# Patient Record
Sex: FEMALE | Race: WHITE | NOT HISPANIC OR LATINO | Employment: FULL TIME | URBAN - METROPOLITAN AREA
[De-identification: names, ages, dates, MRNs, and addresses within clinical notes are randomized per-mention and may not be internally consistent; named-entity substitution may affect disease eponyms.]

---

## 2017-02-20 ENCOUNTER — GENERIC CONVERSION - ENCOUNTER (OUTPATIENT)
Dept: OTHER | Facility: OTHER | Age: 57
End: 2017-02-20

## 2017-08-22 ENCOUNTER — ALLSCRIPTS OFFICE VISIT (OUTPATIENT)
Dept: OTHER | Facility: OTHER | Age: 57
End: 2017-08-22

## 2018-01-14 VITALS
WEIGHT: 183 LBS | SYSTOLIC BLOOD PRESSURE: 122 MMHG | HEART RATE: 72 BPM | HEIGHT: 65 IN | BODY MASS INDEX: 30.49 KG/M2 | DIASTOLIC BLOOD PRESSURE: 78 MMHG

## 2018-01-15 NOTE — RESULT NOTES
Verified Results  (Q) CBC (INCLUDES DIFF/PLT) (REFL) [id number] 08:01GIOVANNA Marcano     Test Name Result Flag Reference   WHITE BLOOD CELL COUNT 7 8 Thousand/uL  3 8-10 8   RED BLOOD CELL COUNT 5 03 Million/uL  3 80-5 10   HEMOGLOBIN 14 8 g/dL  11 7-15 5   HEMATOCRIT 45 0 %  35 0-45 0   MCV 89 3 fL  80 0-100 0   MCH 29 4 pg  27 0-33 0   MCHC 32 8 g/dL  32 0-36 0   RDW 13 0 %  11 0-15 0   PLATELET COUNT 166 Thousand/uL  140-400   MPV 8 7 fL  7 5-11 5   ABSOLUTE NEUTROPHILS 5296 cells/uL  5885-4932   ABSOLUTE LYMPHOCYTES 1700 cells/uL  850-3900   ABSOLUTE MONOCYTES 515 cells/uL  200-950   ABSOLUTE EOSINOPHILS 234 cells/uL     ABSOLUTE BASOPHILS 55 cells/uL  0-200   NEUTROPHILS 67 9 %     LYMPHOCYTES 21 8 %     MONOCYTES 6 6 %     EOSINOPHILS 3 0 %     BASOPHILS 0 7 %       (Q) COMPREHENSIVE METABOLIC PANEL W/O eGFR [id number] 08:01GIOVANNA Canchola [employer]     Test Name Result Flag Reference   GLUCOSE 84 mg/dL  65-99   Fasting reference interval   UREA NITROGEN (BUN) 19 mg/dL  7-25   CREATININE 0 83 mg/dL  0 50-1 05   For patients >52years of age, the reference limit  for Creatinine is approximately 13% higher for people  identified as -American     BUN/CREATININE RATIO   5-75   NOT APPLICABLE (calc)   SODIUM 138 mmol/L  135-146   POTASSIUM 4 0 mmol/L  3 5-5 3   CHLORIDE 102 mmol/L     CARBON DIOXIDE 24 mmol/L  20-31   CALCIUM 9 7 mg/dL  8 6-10 4   PROTEIN, TOTAL 7 1 g/dL  6 1-8 1   ALBUMIN 4 3 g/dL  3 6-5 1   GLOBULIN 2 8 g/dL (calc)  1 9-3 7   ALBUMIN/GLOBULIN RATIO 1 5 (calc)  1 0-2 5   BILIRUBIN, TOTAL 0 4 mg/dL  0 2-1 2   ALKALINE PHOSPHATASE 98 U/L     AST 20 U/L  10-35   ALT 25 U/L  6-29     (Q) URINALYSIS MICROSCOPIC [id number] 08:01GIOVANNA Canchola [employer]     Test Name Result Flag Reference   WBC 6-10 /HPF A < OR = 5   RBC NONE SEEN /HPF  < OR = 2   SQUAMOUS EPITHELIAL CELLS 0-5 /HPF  < OR = 5   BACTERIA NONE SEEN /HPF  NONE SEEN   HYALINE CAST NONE SEEN /LPF  NONE SEEN     (Q) TSH, 3RD GENERATION W/REFLEX TO FT4 [id number] 08:01AM Sushant Marcano   REPORT COMMENT:  FASTING:YES     Test Name Result Flag Reference   TSH W/REFLEX TO FT4 1 86 mIU/L     Reference Range                         > or = 20 Years  0 40-4 50                              Pregnancy Ranges            First trimester    0 26-2 66            Second trimester   0 55-2 73            Third trimester    0 43-2 91     (Q) LIPID PANEL WITH DIRECT LDL [id number] 08:01AM Sushant Marcano     Test Name Result Flag Reference   CHOLESTEROL, TOTAL 220 mg/dL H 125-200   HDL CHOLESTEROL 54 mg/dL  > OR = 46   TRIGLICERIDES 948 mg/dL H <150   DIRECT  mg/dL H <130   Desirable range <100 mg/dL for patients with CHD or  diabetes and <70 mg/dL for diabetic patients with  known heart disease  CHOL/HDLC RATIO 4 1 (calc)  < OR = 5 0   NON HDL CHOLESTEROL 166 mg/dL (calc) H    Target for non-HDL cholesterol is 30 mg/dL higher than   LDL cholesterol target

## 2018-01-17 NOTE — RESULT NOTES
Verified Results  * XR KNEE 3 VW RIGHT NON INJURY [id number] 09:42AM Jesse Swain Order Number: [id number]   Performing Comments: ongoing pain right knee dot subpatellar    no injury     Test Name Result Flag Reference   XR KNEE 3 VW RIGHT (Report)     RIGHT KNEE     INDICATION: Right knee pain  COMPARISON: None     VIEWS: 3; 3 images     FINDINGS:     There is no acute fracture or dislocation  There is no joint effusion  Mild degenerative changes noted medial compartment  No lytic or blastic lesions are seen  Soft tissues are unremarkable  IMPRESSION:     No acute osseous abnormality         Workstation performed: [id number]     Signed by:   Dimas Leon MD   11/16/16

## 2018-04-17 RX ORDER — ALPRAZOLAM 0.25 MG/1
TABLET ORAL
COMMUNITY
Start: 2014-01-31 | End: 2018-04-19 | Stop reason: SDUPTHER

## 2018-04-18 PROBLEM — E78.00 MILD HYPERCHOLESTEROLEMIA: Status: ACTIVE | Noted: 2018-04-18

## 2018-04-18 PROBLEM — M54.2 CERVICAL SPINE PAIN: Status: ACTIVE | Noted: 2017-08-22

## 2018-04-19 ENCOUNTER — OFFICE VISIT (OUTPATIENT)
Dept: FAMILY MEDICINE CLINIC | Facility: CLINIC | Age: 58
End: 2018-04-19
Payer: COMMERCIAL

## 2018-04-19 VITALS
WEIGHT: 177 LBS | OXYGEN SATURATION: 97 % | SYSTOLIC BLOOD PRESSURE: 138 MMHG | DIASTOLIC BLOOD PRESSURE: 76 MMHG | BODY MASS INDEX: 29.49 KG/M2 | HEIGHT: 65 IN | HEART RATE: 80 BPM

## 2018-04-19 DIAGNOSIS — F41.9 ANXIETY: ICD-10-CM

## 2018-04-19 DIAGNOSIS — M72.2 PLANTAR FASCIITIS: ICD-10-CM

## 2018-04-19 DIAGNOSIS — Z00.00 WELL ADULT HEALTH CHECK: Primary | ICD-10-CM

## 2018-04-19 DIAGNOSIS — M77.11 EPICONDYLITIS, LATERAL, RIGHT: ICD-10-CM

## 2018-04-19 DIAGNOSIS — Q61.4 CONGENITAL RENAL DYSPLASIA: ICD-10-CM

## 2018-04-19 PROBLEM — M54.2 CERVICAL SPINE PAIN: Status: RESOLVED | Noted: 2017-08-22 | Resolved: 2018-04-19

## 2018-04-19 PROCEDURE — 99396 PREV VISIT EST AGE 40-64: CPT | Performed by: FAMILY MEDICINE

## 2018-04-19 RX ORDER — ALPRAZOLAM 0.25 MG/1
0.25 TABLET ORAL DAILY PRN
Qty: 30 TABLET | Refills: 0 | Status: SHIPPED | OUTPATIENT
Start: 2018-04-19 | End: 2018-04-19 | Stop reason: SDUPTHER

## 2018-04-19 RX ORDER — ALPRAZOLAM 0.25 MG/1
0.25 TABLET ORAL DAILY PRN
Qty: 30 TABLET | Refills: 0
Start: 2018-04-19 | End: 2019-09-24 | Stop reason: SDUPTHER

## 2018-04-19 NOTE — PATIENT INSTRUCTIONS
She is in today for a routine physical/ check up    She is doing well overall, she does have right heel plantar fasciitis along with right lateral epicondylitis, she will use ice as needed, do stretches  She will use heel cups, sneakers and avoid going barefoot regarding heel pain, if this is not improving in 3-4 weeks she will call us and we will set her up with Podiatry  She also could consider forearm band  I did give her a slip to redo CMP / urinalysis  Lipid panel in 2016 with cholesterol 220, HDL 54,   We will redo that in 1-2 years        Immunization History   Administered Date(s) Administered    Tdap 04/16/2015    Zoster 01/01/2016       She does not do yearly Flu shot  Tdap/tetanus shot is up to date  (done every 10 yrs for superficial cuts, every 5 yrs for deep wounds)   Can also look into coverage for new shingles shot, Shingrix (indicated if over 48years of age ) Can do that at pharmacy  past 3-4 cig a day smoker-  quit over 10 yrs ago     Regarding Colon Cancer screening, we discussed Colonoscopy vs ColoGuard is indicated starting at age 48  Screening is up to date 3/16 w Dr Wesley Tidwell -  Redo 5 yrs    She does see her Gynecologist routinely  up-to-date   2/17  Discussed screening Mammogram, this is up-to-date    2/17-   Will redo later this yr     Hepatis C Screening indicated for 'baby boomers' -  after discussion she will not do Hepatitis C screen  low risk    We did review previous blood work,   She is up to date with Lipid screening  She is up to date with Diabetes screening  Discussed importance of routine exercise, healthy diet       Also - should see Dentist routinely, and also should see Eye Dr if any vision changes      We will see her again in 12 months, sooner as needed

## 2018-04-19 NOTE — PROGRESS NOTES
FAMILY PRACTICE OFFICE VISIT  [facility] [address]  [address], [address]      NAME: Dalila Juan  AGE: 56 y o  SEX: female  : 1960   MRN: [id number]    DATE: 2018  TIME: 5:04 PM    Assessment and Plan     Problem List Items Addressed This Visit        Genitourinary    Congenital renal dysplasia    Relevant Orders    Comprehensive metabolic panel    Urinalysis with microscopic       Other    Anxiety    Relevant Medications    ALPRAZolam (XANAX) 0 25 mg tablet      Other Visit Diagnoses     Well adult health check    -  Primary    Plantar fasciitis        Epicondylitis, lateral, right              Patient Instructions     She is in today for a routine physical/ check up    She is doing well overall, she does have right heel plantar fasciitis along with right lateral epicondylitis, she will use ice as needed, do stretches  She will use heel cups, sneakers and avoid going barefoot regarding heel pain, if this is not improving in 3-4 weeks she will call us and we will set her up with Podiatry  She also could consider forearm band  I did give her a slip to redo CMP / urinalysis  Lipid panel in 2016 with cholesterol 220, HDL 54,   We will redo that in 1-2 years        Immunization History   Administered Date(s) Administered    Tdap 2015    Zoster 2016       She does not do yearly Flu shot  Tdap/tetanus shot is up to date  (done every 10 yrs for superficial cuts, every 5 yrs for deep wounds)   Can also look into coverage for new shingles shot, Shingrix (indicated if over 48years of age ) Can do that at pharmacy  past 3-4 cig a day smoker-  quit over 10 yrs ago     Regarding Colon Cancer screening, we discussed Colonoscopy vs ColoGuard is indicated starting at age 48  Screening is up to date 3/16 w Dr Roberth Bustamante -  Redo 5 yrs    She does see her Gynecologist routinely    up-to-date 2/17  Discussed screening Mammogram, this is up-to-date    2/17-   Will redo later this yr     Hepatis C Screening indicated for 'baby boomers' -  after discussion she will not do Hepatitis C screen  low risk    We did review previous blood work,   She is up to date with Lipid screening  She is up to date with Diabetes screening  Discussed importance of routine exercise, healthy diet       Also - should see Dentist routinely, and also should see Eye Dr if any vision changes  We will see her again in 12 months, sooner as needed            Chief Complaint     Chief Complaint   Patient presents with    Physical Exam       History of Present Illness   Yeimi Maldonado is a 56y o -year-old female who   Is in today for a routine checkup, she is feeling well other than right heel pain, right elbow pain  She had undergone breast reduction in did very well, back pain much improved  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, much less stress, uses alprazolam once or twice a month or so  Review of Systems   Review of Systems   Constitutional: Negative for appetite change, fatigue, fever and unexpected weight change  HENT: Negative for sore throat and trouble swallowing  Respiratory: Negative for cough, chest tightness and shortness of breath  Cardiovascular: Negative for chest pain, palpitations and leg swelling  Gastrointestinal: Negative for abdominal pain and blood in stool  Genitourinary: Negative for dysuria and hematuria  Musculoskeletal: Negative for back pain  Neurological: Negative for dizziness, light-headedness and headaches  Hematological: Does not bruise/bleed easily  Psychiatric/Behavioral: Negative for behavioral problems and confusion         Active Problem List     Patient Active Problem List   Diagnosis    Anxiety    Chondromalacia of right patella    Congenital renal dysplasia    Mild hypercholesterolemia       Past Medical History:  Past Medical History:   Diagnosis Date    Renal dysplasia     nonfunctioning left life-long       Past Surgical History:  Past Surgical History:   Procedure Laterality Date    COLONOSCOPY  03/2016    KIDNEY SURGERY Right     age 29   Nick Perry REDUCTION MAMMAPLASTY      WRIST SURGERY Right     fx       Family History:  No family history on file  Social History:  Social History     Social History    Marital status: /Civil Union     Spouse name: N/A    Number of children: N/A    Years of education: N/A     Occupational History    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      Social History Main Topics    Smoking status: Former Smoker    Smokeless tobacco: Never Used    Alcohol use Yes      Comment: social    Drug use: No    Sexual activity: Not on file     Other Topics Concern    Not on file     Social History Narrative    No narrative on file       Objective     Vitals:    04/19/18 1608   BP: 138/76   Pulse: 80   SpO2: 97%   Weight: 80 3 kg (177 lb)   Height: 5' 5" (1 651 m)     Body mass index is 29 45 kg/m²  BP Readings from Last 3 Encounters:   04/19/18 138/76   08/22/17 122/78   12/13/16 138/83       Wt Readings from Last 3 Encounters:   04/19/18 80 3 kg (177 lb)   08/22/17 83 kg (183 lb)   12/13/16 83 6 kg (184 lb 6 1 oz)       Physical Exam   Constitutional: She is oriented to person, place, and time  She appears well-developed and well-nourished  No distress  HENT:   Mouth/Throat: Oropharynx is clear and moist  No oropharyngeal exudate  TM clear   Eyes: Conjunctivae are normal  No scleral icterus  Cardiovascular: Normal rate, regular rhythm and normal heart sounds  No murmur heard  No carotid bruit   Pulmonary/Chest: Effort normal and breath sounds normal  No respiratory distress  Abdominal: Soft  There is no tenderness  Musculoskeletal: She exhibits no edema  Mild tenderness right heel  Mild tenderness right lateral epicondylar   Lymphadenopathy:     She has no cervical adenopathy     Neurological: She is alert and oriented to person, place, and time  Psychiatric: She has a normal mood and affect  Her behavior is normal        ALLERGIES:  Allergies   Allergen Reactions    Sulfa Antibiotics Rash       Current Medications     Current Outpatient Prescriptions   Medication Sig Dispense Refill    ALPRAZolam (XANAX) 0 25 mg tablet Take 1 tablet (0 25 mg total) by mouth daily as needed for anxiety 30 tablet 0     No current facility-administered medications for this visit                Most recent labs available from [address]   ( others may be available in [facility] / Media sections)  Lab Results   Component Value Date    WBC 7 8 11/18/2016    WBC 6-10 (A) 11/18/2016    HGB 14 8 11/18/2016    HCT 45 0 11/18/2016     11/18/2016    CHOL 220 (H) 11/18/2016    TRIG 203 (H) 11/18/2016    HDL 54 11/18/2016    LDLDIRECT 140 (H) 11/18/2016    ALT 25 11/18/2016    AST 20 11/18/2016     11/18/2016    K 4 0 11/18/2016     11/18/2016    CREATININE 0 83 11/18/2016    BUN 19 11/18/2016    CO2 24 11/18/2016     No results found for: LDLCALC  No results found for: TSH      Orders Placed This Encounter   Procedures    Comprehensive metabolic panel    Urinalysis with microscopic         Pearl Merino DO

## 2018-05-11 DIAGNOSIS — R82.90 ABNORMAL URINALYSIS: Primary | ICD-10-CM

## 2018-05-11 LAB
ALBUMIN SERPL-MCNC: 4.5 G/DL (ref 3.6–5.1)
ALBUMIN/GLOB SERPL: 1.8 (CALC) (ref 1–2.5)
ALP SERPL-CCNC: 75 U/L (ref 33–130)
ALT SERPL-CCNC: 21 U/L (ref 6–29)
APPEARANCE UR: CLEAR
AST SERPL-CCNC: 19 U/L (ref 10–35)
BACTERIA UR QL AUTO: ABNORMAL /HPF
BILIRUB SERPL-MCNC: 0.7 MG/DL (ref 0.2–1.2)
BILIRUB UR QL STRIP: NEGATIVE
BUN SERPL-MCNC: 18 MG/DL (ref 7–25)
BUN/CREAT SERPL: NORMAL (CALC) (ref 6–22)
CALCIUM SERPL-MCNC: 9.6 MG/DL (ref 8.6–10.4)
CHLORIDE SERPL-SCNC: 104 MMOL/L (ref 98–110)
CO2 SERPL-SCNC: 24 MMOL/L (ref 20–31)
COLOR UR: YELLOW
CREAT SERPL-MCNC: 0.73 MG/DL (ref 0.5–1.05)
GLOBULIN SER CALC-MCNC: 2.5 G/DL (CALC) (ref 1.9–3.7)
GLUCOSE SERPL-MCNC: 90 MG/DL (ref 65–139)
GLUCOSE UR QL STRIP: NEGATIVE
HGB UR QL STRIP: NEGATIVE
HYALINE CASTS #/AREA URNS LPF: ABNORMAL /LPF
KETONES UR QL STRIP: NEGATIVE
LEUKOCYTE ESTERASE UR QL STRIP: ABNORMAL
NITRITE UR QL STRIP: POSITIVE
PH UR STRIP: 6.5 [PH] (ref 5–8)
POTASSIUM SERPL-SCNC: 4.2 MMOL/L (ref 3.5–5.3)
PROT SERPL-MCNC: 7 G/DL (ref 6.1–8.1)
PROT UR QL STRIP: NEGATIVE
RBC #/AREA URNS HPF: ABNORMAL /HPF
SL AMB EGFR AFRICAN AMERICAN: 107 ML/MIN/1.73M2
SL AMB EGFR NON AFRICAN AMERICAN: 93 ML/MIN/1.73M2
SODIUM SERPL-SCNC: 137 MMOL/L (ref 135–146)
SP GR UR STRIP: 1.01 (ref 1–1.03)
SQUAMOUS #/AREA URNS HPF: ABNORMAL /HPF
WBC #/AREA URNS HPF: ABNORMAL /HPF

## 2018-05-21 DIAGNOSIS — B96.20 E. COLI UTI: Primary | ICD-10-CM

## 2018-05-21 DIAGNOSIS — N39.0 E. COLI UTI: Primary | ICD-10-CM

## 2018-05-21 DIAGNOSIS — N39.0 URINARY TRACT INFECTION WITHOUT HEMATURIA, SITE UNSPECIFIED: Primary | ICD-10-CM

## 2018-05-21 RX ORDER — CIPROFLOXACIN 500 MG/1
500 TABLET, FILM COATED ORAL EVERY 12 HOURS SCHEDULED
Qty: 6 TABLET | Refills: 0 | Status: SHIPPED | OUTPATIENT
Start: 2018-05-21 | End: 2018-05-24

## 2018-06-14 LAB
APPEARANCE UR: CLEAR
BACTERIA UR CULT: NORMAL
BACTERIA UR QL AUTO: NORMAL /HPF
BILIRUB UR QL STRIP: NEGATIVE
COLOR UR: YELLOW
GLUCOSE UR QL STRIP: NEGATIVE
HGB UR QL STRIP: NEGATIVE
HYALINE CASTS #/AREA URNS LPF: NORMAL /LPF
KETONES UR QL STRIP: NEGATIVE
LEUKOCYTE ESTERASE UR QL STRIP: NEGATIVE
NITRITE UR QL STRIP: NEGATIVE
PH UR STRIP: 6 [PH] (ref 5–8)
PROT UR QL STRIP: NEGATIVE
RBC #/AREA URNS HPF: NORMAL /HPF
SP GR UR STRIP: 1.01 (ref 1–1.03)
SQUAMOUS #/AREA URNS HPF: NORMAL /HPF
WBC #/AREA URNS HPF: NORMAL /HPF

## 2019-09-24 ENCOUNTER — OFFICE VISIT (OUTPATIENT)
Dept: FAMILY MEDICINE CLINIC | Facility: CLINIC | Age: 59
End: 2019-09-24
Payer: COMMERCIAL

## 2019-09-24 VITALS
HEART RATE: 75 BPM | BODY MASS INDEX: 29.72 KG/M2 | SYSTOLIC BLOOD PRESSURE: 120 MMHG | HEIGHT: 65 IN | WEIGHT: 178.4 LBS | DIASTOLIC BLOOD PRESSURE: 70 MMHG | OXYGEN SATURATION: 98 %

## 2019-09-24 DIAGNOSIS — M22.41 CHONDROMALACIA PATELLAE OF RIGHT KNEE: ICD-10-CM

## 2019-09-24 DIAGNOSIS — M25.562 PAIN IN BOTH KNEES, UNSPECIFIED CHRONICITY: Primary | ICD-10-CM

## 2019-09-24 DIAGNOSIS — M17.11 PRIMARY OSTEOARTHRITIS OF RIGHT KNEE: ICD-10-CM

## 2019-09-24 DIAGNOSIS — F41.9 ANXIETY: ICD-10-CM

## 2019-09-24 DIAGNOSIS — M25.561 PAIN IN BOTH KNEES, UNSPECIFIED CHRONICITY: Primary | ICD-10-CM

## 2019-09-24 PROCEDURE — 3008F BODY MASS INDEX DOCD: CPT | Performed by: FAMILY MEDICINE

## 2019-09-24 PROCEDURE — 99212 OFFICE O/P EST SF 10 MIN: CPT | Performed by: FAMILY MEDICINE

## 2019-09-24 PROCEDURE — 20610 DRAIN/INJ JOINT/BURSA W/O US: CPT | Performed by: FAMILY MEDICINE

## 2019-09-24 RX ORDER — LIDOCAINE HYDROCHLORIDE AND EPINEPHRINE BITARTRATE 20; .01 MG/ML; MG/ML
2 INJECTION, SOLUTION SUBCUTANEOUS ONCE
Status: COMPLETED | OUTPATIENT
Start: 2019-09-24 | End: 2019-09-24

## 2019-09-24 RX ORDER — METHYLPREDNISOLONE ACETATE 40 MG/ML
20 INJECTION, SUSPENSION INTRA-ARTICULAR; INTRALESIONAL; INTRAMUSCULAR; SOFT TISSUE ONCE
Status: COMPLETED | OUTPATIENT
Start: 2019-09-24 | End: 2019-09-24

## 2019-09-24 RX ORDER — ALPRAZOLAM 0.25 MG/1
0.25 TABLET ORAL DAILY PRN
Qty: 30 TABLET | Refills: 0 | Status: SHIPPED | OUTPATIENT
Start: 2019-09-24 | End: 2021-08-16 | Stop reason: SDUPTHER

## 2019-09-24 RX ADMIN — METHYLPREDNISOLONE ACETATE 20 MG: 40 INJECTION, SUSPENSION INTRA-ARTICULAR; INTRALESIONAL; INTRAMUSCULAR; SOFT TISSUE at 17:01

## 2019-09-24 RX ADMIN — LIDOCAINE HYDROCHLORIDE AND EPINEPHRINE BITARTRATE 2 ML: 20; .01 INJECTION, SOLUTION SUBCUTANEOUS at 17:00

## 2019-09-24 NOTE — PATIENT INSTRUCTIONS
See procedure note, tolerated injection right knee without issue, notes significant benefit immediately after, can use ice as needed, Advil as needed, slowly increase walking as tolerated  We did discuss this is a temporary benefit, eventually she will need to see Orthopedics  As before she uses Xanax very sparingly long-term for anxiety, we have discussed use many times in the past, is a controlled substance, contract sign  She does see her gynecologist routinely, saw them in March, had mammogram back in February  Colonoscopy was last done in March 2016, redo 2021

## 2019-09-24 NOTE — PROGRESS NOTES
FAMILY PRACTICE OFFICE VISIT  [facility]  [address]   [address] [place], [address]      NAME: Kelsie Christian  AGE: 66 y o  SEX: female  : 1960   MRN: [id number]    DATE: 2019  TIME: 5:05 PM    Assessment and Plan       Large joint arthrocentesis: R knee  Date/Time: 2019 11:59 AM  Consent given by: patient  Site marked: site marked  Timeout: Immediately prior to procedure a time out was called to verify the correct patient, procedure, equipment, support staff and site/side marked as required   Supporting Documentation  Indications: pain and joint swelling   Procedure Details  Location: knee - R knee  Preparation: Patient was prepped and draped in the usual sterile fashion  Needle size: 22 G  Ultrasound guidance: no  Approach: medial    Aspirate amount: 0 mL    Patient tolerance: patient tolerated the procedure well with no immediate complications  Dressing:  Sterile dressing applied          Patient Instructions   See procedure note, tolerated injection right knee without issue, notes significant benefit immediately after, can use ice as needed, Advil as needed, slowly increase walking as tolerated  We did discuss this is a temporary benefit, eventually she will need to see Orthopedics  As before she uses Xanax very sparingly long-term for anxiety, we have discussed use many times in the past, is a controlled substance, contract sign  She does see her gynecologist routinely, saw them in March, had mammogram back in February  Colonoscopy was last done in 2016, redo   Chief Complaint     Chief Complaint   Patient presents with    Knee Pain       History of Present Illness   Kelsie Christian is a 66y o -year-old female who is in today to check bilateral knee pain especially on right, we had seen her 3 years ago for knee pain, x-ray at that time with mild degenerative medial on right     Stiffens w rest   Recently had started walking routinely for exercise/weight loss, seem to aggravate her knees  No other new trauma  She had used Advil/Tylenol with slight benefit  Review of Systems   Review of Systems   Constitutional: Negative for appetite change, fatigue, fever and unexpected weight change  HENT: Negative for sore throat and trouble swallowing  Respiratory: Negative for cough, chest tightness and shortness of breath  Cardiovascular: Negative for chest pain, palpitations and leg swelling  Gastrointestinal: Negative for abdominal pain and blood in stool  No acid reflux     No change in bowel   Genitourinary: Negative for dysuria and hematuria  Neurological: Negative for dizziness, light-headedness and headaches  Hematological: Does not bruise/bleed easily  Active Problem List     Patient Active Problem List   Diagnosis    Anxiety    Chondromalacia patellae of right knee    Congenital renal dysplasia    Mild hypercholesterolemia    Primary osteoarthritis of right knee       Past Medical History:  Reviewed    Past Surgical History:  Reviewed    Family History:  Reviewed    Social History:  Reviewed    Objective     Vitals:    09/24/19 1058   BP: 120/70   BP Location: Left arm   Patient Position: Sitting   Cuff Size: Large   Pulse: 75   SpO2: 98%   Weight: 80 9 kg (178 lb 6 4 oz)   Height: 5' 5" (1 651 m)     Body mass index is 29 69 kg/m²  BP Readings from Last 3 Encounters:   09/24/19 120/70   04/19/18 138/76   08/22/17 122/78       Wt Readings from Last 3 Encounters:   09/24/19 80 9 kg (178 lb 6 4 oz)   04/19/18 80 3 kg (177 lb)   08/22/17 83 kg (183 lb)       Physical Exam   Constitutional:   Pleasant overweight female, ambulates favoring right knee   Musculoskeletal:   Crepitus subpatellar on right, degenerative changes right knee, no redness or warmth, good range of motion, no instability  Tender medial knee with mild compression  No ankle edema/calf tenderness  ALLERGIES:  Allergies   Allergen Reactions    Sulfa Antibiotics Rash       Current Medications     Current Outpatient Medications   Medication Sig Dispense Refill    ALPRAZolam (XANAX) 0 25 mg tablet Take 1 tablet (0 25 mg total) by mouth daily as needed for anxiety 30 tablet 0    FIBER PO Take by mouth daily      Multiple Vitamins-Minerals (MULTI FOR HER 50+ PO) Take by mouth daily       No current facility-administered medications for this visit               Orders Placed This Encounter   Procedures    Large joint arthrocentesis: R knee         Feliz Proctor DO

## 2019-11-24 ENCOUNTER — DOCUMENTATION (OUTPATIENT)
Dept: FAMILY MEDICINE CLINIC | Facility: CLINIC | Age: 59
End: 2019-11-24

## 2019-11-25 NOTE — PROGRESS NOTES
At her visit in Sept maryuri again discussed anxiety-   See note-  She denied any new depressive symptoms or need for other meds

## 2020-12-07 ENCOUNTER — OFFICE VISIT (OUTPATIENT)
Dept: FAMILY MEDICINE CLINIC | Facility: CLINIC | Age: 60
End: 2020-12-07
Payer: COMMERCIAL

## 2020-12-07 VITALS
HEART RATE: 80 BPM | TEMPERATURE: 98.2 F | HEIGHT: 64 IN | SYSTOLIC BLOOD PRESSURE: 118 MMHG | BODY MASS INDEX: 31.07 KG/M2 | DIASTOLIC BLOOD PRESSURE: 70 MMHG | OXYGEN SATURATION: 97 % | WEIGHT: 182 LBS

## 2020-12-07 DIAGNOSIS — R50.9 FEVER, UNSPECIFIED FEVER CAUSE: Primary | ICD-10-CM

## 2020-12-07 DIAGNOSIS — Z20.822 EXPOSURE TO COVID-19 VIRUS: ICD-10-CM

## 2020-12-07 DIAGNOSIS — R50.9 FEVER, UNSPECIFIED FEVER CAUSE: ICD-10-CM

## 2020-12-07 DIAGNOSIS — R68.83 CHILLS: ICD-10-CM

## 2020-12-07 PROBLEM — E78.2 MIXED HYPERLIPIDEMIA: Status: ACTIVE | Noted: 2018-04-18

## 2020-12-07 LAB
BACTERIA UR QL AUTO: NORMAL /HPF
BILIRUB UR QL STRIP: NEGATIVE
CLARITY UR: CLEAR
COLOR UR: YELLOW
GLUCOSE UR STRIP-MCNC: NEGATIVE MG/DL
HGB UR QL STRIP.AUTO: NEGATIVE
HYALINE CASTS #/AREA URNS LPF: NORMAL /LPF
KETONES UR STRIP-MCNC: NEGATIVE MG/DL
LEUKOCYTE ESTERASE UR QL STRIP: NEGATIVE
NITRITE UR QL STRIP: NEGATIVE
NON-SQ EPI CELLS URNS QL MICRO: NORMAL /HPF
PH UR STRIP.AUTO: 6 [PH]
PROT UR STRIP-MCNC: NEGATIVE MG/DL
RBC #/AREA URNS AUTO: NORMAL /HPF
SL AMB  POCT GLUCOSE, UA: NORMAL
SL AMB LEUKOCYTE ESTERASE,UA: NORMAL
SL AMB POCT BILIRUBIN,UA: NORMAL
SL AMB POCT BLOOD,UA: NORMAL
SL AMB POCT CLARITY,UA: NORMAL
SL AMB POCT COLOR,UA: YELLOW
SL AMB POCT KETONES,UA: NORMAL
SL AMB POCT NITRITE,UA: NORMAL
SL AMB POCT PH,UA: 6
SL AMB POCT SPECIFIC GRAVITY,UA: 1.01
SL AMB POCT URINE PROTEIN: NORMAL
SL AMB POCT UROBILINOGEN: NORMAL
SP GR UR STRIP.AUTO: 1.01 (ref 1–1.03)
UROBILINOGEN UR QL STRIP.AUTO: 0.2 E.U./DL
WBC #/AREA URNS AUTO: NORMAL /HPF

## 2020-12-07 PROCEDURE — 3725F SCREEN DEPRESSION PERFORMED: CPT | Performed by: FAMILY MEDICINE

## 2020-12-07 PROCEDURE — 87086 URINE CULTURE/COLONY COUNT: CPT | Performed by: FAMILY MEDICINE

## 2020-12-07 PROCEDURE — U0003 INFECTIOUS AGENT DETECTION BY NUCLEIC ACID (DNA OR RNA); SEVERE ACUTE RESPIRATORY SYNDROME CORONAVIRUS 2 (SARS-COV-2) (CORONAVIRUS DISEASE [COVID-19]), AMPLIFIED PROBE TECHNIQUE, MAKING USE OF HIGH THROUGHPUT TECHNOLOGIES AS DESCRIBED BY CMS-2020-01-R: HCPCS | Performed by: FAMILY MEDICINE

## 2020-12-07 PROCEDURE — 81001 URINALYSIS AUTO W/SCOPE: CPT | Performed by: FAMILY MEDICINE

## 2020-12-07 PROCEDURE — 81002 URINALYSIS NONAUTO W/O SCOPE: CPT | Performed by: FAMILY MEDICINE

## 2020-12-07 PROCEDURE — 1036F TOBACCO NON-USER: CPT | Performed by: FAMILY MEDICINE

## 2020-12-07 PROCEDURE — 3008F BODY MASS INDEX DOCD: CPT | Performed by: FAMILY MEDICINE

## 2020-12-07 PROCEDURE — 99213 OFFICE O/P EST LOW 20 MIN: CPT | Performed by: FAMILY MEDICINE

## 2020-12-08 LAB
BACTERIA UR CULT: NORMAL
SARS-COV-2 RNA SPEC QL NAA+PROBE: NOT DETECTED

## 2021-03-08 ENCOUNTER — OFFICE VISIT (OUTPATIENT)
Dept: FAMILY MEDICINE CLINIC | Facility: CLINIC | Age: 61
End: 2021-03-08
Payer: COMMERCIAL

## 2021-03-08 ENCOUNTER — TELEPHONE (OUTPATIENT)
Dept: ADMINISTRATIVE | Facility: OTHER | Age: 61
End: 2021-03-08

## 2021-03-08 VITALS
BODY MASS INDEX: 30.9 KG/M2 | HEART RATE: 62 BPM | TEMPERATURE: 98.2 F | OXYGEN SATURATION: 98 % | WEIGHT: 181 LBS | SYSTOLIC BLOOD PRESSURE: 120 MMHG | DIASTOLIC BLOOD PRESSURE: 72 MMHG | HEIGHT: 64 IN

## 2021-03-08 DIAGNOSIS — M17.0 PRIMARY OSTEOARTHRITIS OF BOTH KNEES: ICD-10-CM

## 2021-03-08 DIAGNOSIS — Z00.00 ENCOUNTER FOR ANNUAL PHYSICAL EXAM: Primary | ICD-10-CM

## 2021-03-08 DIAGNOSIS — E78.2 MIXED HYPERLIPIDEMIA: ICD-10-CM

## 2021-03-08 DIAGNOSIS — F41.9 ANXIETY: ICD-10-CM

## 2021-03-08 DIAGNOSIS — M25.561 CHRONIC PAIN OF BOTH KNEES: ICD-10-CM

## 2021-03-08 DIAGNOSIS — M25.562 CHRONIC PAIN OF BOTH KNEES: ICD-10-CM

## 2021-03-08 DIAGNOSIS — Z11.59 ENCOUNTER FOR HEPATITIS C SCREENING TEST FOR LOW RISK PATIENT: ICD-10-CM

## 2021-03-08 DIAGNOSIS — G89.29 CHRONIC PAIN OF BOTH KNEES: ICD-10-CM

## 2021-03-08 PROCEDURE — 3725F SCREEN DEPRESSION PERFORMED: CPT | Performed by: FAMILY MEDICINE

## 2021-03-08 PROCEDURE — 99396 PREV VISIT EST AGE 40-64: CPT | Performed by: FAMILY MEDICINE

## 2021-03-08 PROCEDURE — 3008F BODY MASS INDEX DOCD: CPT | Performed by: FAMILY MEDICINE

## 2021-03-08 PROCEDURE — 1036F TOBACCO NON-USER: CPT | Performed by: FAMILY MEDICINE

## 2021-03-08 NOTE — TELEPHONE ENCOUNTER
----- Message from [place], [place] sent at 3/8/2021 10:59 AM EST -----  Regarding: mammogram and Pap Smear  03/08/21 11:01 AM    Hello, our patient Garth Cardona has had Mammogram and Pap Smear completed/performed  Please assist in updating the patient chart by pulling a previous Electronic Medical Record (EMR) document  The previous EMR is on Care Everywhere  The date of service is Mammogram 6/29/20 and Pap Smear 3/28/19        Thank you,  Nancy Henry MA  [facility]

## 2021-03-08 NOTE — PATIENT INSTRUCTIONS
Reviewed health history, overall she is doing well other than ongoing knee pain  She has started to power walk routinely along with her , has started to watch her diet more closely, keep at it  We did review previous blood work, I would like her to redo fasting blood work along with urinalysis     She is up to date with Lipid screening  Back in 2016 cholesterol 220 with HDL 54, , triglyceride 203  Await redo  She is up to date with Diabetes screening  History renal dysplasia, urinalysis was negative in December  History anxiety, redo TSH, last TSH was okay in 2016  Await redo  She uses alprazolam very sparingly, still has 5 pills left over from 2019 prescription, we did discuss controlled substance, call for prescription when needed  Immunization History   Administered Date(s) Administered    Tdap 04/16/2015    Zoster 01/01/2016     She does not do yearly Flu shot  Tdap/tetanus shot is up to date  (done every 10 yrs for superficial cuts, every 5 yrs for deep wounds)   Can also look into coverage for new shingles shot, Shingrix  Can do that here or at pharmacy  Covid vaccine when available     Is a former smoker, quit 13 years ago  Regarding Colon Cancer screening, her colonoscopy was negative back in March of 2016  She will be doing another 1 this month  She does see her Gynecologist routinely  Discussed screening Mammogram, this is up-to-date  Last mammogram was in June of 2020 at [facility]  Hepatitis C Screening indicated for 'baby boomers' -  after discussion she will do Hepatitis C screen  low risk  We will see her again in 12 months, sooner as needed

## 2021-03-08 NOTE — PROGRESS NOTES
BMI Counseling: Body mass index is 31 07 kg/m²  The BMI is above normal  Nutrition recommendations include decreasing portion sizes, encouraging healthy choices of fruits and vegetables and moderation in carbohydrate intake  Exercise recommendations include exercising 3-5 times per week  FAMILY PRACTICE OFFICE VISIT  [facility]  [id number]   [address] [place], [address]      NAME: Romi Luong  AGE: 64 y o  SEX: female  : 1960   MRN: [id number]    DATE: 3/8/2021  TIME: 12:20 PM    Assessment and Plan     Problem List Items Addressed This Visit        Other    Anxiety    Relevant Orders    TSH, 3rd generation with Free T4 reflex    Mixed hyperlipidemia    Relevant Orders    Comprehensive metabolic panel    Lipid panel      Other Visit Diagnoses     Encounter for annual physical exam    -  Primary    Chronic pain of both knees        Relevant Orders    CBC    Ambulatory referral to Orthopedic Surgery    Primary osteoarthritis of both knees        Relevant Orders    Ambulatory referral to Orthopedic Surgery    Encounter for hepatitis C screening test for low risk patient        Relevant Orders    Hepatitis C antibody    BMI 31 0-31 9,adult              Patient Instructions     Reviewed health history, overall she is doing well other than ongoing knee pain  She has started to power walk routinely along with her , has started to watch her diet more closely, keep at it  We did review previous blood work, I would like her to redo fasting blood work along with urinalysis     She is up to date with Lipid screening  Back in 2016 cholesterol 220 with HDL 54, , triglyceride 203  Await redo  She is up to date with Diabetes screening  History renal dysplasia, urinalysis was negative in December  History anxiety, redo TSH, last TSH was okay in 2016    Await redo  She uses alprazolam very sparingly, still has 5 pills left over from 2019 prescription, we did discuss controlled substance, call for prescription when needed  Immunization History   Administered Date(s) Administered    Tdap 04/16/2015    Zoster 01/01/2016     She does not do yearly Flu shot  Tdap/tetanus shot is up to date  (done every 10 yrs for superficial cuts, every 5 yrs for deep wounds)   Can also look into coverage for new shingles shot, Shingrix  Can do that here or at pharmacy  Covid vaccine when available     Is a former smoker, quit 13 years ago  Regarding Colon Cancer screening, her colonoscopy was negative back in March of 2016  She will be doing another 1 this month  She does see her Gynecologist routinely  Discussed screening Mammogram, this is up-to-date  Last mammogram was in June of 2020 at [facility]  Hepatitis C Screening indicated for 'baby boomers' -  after discussion she will do Hepatitis C screen  low risk  We will see her again in 12 months, sooner as needed  Chief Complaint     Chief Complaint   Patient presents with    Physical Exam       History of Present Illness   Scarlett Krishna is a 64y o -year-old female who is in today for a routine checkup, overall she is feeling well other than ongoing bilateral knee pain  She has been able to walk routinely, recently started power walking with  for 1-1/2 hour  No exertional chest pain, does note some nonspecific chest discomfort at times at rest   No other new issues, uses Xanax very rarely/ every few months  Review of Systems   Review of Systems   Constitutional: Negative for appetite change, fatigue, fever and unexpected weight change  HENT: Negative for sore throat and trouble swallowing  Respiratory: Negative for cough, chest tightness and shortness of breath  Cardiovascular: Negative for chest pain, palpitations and leg swelling  Gastrointestinal: Negative for abdominal pain, blood in stool, nausea and vomiting  No acid reflux     No change in bowel   Genitourinary: Negative for dysuria and hematuria  Musculoskeletal: Positive for arthralgias  Neurological: Negative for dizziness, syncope, light-headedness and headaches  Hematological: Does not bruise/bleed easily  Psychiatric/Behavioral: Negative for behavioral problems and confusion  The patient is nervous/anxious  Active Problem List     Patient Active Problem List   Diagnosis    Anxiety    Chondromalacia patellae of right knee    Congenital renal dysplasia ( left )    Mixed hyperlipidemia    Primary osteoarthritis of right knee       Past Medical History:  Reviewed    Past Surgical History:  Reviewed    Family History:  Reviewed    Social History:  Reviewed    Objective     Vitals:    03/08/21 1050   BP: 120/72   BP Location: Left arm   Patient Position: Sitting   Cuff Size: Standard   Pulse: 62   Temp: 98 2 °F (36 8 °C)   SpO2: 98%   Weight: 82 1 kg (181 lb)   Height: 5' 4" (1 626 m)     Body mass index is 31 07 kg/m²  BP Readings from Last 3 Encounters:   03/08/21 120/72   12/07/20 118/70   09/24/19 120/70       Wt Readings from Last 3 Encounters:   03/08/21 82 1 kg (181 lb)   12/07/20 82 6 kg (182 lb)   09/24/19 80 9 kg (178 lb 6 4 oz)       Physical Exam  Constitutional:       General: She is not in acute distress  Appearance: Normal appearance  She is well-developed  Eyes:      General: No scleral icterus  Neck:      Vascular: No carotid bruit  Cardiovascular:      Rate and Rhythm: Normal rate and regular rhythm  Heart sounds: Normal heart sounds  No murmur  Pulmonary:      Effort: Pulmonary effort is normal  No respiratory distress  Breath sounds: Normal breath sounds  Abdominal:      Palpations: Abdomen is soft  Tenderness: There is no abdominal tenderness  Musculoskeletal:      Right lower leg: No edema  Left lower leg: No edema  Lymphadenopathy:      Cervical: No cervical adenopathy     Skin: Coloration: Skin is not jaundiced  Neurological:      Mental Status: She is alert and oriented to person, place, and time  Psychiatric:         Mood and Affect: Mood normal          Behavior: Behavior normal          ALLERGIES:  Allergies   Allergen Reactions    Sulfa Antibiotics Rash       Current Medications     Current Outpatient Medications   Medication Sig Dispense Refill    FIBER PO Take by mouth daily      Multiple Vitamins-Minerals (MULTI FOR HER 50+ PO) Take by mouth daily      ALPRAZolam (XANAX) 0 25 mg tablet Take 1 tablet (0 25 mg total) by mouth daily as needed for anxiety (Patient taking differently: Take 0 25 mg by mouth as needed for anxiety ) 30 tablet 0     No current facility-administered medications for this visit               Orders Placed This Encounter   Procedures    Comprehensive metabolic panel    Lipid panel    TSH, 3rd generation with Free T4 reflex    Hepatitis C antibody    CBC    Ambulatory referral to [address] [place] [address]

## 2021-03-10 DIAGNOSIS — Z23 ENCOUNTER FOR IMMUNIZATION: ICD-10-CM

## 2021-03-17 ENCOUNTER — APPOINTMENT (OUTPATIENT)
Dept: LAB | Facility: HOSPITAL | Age: 61
End: 2021-03-17
Payer: COMMERCIAL

## 2021-03-17 ENCOUNTER — IMMUNIZATIONS (OUTPATIENT)
Dept: FAMILY MEDICINE CLINIC | Facility: HOSPITAL | Age: 61
End: 2021-03-17
Payer: COMMERCIAL

## 2021-03-17 DIAGNOSIS — Z11.59 ENCOUNTER FOR HEPATITIS C SCREENING TEST FOR LOW RISK PATIENT: ICD-10-CM

## 2021-03-17 DIAGNOSIS — Z23 ENCOUNTER FOR IMMUNIZATION: Primary | ICD-10-CM

## 2021-03-17 LAB
ALBUMIN SERPL BCP-MCNC: 3.9 G/DL (ref 3.5–5)
ALP SERPL-CCNC: 80 U/L (ref 46–116)
ALT SERPL W P-5'-P-CCNC: 44 U/L (ref 12–78)
ANION GAP SERPL CALCULATED.3IONS-SCNC: 5 MMOL/L (ref 4–13)
AST SERPL W P-5'-P-CCNC: 24 U/L (ref 5–45)
BILIRUB SERPL-MCNC: 0.64 MG/DL (ref 0.2–1)
BUN SERPL-MCNC: 19 MG/DL (ref 5–25)
CALCIUM SERPL-MCNC: 9.1 MG/DL (ref 8.3–10.1)
CHLORIDE SERPL-SCNC: 109 MMOL/L (ref 100–108)
CHOLEST SERPL-MCNC: 208 MG/DL (ref 50–200)
CO2 SERPL-SCNC: 26 MMOL/L (ref 21–32)
CREAT SERPL-MCNC: 0.75 MG/DL (ref 0.6–1.3)
ERYTHROCYTE [DISTWIDTH] IN BLOOD BY AUTOMATED COUNT: 12.2 % (ref 11.6–15.1)
GFR SERPL CREATININE-BSD FRML MDRD: 88 ML/MIN/1.73SQ M
GLUCOSE P FAST SERPL-MCNC: 85 MG/DL (ref 65–99)
HCT VFR BLD AUTO: 44.6 % (ref 34.8–46.1)
HCV AB SER QL: NORMAL
HDLC SERPL-MCNC: 59 MG/DL
HGB BLD-MCNC: 14.6 G/DL (ref 11.5–15.4)
LDLC SERPL CALC-MCNC: 120 MG/DL (ref 0–100)
MCH RBC QN AUTO: 29.4 PG (ref 26.8–34.3)
MCHC RBC AUTO-ENTMCNC: 32.7 G/DL (ref 31.4–37.4)
MCV RBC AUTO: 90 FL (ref 82–98)
NONHDLC SERPL-MCNC: 149 MG/DL
PLATELET # BLD AUTO: 260 THOUSANDS/UL (ref 149–390)
PMV BLD AUTO: 10.3 FL (ref 8.9–12.7)
POTASSIUM SERPL-SCNC: 3.9 MMOL/L (ref 3.5–5.3)
PROT SERPL-MCNC: 7.3 G/DL (ref 6.4–8.2)
RBC # BLD AUTO: 4.97 MILLION/UL (ref 3.81–5.12)
SODIUM SERPL-SCNC: 140 MMOL/L (ref 136–145)
TRIGL SERPL-MCNC: 147 MG/DL
TSH SERPL DL<=0.05 MIU/L-ACNC: 2.04 UIU/ML (ref 0.36–3.74)
WBC # BLD AUTO: 6.37 THOUSAND/UL (ref 4.31–10.16)

## 2021-03-17 PROCEDURE — 85027 COMPLETE CBC AUTOMATED: CPT | Performed by: FAMILY MEDICINE

## 2021-03-17 PROCEDURE — 80053 COMPREHEN METABOLIC PANEL: CPT | Performed by: FAMILY MEDICINE

## 2021-03-17 PROCEDURE — 80061 LIPID PANEL: CPT | Performed by: FAMILY MEDICINE

## 2021-03-17 PROCEDURE — 86803 HEPATITIS C AB TEST: CPT

## 2021-03-17 PROCEDURE — 36415 COLL VENOUS BLD VENIPUNCTURE: CPT | Performed by: FAMILY MEDICINE

## 2021-03-17 PROCEDURE — 84443 ASSAY THYROID STIM HORMONE: CPT | Performed by: FAMILY MEDICINE

## 2021-04-05 DIAGNOSIS — M25.561 PAIN IN BOTH KNEES, UNSPECIFIED CHRONICITY: Primary | ICD-10-CM

## 2021-04-05 DIAGNOSIS — M25.562 PAIN IN BOTH KNEES, UNSPECIFIED CHRONICITY: Primary | ICD-10-CM

## 2021-04-08 ENCOUNTER — IMMUNIZATIONS (OUTPATIENT)
Dept: FAMILY MEDICINE CLINIC | Facility: HOSPITAL | Age: 61
End: 2021-04-08

## 2021-04-08 DIAGNOSIS — Z23 ENCOUNTER FOR IMMUNIZATION: Primary | ICD-10-CM

## 2021-04-08 PROCEDURE — 0002A SARS-COV-2 / COVID-19 MRNA VACCINE (PFIZER-BIONTECH) 30 MCG: CPT

## 2021-04-08 PROCEDURE — 91300 SARS-COV-2 / COVID-19 MRNA VACCINE (PFIZER-BIONTECH) 30 MCG: CPT

## 2021-04-08 RX ORDER — SODIUM PICOSULFATE, MAGNESIUM OXIDE, AND ANHYDROUS CITRIC ACID 10; 3.5; 12 MG/160ML; G/160ML; G/160ML
LIQUID ORAL
COMMUNITY
Start: 2021-03-18 | End: 2021-08-17 | Stop reason: ALTCHOICE

## 2021-04-08 RX ORDER — SODIUM PICOSULFATE, MAGNESIUM OXIDE, AND ANHYDROUS CITRIC ACID 10; 3.5; 12 MG/160ML; G/160ML; G/160ML
LIQUID ORAL
COMMUNITY
Start: 2021-03-18 | End: 2021-08-17 | Stop reason: HOSPADM

## 2021-04-08 RX ORDER — PSYLLIUM SEED (WITH DEXTROSE)
POWDER (GRAM) ORAL
COMMUNITY

## 2021-04-13 ENCOUNTER — CONSULT (OUTPATIENT)
Dept: OBGYN CLINIC | Facility: HOSPITAL | Age: 61
End: 2021-04-13
Payer: COMMERCIAL

## 2021-04-13 ENCOUNTER — HOSPITAL ENCOUNTER (OUTPATIENT)
Dept: RADIOLOGY | Facility: HOSPITAL | Age: 61
Discharge: HOME | End: 2021-04-13
Attending: ORTHOPAEDIC SURGERY
Payer: COMMERCIAL

## 2021-04-13 VITALS
HEART RATE: 51 BPM | HEIGHT: 64 IN | BODY MASS INDEX: 31.07 KG/M2 | SYSTOLIC BLOOD PRESSURE: 159 MMHG | DIASTOLIC BLOOD PRESSURE: 88 MMHG

## 2021-04-13 DIAGNOSIS — M25.562 PAIN IN BOTH KNEES, UNSPECIFIED CHRONICITY: ICD-10-CM

## 2021-04-13 DIAGNOSIS — M25.561 CHRONIC PAIN OF BOTH KNEES: ICD-10-CM

## 2021-04-13 DIAGNOSIS — M25.562 CHRONIC PAIN OF BOTH KNEES: ICD-10-CM

## 2021-04-13 DIAGNOSIS — G89.29 CHRONIC PAIN OF BOTH KNEES: ICD-10-CM

## 2021-04-13 DIAGNOSIS — M25.561 PAIN IN BOTH KNEES, UNSPECIFIED CHRONICITY: ICD-10-CM

## 2021-04-13 DIAGNOSIS — M17.0 PRIMARY OSTEOARTHRITIS OF BOTH KNEES: ICD-10-CM

## 2021-04-13 PROCEDURE — 99243 OFF/OP CNSLTJ NEW/EST LOW 30: CPT | Performed by: ORTHOPAEDIC SURGERY

## 2021-04-13 PROCEDURE — 20610 DRAIN/INJ JOINT/BURSA W/O US: CPT | Performed by: ORTHOPAEDIC SURGERY

## 2021-04-13 PROCEDURE — 1036F TOBACCO NON-USER: CPT | Performed by: ORTHOPAEDIC SURGERY

## 2021-04-13 PROCEDURE — 73562 X-RAY EXAM OF KNEE 3: CPT

## 2021-04-13 RX ORDER — LIDOCAINE HYDROCHLORIDE 10 MG/ML
2 INJECTION, SOLUTION INFILTRATION; PERINEURAL
Status: COMPLETED | OUTPATIENT
Start: 2021-04-13 | End: 2021-04-13

## 2021-04-13 RX ORDER — BUPIVACAINE HYDROCHLORIDE 2.5 MG/ML
2 INJECTION, SOLUTION INFILTRATION; PERINEURAL
Status: COMPLETED | OUTPATIENT
Start: 2021-04-13 | End: 2021-04-13

## 2021-04-13 RX ORDER — BETAMETHASONE SODIUM PHOSPHATE AND BETAMETHASONE ACETATE 3; 3 MG/ML; MG/ML
12 INJECTION, SUSPENSION INTRA-ARTICULAR; INTRALESIONAL; INTRAMUSCULAR; SOFT TISSUE
Status: COMPLETED | OUTPATIENT
Start: 2021-04-13 | End: 2021-04-13

## 2021-04-13 RX ADMIN — BETAMETHASONE SODIUM PHOSPHATE AND BETAMETHASONE ACETATE 12 MG: 3; 3 INJECTION, SUSPENSION INTRA-ARTICULAR; INTRALESIONAL; INTRAMUSCULAR; SOFT TISSUE at 10:50

## 2021-04-13 RX ADMIN — BUPIVACAINE HYDROCHLORIDE 2 ML: 2.5 INJECTION, SOLUTION INFILTRATION; PERINEURAL at 10:50

## 2021-04-13 RX ADMIN — LIDOCAINE HYDROCHLORIDE 2 ML: 10 INJECTION, SOLUTION INFILTRATION; PERINEURAL at 10:50

## 2021-04-13 NOTE — PROGRESS NOTES
Assessment:  1  Chronic pain of both knees  Ambulatory referral to Orthopedic Surgery    Ambulatory referral to Physical Therapy   2  Primary osteoarthritis of both knees  Ambulatory referral to Orthopedic Surgery    Ambulatory referral to Physical Therapy       Plan:  The patient was provided with bilateral knee steroid injections  The patient tolerated the procedure well  She should schedule physical therapy  The patient can follow up as needed and is welcome to return at any point with any new or old issue  To do next visit:  Return if symptoms worsen or fail to improve  The above stated was discussed in layman's terms and the patient expressed understanding  All questions were answered to the patient's satisfaction  Scribe Attestation    I,:  Rubina Hill am acting as a scribe while in the presence of the attending physician :       I,:  Brenda Griffin MD personally performed the services described in this documentation    as scribed in my presence :             Subjective:   Migdalia Boo is a 64 y o  female who presents for initial evaluation of left knee  She has had symptoms for several years without injury  Today she complains of left > right medial and generalized knee pain  The knees can click, catch, lock and feel unstable  She rates her symptoms at 0/10 and 9/10 at its worse  Transitional positions and getting up from prolonged sitting aggravates while rest alleviates  She does use Tylenol and Ibuprofen with some benefit  She denies past physical therapy  She has had right knee steroid injection with benefit, 2019 with PCP  She denies past knee surgery          Review of systems negative unless otherwise specified in HPI    Past Medical History:   Diagnosis Date    Renal dysplasia     nonfunctioning left life-long       Past Surgical History:   Procedure Laterality Date    COLONOSCOPY  03/2016    KIDNEY SURGERY Right     age 29   [facility] REDUCTION MAMMAPLASTY      WRIST SURGERY Right     fx       History reviewed  No pertinent family history  Social History     Occupational History    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   Tobacco Use    Smoking status: Former Smoker    Smokeless tobacco: Never Used   Substance and Sexual Activity    Alcohol use: Yes     Comment: social    Drug use: No    Sexual activity: Not on file         Current Outpatient Medications:     Sod Picosulfate-Mag Ox-Cit Acd (Clenpiq) 10-3 5-12 MG-GM -GM/160ML SOLN, , Disp: , Rfl:     ALPRAZolam (XANAX) 0 25 mg tablet, Take 1 tablet (0 25 mg total) by mouth daily as needed for anxiety (Patient taking differently: Take 0 25 mg by mouth as needed for anxiety ), Disp: 30 tablet, Rfl: 0    Clenpiq 10-3 5-12 MG-GM -GM/160ML SOLN, Use as directed, Disp: , Rfl:     FIBER PO, Take by mouth daily, Disp: , Rfl:     Multiple Vitamins-Minerals (MULTI FOR HER 50+ PO), Take by mouth daily, Disp: , Rfl:     Psyllium (Metamucil) WAFR, Take by mouth, Disp: , Rfl:     Allergies   Allergen Reactions    Acetazolamide Rash    Sulfa Antibiotics Rash            Vitals:    04/13/21 1003   BP: 159/88   Pulse: (!) 51       Objective:  Physical exam  · General: Awake, Alert, Oriented  · Eyes: Pupils equal, round and reactive to light  · Heart: regular rate and rhythm  · Lungs: No audible wheezing  · Abdomen: soft                    Ortho Exam  Bilateral knees:  Varus alignment  No erythema or ecchymosis  No effusion or swelling, left  No effusion, mild swelling right   Normal strength  Good ROM with crepitus, right> left   Calf compartments soft and supple  Sensation intact  Toes are warm sensate and mobile        Diagnostics, reviewed and taken today if performed as documented: The attending physician has personally reviewed the pertinent films in PACS and interpretation is as follows:  Bilateral knee x-rays:  Each knee displays medial and patellofemoral arthritic changes, right > left        Procedures, if performed today:    Large joint arthrocentesis: R knee  Universal Protocol:  Consent: Verbal consent obtained  Risks and benefits: risks, benefits and alternatives were discussed  Consent given by: patient  Time out: Immediately prior to procedure a "time out" was called to verify the correct patient, procedure, equipment, support staff and site/side marked as required  Timeout called at: 4/13/2021 10:47 AM   Patient understanding: patient states understanding of the procedure being performed  Site marked: the operative site was marked  Patient identity confirmed: verbally with patient    Supporting Documentation  Indications: pain   Procedure Details  Location: knee - R knee  Preparation: Patient was prepped and draped in the usual sterile fashion  Needle size: 22 G  Ultrasound guidance: no  Approach: anterolateral  Medications administered: 12 mg betamethasone acetate-betamethasone sodium phosphate 6 (3-3) mg/mL; 2 mL bupivacaine 0 25 %; 2 mL lidocaine 1 %    Patient tolerance: patient tolerated the procedure well with no immediate complications  Dressing:  Sterile dressing applied    Large joint arthrocentesis: L knee  Universal Protocol:  Consent: Verbal consent obtained  Risks and benefits: risks, benefits and alternatives were discussed  Consent given by: patient  Time out: Immediately prior to procedure a "time out" was called to verify the correct patient, procedure, equipment, support staff and site/side marked as required    Timeout called at: 4/13/2021 10:47 AM   Patient understanding: patient states understanding of the procedure being performed  Site marked: the operative site was marked  Patient identity confirmed: verbally with patient    Supporting Documentation  Indications: pain   Procedure Details  Location: knee - L knee  Preparation: Patient was prepped and draped in the usual sterile fashion  Needle size: 22 G  Ultrasound guidance: no  Approach: anterolateral  Medications administered: 12 mg betamethasone acetate-betamethasone sodium phosphate 6 (3-3) mg/mL; 2 mL bupivacaine 0 25 %; 2 mL lidocaine 1 %    Patient tolerance: patient tolerated the procedure well with no immediate complications  Dressing:  Sterile dressing applied            Portions of the record may have been created with voice recognition software  Occasional wrong word or "sound a like" substitutions may have occurred due to the inherent limitations of voice recognition software  Read the chart carefully and recognize, using context, where substitutions have occurred

## 2021-08-16 DIAGNOSIS — F41.9 ANXIETY: ICD-10-CM

## 2021-08-17 ENCOUNTER — OFFICE VISIT (OUTPATIENT)
Dept: OBGYN CLINIC | Facility: HOSPITAL | Age: 61
End: 2021-08-17
Payer: COMMERCIAL

## 2021-08-17 VITALS
HEART RATE: 70 BPM | BODY MASS INDEX: 30.25 KG/M2 | SYSTOLIC BLOOD PRESSURE: 120 MMHG | HEIGHT: 64 IN | WEIGHT: 177.2 LBS | DIASTOLIC BLOOD PRESSURE: 78 MMHG

## 2021-08-17 DIAGNOSIS — M17.0 PRIMARY OSTEOARTHRITIS OF BOTH KNEES: Primary | ICD-10-CM

## 2021-08-17 PROCEDURE — 99213 OFFICE O/P EST LOW 20 MIN: CPT | Performed by: ORTHOPAEDIC SURGERY

## 2021-08-17 PROCEDURE — 20610 DRAIN/INJ JOINT/BURSA W/O US: CPT | Performed by: ORTHOPAEDIC SURGERY

## 2021-08-17 PROCEDURE — 1036F TOBACCO NON-USER: CPT | Performed by: ORTHOPAEDIC SURGERY

## 2021-08-17 PROCEDURE — 3008F BODY MASS INDEX DOCD: CPT | Performed by: ORTHOPAEDIC SURGERY

## 2021-08-17 RX ORDER — BUPIVACAINE HYDROCHLORIDE 2.5 MG/ML
2 INJECTION, SOLUTION INFILTRATION; PERINEURAL
Status: COMPLETED | OUTPATIENT
Start: 2021-08-17 | End: 2021-08-17

## 2021-08-17 RX ORDER — LIDOCAINE HYDROCHLORIDE 10 MG/ML
2 INJECTION, SOLUTION INFILTRATION; PERINEURAL
Status: COMPLETED | OUTPATIENT
Start: 2021-08-17 | End: 2021-08-17

## 2021-08-17 RX ORDER — ALPRAZOLAM 0.25 MG/1
0.25 TABLET ORAL DAILY PRN
Qty: 30 TABLET | Refills: 0 | Status: SHIPPED | OUTPATIENT
Start: 2021-08-17

## 2021-08-17 RX ORDER — BETAMETHASONE SODIUM PHOSPHATE AND BETAMETHASONE ACETATE 3; 3 MG/ML; MG/ML
12 INJECTION, SUSPENSION INTRA-ARTICULAR; INTRALESIONAL; INTRAMUSCULAR; SOFT TISSUE
Status: COMPLETED | OUTPATIENT
Start: 2021-08-17 | End: 2021-08-17

## 2021-08-17 RX ADMIN — BETAMETHASONE SODIUM PHOSPHATE AND BETAMETHASONE ACETATE 12 MG: 3; 3 INJECTION, SUSPENSION INTRA-ARTICULAR; INTRALESIONAL; INTRAMUSCULAR; SOFT TISSUE at 12:16

## 2021-08-17 RX ADMIN — LIDOCAINE HYDROCHLORIDE 2 ML: 10 INJECTION, SOLUTION INFILTRATION; PERINEURAL at 12:16

## 2021-08-17 RX ADMIN — BUPIVACAINE HYDROCHLORIDE 2 ML: 2.5 INJECTION, SOLUTION INFILTRATION; PERINEURAL at 12:16

## 2021-08-17 NOTE — PROGRESS NOTES
Assessment:     1  Primary osteoarthritis of both knees            Plan:      46-year-old female presents to office today for follow-up of bilateral knee osteoarthritis  Today the patient wished received bilateral cortisone injections  Patient tolerated the procedures well as described below  She was instructed to ice knee for up to 20 minutes at a time for pain and swelling  The patient may return in 3 months for repeat injection  Visco supplementation was discussed with the patient today, she may consider the visco injections for her next office visit  Subjective:      Ange Lee is a 64 y o  female presented to the office today for follow-up of bilateral knee osteoarthritis  The patient was last seen on 04/13/2021, when she received bilateral cortisone injections  Today the patient states that the cortisone injections provided her with about 2 months of significant pain relief  Today the patient returns for repeat injections  The patient denies any new recent injury or trauma  She denies any numbness or tingling in lower extremities  Patient denies any fever or chills  Current Outpatient Medications:     ALPRAZolam (XANAX) 0 25 mg tablet, Take 1 tablet (0 25 mg total) by mouth daily as needed for anxiety (Patient taking differently: Take 0 25 mg by mouth as needed for anxiety ), Disp: 30 tablet, Rfl: 0    FIBER PO, Take by mouth daily, Disp: , Rfl:     Multiple Vitamins-Minerals (MULTI FOR HER 50+ PO), Take by mouth daily, Disp: , Rfl:     Psyllium (Metamucil) WAFR, Take by mouth, Disp: , Rfl:     Past Medical History:   Diagnosis Date    Renal dysplasia     nonfunctioning left life-long       Past Surgical History:   Procedure Laterality Date    COLONOSCOPY  03/2016    KIDNEY SURGERY Right     age 29   [facility] REDUCTION MAMMAPLASTY      WRIST SURGERY Right     fx       History reviewed  No pertinent family history  Review of Systems  Pertinent items are noted in HPI       Objective: /78   Pulse 70   Ht 5' 4" (1 626 m)   Wt 80 4 kg (177 lb 3 2 oz)   BMI 30 42 kg/m²   Gen: Alert and oriented to person, place, time  HEENT: EOMI, eyes clear, moist mucus membranes, hearing intact  Respiratory: Bilateral chest rise  No audible wheezing found  Cardiovascular: Regular Rate and Rhythm  Abdomen: soft nontender/nondistended    Neuro:  Grossly intact     bilateral knees:  - skin without lesions, effusion, erythema, ecchymosis, warmth, or other signs of infection  - mild palpable tenderness along the medial joint line spaces  - good range of motion with patellofemoral crepitation noted  - sensation intact to light touch  - soft lower extremity compartments  - brisk cap refill in all toes      Images:   no new images    Procedures:  Large joint arthrocentesis: bilateral knee  Universal Protocol:  Consent: Verbal consent obtained  Risks and benefits: risks, benefits and alternatives were discussed  Consent given by: patient  Time out: Immediately prior to procedure a "time out" was called to verify the correct patient, procedure, equipment, support staff and site/side marked as required  Timeout called at: 8/17/2021 12:14 PM   Patient understanding: patient states understanding of the procedure being performed  Site marked: the operative site was marked  Radiology Images displayed and confirmed   If images not available, report reviewed: imaging studies available  Patient identity confirmed: verbally with patient    Supporting Documentation  Indications: pain   Procedure Details  Location: knee - bilateral knee  Needle size: 22 G  Ultrasound guidance: no  Approach: anterolateral    Medications (Right): 2 mL bupivacaine 0 25 %; 2 mL lidocaine 1 %; 12 mg betamethasone acetate-betamethasone sodium phosphate 6 (3-3) mg/mLMedications (Left): 2 mL bupivacaine 0 25 %; 2 mL lidocaine 1 %; 12 mg betamethasone acetate-betamethasone sodium phosphate 6 (3-3) mg/mL

## 2021-11-22 ENCOUNTER — TELEPHONE (OUTPATIENT)
Dept: OBGYN CLINIC | Facility: OTHER | Age: 61
End: 2021-11-22

## 2022-01-04 ENCOUNTER — PROCEDURE VISIT (OUTPATIENT)
Dept: OBGYN CLINIC | Facility: HOSPITAL | Age: 62
End: 2022-01-04
Payer: COMMERCIAL

## 2022-01-04 VITALS
HEART RATE: 160 BPM | WEIGHT: 184 LBS | HEIGHT: 64 IN | DIASTOLIC BLOOD PRESSURE: 50 MMHG | BODY MASS INDEX: 31.41 KG/M2 | SYSTOLIC BLOOD PRESSURE: 107 MMHG

## 2022-01-04 DIAGNOSIS — M17.0 PRIMARY OSTEOARTHRITIS OF BOTH KNEES: Primary | ICD-10-CM

## 2022-01-04 DIAGNOSIS — G89.29 CHRONIC PAIN OF BOTH KNEES: ICD-10-CM

## 2022-01-04 DIAGNOSIS — M25.561 CHRONIC PAIN OF BOTH KNEES: ICD-10-CM

## 2022-01-04 DIAGNOSIS — M25.562 CHRONIC PAIN OF BOTH KNEES: ICD-10-CM

## 2022-01-04 PROCEDURE — 99213 OFFICE O/P EST LOW 20 MIN: CPT | Performed by: ORTHOPAEDIC SURGERY

## 2022-01-04 PROCEDURE — 20610 DRAIN/INJ JOINT/BURSA W/O US: CPT | Performed by: ORTHOPAEDIC SURGERY

## 2022-01-04 RX ORDER — MELOXICAM 15 MG/1
15 TABLET ORAL DAILY
Qty: 30 TABLET | Refills: 1 | Status: SHIPPED | OUTPATIENT
Start: 2022-01-04 | End: 2022-06-22 | Stop reason: ALTCHOICE

## 2022-01-04 RX ORDER — BUPIVACAINE HYDROCHLORIDE 2.5 MG/ML
2 INJECTION, SOLUTION INFILTRATION; PERINEURAL
Status: COMPLETED | OUTPATIENT
Start: 2022-01-04 | End: 2022-01-04

## 2022-01-04 RX ORDER — BETAMETHASONE SODIUM PHOSPHATE AND BETAMETHASONE ACETATE 3; 3 MG/ML; MG/ML
12 INJECTION, SUSPENSION INTRA-ARTICULAR; INTRALESIONAL; INTRAMUSCULAR; SOFT TISSUE
Status: COMPLETED | OUTPATIENT
Start: 2022-01-04 | End: 2022-01-04

## 2022-01-04 RX ORDER — LIDOCAINE HYDROCHLORIDE 10 MG/ML
4 INJECTION, SOLUTION INFILTRATION; PERINEURAL
Status: COMPLETED | OUTPATIENT
Start: 2022-01-04 | End: 2022-01-04

## 2022-01-04 RX ADMIN — LIDOCAINE HYDROCHLORIDE 4 ML: 10 INJECTION, SOLUTION INFILTRATION; PERINEURAL at 15:19

## 2022-01-04 RX ADMIN — BUPIVACAINE HYDROCHLORIDE 2 ML: 2.5 INJECTION, SOLUTION INFILTRATION; PERINEURAL at 15:19

## 2022-01-04 RX ADMIN — BETAMETHASONE SODIUM PHOSPHATE AND BETAMETHASONE ACETATE 12 MG: 3; 3 INJECTION, SUSPENSION INTRA-ARTICULAR; INTRALESIONAL; INTRAMUSCULAR; SOFT TISSUE at 15:19

## 2022-01-04 NOTE — PROGRESS NOTES
Assessment:   Diagnosis ICD-10-CM Associated Orders   1  Primary osteoarthritis of both knees  M17 0 Large joint arthrocentesis: bilateral knee     meloxicam (Mobic) 15 mg tablet   2  Chronic pain of both knees  M25 561 Large joint arthrocentesis: bilateral knee    M25 562 meloxicam (Mobic) 15 mg tablet    G89 29        Plan:  Bilateral knees known osteoarthritis with return of pain  In the presence of her knee symptoms and lack of significant relief from the cortisone injections that were administered back in August, the recommendation is to repeat the cortisone injections today with a follow-up appointment in approximately 2 weeks to initiate the 3 shot Euflexxa series to both knees  She was offered, accepted, performed injections of cortisone to both knees today for symptomatic relief  Sujatha tolerated both injections well  Ice and post injection protocol advised  Weightbearing activities as tolerated  Recommend low-impact exercise activities  Prescription of Mobic will be sent to her pharmacy of record  To do next visit:  Return in about 2 weeks (around 1/18/2022) for re-check and Euflexxa #1 B/L knees  The above stated was discussed in layman's terms and the patient expressed understanding  All questions were answered to the patient's satisfaction  Scribe Attestation    I,:  Michi Call am acting as a scribe while in the presence of the attending physician :       I,:  Rose Markham MD personally performed the services described in this documentation    as scribed in my presence :             Subjective:   Migdalia Boo is a 63 y o  female who presents today for repeat evaluation of her bilateral knees due to return of pain  She has known osteoarthritis  Radiographically she has right greater than left knee osteoarthritis  At her visit in August both knees were injected with cortisone with approximately 4-6 weeks of relief    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 and limiting her daily activities over the past several months  As a result of the pain that she has at her knee she has pain laterally at both hips  She cannot lay comfortably for prolonged periods of time at either hip  She denies any groin pain  She was slated for a 3 shot Visco injection series today for both knees  Review of systems negative unless otherwise specified in HPI  Review of Systems    Past Medical History:   Diagnosis Date    Renal dysplasia     nonfunctioning left life-long       Past Surgical History:   Procedure Laterality Date    COLONOSCOPY  03/2016    KIDNEY SURGERY Right     age 29   [facility] REDUCTION MAMMAPLASTY      WRIST SURGERY Right     fx       History reviewed  No pertinent family history  Social History     Occupational History    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   Tobacco Use    Smoking status: Former Smoker    Smokeless tobacco: Never Used   Substance and Sexual Activity    Alcohol use: Yes     Comment: social    Drug use: No    Sexual activity: Not on file         Current Outpatient Medications:     ALPRAZolam (XANAX) 0 25 mg tablet, Take 1 tablet (0 25 mg total) by mouth daily as needed for anxiety, Disp: 30 tablet, Rfl: 0    FIBER PO, Take by mouth daily, Disp: , Rfl:     meloxicam (Mobic) 15 mg tablet, Take 1 tablet (15 mg total) by mouth daily, Disp: 30 tablet, Rfl: 1    Multiple Vitamins-Minerals (MULTI FOR HER 50+ PO), Take by mouth daily, Disp: , Rfl:     Psyllium (Metamucil) WAFR, Take by mouth, Disp: , Rfl:     Allergies   Allergen Reactions    Acetazolamide Rash    Sulfa Antibiotics Rash            Vitals:    01/04/22 1440   BP: 107/50   Pulse: (!) 160       Objective:                    Right Knee Exam     Muscle Strength   The patient has normal right knee strength  Tenderness   The patient is experiencing tenderness in the medial joint line      Range of Motion   Extension: 0   Flexion: 110 (With crepitation and stiffness)     Other Erythema: absent  Sensation: normal  Swelling: mild  Effusion: effusion (Trace) present      Left Knee Exam     Muscle Strength   The patient has normal left knee strength  Tenderness   The patient is experiencing tenderness in the medial joint line  Range of Motion   Extension: 0   Flexion: 120 (With crepitation and stiffness)     Other   Erythema: absent  Sensation: normal  Swelling: mild  Effusion: effusion (Trace) present    Comments:    Right greater than left varus knee deformities  Diagnostics, reviewed and taken today if performed as documented:    None performed          Procedures, if performed today:    Large joint arthrocentesis: bilateral knee  Universal Protocol:  Consent: Verbal consent obtained  Risks and benefits: risks, benefits and alternatives were discussed  Consent given by: patient  Time out: Immediately prior to procedure a "time out" was called to verify the correct patient, procedure, equipment, support staff and site/side marked as required    Timeout called at: 1/4/2022 3:15 PM   Patient understanding: patient states understanding of the procedure being performed  Site marked: the operative site was marked  Patient identity confirmed: verbally with patient    Supporting Documentation  Indications: pain and diagnostic evaluation   Procedure Details  Location: knee - bilateral knee  Preparation: Patient was prepped and draped in the usual sterile fashion  Needle size: 22 G  Ultrasound guidance: no  Approach: anterolateral    Medications (Right): 2 mL bupivacaine 0 25 %; 4 mL lidocaine 1 %; 12 mg betamethasone acetate-betamethasone sodium phosphate 6 (3-3) mg/mLMedications (Left): 2 mL bupivacaine 0 25 %; 4 mL lidocaine 1 %; 12 mg betamethasone acetate-betamethasone sodium phosphate 6 (3-3) mg/mL   Patient tolerance: patient tolerated the procedure well with no immediate complications  Dressing:  Sterile dressing applied            Portions of the record may have been created with voice recognition software  Occasional wrong word or "sound a like" substitutions may have occurred due to the inherent limitations of voice recognition software  Read the chart carefully and recognize, using context, where substitutions have occurred

## 2022-01-19 ENCOUNTER — PROCEDURE VISIT (OUTPATIENT)
Dept: OBGYN CLINIC | Facility: HOSPITAL | Age: 62
End: 2022-01-19
Payer: COMMERCIAL

## 2022-01-19 VITALS
HEART RATE: 75 BPM | BODY MASS INDEX: 31.58 KG/M2 | SYSTOLIC BLOOD PRESSURE: 129 MMHG | DIASTOLIC BLOOD PRESSURE: 70 MMHG | WEIGHT: 185 LBS | HEIGHT: 64 IN

## 2022-01-19 DIAGNOSIS — M17.0 PRIMARY OSTEOARTHRITIS OF BOTH KNEES: Primary | ICD-10-CM

## 2022-01-19 DIAGNOSIS — G89.29 CHRONIC PAIN OF BOTH KNEES: ICD-10-CM

## 2022-01-19 DIAGNOSIS — M25.561 CHRONIC PAIN OF BOTH KNEES: ICD-10-CM

## 2022-01-19 DIAGNOSIS — M25.562 CHRONIC PAIN OF BOTH KNEES: ICD-10-CM

## 2022-01-19 PROCEDURE — 20610 DRAIN/INJ JOINT/BURSA W/O US: CPT | Performed by: ORTHOPAEDIC SURGERY

## 2022-01-19 PROCEDURE — 99213 OFFICE O/P EST LOW 20 MIN: CPT | Performed by: ORTHOPAEDIC SURGERY

## 2022-01-19 PROCEDURE — 3008F BODY MASS INDEX DOCD: CPT | Performed by: ORTHOPAEDIC SURGERY

## 2022-01-19 PROCEDURE — 1036F TOBACCO NON-USER: CPT | Performed by: ORTHOPAEDIC SURGERY

## 2022-01-19 RX ORDER — HYALURONATE SODIUM 10 MG/ML
20 SYRINGE (ML) INTRAARTICULAR
Status: COMPLETED | OUTPATIENT
Start: 2022-01-19 | End: 2022-01-19

## 2022-01-19 RX ADMIN — Medication 20 MG: at 15:25

## 2022-01-19 NOTE — PROGRESS NOTES
Assessment:   Diagnosis ICD-10-CM Associated Orders   1  Primary osteoarthritis of both knees  M17 0 Large joint arthrocentesis: bilateral knee   2  Chronic pain of both knees  M25 561 Large joint arthrocentesis: bilateral knee    M25 562     G89 29        Plan:  Bilateral knees known osteoarthritis and chronic pain  Zain Gamez returns today for the 1st of 3 Euflexxa injections  She tolerated both injections well today  Ice and post injection protocol advised  Weightbearing activities as tolerated  She will follow up each of the next 2 weeks to complete the Visco series  To do next visit:  Return in about 1 week (around 1/26/2022) for re-check and Euflexxa #2 B/L knees  The above stated was discussed in layman's terms and the patient expressed understanding  All questions were answered to the patient's satisfaction  Scribe Attestation    I,:  Cecille Galicia am acting as a scribe while in the presence of the attending physician :       I,:  Kat Burciaga MD personally performed the services described in this documentation    as scribed in my presence :             Subjective:   Aga Zaidi is a 63 y o  female who presents today for repeat evaluation of her bilateral knees, known osteoarthritis  She returns today for the 1st of 3 Euflexxa injections  She was seen 2 weeks ago for Visco injections were scheduled however due to her knees being actively inflamed injections of cortisone were administered  She has felt a dramatic improvement of her knee symptoms  She does state that her behavior became aggressive following the cortisone injections 2 weeks ago        Review of systems negative unless otherwise specified in HPI  Review of Systems    Past Medical History:   Diagnosis Date    Renal dysplasia     nonfunctioning left life-long       Past Surgical History:   Procedure Laterality Date    COLONOSCOPY  03/2016    KIDNEY SURGERY Right     age 29   [facility] REDUCTION MAMMAPLASTY      WRIST SURGERY Right     fx       History reviewed  No pertinent family history  Social History     Occupational History    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   Tobacco Use    Smoking status: Former Smoker    Smokeless tobacco: Never Used   Substance and Sexual Activity    Alcohol use: Yes     Comment: social    Drug use: No    Sexual activity: Not on file         Current Outpatient Medications:     ALPRAZolam (XANAX) 0 25 mg tablet, Take 1 tablet (0 25 mg total) by mouth daily as needed for anxiety, Disp: 30 tablet, Rfl: 0    FIBER PO, Take by mouth daily, Disp: , Rfl:     meloxicam (Mobic) 15 mg tablet, Take 1 tablet (15 mg total) by mouth daily, Disp: 30 tablet, Rfl: 1    Multiple Vitamins-Minerals (MULTI FOR HER 50+ PO), Take by mouth daily, Disp: , Rfl:     Psyllium (Metamucil) WAFR, Take by mouth, Disp: , Rfl:     Allergies   Allergen Reactions    Acetazolamide Rash    Sulfa Antibiotics Rash            Vitals:    01/19/22 1443   BP: 129/70   Pulse: 75       Objective:                    Right Knee Exam     Muscle Strength   The patient has normal right knee strength  Tenderness   The patient is experiencing tenderness in the medial joint line  Range of Motion   Extension: 0   Flexion: 110 (With crepitation and stiffness)     Other   Erythema: absent  Sensation: normal  Swelling: mild  Effusion: no effusion present      Left Knee Exam     Muscle Strength   The patient has normal left knee strength  Tenderness   The patient is experiencing tenderness in the medial joint line  Range of Motion   Extension: 0   Flexion: 120 (With crepitation and stiffness)     Other   Erythema: absent  Sensation: normal  Swelling: mild  Effusion: no effusion present    Comments:    Right greater than left varus knee deformities               Diagnostics, reviewed and taken today if performed as documented:    None performed          Procedures, if performed today:    Large joint arthrocentesis: bilateral knee  Universal Protocol:  Consent: Verbal consent obtained  Risks and benefits: risks, benefits and alternatives were discussed  Consent given by: patient  Time out: Immediately prior to procedure a "time out" was called to verify the correct patient, procedure, equipment, support staff and site/side marked as required  Timeout called at: 1/19/2022 3:23 PM   Patient understanding: patient states understanding of the procedure being performed  Site marked: the operative site was marked  Patient identity confirmed: verbally with patient    Supporting Documentation  Indications: pain and diagnostic evaluation   Procedure Details  Location: knee - bilateral knee  Preparation: Patient was prepped and draped in the usual sterile fashion  Needle size: 22 G  Ultrasound guidance: no  Approach: anterolateral    Medications (Right): 20 mg Sodium Hyaluronate 20 MG/2MLSpecialty Pharmacy Supplied (Right): for right side  Medications (Left): 20 mg Sodium Hyaluronate 20 MG/2ML   Specialty Pharmacy Supplied (Left): for left side  Patient tolerance: patient tolerated the procedure well with no immediate complications  Dressing:  Sterile dressing applied          Portions of the record may have been created with voice recognition software  Occasional wrong word or "sound a like" substitutions may have occurred due to the inherent limitations of voice recognition software  Read the chart carefully and recognize, using context, where substitutions have occurred

## 2022-01-26 ENCOUNTER — PROCEDURE VISIT (OUTPATIENT)
Dept: OBGYN CLINIC | Facility: HOSPITAL | Age: 62
End: 2022-01-26
Payer: COMMERCIAL

## 2022-01-26 VITALS
DIASTOLIC BLOOD PRESSURE: 79 MMHG | SYSTOLIC BLOOD PRESSURE: 128 MMHG | BODY MASS INDEX: 31.76 KG/M2 | HEART RATE: 77 BPM | HEIGHT: 64 IN

## 2022-01-26 DIAGNOSIS — M17.11 PRIMARY OSTEOARTHRITIS OF RIGHT KNEE: ICD-10-CM

## 2022-01-26 DIAGNOSIS — M17.12 PRIMARY OSTEOARTHRITIS OF LEFT KNEE: Primary | ICD-10-CM

## 2022-01-26 PROCEDURE — 20610 DRAIN/INJ JOINT/BURSA W/O US: CPT | Performed by: ORTHOPAEDIC SURGERY

## 2022-01-26 RX ORDER — HYALURONATE SODIUM 10 MG/ML
20 SYRINGE (ML) INTRAARTICULAR
Status: COMPLETED | OUTPATIENT
Start: 2022-01-26 | End: 2022-01-26

## 2022-01-26 RX ADMIN — Medication 20 MG: at 13:11

## 2022-01-26 NOTE — PROGRESS NOTES
63 y o female presents for ongoing viscosupplementation both knees  Review of Systems  Review of systems negative unless otherwise specified in HPI    Past Medical History  Past Medical History:   Diagnosis Date    Renal dysplasia     nonfunctioning left life-long       Past Surgical History  Past Surgical History:   Procedure Laterality Date    COLONOSCOPY  03/2016    KIDNEY SURGERY Right     age 29   [facility] REDUCTION MAMMAPLASTY      WRIST SURGERY Right     fx       Current Medications  Current Outpatient Medications on File Prior to Visit   Medication Sig Dispense Refill    ALPRAZolam (XANAX) 0 25 mg tablet Take 1 tablet (0 25 mg total) by mouth daily as needed for anxiety 30 tablet 0    FIBER PO Take by mouth daily      meloxicam (Mobic) 15 mg tablet Take 1 tablet (15 mg total) by mouth daily 30 tablet 1    Multiple Vitamins-Minerals (MULTI FOR HER 50+ PO) Take by mouth daily      Psyllium (Metamucil) WAFR Take by mouth       No current facility-administered medications on file prior to visit  Recent Labs [facility])  0   Lab Value Date/Time    HCT 44 6 03/17/2021 0817    HCT 45 0 11/18/2016 0801    HGB 14 6 03/17/2021 0817    HGB 14 8 11/18/2016 0801    WBC 6 37 03/17/2021 0817    WBC 7 8 11/18/2016 0801    WBC 6-10 (A) 11/18/2016 0801         Physical exam  · General: Awake, Alert, Oriented  · Eyes: Pupils equal, round and reactive to light  · Heart: regular rate and rhythm  · Lungs: No audible wheezing  · Abdomen: soft  Exam confirms neither knee to be effused  Neither knee has erythema    Imaging  None indicated or performed today    Procedure  Injections of viscosupplementation provided for both knees    They are documented below      Large joint arthrocentesis: R knee  Universal Protocol:  Consent given by: patient    Supporting Documentation  Indications: pain   Procedure Details  Location: knee - R knee  Needle size: 22 G  Ultrasound guidance: no  Medications administered: 20 mg Sodium Hyaluronate 20 MG/2ML  Specialty Pharmacy Supplied: received medications from pharmacy  Patient tolerance: patient tolerated the procedure well with no immediate complications  Dressing:  Sterile dressing applied    Large joint arthrocentesis: L knee  Universal Protocol:  Consent given by: patient    Supporting Documentation  Indications: pain   Procedure Details  Location: knee - L knee  Needle size: 22 G  Medications administered: 3 mL sodium hyaluronate 60 MG/3ML  Specialty Pharmacy Supplied: received medications from pharmacy  Patient tolerance: patient tolerated the procedure well with no immediate complications  Dressing:  Sterile dressing applied            Assessment/Plan:   63 y  o female who presents for ongoing viscosupplementation to both knees  They are advised, except, administers outlined above    Office follow-up in 1 week time is necessary for ongoing viscosupplementation to both knees

## 2022-02-02 ENCOUNTER — PROCEDURE VISIT (OUTPATIENT)
Dept: OBGYN CLINIC | Facility: HOSPITAL | Age: 62
End: 2022-02-02
Payer: COMMERCIAL

## 2022-02-02 VITALS
SYSTOLIC BLOOD PRESSURE: 130 MMHG | HEART RATE: 84 BPM | DIASTOLIC BLOOD PRESSURE: 76 MMHG | HEIGHT: 64 IN | BODY MASS INDEX: 31.76 KG/M2

## 2022-02-02 DIAGNOSIS — G89.29 CHRONIC PAIN OF BOTH KNEES: Primary | ICD-10-CM

## 2022-02-02 DIAGNOSIS — M25.561 CHRONIC PAIN OF BOTH KNEES: Primary | ICD-10-CM

## 2022-02-02 DIAGNOSIS — M17.0 PRIMARY OSTEOARTHRITIS OF BOTH KNEES: ICD-10-CM

## 2022-02-02 DIAGNOSIS — M25.562 CHRONIC PAIN OF BOTH KNEES: Primary | ICD-10-CM

## 2022-02-02 PROCEDURE — 20610 DRAIN/INJ JOINT/BURSA W/O US: CPT | Performed by: ORTHOPAEDIC SURGERY

## 2022-02-02 RX ORDER — HYALURONATE SODIUM 10 MG/ML
20 SYRINGE (ML) INTRAARTICULAR
Status: COMPLETED | OUTPATIENT
Start: 2022-02-02 | End: 2022-02-02

## 2022-02-02 RX ADMIN — Medication 20 MG: at 13:10

## 2022-02-02 NOTE — PROGRESS NOTES
Assessment:  1  Chronic pain of both knees     2  Primary osteoarthritis of both knees         Plan:  The patient was provided with 3rd bilateral knee Euflexxa injections  The patient tolerated the procedure well  Bilateral hips will be addressed at next visit including x-rays  The patient should follow up in 3 months  To do next visit:  Return in about 3 months (around 5/2/2022) for re-check with x-rays of bilateral hips  The above stated was discussed in layman's terms and the patient expressed understanding  All questions were answered to the patient's satisfaction  Scribe Attestation    I,:  Jessica Gordon am acting as a scribe while in the presence of the attending physician :       I,:  Sasha Louise MD personally performed the services described in this documentation    as scribed in my presence :             Subjective:   Stephen Mcghee is a 63 y o  female who presents for 3rd bilateral Euflexxa injections  Today she complains of resolving bilateral knee pain with greater right > left anterior groin pain  Prolonged walking aggravates while rest alleviates  Review of systems negative unless otherwise specified in HPI    Past Medical History:   Diagnosis Date    Renal dysplasia     nonfunctioning left life-long       Past Surgical History:   Procedure Laterality Date    COLONOSCOPY  03/2016    KIDNEY SURGERY Right     age 29   [facility] REDUCTION MAMMAPLASTY      WRIST SURGERY Right     fx       History reviewed  No pertinent family history      Social History     Occupational History    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   Tobacco Use    Smoking status: Former Smoker    Smokeless tobacco: Never Used   Substance and Sexual Activity    Alcohol use: Yes     Comment: social    Drug use: No    Sexual activity: Not on file         Current Outpatient Medications:     ALPRAZolam (XANAX) 0 25 mg tablet, Take 1 tablet (0 25 mg total) by mouth daily as needed for anxiety, Disp: 30 tablet, Rfl: 0    FIBER PO, Take by mouth daily, Disp: , Rfl:     meloxicam (Mobic) 15 mg tablet, Take 1 tablet (15 mg total) by mouth daily, Disp: 30 tablet, Rfl: 1    Multiple Vitamins-Minerals (MULTI FOR HER 50+ PO), Take by mouth daily, Disp: , Rfl:     Psyllium (Metamucil) WAFR, Take by mouth, Disp: , Rfl:     Allergies   Allergen Reactions    Acetazolamide Rash    Sulfa Antibiotics Rash            Vitals:    02/02/22 1252   BP: 130/76   Pulse: 84       Objective:  Physical exam  · General: Awake, Alert, Oriented  · Eyes: Pupils equal, round and reactive to light  · Heart: regular rate and rhythm  · Lungs: No audible wheezing  · Abdomen: soft                    Ortho Exam  Bilateral knees:  No erythema or ecchymosis  No effusion or swelling  Normal strength  Good ROM   Calf compartments soft and supple  Sensation intact  Toes are warm sensate and mobile      Diagnostics, reviewed and taken today if performed as documented:    None performed     Procedures, if performed today:    Large joint arthrocentesis: R knee  Universal Protocol:  Consent: Verbal consent obtained  Risks and benefits: risks, benefits and alternatives were discussed  Consent given by: patient  Time out: Immediately prior to procedure a "time out" was called to verify the correct patient, procedure, equipment, support staff and site/side marked as required    Timeout called at: 2/2/2022 1:08 PM   Patient understanding: patient states understanding of the procedure being performed  Patient identity confirmed: verbally with patient    Supporting Documentation  Indications: pain   Procedure Details  Location: knee - R knee  Preparation: Patient was prepped and draped in the usual sterile fashion  Needle size: 22 G  Ultrasound guidance: no  Approach: anterolateral  Medications administered: 20 mg Sodium Hyaluronate 20 MG/2ML  Specialty Pharmacy Supplied: received medications from pharmacy  Patient tolerance: patient tolerated the procedure well with no immediate complications  Dressing:  Sterile dressing applied    Large joint arthrocentesis: L knee  Universal Protocol:  Consent: Verbal consent obtained  Risks and benefits: risks, benefits and alternatives were discussed  Consent given by: patient  Time out: Immediately prior to procedure a "time out" was called to verify the correct patient, procedure, equipment, support staff and site/side marked as required  Timeout called at: 2/2/2022 1:08 PM   Patient understanding: patient states understanding of the procedure being performed  Patient identity confirmed: verbally with patient    Supporting Documentation  Indications: pain   Procedure Details  Location: knee - L knee  Preparation: Patient was prepped and draped in the usual sterile fashion  Needle size: 22 G  Ultrasound guidance: no  Approach: anterolateral  Medications administered: 20 mg Sodium Hyaluronate 20 MG/2ML  Specialty Pharmacy Supplied: received medications from pharmacy  Patient tolerance: patient tolerated the procedure well with no immediate complications  Dressing:  Sterile dressing applied            Portions of the record may have been created with voice recognition software  Occasional wrong word or "sound a like" substitutions may have occurred due to the inherent limitations of voice recognition software  Read the chart carefully and recognize, using context, where substitutions have occurred

## 2022-03-28 ENCOUNTER — APPOINTMENT (EMERGENCY)
Dept: RADIOLOGY | Facility: HOSPITAL | Age: 62
End: 2022-03-28
Payer: COMMERCIAL

## 2022-03-28 ENCOUNTER — HOSPITAL ENCOUNTER (EMERGENCY)
Facility: HOSPITAL | Age: 62
Discharge: HOME | End: 2022-03-29
Attending: EMERGENCY MEDICINE
Payer: COMMERCIAL

## 2022-03-28 VITALS
DIASTOLIC BLOOD PRESSURE: 67 MMHG | TEMPERATURE: 97.5 F | HEART RATE: 72 BPM | OXYGEN SATURATION: 97 % | SYSTOLIC BLOOD PRESSURE: 135 MMHG | RESPIRATION RATE: 18 BRPM

## 2022-03-28 DIAGNOSIS — R07.9 CHEST PAIN: Primary | ICD-10-CM

## 2022-03-28 DIAGNOSIS — R11.0 NAUSEA: ICD-10-CM

## 2022-03-28 LAB
ALBUMIN SERPL BCP-MCNC: 4.1 G/DL (ref 3.5–5)
ALP SERPL-CCNC: 81 U/L (ref 46–116)
ALT SERPL W P-5'-P-CCNC: 38 U/L (ref 12–78)
ANION GAP SERPL CALCULATED.3IONS-SCNC: 8 MMOL/L (ref 4–13)
AST SERPL W P-5'-P-CCNC: 24 U/L (ref 5–45)
BASOPHILS # BLD AUTO: 0.06 THOUSANDS/ΜL (ref 0–0.1)
BASOPHILS NFR BLD AUTO: 1 % (ref 0–1)
BILIRUB SERPL-MCNC: 0.61 MG/DL (ref 0.2–1)
BUN SERPL-MCNC: 12 MG/DL (ref 5–25)
CALCIUM SERPL-MCNC: 9.1 MG/DL (ref 8.3–10.1)
CARDIAC TROPONIN I PNL SERPL HS: 4 NG/L
CHLORIDE SERPL-SCNC: 102 MMOL/L (ref 100–108)
CO2 SERPL-SCNC: 29 MMOL/L (ref 21–32)
CREAT SERPL-MCNC: 0.8 MG/DL (ref 0.6–1.3)
EOSINOPHIL # BLD AUTO: 0.47 THOUSAND/ΜL (ref 0–0.61)
EOSINOPHIL NFR BLD AUTO: 5 % (ref 0–6)
ERYTHROCYTE [DISTWIDTH] IN BLOOD BY AUTOMATED COUNT: 12.1 % (ref 11.6–15.1)
GFR SERPL CREATININE-BSD FRML MDRD: 80 ML/MIN/1.73SQ M
GLUCOSE SERPL-MCNC: 105 MG/DL (ref 65–140)
HCT VFR BLD AUTO: 44.8 % (ref 34.8–46.1)
HGB BLD-MCNC: 15.1 G/DL (ref 11.5–15.4)
IMM GRANULOCYTES # BLD AUTO: 0.05 THOUSAND/UL (ref 0–0.2)
IMM GRANULOCYTES NFR BLD AUTO: 1 % (ref 0–2)
LYMPHOCYTES # BLD AUTO: 2 THOUSANDS/ΜL (ref 0.6–4.47)
LYMPHOCYTES NFR BLD AUTO: 20 % (ref 14–44)
MCH RBC QN AUTO: 30.3 PG (ref 26.8–34.3)
MCHC RBC AUTO-ENTMCNC: 33.7 G/DL (ref 31.4–37.4)
MCV RBC AUTO: 90 FL (ref 82–98)
MONOCYTES # BLD AUTO: 0.77 THOUSAND/ΜL (ref 0.17–1.22)
MONOCYTES NFR BLD AUTO: 8 % (ref 4–12)
NEUTROPHILS # BLD AUTO: 6.82 THOUSANDS/ΜL (ref 1.85–7.62)
NEUTS SEG NFR BLD AUTO: 65 % (ref 43–75)
NRBC BLD AUTO-RTO: 0 /100 WBCS
PLATELET # BLD AUTO: 302 THOUSANDS/UL (ref 149–390)
PMV BLD AUTO: 9.8 FL (ref 8.9–12.7)
POTASSIUM SERPL-SCNC: 3.6 MMOL/L (ref 3.5–5.3)
PROT SERPL-MCNC: 7.9 G/DL (ref 6.4–8.2)
RBC # BLD AUTO: 4.99 MILLION/UL (ref 3.81–5.12)
SODIUM SERPL-SCNC: 139 MMOL/L (ref 136–145)
WBC # BLD AUTO: 10.17 THOUSAND/UL (ref 4.31–10.16)

## 2022-03-28 PROCEDURE — 36415 COLL VENOUS BLD VENIPUNCTURE: CPT | Performed by: EMERGENCY MEDICINE

## 2022-03-28 PROCEDURE — 99285 EMERGENCY DEPT VISIT HI MDM: CPT | Performed by: EMERGENCY MEDICINE

## 2022-03-28 PROCEDURE — 71045 X-RAY EXAM CHEST 1 VIEW: CPT

## 2022-03-28 PROCEDURE — 99285 EMERGENCY DEPT VISIT HI MDM: CPT

## 2022-03-28 PROCEDURE — 80053 COMPREHEN METABOLIC PANEL: CPT | Performed by: EMERGENCY MEDICINE

## 2022-03-28 PROCEDURE — 93005 ELECTROCARDIOGRAM TRACING: CPT

## 2022-03-28 PROCEDURE — 84484 ASSAY OF TROPONIN QUANT: CPT | Performed by: EMERGENCY MEDICINE

## 2022-03-28 PROCEDURE — 85025 COMPLETE CBC W/AUTO DIFF WBC: CPT | Performed by: EMERGENCY MEDICINE

## 2022-03-28 NOTE — Clinical Note
Lucy Delgado was seen and treated in our emergency department on 3/28/2022  Diagnosis:     Leah Pedersen  may return to work on return date  She may return on this date: 03/30/2022         If you have any questions or concerns, please don't hesitate to call        Geo Jay, DO    ______________________________           _______________          _______________  Hospital Representative                              Date                                Time

## 2022-03-28 NOTE — ED NOTES
RN attempted x1 for bloodwork in triage  Pt requesting bloodwork when IV is placed       Keo Temple RN  03/28/22 8603

## 2022-03-29 ENCOUNTER — CONSULT (OUTPATIENT)
Dept: CARDIOLOGY CLINIC | Facility: CLINIC | Age: 62
End: 2022-03-29
Payer: COMMERCIAL

## 2022-03-29 VITALS
WEIGHT: 184 LBS | DIASTOLIC BLOOD PRESSURE: 60 MMHG | HEART RATE: 70 BPM | HEIGHT: 64 IN | SYSTOLIC BLOOD PRESSURE: 100 MMHG | BODY MASS INDEX: 31.41 KG/M2

## 2022-03-29 DIAGNOSIS — R07.9 CHEST PAIN SYNDROME: ICD-10-CM

## 2022-03-29 DIAGNOSIS — R07.9 CHEST PAIN: ICD-10-CM

## 2022-03-29 DIAGNOSIS — E78.2 MIXED HYPERLIPIDEMIA: Primary | ICD-10-CM

## 2022-03-29 LAB
2HR DELTA HS TROPONIN: 0 NG/L
ATRIAL RATE: 59 BPM
ATRIAL RATE: 61 BPM
CARDIAC TROPONIN I PNL SERPL HS: 4 NG/L
P AXIS: 68 DEGREES
P AXIS: 86 DEGREES
PR INTERVAL: 148 MS
PR INTERVAL: 160 MS
QRS AXIS: 44 DEGREES
QRS AXIS: 51 DEGREES
QRSD INTERVAL: 66 MS
QRSD INTERVAL: 68 MS
QT INTERVAL: 390 MS
QT INTERVAL: 410 MS
QTC INTERVAL: 386 MS
QTC INTERVAL: 403 MS
T WAVE AXIS: 42 DEGREES
T WAVE AXIS: 48 DEGREES
VENTRICULAR RATE: 58 BPM
VENTRICULAR RATE: 59 BPM

## 2022-03-29 PROCEDURE — 99202 OFFICE O/P NEW SF 15 MIN: CPT | Performed by: INTERNAL MEDICINE

## 2022-03-29 PROCEDURE — 93010 ELECTROCARDIOGRAM REPORT: CPT | Performed by: INTERNAL MEDICINE

## 2022-03-29 PROCEDURE — 1036F TOBACCO NON-USER: CPT | Performed by: INTERNAL MEDICINE

## 2022-03-29 PROCEDURE — 3008F BODY MASS INDEX DOCD: CPT | Performed by: INTERNAL MEDICINE

## 2022-03-29 NOTE — PROGRESS NOTES
Cardiology Follow Up    Miri Lopez  1960  [id number]  [facility] [place]  [address]  [address] [address]  [phone]    1  Mixed hyperlipidemia     2  Chest pain syndrome     3  Chest pain  Ambulatory Referral to Cardiology       Interval History:  Pleasant 53-year-old female who has no previous cardiac history  The patient was in usual state of health, he developed midsternal left-sided discomfort, are from the inframammary area without any radiation very intense she feel dizzy lightheaded she did not suffer syncope  There is no diaphoresis  She did felt nauseous  This resolved spontaneously after approximately 20 minutes current and then she went to the emergency room  The patient tells me that she has been experience intermittent episode of chest discomfort over last couple years  Not very frequently, every few months  Similar to that but not as intense and without any associated symptoms as described above  Imaging was reviewed, EKG was reviewed, evaluation emergency room was mostly unrevealing  Except she was very hypertensive with blood pressure 215/82  Chest x-ray and EKG were unremarkable  The patient has no previous cardiac history as stated above, lipids last year total cholesterol 208, HDL 59, , there is no family history premature coronary artery disease, patient is nonsmoker  She is active, tries to exercise regularly, she has have DJD of both knees which limits her ambulation  The patient tells me that she does have a reduction mammoplasty about 5 years ago and wonders what this could be related      Patient Active Problem List   Diagnosis    Anxiety    Chondromalacia patellae of right knee    Congenital renal dysplasia ( left )    Mixed hyperlipidemia    Primary osteoarthritis of right knee    Chest pain syndrome     Past Medical History:   Diagnosis Date    Renal dysplasia     nonfunctioning left life-long     Social History     Socioeconomic History    Marital status: /Civil Union     Spouse name: Not on file    Number of children: Not on file    Years of education: Not on file    Highest education level: Not on file   Occupational History    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   Tobacco Use    Smoking status: Former Smoker    Smokeless tobacco: Never Used   Substance and Sexual Activity    Alcohol use: Yes     Comment: social    Drug use: No    Sexual activity: Not on file   Other Topics Concern    Not on file   Social History Narrative    Not on file     Social Determinants of Health     Financial Resource Strain: Not on file   Food Insecurity: Not on file   Transportation Needs: Not on file   Physical Activity: Not on file   Stress: Not on file   Social Connections: Not on file   Intimate Partner Violence: Not on file   Housing Stability: Not on file      No family history on file    Past Surgical History:   Procedure Laterality Date    COLONOSCOPY  03/2016    KIDNEY SURGERY Right     age 29   Justa Riis REDUCTION MAMMAPLASTY      WRIST SURGERY Right     fx       Current Outpatient Medications:     ALPRAZolam (XANAX) 0 25 mg tablet, Take 1 tablet (0 25 mg total) by mouth daily as needed for anxiety, Disp: 30 tablet, Rfl: 0    FIBER PO, Take by mouth daily, Disp: , Rfl:     Multiple Vitamins-Minerals (MULTI FOR HER 50+ PO), Take by mouth daily, Disp: , Rfl:     Psyllium (Metamucil) WAFR, Take by mouth, Disp: , Rfl:     meloxicam (Mobic) 15 mg tablet, Take 1 tablet (15 mg total) by mouth daily (Patient not taking: Reported on 3/29/2022 ), Disp: 30 tablet, Rfl: 1  Allergies   Allergen Reactions    Acetazolamide Rash    Sulfa Antibiotics Rash       Labs:  Admission on 03/28/2022, Discharged on 03/29/2022   Component Date Value    WBC 03/28/2022 10 17*    RBC 03/28/2022 4 99     Hemoglobin 03/28/2022 15 1     Hematocrit 03/28/2022 44 8     MCV 03/28/2022 90     MCH 03/28/2022 30 3     MCHC 03/28/2022 33 7     RDW 03/28/2022 12 1     MPV 03/28/2022 9 8     Platelets 03/12/0379 302     nRBC 03/28/2022 0     Neutrophils Relative 03/28/2022 65     Immat GRANS % 03/28/2022 1     Lymphocytes Relative 03/28/2022 20     Monocytes Relative 03/28/2022 8     Eosinophils Relative 03/28/2022 5     Basophils Relative 03/28/2022 1     Neutrophils Absolute 03/28/2022 6 82     Immature Grans Absolute 03/28/2022 0 05     Lymphocytes Absolute 03/28/2022 2 00     Monocytes Absolute 03/28/2022 0 77     Eosinophils Absolute 03/28/2022 0 47     Basophils Absolute 03/28/2022 0 06     Sodium 03/28/2022 139     Potassium 03/28/2022 3 6     Chloride 03/28/2022 102     CO2 03/28/2022 29     ANION GAP 03/28/2022 8     BUN 03/28/2022 12     Creatinine 03/28/2022 0 80     Glucose 03/28/2022 105     Calcium 03/28/2022 9 1     AST 03/28/2022 24     ALT 03/28/2022 38     Alkaline Phosphatase 03/28/2022 81     Total Protein 03/28/2022 7 9     Albumin 03/28/2022 4 1     Total Bilirubin 03/28/2022 0 61     eGFR 03/28/2022 80     hs TnI 0hr 03/28/2022 4     hs TnI 2hr 03/28/2022 4     Delta 2hr hsTnI 03/28/2022 0     Ventricular Rate 03/28/2022 59     Atrial Rate 03/28/2022 59     SD Interval 03/28/2022 148     QRSD Interval 03/28/2022 68     QT Interval 03/28/2022 390     QTC Interval 03/28/2022 386     P Axis 03/28/2022 68     QRS Axis 03/28/2022 51     T Wave Axis 03/28/2022 48     Ventricular Rate 03/28/2022 58     Atrial Rate 03/28/2022 61     SD Interval 03/28/2022 160     QRSD Interval 03/28/2022 66     QT Interval 03/28/2022 410     QTC Interval 03/28/2022 403     P Axis 03/28/2022 86     QRS Axis 03/28/2022 44     T Wave Axis 03/28/2022 42      Imaging: XR chest 1 view portable    Result Date: 3/29/2022  Narrative: CHEST INDICATION:   Chest pain   COMPARISON:  None EXAM PERFORMED/VIEWS:  XR CHEST PORTABLE FINDINGS: Cardiomediastinal silhouette appears unremarkable  The lungs are clear  No pneumothorax or pleural effusion  Osseous structures appear within normal limits for patient age  Impression: No acute cardiopulmonary disease  Workstation performed: [id number]       Review of Systems:  Review of Systems   Constitutional: Positive for fatigue  Negative for activity change, appetite change, chills, diaphoresis, fever and unexpected weight change  HENT: Negative for congestion, hearing loss, nosebleeds and trouble swallowing  Eyes: Negative for visual disturbance  Respiratory: Negative for apnea, cough, choking, chest tightness, shortness of breath, wheezing and stridor  Cardiovascular: Positive for chest pain  Negative for palpitations and leg swelling  Gastrointestinal: Positive for nausea  Negative for abdominal distention, abdominal pain, anal bleeding, blood in stool, constipation, diarrhea and rectal pain  Endocrine: Negative for cold intolerance and heat intolerance  Genitourinary: Negative for difficulty urinating, dysuria, frequency, hematuria and urgency  Musculoskeletal: Positive for arthralgias  Negative for back pain  Skin: Negative for color change, pallor, rash and wound  Allergic/Immunologic: Negative for immunocompromised state  Neurological: Positive for dizziness and headaches  Negative for tremors, seizures, syncope, facial asymmetry, speech difficulty, weakness, light-headedness and numbness  Hematological: Does not bruise/bleed easily  Psychiatric/Behavioral: Negative for behavioral problems, decreased concentration and sleep disturbance  The patient is not nervous/anxious  Physical Exam:  Physical Exam  Vitals reviewed  Constitutional:       General: She is not in acute distress  Appearance: Normal appearance  She is obese  She is not ill-appearing, toxic-appearing or diaphoretic  HENT:      Head: Normocephalic  Eyes:      General: No scleral icterus       Conjunctiva/sclera: Conjunctivae normal    Neck:      Vascular: No carotid bruit  Cardiovascular:      Rate and Rhythm: Normal rate and regular rhythm  Pulses: Normal pulses  Heart sounds: Normal heart sounds  No murmur heard  No friction rub  No gallop  Pulmonary:      Effort: Pulmonary effort is normal  No respiratory distress  Breath sounds: Normal breath sounds  No stridor  No wheezing, rhonchi or rales  Chest:      Chest wall: No tenderness  Abdominal:      General: Bowel sounds are normal       Palpations: Abdomen is soft  Tenderness: There is no abdominal tenderness  Musculoskeletal:      Right lower leg: No edema  Left lower leg: No edema  Skin:     General: Skin is warm and dry  Capillary Refill: Capillary refill takes less than 2 seconds  Coloration: Skin is not jaundiced or pale  Findings: No bruising or erythema  Neurological:      General: No focal deficit present  Mental Status: She is alert and oriented to person, place, and time  Psychiatric:         Mood and Affect: Mood normal          Behavior: Behavior normal          Thought Content: Thought content normal          Discussion/Summary:  Chest pain syndrome  Recent evaluation emergency room was unrevealing  Will proceed with noninvasive evaluation, anticipate unrevealing  No further testing if this is unremarkable  No medications at the present time,    This note was completed in part utilizing [employer] fluency direct voice recognition software  Grammatical errors, random word insertion, spelling mistakes, and incomplete sentences may be an occasional consequence of the system secondary to software limitations, ambient noise and hardware issues  At the time of dictation, efforts were made to edit, clarify and /or correct errors  Please read the chart carefully and recognize, using context, where substitutions have occurred    If you have any questions or concerns about the context, text or information contained within the body of this dictation, please contact myself, the provider, for further clarification

## 2022-03-29 NOTE — ED PROVIDER NOTES
History  Chief Complaint   Patient presents with    Chest Pain     pt c/o chest pains onset at work at [employer], also felt dizziness/nauseous but denies any symptoms now     Caroline Rosa is a 63year old female presenting today for evaluation an 15 minute episode of chest pain with associated nausea and lightheadedness  The patient describes having intermittent episodes of chest pain over the last 3 years, but the episodes are always brief and resolve on their own, they occur her once every several months, they are typically not associated with nausea and lightheadedness which is why the patient was concerned today  She says that she was sitting at her desk at work when the episode began  His episodes do not seem to be particularly associated with stress or strenuous activity  She denies any significant cardiac history  She has not have any history of hypertension, diabetes, hyperlipidemia  She also denies any family history of cardiac disorders  She denies any recent lower extremity swelling or tenderness  She denies any shortness of breath  She is a nonsmoker  She does not have any history of DVT or PE  History provided by:  Patient   used: No    Chest Pain  Pain location:  Substernal area and L chest  Pain quality: tightness    Pain radiates to:  Does not radiate  Associated symptoms: nausea    Associated symptoms: no abdominal pain, no back pain, no cough, no diaphoresis, no fever, no lower extremity edema, no palpitations, no shortness of breath and not vomiting    Risk factors: obesity    Risk factors: no coronary artery disease, no diabetes mellitus, no hypertension, no prior DVT/PE, no smoking and no surgery        Prior to Admission Medications   Prescriptions Last Dose Informant Patient Reported? Taking?    ALPRAZolam (XANAX) 0 25 mg tablet   No No   Sig: Take 1 tablet (0 25 mg total) by mouth daily as needed for anxiety   FIBER PO   Yes No   Sig: Take by mouth daily   Multiple Vitamins-Minerals (MULTI FOR HER 50+ PO)   Yes No   Sig: Take by mouth daily   Psyllium (Metamucil) WAFR   Yes No   Sig: Take by mouth   meloxicam (Mobic) 15 mg tablet   No No   Sig: Take 1 tablet (15 mg total) by mouth daily      Facility-Administered Medications: None       Past Medical History:   Diagnosis Date    Renal dysplasia     nonfunctioning left life-long       Past Surgical History:   Procedure Laterality Date    COLONOSCOPY  03/2016    KIDNEY SURGERY Right     age 29   Shelley Pall REDUCTION MAMMAPLASTY      WRIST SURGERY Right     fx       History reviewed  No pertinent family history  I have reviewed and agree with the history as documented  E-Cigarette/Vaping     E-Cigarette/Vaping Substances     Social History     Tobacco Use    Smoking status: Former Smoker    Smokeless tobacco: Never Used   Substance Use Topics    Alcohol use: Yes     Comment: social    Drug use: No        Review of Systems   Constitutional: Negative for chills, diaphoresis and fever  HENT: Negative for ear pain and sore throat  Eyes: Negative for pain and visual disturbance  Respiratory: Negative for cough and shortness of breath  Cardiovascular: Positive for chest pain  Negative for palpitations  Gastrointestinal: Positive for nausea  Negative for abdominal pain and vomiting  Genitourinary: Negative for dysuria and hematuria  Musculoskeletal: Negative for arthralgias and back pain  Skin: Negative for color change and rash  Neurological: Positive for light-headedness  Negative for seizures and syncope  All other systems reviewed and are negative        Physical Exam  ED Triage Vitals [03/28/22 1711]   Temperature Pulse Respirations Blood Pressure SpO2   97 5 °F (36 4 °C) 65 18 (!) 215/82 96 %      Temp Source Heart Rate Source Patient Position - Orthostatic VS BP Location FiO2 (%)   Oral Monitor Sitting Left arm --      Pain Score       --             Orthostatic Vital Signs  Vitals:    03/28/22 1711 03/28/22 1954 03/28/22 2300   BP: (!) 215/82 (!) 171/79 135/67   Pulse: 65 65 72   Patient Position - Orthostatic VS: Sitting Lying Lying       Physical Exam  Vitals and nursing note reviewed  Constitutional:       Appearance: Normal appearance  She is not ill-appearing  HENT:      Head: Normocephalic and atraumatic  Mouth/Throat:      Mouth: Mucous membranes are moist       Pharynx: Oropharynx is clear  Eyes:      General: No scleral icterus  Conjunctiva/sclera: Conjunctivae normal    Cardiovascular:      Rate and Rhythm: Normal rate and regular rhythm  Heart sounds: Normal heart sounds  Pulmonary:      Effort: Pulmonary effort is normal  No respiratory distress  Breath sounds: Normal breath sounds  No wheezing, rhonchi or rales  Abdominal:      General: Abdomen is flat  There is no distension  Tenderness: There is no abdominal tenderness  Musculoskeletal:         General: No tenderness or signs of injury  Cervical back: Neck supple  No rigidity  Skin:     General: Skin is warm  Coloration: Skin is not jaundiced  Findings: No erythema or rash  Neurological:      General: No focal deficit present  Mental Status: She is alert  Mental status is at baseline     Psychiatric:         Mood and Affect: Mood normal          Behavior: Behavior normal          ED Medications  Medications - No data to display    Diagnostic Studies  Results Reviewed     Procedure Component Value Units Date/Time    HS Troponin I 2hr [[id number]]  (Normal) Collected: 03/28/22 2253    Lab Status: Final result Specimen: Blood from Arm, Left Updated: 03/29/22 0000     hs TnI 2hr 4 ng/L      Delta 2hr hsTnI 0 ng/L     HS Troponin I 4hr [[id number]]     Lab Status: No result Specimen: Blood     HS Troponin 0hr (reflex protocol) [[id number]]  (Normal) Collected: 03/28/22 2003    Lab Status: Final result Specimen: Blood from Arm, Left Updated: 03/28/22 2038     hs TnI 0hr 4 ng/L     Comprehensive metabolic panel [[id number]] Collected: 03/28/22 2003    Lab Status: Final result Specimen: Blood from Arm, Left Updated: 03/28/22 2030     Sodium 139 mmol/L      Potassium 3 6 mmol/L      Chloride 102 mmol/L      CO2 29 mmol/L      ANION GAP 8 mmol/L      BUN 12 mg/dL      Creatinine 0 80 mg/dL      Glucose 105 mg/dL      Calcium 9 1 mg/dL      AST 24 U/L      ALT 38 U/L      Alkaline Phosphatase 81 U/L      Total Protein 7 9 g/dL      Albumin 4 1 g/dL      Total Bilirubin 0 61 mg/dL      eGFR 80 ml/min/1 73sq m     Narrative:      National Kidney Disease Foundation guidelines for Chronic Kidney Disease (CKD):     Stage 1 with normal or high GFR (GFR > 90 mL/min/1 73 square meters)    Stage 2 Mild CKD (GFR = 60-89 mL/min/1 73 square meters)    Stage 3A Moderate CKD (GFR = 45-59 mL/min/1 73 square meters)    Stage 3B Moderate CKD (GFR = 30-44 mL/min/1 73 square meters)    Stage 4 Severe CKD (GFR = 15-29 mL/min/1 73 square meters)    Stage 5 End Stage CKD (GFR <15 mL/min/1 73 square meters)  Note: GFR calculation is accurate only with a steady state creatinine    CBC and differential [[id number]]  (Abnormal) Collected: 03/28/22 2003    Lab Status: Final result Specimen: Blood from Arm, Left Updated: 03/28/22 2012     WBC 10 17 Thousand/uL      RBC 4 99 Million/uL      Hemoglobin 15 1 g/dL      Hematocrit 44 8 %      MCV 90 fL      MCH 30 3 pg      MCHC 33 7 g/dL      RDW 12 1 %      MPV 9 8 fL      Platelets 442 Thousands/uL      nRBC 0 /100 WBCs      Neutrophils Relative 65 %      Immat GRANS % 1 %      Lymphocytes Relative 20 %      Monocytes Relative 8 %      Eosinophils Relative 5 %      Basophils Relative 1 %      Neutrophils Absolute 6 82 Thousands/µL      Immature Grans Absolute 0 05 Thousand/uL      Lymphocytes Absolute 2 00 Thousands/µL      Monocytes Absolute 0 77 Thousand/µL      Eosinophils Absolute 0 47 Thousand/µL      Basophils Absolute 0 06 Thousands/µL                  XR chest 1 view portable ED Interpretation by Berna Guo DO (03/28 2110)   No acute cardiopulmonary abnormalities visualized            Procedures  ECG 12 Lead Documentation Only    Date/Time: 3/28/2022 8:29 PM  Performed by: Berna Guo DO  Authorized by: Berna Guo DO     Indications / Diagnosis:  Chest pain  ECG reviewed by me, the ED Provider: yes    Patient location:  ED  Previous ECG:     Previous ECG:  Compared to current    Similarity:  No change  Interpretation:     Interpretation: normal    Rate:     ECG rate:  59    ECG rate assessment: bradycardic    Rhythm:     Rhythm: sinus rhythm    Ectopy:     Ectopy: none    QRS:     QRS axis:  Normal    QRS intervals:  Normal  Conduction:     Conduction: normal    ST segments:     ST segments:  Normal  T waves:     T waves: normal    Comments:      Normal sinus rhythm          ED Course                             SBIRT 20yo+      Most Recent Value   SBIRT (24 yo +)    In order to provide better care to our patients, we are screening all of our patients for alcohol and drug use  Would it be okay to ask you these screening questions? Unable to answer at this time Filed at: 03/28/2022 1722                MDM  Number of Diagnoses or Management Options  Chest pain: new and requires workup  Nausea: new and requires workup     Amount and/or Complexity of Data Reviewed  Clinical lab tests: reviewed  Tests in the radiology section of CPT®: ordered and reviewed  Review and summarize past medical records: yes  Independent visualization of images, tracings, or specimens: yes    Risk of Complications, Morbidity, and/or Mortality  General comments: Vale Michel is a 63year old female presenting today after an episode of chest pain with associated nausea and lightheadedness    The patient describes having intermittent episodes of chest pain over the previous 3 years, they typically occur every several months, they are not typically associated with nausea or lightheadedness which is why she came to the emergency department today for further evaluation  She does not have any significant cardiac history nor cardiac risk factors  She is not currently having chest pain  Patient was hypertensive, otherwise her vital signs were unremarkable, she was not tachycardic nor tachypneic, she was satting 96% on room air  Physical exam was unremarkable, heart and lung sounds were normal, she denies any lower extremity edema or tenderness  ECG showed sinus bradycardia with a heart rate of 59, there were no acute ST changes, no T-wave inversions  Initial troponin was 4, repeated 2 hours was  Chest x-ray was unremarkable  Patient stable for discharge  I advised the patient to call and schedule an appointment with the cardiologist, I made a referral   I gave her very strict return precautions, she was agreeable to the plan  Patient Progress  Patient progress: stable      Disposition  Final diagnoses:   Chest pain   Nausea     Time reflects when diagnosis was documented in both MDM as applicable and the Disposition within this note     Time User Action Codes Description Comment    3/28/2022  9:45 PM Geo Lin Add [R07 9] Chest pain     3/28/2022  9:45 PM Geo Lin Add [R11 0] Nausea       ED Disposition     ED Disposition Condition Date/Time Comment    Discharge Stable Mon Mar 28, 2022  9:45  [place] discharge to home/self care              Follow-up Information     Follow up With Specialties Details Why Contact Info Additional [facility] Cardiology Schedule an appointment as soon as possible for a visit   [address] [facility] [phone] [address] Cardiology [address] [facility], [facility] [address], [place], [place] [address]    Shahla Gonzalez DO Family Medicine Schedule an appointment as soon as possible for a visit   [address] [facility] [place] [id number]  [phone]             Patient's Medications   Discharge Prescriptions    No medications on file         PDMP Review     None           ED Provider  Attending physically available and evaluated [address]  I managed the patient along with the ED Attending      Electronically Signed by         Bong Jones DO  03/29/22 0002

## 2022-03-29 NOTE — DISCHARGE INSTRUCTIONS
Call and schedule an appointment with a cardiologist, I have made a referral for you  Call and schedule follow-up appointment with her primary care doctor  Please return to the emergency department should worsening chest pain, shortness, swelling in your legs, or any other concerning symptoms

## 2022-03-29 NOTE — ED ATTENDING ATTESTATION
3/28/2022  I, Mik Powers MD, saw and evaluated the patient  I have discussed the patient with the resident/non-physician practitioner and agree with the resident's/non-physician practitioner's findings, Plan of Care, and MDM as documented in the resident's/non-physician practitioner's note, except where noted  All available labs and Radiology studies were reviewed  I was present for key portions of any procedure(s) performed by the resident/non-physician practitioner and I was immediately available to provide assistance  At this point I agree with the current assessment done in the Emergency Department  I have conducted an independent evaluation of this patient a history and physical is as follows:    62 yo female without significant PMH presents after episode of chest pain associated with nausea and lightheadedness  Pain substernal, non-radiating, no associated diaphoresis, no neuro symptoms  Episode occurred while at rest, self resolved after 15 minutes  No h/o SOB, syncope, near-syncope, palpitations  No exertional component  When feeling well pt has no fatigue, change in exercise tolerance or abnormal SOB  No VTE risk factors  No first degree family members with CAD, no h/o HTN, HLD, DM, heavy ETOH use, recent travel/surgery, oral hormone, personal or familial h/o VTE  On exam pt well appearing, NV intact, equal pulses, no murmur, no signs vol overload or VTE  EKG non-ischemic  Serial trop negative  Pt safe for dispo with close cardiology f/u and PCP f/u  RTER precautions discussed and documented on discharge paperwork, pt and family endorsed good understanding of reasons to return  Past Medical History:   Diagnosis Date    Renal dysplasia     nonfunctioning left life-long         ED Course  ED Course as of 03/29/22 1406   Mon Mar 28, 2022   2053 hs TnI 0hr: 4  wnl   2053 CBC and differential(!)  No significant leukocytosis reassuring diff, normal H/H, normal platelets  2053 Comprehensive metabolic panel  Reassuring, no end organ damage, no AG, normal bicarb               Critical Care Time  Procedures

## 2022-04-11 ENCOUNTER — HOSPITAL ENCOUNTER (OUTPATIENT)
Dept: NON INVASIVE DIAGNOSTICS | Facility: HOSPITAL | Age: 62
Discharge: HOME | End: 2022-04-11
Payer: COMMERCIAL

## 2022-04-11 VITALS
SYSTOLIC BLOOD PRESSURE: 148 MMHG | WEIGHT: 184 LBS | HEIGHT: 64 IN | DIASTOLIC BLOOD PRESSURE: 81 MMHG | BODY MASS INDEX: 31.41 KG/M2 | HEART RATE: 60 BPM

## 2022-04-11 VITALS — WEIGHT: 184 LBS | HEIGHT: 64 IN | BODY MASS INDEX: 31.41 KG/M2

## 2022-04-11 DIAGNOSIS — R07.9 CHEST PAIN: ICD-10-CM

## 2022-04-11 DIAGNOSIS — R07.9 CHEST PAIN SYNDROME: ICD-10-CM

## 2022-04-11 LAB
AORTIC ROOT: 2.6 CM
APICAL FOUR CHAMBER EJECTION FRACTION: 64 %
ARRHY DURING EX: NORMAL
BASELINE ST DEPRESSION: 0 MM
CHEST PAIN STATEMENT: NORMAL
E WAVE DECELERATION TIME: 156 MS
FRACTIONAL SHORTENING: 43 % (ref 28–44)
INTERVENTRICULAR SEPTUM IN DIASTOLE (PARASTERNAL SHORT AXIS VIEW): 0.7 CM
INTERVENTRICULAR SEPTUM: 0.7 CM (ref 0.53–0.99)
LAAS-AP2: 17.6 CM2
LAAS-AP4: 14.1 CM2
LEFT ATRIUM SIZE: 3.9 CM
LEFT INTERNAL DIMENSION IN SYSTOLE: 2.8 CM (ref 2.83–4.28)
LEFT VENTRICULAR INTERNAL DIMENSION IN DIASTOLE: 4.9 CM (ref 4.64–6.91)
LEFT VENTRICULAR POSTERIOR WALL IN END DIASTOLE: 0.7 CM (ref 0.52–0.98)
LEFT VENTRICULAR STROKE VOLUME: 83 ML
LVSV (TEICH): 83 ML
MAX DIASTOLIC BP: 86 MMHG
MAX HEART RATE: 153 BPM
MAX HR PERCENT: 99 %
MAX PREDICTED HEART RATE: 161 BPM
MAX. SYSTOLIC BP: 210 MMHG
MV E'TISSUE VEL-SEP: 12 CM/S
MV PEAK A VEL: 0.97 M/S
MV PEAK E VEL: 98 CM/S
MV STENOSIS PRESSURE HALF TIME: 45 MS
MV VALVE AREA P 1/2 METHOD: 4.89 CM2
PROTOCOL NAME: NORMAL
RA PRESSURE ESTIMATED: 3 MMHG
RATE PRESSURE PRODUCT: NORMAL
REASON FOR TERMINATION: NORMAL
RIGHT ATRIAL 2D VOLUME: 27 ML
RIGHT ATRIUM AREA SYSTOLE A4C: 11.5 CM2
RIGHT VENTRICLE ID DIMENSION: 3.4 CM
RV PSP: 28 MMHG
SL CV LEFT ATRIUM LENGTH A2C: 4.9 CM
SL CV PED ECHO LEFT VENTRICLE DIASTOLIC VOLUME (MOD BIPLANE) 2D: 113 ML
SL CV PED ECHO LEFT VENTRICLE SYSTOLIC VOLUME (MOD BIPLANE) 2D: 30 ML
SL CV STRESS RECOVERY BP: NORMAL MMHG
SL CV STRESS RECOVERY HR: 68 BPM
SL CV STRESS RECOVERY O2 SAT: 99 %
SL CV STRESS STAGE REACHED: 2
STRESS ANGINA INDEX: 0
STRESS BASELINE BP: NORMAL MMHG
STRESS BASELINE HR: 65 BPM
STRESS DUKE TREADMILL SCORE: 5
STRESS O2 SAT REST: 98 %
STRESS PEAK HR: 153 BPM
STRESS PERCENT HR: 99 %
STRESS POST ESTIMATED WORKLOAD: 6.8 METS
STRESS POST EXERCISE DUR MIN: 4 MIN
STRESS POST EXERCISE DUR SEC: 54 SEC
STRESS POST O2 SAT PEAK: 94 %
STRESS POST PEAK BP: 210 MMHG
STRESS ST DEPRESSION: 0 MM
STRESS TARGET HR: 153 BPM
TARGET HR FORMULA: NORMAL
TEST INDICATION: NORMAL
TIME IN EXERCISE PHASE: NORMAL
TR MAX PG: 25 MMHG
TR PEAK VELOCITY: 2.5 M/S
TRICUSPID VALVE PEAK REGURGITATION VELOCITY: 2.48 M/S
Z-SCORE OF INTERVENTRICULAR SEPTUM IN END DIASTOLE: -0.51
Z-SCORE OF LEFT VENTRICULAR DIMENSION IN END DIASTOLE: -1.45
Z-SCORE OF LEFT VENTRICULAR DIMENSION IN END SYSTOLE: -1.72
Z-SCORE OF LEFT VENTRICULAR POSTERIOR WALL IN END DIASTOLE: -0.4

## 2022-04-11 PROCEDURE — 93306 TTE W/DOPPLER COMPLETE: CPT

## 2022-04-11 PROCEDURE — 93016 CV STRESS TEST SUPVJ ONLY: CPT | Performed by: INTERNAL MEDICINE

## 2022-04-11 PROCEDURE — 93018 CV STRESS TEST I&R ONLY: CPT | Performed by: INTERNAL MEDICINE

## 2022-04-11 PROCEDURE — 93306 TTE W/DOPPLER COMPLETE: CPT | Performed by: INTERNAL MEDICINE

## 2022-04-11 PROCEDURE — 93017 CV STRESS TEST TRACING ONLY: CPT

## 2022-05-05 ENCOUNTER — OFFICE VISIT (OUTPATIENT)
Dept: OBGYN CLINIC | Facility: HOSPITAL | Age: 62
End: 2022-05-05
Payer: COMMERCIAL

## 2022-05-05 ENCOUNTER — HOSPITAL ENCOUNTER (OUTPATIENT)
Dept: RADIOLOGY | Facility: HOSPITAL | Age: 62
Discharge: HOME | End: 2022-05-05
Attending: ORTHOPAEDIC SURGERY
Payer: COMMERCIAL

## 2022-05-05 VITALS
BODY MASS INDEX: 31.58 KG/M2 | HEART RATE: 78 BPM | SYSTOLIC BLOOD PRESSURE: 161 MMHG | HEIGHT: 64 IN | DIASTOLIC BLOOD PRESSURE: 80 MMHG

## 2022-05-05 DIAGNOSIS — Z01.812 PRE-OPERATIVE LABORATORY EXAMINATION: ICD-10-CM

## 2022-05-05 DIAGNOSIS — Z01.810 PRE-OPERATIVE CARDIOVASCULAR EXAMINATION: ICD-10-CM

## 2022-05-05 DIAGNOSIS — M25.561 CHRONIC PAIN OF RIGHT KNEE: ICD-10-CM

## 2022-05-05 DIAGNOSIS — M25.551 PAIN IN RIGHT HIP: ICD-10-CM

## 2022-05-05 DIAGNOSIS — M17.11 PRIMARY OSTEOARTHRITIS OF RIGHT KNEE: Primary | ICD-10-CM

## 2022-05-05 DIAGNOSIS — Z96.651 AFTERCARE FOLLOWING RIGHT KNEE JOINT REPLACEMENT SURGERY: ICD-10-CM

## 2022-05-05 DIAGNOSIS — Z47.1 AFTERCARE FOLLOWING RIGHT KNEE JOINT REPLACEMENT SURGERY: ICD-10-CM

## 2022-05-05 DIAGNOSIS — M17.12 PRIMARY OSTEOARTHRITIS OF LEFT KNEE: ICD-10-CM

## 2022-05-05 DIAGNOSIS — G89.29 CHRONIC PAIN OF RIGHT KNEE: ICD-10-CM

## 2022-05-05 DIAGNOSIS — M16.11 PRIMARY OSTEOARTHRITIS OF RIGHT HIP: ICD-10-CM

## 2022-05-05 PROCEDURE — 99214 OFFICE O/P EST MOD 30 MIN: CPT | Performed by: ORTHOPAEDIC SURGERY

## 2022-05-05 PROCEDURE — 1036F TOBACCO NON-USER: CPT | Performed by: ORTHOPAEDIC SURGERY

## 2022-05-05 PROCEDURE — 73502 X-RAY EXAM HIP UNI 2-3 VIEWS: CPT

## 2022-05-05 RX ORDER — FOLIC ACID 1 MG/1
1 TABLET ORAL DAILY
Qty: 30 TABLET | Refills: 0 | Status: SHIPPED | OUTPATIENT
Start: 2022-05-05

## 2022-05-05 RX ORDER — ASCORBIC ACID 500 MG
500 TABLET ORAL 2 TIMES DAILY
Qty: 60 TABLET | Refills: 0 | Status: SHIPPED | OUTPATIENT
Start: 2022-05-05

## 2022-05-05 RX ORDER — ACETAMINOPHEN 325 MG/1
975 TABLET ORAL ONCE
Status: CANCELLED | OUTPATIENT
Start: 2022-05-05 | End: 2022-05-05

## 2022-05-05 RX ORDER — CHLORHEXIDINE GLUCONATE 0.12 MG/ML
15 RINSE ORAL ONCE
Status: CANCELLED | OUTPATIENT
Start: 2022-05-05 | End: 2022-05-05

## 2022-05-05 RX ORDER — CEFAZOLIN SODIUM 2 G/50ML
2000 SOLUTION INTRAVENOUS ONCE
Status: CANCELLED | OUTPATIENT
Start: 2022-05-05 | End: 2022-05-05

## 2022-05-05 RX ORDER — FERROUS SULFATE TAB EC 324 MG (65 MG FE EQUIVALENT) 324 (65 FE) MG
TABLET DELAYED RESPONSE ORAL
Qty: 30 TABLET | Refills: 0 | Status: SHIPPED | OUTPATIENT
Start: 2022-05-05

## 2022-05-05 RX ORDER — ENOXAPARIN SODIUM 100 MG/ML
40 INJECTION SUBCUTANEOUS DAILY
Qty: 11.2 ML | Refills: 0 | Status: SHIPPED | OUTPATIENT
Start: 2022-05-05 | End: 2022-07-19

## 2022-05-05 RX ORDER — SODIUM CHLORIDE, SODIUM LACTATE, POTASSIUM CHLORIDE, CALCIUM CHLORIDE 600; 310; 30; 20 MG/100ML; MG/100ML; MG/100ML; MG/100ML
125 INJECTION, SOLUTION INTRAVENOUS CONTINUOUS
Status: CANCELLED | OUTPATIENT
Start: 2022-05-05

## 2022-05-05 RX ORDER — GABAPENTIN 300 MG/1
300 CAPSULE ORAL ONCE
Status: CANCELLED | OUTPATIENT
Start: 2022-05-05 | End: 2022-05-05

## 2022-05-05 NOTE — PATIENT INSTRUCTIONS
Diagnosis ICD-10-CM Associated Orders   1  Primary osteoarthritis of right knee  M17 11    2  Chronic pain of right knee  M25 561     G89 29    3  Primary osteoarthritis of left knee  M17 12    4  Primary osteoarthritis of both hips, very mild  M16 0    5  Pre-operative laboratory examination  Z01 812    6  Pre-operative cardiovascular examination  Z01 810    7  Aftercare following right knee joint replacement surgery  Z47 1     Z96 651          No follow-ups on file  Knee Replacement   AMBULATORY CARE:   What you need to know about knee replacement:  Knee replacement is surgery to replace all or part of your knee joint  It is also called knee arthroplasty  How to prepare for knee replacement:   · Weeks before your surgery:      ? Your healthcare provider will check your overall health  He or she will ask about your current knee pain or stiffness  Tell your provider how pain or stiffness affects your daily activities or ability to play sports  He or she may also ask about fatigue, anxiety, or depression you may have  ? Some medicines will need to be stopped weeks before surgery  These medicines include blood thinning medicine, such as aspirin and ibuprofen  It also includes some antirheumatic medicines  Make sure your healthcare provider knows all medicines you are taking  Also ask how long before surgery to stop taking them  ? Your healthcare provider may have you do exercises to strengthen your leg muscles before surgery  ? You may need x-rays to help your healthcare provider plan your surgery  Ask about any other tests you may need  · The night before and the day of surgery:      ? Your healthcare provider may tell you not to eat or drink anything after midnight on the day of your surgery  ? You will be told what medicines you can or cannot take the morning of surgery      What will happen during knee replacement:   · You may be given general anesthesia to keep you asleep and free from pain during surgery  You may instead be given regional anesthesia, such as spinal or epidural anesthesia, or a peripheral nerve block  Regional anesthesia keeps you numb from the waist down, but you will be awake during surgery  · Your surgeon will make an incision over your knee joint  He or she will remove the damaged parts of your knee joint and replace them with a knee implant  The knee implant may be made of metal and plastic  Your surgeon may secure it with medical cement  · Your surgeon will move the muscles and other tissues around your joint back into place  He or she will close your incision with stitches or staples  He or she may use strips of medical tape and a bandage to cover your wound  What to expect after knee replacement:   · It is normal to have increased stiffness and pain after surgery  Your pain and stiffness should get better with exercise  · Do not get out of bed until your healthcare provider says it is okay  The physical therapist will help you walk after your surgery  When you walk the same day after surgery, it helps decrease pain and improves the function of your knee  You may use crutches or a walker  · You may be in the hospital 1 to 4 days, or you may go home shortly after surgery  Your healthcare provider may talk to you about rehabilitation you can do at home  A physical therapist can teach you exercises to help strengthen your knee and prevent stiffness  You may also need occupational therapy to teach you the best ways to bathe and dress  · You may  be given a joint replacement ID card  The card will tell which joint was replaced and when it was replaced  Tell all healthcare providers about your joint replacement surgery  Risks of knee replacement:  You may bleed more than expected or get an infection  Nerves or blood vessels may be damaged during surgery  After surgery, your knee may be stiff or numb  You may continue to have knee pain   You may get a blood clot in your leg  This may become life-threatening  Your implant may get loose or move out of place  The implant may get worn out over time and need to be replaced  Call your local emergency number (911 in the [address]) for any of the following:   · You feel lightheaded, short of breath, and have chest pain  · You cough up blood  Seek care immediately if:   · Your leg feels warm, tender, and painful  It may look swollen and red  · You cannot walk or move your knee  · Blood soaks through your bandage  · Your incision wound comes apart  · Your incision area is red, swollen, or draining pus  Call your doctor or surgeon if:   · You have a fever or chills  · You have trouble moving or bending your knee  · You have new knee pain or pain that does not get better with medicine  · You have questions or concerns about your condition or care  Medicines: You may  need any of the following:  · Prescription pain medicine  may be given  Ask your healthcare provider how to take this medicine safely  Some prescription pain medicines contain acetaminophen  Do not take other medicines that contain acetaminophen without talking to your healthcare provider  Too much acetaminophen may cause liver damage  Prescription pain medicine may cause constipation  Ask your healthcare provider how to prevent or treat constipation  · NSAIDs , such as ibuprofen, help decrease swelling, pain, and fever  This medicine is available with or without a doctor's order  NSAIDs can cause stomach bleeding or kidney problems in certain people  If you take blood thinner medicine, always ask your healthcare provider if NSAIDs are safe for you  Always read the medicine label and follow directions  · Blood thinners  help prevent blood clots  Clots can cause strokes, heart attacks, and death  The following are general safety guidelines to follow while you are taking a blood thinner:    ?  Watch for bleeding and bruising while you take blood thinners  Watch for bleeding from your gums or nose  Watch for blood in your urine and bowel movements  Use a soft washcloth on your skin, and a soft toothbrush to brush your teeth  This can keep your skin and gums from bleeding  If you shave, use an electric shaver  Do not play contact sports  ? Tell your dentist and other healthcare providers that you take a blood thinner  Wear a bracelet or necklace that says you take this medicine  ? Do not start or stop any other medicines unless your healthcare provider tells you to  Many medicines cannot be used with blood thinners  ? Take your blood thinner exactly as prescribed by your healthcare provider  Do not skip does or take less than prescribed  Tell your provider right away if you forget to take your blood thinner, or if you take too much  ? Warfarin  is a blood thinner that you may need to take  The following are things you should be aware of if you take warfarin:     § Foods and medicines can affect the amount of warfarin in your blood  Do not make major changes to your diet while you take warfarin  Warfarin works best when you eat about the same amount of vitamin K every day  Vitamin K is found in green leafy vegetables and certain other foods  Ask for more information about what to eat when you are taking warfarin  § You will need to see your healthcare provider for follow-up visits when you are on warfarin  You will need regular blood tests  These tests are used to decide how much medicine you need  · Take your medicine as directed  Contact your healthcare provider if you think your medicine is not helping or if you have side effects  Tell him or her if you are allergic to any medicine  Keep a list of the medicines, vitamins, and herbs you take  Include the amounts, and when and why you take them  Bring the list or the pill bottles to follow-up visits   Carry your medicine list with you in case of an emergency  Self-care:   · Apply ice  on your knee for 15 to 20 minutes every hour or as directed  Use an ice pack, or put crushed ice in a plastic bag  Cover it with a towel  Ice helps prevent tissue damage and decreases swelling and pain  · Do not get the area wet  until your healthcare provider says it is okay  When your provider says it is okay, carefully wash the area with soap and water  Dry the area and put on new, clean bandages as directed  · Care for your incision area  as directed  Do not put powders or lotions over the area  If you have strips of medical tape over the incision area, let them fall off on their own  If they do not fall off within 14 days, gently remove them  Check the area for signs of infection, such as swelling, redness, or pus  · Go to physical or occupational therapy , if directed  A physical therapist will teach you exercises to build muscle strength, decrease pain and swelling, and prevent blood clots  An occupational therapist will show you how to do daily activities safely  He or she may recommend assistive devices to help you dress, bathe, and  objects  The assistive devices will help you prevent knee damage from twisting or bending  Prevent blood clots:   · Wear pressure stockings as directed  Pressure stockings help keep blood from pooling in your leg veins  Your healthcare provider can prescribe stockings that are right for you  Do not buy over-the-counter pressure stockings unless your healthcare provider says it is okay  They may not fit correctly or may have elastic that cuts off your circulation  Ask your healthcare provider when to start wearing pressure stockings and how long to wear them each day  · Do not cross your legs for 6 weeks or as directed  Your risk for blood clots is greater when you cross your legs  You might also move your implant out of place  Activity guidelines:   · Know your limits    Start activities slowly and give yourself rest periods  Pain and swelling can increase when you do too much  Do not do an activity until your healthcare provider says you are ready  · Use assistive devices as directed  Your thigh muscles will be weak after surgery  Assistive devices will help you not fall  · Go up the stairs  by placing your non-operated leg on the step first  Then bring your operated leg to the same step  Repeat  · Go down stairs  by placing your operated leg down on the step first  Then bring your non-operated leg to the same step  Repeat  · Do light housekeeping only  Ask your healthcare provider which housekeeping activities are okay for you  Do not vacuum, change sheets on a bed, or anything that makes you reach up, bend, or twist     · Do not drive for at least 6 weeks  or until your healthcare provider says it is okay  · Do not play contact sports, tennis, golf, or ski  until your healthcare provider says it is okay  These activities can loosen your knee implant  Prevent falls:       · Remove all loose carpets and cords  These can cause you to trip and fall  · Use a shower bench or chair  when you take a shower to limit the time you are standing  · Use a toilet seat riser with arms  if your toilet seat is low  A toilet seat riser will help prevent bending or twisting your knee  Metal detectors and MRIs after joint replacement surgery:   · The metal in your joint may set off metal detectors, such as at an airport  Tell the officials about your joint replacement surgery  You may also want to show your joint replacement ID card, if you were given one  · MRIs are considered safe for people with joint replacements  The type of metal used is not magnetic  Tell all healthcare providers about your joint replacement surgery  Follow up with your healthcare provider as directed: Your provider may contact you weeks after your surgery   He or she may ask if surgery helped relieve your pain or stiffness  Tell your provider how well you are able to do your daily activities after surgery  Also tell him or her if you are having any problems with mobility or range of motion  Write down your questions so you remember to ask them during your visits  © Copyright [employer] 2022 Information is for End User's use only and may not be sold, redistributed or otherwise used for commercial purposes  All illustrations and images included in CareNotes® are the copyrighted property of [employer]  or [employer]  The above information is an  only  It is not intended as medical advice for individual conditions or treatments  Talk to your doctor, nurse or pharmacist before following any medical regimen to see if it is safe and effective for you

## 2022-05-05 NOTE — PROGRESS NOTES
Assessment:   Diagnosis ICD-10-CM Associated Orders   1  Primary osteoarthritis of right knee  M17 11    2  Chronic pain of right knee  M25 561     G89 29    3  Primary osteoarthritis of left knee  M17 12    4  Primary osteoarthritis of right hip  M16 11 FL injection right hip (non arthrogram)   5  Pre-operative laboratory examination  Z01 812    6  Pre-operative cardiovascular examination  Z01 810    7  Aftercare following right knee joint replacement surgery  Z47 1     Z96 651        Plan:  Diagnostics reviewed and physical exam performed  Diagnosis, treatment options and associated risks were discussed with the patient including no treatment, nonsurgical treatment and potential for surgical intervention  The patient was given the opportunity to ask questions regarding each  Her hip x-rays show very mild osteoarthritis and appears that her hip symptoms are exacerbated by her right knee advanced osteoarthritis  Referral placed to radiology for a right hip intra-articular injection of cortisone for symptomatic improvement  Quality of life decision to pursue an elective right total knee arthroplasty  Risks and benefits discussed, consents obtained  Pre-operative vitamins and post-operative lovenox sent to her pharmacy  [facility]    To do next visit:  Return for post-op with x-rays right knee  The above stated was discussed in layman's terms and the patient expressed understanding  All questions were answered to the patient's satisfaction  Scribe Attestation    I,:  Moises Sears am acting as a scribe while in the presence of the attending physician :       I,:  Brittany Eugene MD personally performed the services described in this documentation    as scribed in my presence :             Subjective:   Pito Alvarez is a 63 y o  female who presents today for repeat evaluation of her bilateral knees and for evaluation of her right hip due to pain   She has known knee osteoarthritis, right worse than left   At her visit 3 months ago she completed a 3 shot Visco series to both knees with some improvement at her left knee however no significant improvement at her right knee  She finds that her right knee symptoms can be severe and unrelenting at times  It limits her day-to-day activities as well as impede on her quality of life  As result of her right knee symptoms which cause her to limp it is aggravated her right hip/groin pain  She has difficulty turning in bed as well as putting on her off her socks or shoes  When laying in bed she does get right leg numbness  She has a history of sciatica with lower back issues  Review of systems negative unless otherwise specified in HPI  Review of Systems    Past Medical History:   Diagnosis Date    Renal dysplasia     nonfunctioning left life-long       Past Surgical History:   Procedure Laterality Date    COLONOSCOPY  03/2016    KIDNEY SURGERY Right     age 29   Jessa Mullet REDUCTION MAMMAPLASTY      WRIST SURGERY Right     fx       History reviewed  No pertinent family history      Social History     Occupational History    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   Tobacco Use    Smoking status: Former Smoker    Smokeless tobacco: Never Used   Substance and Sexual Activity    Alcohol use: Yes     Comment: social    Drug use: No    Sexual activity: Not on file         Current Outpatient Medications:     ALPRAZolam (XANAX) 0 25 mg tablet, Take 1 tablet (0 25 mg total) by mouth daily as needed for anxiety, Disp: 30 tablet, Rfl: 0    FIBER PO, Take by mouth daily, Disp: , Rfl:     meloxicam (Mobic) 15 mg tablet, Take 1 tablet (15 mg total) by mouth daily (Patient not taking: Reported on 3/29/2022 ), Disp: 30 tablet, Rfl: 1    Multiple Vitamins-Minerals (MULTI FOR HER 50+ PO), Take by mouth daily, Disp: , Rfl:     Psyllium (Metamucil) WAFR, Take by mouth, Disp: , Rfl:     Allergies   Allergen Reactions    Acetazolamide Rash    Sulfa Antibiotics Rash            Vitals: 05/05/22 1253   BP: 161/80   Pulse: 78       Objective:                    Right Knee Exam     Muscle Strength   The patient has normal right knee strength  Tenderness   The patient is experiencing tenderness in the medial joint line  Range of Motion   Extension: 0   Flexion: 110 (With crepitation and stiffness)     Other   Erythema: absent  Sensation: normal  Swelling: mild  Effusion: effusion (trace) present    Comments:    Minimal warmth  No signs of infection      Left Knee Exam     Muscle Strength   The patient has normal left knee strength  Tenderness   The patient is experiencing tenderness in the medial joint line  Range of Motion   Extension: 0   Flexion: 120 (With crepitation and stiffness)     Other   Erythema: absent  Sensation: normal  Swelling: mild  Effusion: no effusion present    Comments:    Right greater than left varus knee deformities  Right Hip Exam     Tenderness   The patient is experiencing tenderness in the greater trochanter  Range of Motion   Flexion: 100   External rotation: 40   Internal rotation: 20 (mild groin discomfort)     Muscle Strength   The patient has normal right hip strength  Other   Sensation: normal    Comments:    Equal leg lengths            Diagnostics, reviewed and taken today if performed as documented: The attending physician has personally reviewed the pertinent films in PACS and interpretation is as follows:    Right hip and pelvis x-rays taken and reviewed in the office today and show: mild intra-articular degenerative changes but otherwise well preserved  Limited visualization of her lumbar spine which does show modest degenerative changes  Procedures, if performed today:    Procedures    None performed      Portions of the record may have been created with voice recognition software  Occasional wrong word or "sound a like" substitutions may have occurred due to the inherent limitations of voice recognition software    Read the chart carefully and recognize, using context, where substitutions have occurred

## 2022-05-09 NOTE — NURSING NOTE
Call placed to patient to discuss upcoming appointment at [facility] radiology department and complete consultation with patient  Patient is having right hip CSI utilizing fluoroscopy guidance  Reviewed patient's allergies, no current anticoagulant medication present, also discussed the pre and post procedure expectations  P Reminded patient of location and time expected for procedure, Patient expressed understanding by verbalizing and repeating instructions  My number was also supplied to patient to call if any further questions or concerns should arise pre or post procedure

## 2022-05-10 ENCOUNTER — HOSPITAL ENCOUNTER (OUTPATIENT)
Dept: RADIOLOGY | Facility: HOSPITAL | Age: 62
Discharge: HOME | End: 2022-05-10
Attending: ORTHOPAEDIC SURGERY
Payer: COMMERCIAL

## 2022-05-10 DIAGNOSIS — M16.11 PRIMARY OSTEOARTHRITIS OF RIGHT HIP: ICD-10-CM

## 2022-05-10 PROCEDURE — 77002 NEEDLE LOCALIZATION BY XRAY: CPT

## 2022-05-10 PROCEDURE — 20610 DRAIN/INJ JOINT/BURSA W/O US: CPT

## 2022-05-10 RX ORDER — LIDOCAINE HYDROCHLORIDE 10 MG/ML
10 INJECTION, SOLUTION EPIDURAL; INFILTRATION; INTRACAUDAL; PERINEURAL
Status: COMPLETED | OUTPATIENT
Start: 2022-05-10 | End: 2022-05-10

## 2022-05-10 RX ORDER — BUPIVACAINE HYDROCHLORIDE 2.5 MG/ML
15 INJECTION, SOLUTION EPIDURAL; INFILTRATION; INTRACAUDAL
Status: COMPLETED | OUTPATIENT
Start: 2022-05-10 | End: 2022-05-10

## 2022-05-10 RX ORDER — METHYLPREDNISOLONE ACETATE 80 MG/ML
80 INJECTION, SUSPENSION INTRA-ARTICULAR; INTRALESIONAL; INTRAMUSCULAR; SOFT TISSUE
Status: COMPLETED | OUTPATIENT
Start: 2022-05-10 | End: 2022-05-10

## 2022-05-10 RX ADMIN — IOHEXOL 1 ML: 240 INJECTION, SOLUTION INTRATHECAL; INTRAVASCULAR; INTRAVENOUS; ORAL at 16:27

## 2022-05-10 RX ADMIN — BUPIVACAINE HYDROCHLORIDE 2 ML: 2.5 INJECTION, SOLUTION EPIDURAL; INFILTRATION; INTRACAUDAL; PERINEURAL at 16:27

## 2022-05-10 RX ADMIN — LIDOCAINE HYDROCHLORIDE 7 ML: 10 INJECTION, SOLUTION EPIDURAL; INFILTRATION; INTRACAUDAL; PERINEURAL at 16:20

## 2022-05-10 RX ADMIN — METHYLPREDNISOLONE ACETATE 80 MG: 80 INJECTION, SUSPENSION INTRA-ARTICULAR; INTRALESIONAL; INTRAMUSCULAR; SOFT TISSUE at 16:28

## 2022-06-22 ENCOUNTER — TELEPHONE (OUTPATIENT)
Dept: OBGYN CLINIC | Facility: HOSPITAL | Age: 62
End: 2022-06-22

## 2022-06-22 ENCOUNTER — OFFICE VISIT (OUTPATIENT)
Dept: FAMILY MEDICINE CLINIC | Facility: CLINIC | Age: 62
End: 2022-06-22
Payer: COMMERCIAL

## 2022-06-22 VITALS
TEMPERATURE: 99 F | WEIGHT: 184 LBS | OXYGEN SATURATION: 98 % | DIASTOLIC BLOOD PRESSURE: 80 MMHG | HEIGHT: 64 IN | HEART RATE: 69 BPM | BODY MASS INDEX: 31.41 KG/M2 | SYSTOLIC BLOOD PRESSURE: 114 MMHG

## 2022-06-22 DIAGNOSIS — Z12.11 COLON CANCER SCREENING: ICD-10-CM

## 2022-06-22 DIAGNOSIS — I10 ESSENTIAL HYPERTENSION: ICD-10-CM

## 2022-06-22 DIAGNOSIS — Z12.31 ENCOUNTER FOR SCREENING MAMMOGRAM FOR MALIGNANT NEOPLASM OF BREAST: ICD-10-CM

## 2022-06-22 DIAGNOSIS — Z00.00 ANNUAL PHYSICAL EXAM: ICD-10-CM

## 2022-06-22 PROCEDURE — 99396 PREV VISIT EST AGE 40-64: CPT | Performed by: INTERNAL MEDICINE

## 2022-06-22 PROCEDURE — 1036F TOBACCO NON-USER: CPT | Performed by: INTERNAL MEDICINE

## 2022-06-22 PROCEDURE — 3008F BODY MASS INDEX DOCD: CPT | Performed by: INTERNAL MEDICINE

## 2022-06-22 NOTE — PROGRESS NOTES
Assessment/Plan:    Essential hypertension  Looks like she is consistently running on the higher side  Hold off on treatment  Limit salt intake, avoid NSAIDs  She will check her blood pressure at home before the next visit, she will bring her blood pressure cuff and blood pressure readings  Hold off on treatment as for now  Diagnoses and all orders for this visit:    BMI 31 0-31 9,adult  -     Lipid panel; Future  -     TSH, 3rd generation with Free T4 reflex; Future    Encounter for screening mammogram for malignant neoplasm of breast  -     Mammo screening bilateral w 3d & cad; Future    Colon cancer screening  -     Ambulatory referral for colonoscopy; Future    Essential hypertension    Annual physical exam    Other orders  -     AMOXICILLIN PO; Take by mouth          Subjective:      Patient ID: Radha Hicks is a 63 y o  female  Patient came today for annual checkup  Does not report any active complaints today  Script for mammogram and colonoscopy given  Blood pressure recheck by me 140/80  The following portions of the patient's history were reviewed and updated as appropriate: allergies, current medications, past family history, past medical history, past social history, past surgical history, and problem list     Review of Systems   Constitutional: Negative for activity change, appetite change, chills, fatigue and fever  HENT: Negative for congestion, ear pain, rhinorrhea and sore throat  Respiratory: Negative for cough, shortness of breath and wheezing  Cardiovascular: Negative for chest pain, palpitations and leg swelling  Gastrointestinal: Negative for abdominal distention, abdominal pain, diarrhea, nausea and vomiting  Genitourinary: Negative for difficulty urinating, frequency and pelvic pain  Musculoskeletal: Negative for arthralgias, back pain and neck pain  Skin: Negative for rash     Neurological: Negative for dizziness, tremors, weakness, numbness and headaches  Objective:      /80 (BP Location: Left arm, Patient Position: Sitting, Cuff Size: Large)   Pulse 69   Temp 99 °F (37 2 °C) (Tympanic)   Ht 5' 4" (1 626 m)   Wt 83 5 kg (184 lb)   SpO2 98%   BMI 31 58 kg/m²     Allergies   Allergen Reactions    Acetazolamide Rash    Sulfa Antibiotics Rash          Current Outpatient Medications:     ALPRAZolam (XANAX) 0 25 mg tablet, Take 1 tablet (0 25 mg total) by mouth daily as needed for anxiety, Disp: 30 tablet, Rfl: 0    AMOXICILLIN PO, Take by mouth, Disp: , Rfl:     ascorbic acid (VITAMIN C) 500 MG tablet, Take 1 tablet (500 mg total) by mouth 2 (two) times a day, Disp: 60 tablet, Rfl: 0    ferrous sulfate 324 (65 Fe) mg, Take one tablet daily  , Disp: 30 tablet, Rfl: 0    FIBER PO, Take by mouth daily, Disp: , Rfl:     folic acid (FOLVITE) 1 mg tablet, Take 1 tablet (1 mg total) by mouth daily, Disp: 30 tablet, Rfl: 0    Multiple Vitamins-Minerals (MULTI FOR HER 50+ PO), Take by mouth daily, Disp: , Rfl:     Psyllium (Metamucil) WAFR, Take by mouth, Disp: , Rfl:     enoxaparin (LOVENOX) 40 mg/0 4 mL, Inject 0 4 mL (40 mg total) under the skin daily for 28 days To start postoperatively, Disp: 11 2 mL, Rfl: 0     Patient Instructions   Please go to [employer] and buy  Omron upper arm large size blood pressure cuff  Please check your blood pressure twice a day first time in the morning 2nd time during the day at your convenience  Right down your numbers  on the piece of paper is a table  Bring your blood pressure readings and your blood pressure cuff with you next time for your preop clearance  Please limit use salt intake down to 0  Try to limit ibuprofen, naproxen  Stick with Tylenol for pain  Physical Exam  Constitutional:       General: She is not in acute distress  Appearance: Normal appearance  She is not ill-appearing  HENT:      Nose: No rhinorrhea     Cardiovascular:      Rate and Rhythm: Normal rate and regular rhythm  Heart sounds: No murmur heard  No friction rub  No gallop  Pulmonary:      Effort: No respiratory distress  Breath sounds: No wheezing or rhonchi  Chest:      Chest wall: No tenderness  Abdominal:      General: There is no distension  Palpations: There is no mass  Tenderness: There is no abdominal tenderness  There is no guarding or rebound  Hernia: No hernia is present  Musculoskeletal:         General: No swelling or tenderness  Lymphadenopathy:      Cervical: No cervical adenopathy  Skin:     Coloration: Skin is not jaundiced  Findings: No rash  Neurological:      Mental Status: She is alert and oriented to person, place, and time  Motor: No weakness        Gait: Gait normal    Psychiatric:         Mood and Affect: Mood normal          Behavior: Behavior normal

## 2022-06-22 NOTE — TELEPHONE ENCOUNTER
Patient seees Dr Yaneth Baer      Patient PCP's office is calling because patient is there now and wanted to know if its too soon to do her preop clearance  Spoke to surgery coordinator and advised it was 30 days        Advised call, verbalized understanding

## 2022-06-22 NOTE — PATIENT INSTRUCTIONS
Please go to [employer] and buy  Omron upper arm large size blood pressure cuff  Please check your blood pressure twice a day first time in the morning 2nd time during the day at your convenience  Right down your numbers  on the piece of paper is a table  Bring your blood pressure readings and your blood pressure cuff with you next time for your preop clearance  Please limit use salt intake down to 0  Try to limit ibuprofen, naproxen  Stick with Tylenol for pain

## 2022-06-22 NOTE — ASSESSMENT & PLAN NOTE
Looks like she is consistently running on the higher side  Hold off on treatment  Limit salt intake, avoid NSAIDs  She will check her blood pressure at home before the next visit, she will bring her blood pressure cuff and blood pressure readings  Hold off on treatment as for now

## 2022-07-05 ENCOUNTER — RA CDI HCC (OUTPATIENT)
Dept: OTHER | Facility: HOSPITAL | Age: 62
End: 2022-07-05

## 2022-07-05 ENCOUNTER — TELEPHONE (OUTPATIENT)
Dept: OBGYN CLINIC | Facility: HOSPITAL | Age: 62
End: 2022-07-05

## 2022-07-05 NOTE — PROGRESS NOTES
[facility]       Chart reviewed, no opportunity found: CHART REVIEWED, NO OPPORTUNITY FOUND        Patients Insurance        Commercial Insurance: [address] [place] [address]

## 2022-07-05 NOTE — TELEPHONE ENCOUNTER
Preoperative Elective Admission Assessment- spoke to patient    B- [facility] qualified  Living Situation:    Who does pt live with: spouse  What kind of home: ranch with basement  How do they enter the home: front  How many levels in home: 1 with basement  # of steps to enter home: 2 to enter   # of steps to second floor: 12 down  Are there handrails: Yes  Are there landings: No  Sleeping arrangement: first/entry floor  Where is Bathroom: First floor   Where is the tub or shower:Walk in shower     First Floor Setup:   Is there a bathroom: Yes  Where would pt sleep: bed     DME: Denies DME     Patient's Current Level of Function: Ambulates: Independently and ADLs: Independent    Post-op Caregiver: spouse  Caregiver Name and phone number for Inpatient discharge needs: Oliver Sharpe, spouse, [phone]  Currently receive any HHC/aides/community supports: No     Post-op Transport: spouse  To/from hospital: spouse  To/from PT 2-3x/week: spouse  Uses community transport now: No     Outpatient Physical Therapy Site:  Site: [address] [place] [address]    pre and post-op appts scheduled? Yes     Medication Management: self and pillbox  Preferred Pharmacy for Post-op Medications: [facility]   Blood Management Vitamin Regimen: Pt confirms taking as prescribed  Post-op anticoagulant: prescribed preoperatively - patient has at home ready for after surgery use only     DC Plan: Pt plans to be discharged home  Pt educated that our goal, if at all possible, is to appropriately discharge patient based off their post-op function while striving to maintain maximal independence  If possible, the goal is to discharge patient to home and for them to attend outpatient physical therapy      Barriers to DC identified preoperatively: none identified    BMI: 31 58     C[employer]: Enrolled in surveys     Patient Education:  Pt educated on post-op pain, early mobilization (POD0), Average inpt LOS, OP PT goal   Patient educated that our goal is to appropriately discharge patient based off their post-op function while striving to maintain maximal independence  The goal is to discharge patient to home and for them to attend outpatient physical therapy

## 2022-07-06 ENCOUNTER — LAB REQUISITION (OUTPATIENT)
Dept: LAB | Facility: HOSPITAL | Age: 62
End: 2022-07-06
Payer: COMMERCIAL

## 2022-07-06 ENCOUNTER — APPOINTMENT (OUTPATIENT)
Dept: LAB | Facility: HOSPITAL | Age: 62
End: 2022-07-06
Attending: ORTHOPAEDIC SURGERY
Payer: COMMERCIAL

## 2022-07-06 DIAGNOSIS — M25.561 CHRONIC PAIN OF RIGHT KNEE: ICD-10-CM

## 2022-07-06 DIAGNOSIS — G89.29 CHRONIC PAIN OF RIGHT KNEE: ICD-10-CM

## 2022-07-06 DIAGNOSIS — M17.11 PRIMARY OSTEOARTHRITIS OF RIGHT KNEE: ICD-10-CM

## 2022-07-06 LAB
ABO GROUP BLD: NORMAL
ALBUMIN SERPL BCP-MCNC: 3.8 G/DL (ref 3.5–5)
ALP SERPL-CCNC: 79 U/L (ref 46–116)
ALT SERPL W P-5'-P-CCNC: 30 U/L (ref 12–78)
ANION GAP SERPL CALCULATED.3IONS-SCNC: 5 MMOL/L (ref 4–13)
APTT PPP: 26 SECONDS (ref 23–37)
AST SERPL W P-5'-P-CCNC: 21 U/L (ref 5–45)
BASOPHILS # BLD AUTO: 0.05 THOUSANDS/ΜL (ref 0–0.1)
BASOPHILS NFR BLD AUTO: 1 % (ref 0–1)
BILIRUB SERPL-MCNC: 1.69 MG/DL (ref 0.2–1)
BLD GP AB SCN SERPL QL: NEGATIVE
BUN SERPL-MCNC: 17 MG/DL (ref 5–25)
CALCIUM SERPL-MCNC: 9.1 MG/DL (ref 8.3–10.1)
CHLORIDE SERPL-SCNC: 106 MMOL/L (ref 100–108)
CHOLEST SERPL-MCNC: 212 MG/DL
CO2 SERPL-SCNC: 25 MMOL/L (ref 21–32)
CREAT SERPL-MCNC: 0.73 MG/DL (ref 0.6–1.3)
EOSINOPHIL # BLD AUTO: 0.23 THOUSAND/ΜL (ref 0–0.61)
EOSINOPHIL NFR BLD AUTO: 4 % (ref 0–6)
ERYTHROCYTE [DISTWIDTH] IN BLOOD BY AUTOMATED COUNT: 12 % (ref 11.6–15.1)
EST. AVERAGE GLUCOSE BLD GHB EST-MCNC: 111 MG/DL
GFR SERPL CREATININE-BSD FRML MDRD: 90 ML/MIN/1.73SQ M
GLUCOSE P FAST SERPL-MCNC: 98 MG/DL (ref 65–99)
HBA1C MFR BLD: 5.5 %
HCT VFR BLD AUTO: 42.9 % (ref 34.8–46.1)
HDLC SERPL-MCNC: 55 MG/DL
HGB BLD-MCNC: 14.1 G/DL (ref 11.5–15.4)
IMM GRANULOCYTES # BLD AUTO: 0.05 THOUSAND/UL (ref 0–0.2)
IMM GRANULOCYTES NFR BLD AUTO: 1 % (ref 0–2)
INR PPP: 0.92 (ref 0.84–1.19)
LDLC SERPL CALC-MCNC: 128 MG/DL (ref 0–100)
LYMPHOCYTES # BLD AUTO: 1.23 THOUSANDS/ΜL (ref 0.6–4.47)
LYMPHOCYTES NFR BLD AUTO: 19 % (ref 14–44)
MCH RBC QN AUTO: 29.9 PG (ref 26.8–34.3)
MCHC RBC AUTO-ENTMCNC: 32.9 G/DL (ref 31.4–37.4)
MCV RBC AUTO: 91 FL (ref 82–98)
MONOCYTES # BLD AUTO: 0.58 THOUSAND/ΜL (ref 0.17–1.22)
MONOCYTES NFR BLD AUTO: 9 % (ref 4–12)
NEUTROPHILS # BLD AUTO: 4.35 THOUSANDS/ΜL (ref 1.85–7.62)
NEUTS SEG NFR BLD AUTO: 66 % (ref 43–75)
NONHDLC SERPL-MCNC: 157 MG/DL
NRBC BLD AUTO-RTO: 0 /100 WBCS
PLATELET # BLD AUTO: 259 THOUSANDS/UL (ref 149–390)
PMV BLD AUTO: 9.8 FL (ref 8.9–12.7)
POTASSIUM SERPL-SCNC: 4.3 MMOL/L (ref 3.5–5.3)
PROT SERPL-MCNC: 7.3 G/DL (ref 6.4–8.2)
PROTHROMBIN TIME: 12 SECONDS (ref 11.6–14.5)
RBC # BLD AUTO: 4.72 MILLION/UL (ref 3.81–5.12)
RH BLD: POSITIVE
SODIUM SERPL-SCNC: 136 MMOL/L (ref 136–145)
SPECIMEN EXPIRATION DATE: NORMAL
TRIGL SERPL-MCNC: 144 MG/DL
TSH SERPL DL<=0.05 MIU/L-ACNC: 1.89 UIU/ML (ref 0.45–4.5)
WBC # BLD AUTO: 6.49 THOUSAND/UL (ref 4.31–10.16)

## 2022-07-06 PROCEDURE — 80061 LIPID PANEL: CPT

## 2022-07-06 PROCEDURE — 80053 COMPREHEN METABOLIC PANEL: CPT

## 2022-07-06 PROCEDURE — 86900 BLOOD TYPING SEROLOGIC ABO: CPT | Performed by: ORTHOPAEDIC SURGERY

## 2022-07-06 PROCEDURE — 84443 ASSAY THYROID STIM HORMONE: CPT

## 2022-07-06 PROCEDURE — 86901 BLOOD TYPING SEROLOGIC RH(D): CPT | Performed by: ORTHOPAEDIC SURGERY

## 2022-07-06 PROCEDURE — 36415 COLL VENOUS BLD VENIPUNCTURE: CPT

## 2022-07-06 PROCEDURE — 85730 THROMBOPLASTIN TIME PARTIAL: CPT

## 2022-07-06 PROCEDURE — 85610 PROTHROMBIN TIME: CPT

## 2022-07-06 PROCEDURE — 85025 COMPLETE CBC W/AUTO DIFF WBC: CPT

## 2022-07-06 PROCEDURE — 86850 RBC ANTIBODY SCREEN: CPT | Performed by: ORTHOPAEDIC SURGERY

## 2022-07-06 PROCEDURE — 83036 HEMOGLOBIN GLYCOSYLATED A1C: CPT

## 2022-07-11 NOTE — PROGRESS NOTES
Internal Medicine Pre-Operative Evaluation:     Reason for Visit: Pre-operative Evaluation for Risk Stratification and Optimization    Patient ID: Mae Adams is a 63 y o  female  Surgery: Arthroplasty of right knee  Referring Provider: Dr Shravan Hill      Primary osteoarthritis right knee   Failed conservative measures   Electing to undergo total joint arthroplasty    Pre-operative Optimization for planned surgery   Patient is surgically optimized for planned procedure   Patient meets preoperative quality goals as noted below   Recommendations as listed in PLAN section below  Carlos Nava 3005 surgical nurse  navigator with any questions regarding preoperative plan or schedule   Follow up visit with [facility] medical team 1 week after discharge from surgery as scheduled    Single functioning kidney  · Right is functioning left is not  · Bmp is stable  · Avoid hypotension/NSAIDS    Anxiety  · Cont xanax as needed    Atypical chest pain  · Cardiac ruled out with negative stress/echo  · Felt 2/2 to anxiety        Patient Active Problem List   Diagnosis    Anxiety    Chondromalacia patellae of right knee    Congenital renal dysplasia ( left )    Mixed hyperlipidemia    Primary osteoarthritis of right knee    Primary osteoarthritis of right hip    Primary osteoarthritis of left knee    Chronic pain of right knee        Plan:     1  Further preoperative workup as follows:   - none no further testing required may proceed with surgery    2  Medication Management/Recommendations:   - continue all current medicines through morning of surgery with sip of water except the following:  - hold aspirin 7 days prior to surgery  - avoid use of NSAID such as motrin, advil, aleve for 7 days prior to surgery  - hold all OTC herbal or nutritional supplements 7 days before surgery    3  Patient requires further consultation with:   No Consults Required    4   Discharge Planning / Barriers to Discharge  none identified - patients has post discharge therapy plan in place, transportation arranged for discharge day, adequate family support at home to assist with discharge to home  Recommendations to Proceed withSurgery    No contraindications to planned surgery      Subjective:           History of Present Illness:     Scooby Siu is a 63 y o  female who presents to the office today for a preoperative consultation at the request of surgeon  The patient understands this is an elective procedure and not emergent  They are electing to undergo planned procedure with an understanding that all surgery has inherent risk  They have worked with their surgeon and failed conservative treatment measures  Today they present for preoperative risk assessment and recommendations for optimization in preparation for surgery  Pt seen in surgical optimization center for upcoming proposed surgery  They have failed previous conservative measures and have elected surgical intervention  Pt meets presurgical lab and BMI optimization goals  Upon interview questioning patient is able to perform greater than 4 METs workload in daily life without any reported cardio-pulmonary symptoms  Pt has a h/o single solitary kidney on right side, left side not functioning  Avoid NSAIDS and monitor bmp  ROS: No TIA's or unusual headaches, no dysphagia  No prolonged cough  No dyspnea or chest pain on exertion  No abdominal pain, change in bowel habits, black or bloody stools  No urinary tract or BPH symptoms  Positive reported pain in arthritic joint  Positive difficulty with gait  No skin rashes or issues              Objective:      /78   Pulse 66   Ht 5' 4" (1 626 m)   Wt 83 2 kg (183 lb 6 4 oz)   SpO2 99%   BMI 31 48 kg/m²       General Appearance: no distress, conversive  HEENT: PERRLA, conjuctiva normal; oropharynx clear; mucous membranes moist;   Neck:  Supple, no lymphadenopathy or thyromegaly  Lungs: CTA, normal respiratory effort, no retractions, expiratory effort normal  CV: regular rate and rhythm , PMI normal   ABD: soft non tender, no masses , no hepatic or splenomegaly  EXT: DP pulses intact, no lymphadenopathy, no edema  Skin: normal turgor, normal texture, no rash  Psych: affect normal, mood normal  Neuro: AAOx3        The following portions of the patient's history were reviewed and updated as appropriate: allergies, current medications, past family history, past medical history, past social history, past surgical history and problem list      Past History:       Past Medical History:   Diagnosis Date    Chest pain syndrome 3/29/2022    Renal dysplasia     nonfunctioning left life-long    Past Surgical History:   Procedure Laterality Date    COLONOSCOPY  03/2016    FL INJECTION RIGHT HIP (NON ARTHROGRAM)  5/10/2022    KIDNEY SURGERY Right     age 29   Derrek Pert REDUCTION MAMMAPLASTY      WRIST SURGERY Right     fx          Social History     Tobacco Use    Smoking status: Former Smoker     Packs/day: 0 25     Years: 15 00     Pack years: 3 75     Types: Cigarettes    Smokeless tobacco: Never Used   Substance Use Topics    Alcohol use: Yes     Alcohol/week: 10 0 standard drinks     Types: 5 Cans of beer, 5 Standard drinks or equivalent per week     Comment: social    Drug use: No     History reviewed  No pertinent family history  Allergies: Allergies   Allergen Reactions    Acetazolamide Rash    Sulfa Antibiotics Rash        Current Medications:     Current Outpatient Medications   Medication Instructions    ALPRAZolam (XANAX) 0 25 mg, Oral, Daily PRN    ascorbic acid (VITAMIN C) 500 mg, Oral, 2 times daily    enoxaparin (LOVENOX) 40 mg, Subcutaneous, Daily, To start postoperatively    ferrous sulfate 324 (65 Fe) mg Take one tablet daily      FIBER PO Oral, Daily    folic acid (FOLVITE) 1 mg, Oral, Daily    Multiple Vitamins-Minerals (MULTI FOR HER 50+ PO) Oral, Daily    Psyllium (Metamucil) WAFR Oral             PRE-OP WORKSHEET DATA    Assessment of Pre-Operative Risks     MLJ Quality Hard Stops:  BMI (<40) : Estimated body mass index is 31 48 kg/m² as calculated from the following:    Height as of this encounter: 5' 4" (1 626 m)  Weight as of this encounter: 83 2 kg (183 lb 6 4 oz)  Hgb ( >11): Lab Results   Component Value Date    HGB 14 1 07/06/2022     HbA1c (<7 0) :   Lab Results   Component Value Date    HGBA1C 5 5 07/06/2022     GFR (>60) (Less then 45 = Nephrology consult):  CrCl cannot be calculated (Patient's most recent lab result is older than the maximum 7 days allowed  )  Active Decompensated Chronic Conditions which would delay surgery  No acutely decompensated medical issues such as recent CVA, MI, new onset arrhythmia, severe aortic stenosis, CHF, uncontrolled COPD       Exercise Capacity: (if less the 4 mets consider functional assessment via cardiac stress testing or consultation)    · Able to walk 2 blocks without symptoms?: Yes  · Able to walk 1 flights without symptoms?: Yes    Assessment of intra and post operative respiratory, hemodynamic and thrombotic risks     Prior Anesthesia Reactions: No     Personal history of venous thromboembolic disease? No    History of steroid use > 5 mg for >2 weeks within last year? No    Cardiac Risk Estimation: per the Revised Cardiac Risk Index (Circ  100:1043, 1999),     The patient's risk factors for cardiac complications include :  none    Scooby Siu has a in hospital cardiac risk of RCI RISK CLASS I (0 risk factors, risk of major cardiac compl  appr  0 5%) based on RCRI calculator          Pre-Op Data Reviewed:       Laboratory Results: I have personally reviewed the pertinent laboratory results/reports     EKG:I have personally reviewed pertinent reports      I personally reviewed and interpreted available tracings in the electronic medical record    OLD RECORDS: reviewed old records in the chart review section if EHR on day of visit      Previous cardiopulmonary studies within the past year:  · Echocardiogram: yes, 2022 ef normal  No valvular issues  · Cardiac Catheterization: no  · Stress Test: yes, negative 2022      Time of visit including pre-visit chart review, visit and post-visit coordination of plan and care , review of pre-surgical lab work, preparation and time spent documenting note in electronic medical record, time spent face-to-face in physical examination answering patient questions: 60 minutes    Tate [place], [address] [place] [address] Drive

## 2022-07-11 NOTE — PATIENT INSTRUCTIONS
Contact surgical nurse  navigator with any questions regarding preoperative plan or schedule  Follow up visit with [facility] medical team 1 week after discharge from surgery    Stop all over the counter supplements, herbal, naturopathic  medications for 1 week prior to surgery UNLESS prescribed by your surgeon  Hold NSAIDS (i e  advil, alleve, motrin, ibuprofen, celebrex) minimum 7 days prior to surgery  Hold Asprin minimum 7 days prior to surgery  Recommend using Tylenol ( acetaminophen ) 500mg every eight hours during the first week post discharge in conjunction with any additional pain medicine prescribed by your surgeon  Use bowel medicines prescribed by your surgeon ( colace) daily post op during the first 1-2 weeks to avoid post operative constipation issues  Call [phone] with any post discharge concerns or medical issues

## 2022-07-12 ENCOUNTER — CONSULT (OUTPATIENT)
Dept: FAMILY MEDICINE CLINIC | Facility: CLINIC | Age: 62
End: 2022-07-12
Payer: COMMERCIAL

## 2022-07-12 ENCOUNTER — TELEPHONE (OUTPATIENT)
Dept: ADMINISTRATIVE | Facility: OTHER | Age: 62
End: 2022-07-12

## 2022-07-12 VITALS
DIASTOLIC BLOOD PRESSURE: 80 MMHG | SYSTOLIC BLOOD PRESSURE: 128 MMHG | HEART RATE: 68 BPM | HEIGHT: 64 IN | BODY MASS INDEX: 31.24 KG/M2 | RESPIRATION RATE: 18 BRPM | OXYGEN SATURATION: 98 % | WEIGHT: 183 LBS

## 2022-07-12 DIAGNOSIS — M25.561 CHRONIC PAIN OF RIGHT KNEE: Primary | ICD-10-CM

## 2022-07-12 DIAGNOSIS — G89.29 CHRONIC PAIN OF RIGHT KNEE: Primary | ICD-10-CM

## 2022-07-12 DIAGNOSIS — Q61.4 CONGENITAL RENAL DYSPLASIA: ICD-10-CM

## 2022-07-12 DIAGNOSIS — K21.9 GASTROESOPHAGEAL REFLUX DISEASE, UNSPECIFIED WHETHER ESOPHAGITIS PRESENT: ICD-10-CM

## 2022-07-12 PROBLEM — R07.9 CHEST PAIN SYNDROME: Status: RESOLVED | Noted: 2022-03-29 | Resolved: 2022-07-12

## 2022-07-12 PROCEDURE — 99212 OFFICE O/P EST SF 10 MIN: CPT | Performed by: FAMILY MEDICINE

## 2022-07-12 PROCEDURE — 3725F SCREEN DEPRESSION PERFORMED: CPT | Performed by: FAMILY MEDICINE

## 2022-07-12 NOTE — TELEPHONE ENCOUNTER
----- Message from Argentina Dukes sent at 7/12/2022 12:29 PM EDT -----  Regarding: Colonoscopy/ [facility]  07/12/22 12:30 PM    Hello, our patient Rebel Landry has had CRC: Colonoscopy completed/performed  Please assist in updating the patient chart by pulling the document from the Media Tab  The date of service is 3/18/2021       Thank you,  Rosana Heard MA  [facility]

## 2022-07-12 NOTE — LETTER
Procedure Request Form: Colonoscopy      Date Requested: 22  Patient: Tana Quinn  Patient : 1960   Referring Provider: Marilou Crawford DO        Date of Procedure ______________________________       The above patient has informed us that they have completed their   most recent Colonoscopy at your facility  Please complete   this form and attach all corresponding procedure reports/results  Comments DOS: 2021  ____________________________________________________________________  ____________________________________________________________________  ____________________________________________________________________    Facility Completing Procedure _________________________________________    Form Completed By (print name) _______________________________________      Signature __________________________________________________________      These reports are needed for  compliance  Please fax this completed form and a copy of the procedure report to our office located at [place] [address] as soon as possible to [phone] rodrigo Mccoy Cj: Phone [phone]    We thank you for your assistance in treating our mutual patient

## 2022-07-12 NOTE — PROGRESS NOTES
FAMILY PRACTICE OFFICE VISIT  [facility]  [address]  [address], [place], [address]      NAME: Lauree Hammans  AGE: 63 y o  SEX: female  : 1960   MRN: [id number]    DATE: 2022  TIME: 11:59 AM    Assessment and Plan     Problem List Items Addressed This Visit     Congenital renal dysplasia ( left )    Chronic pain of right knee - Primary      Other Visit Diagnoses     Gastroesophageal reflux disease, hx chest pain w neg EKG/ stress test              Patient Instructions   She had a routine physical with other provider here in   She did have recent blood work, CBC, CMP appears stable other than mild elevation fasting bilirubin which is not unusual       A1c 5 5, TSH 1 89  Cholesterol 212 with HDL 55, , 2 5% 10 year cardiovascular risk    She did undergo EKG with stress testing in March/April of this year, that showed no ischemia  Chest x-ray was also normal had emergency room in March  Blood pressure today is 128/80, home readings have been fine  Creatinine 0 73  She does have congenital dysplasia left kidney  I see no need for further cardiac testing  She is medically stable for upcoming knee replacement  She does have occasional acid reflux, she can use over-the-counter 20 mg Famotidine/Pepcid once or twice daily as needed, call us if symptoms worsen, if does worsen we can refer for EGD  She is up-to-date with colonoscopy screening, her colonoscopy in May of 2021 was normal, redo 10 years  Mammogram screening is up-to-date, that was okay in November  She is scheduled for a full preop evaluation with Dr Vicky Paniagua, recheck with us at least yearly      She did receive 3rd COVID vaccine at [facility] Complaint     Chief Complaint   Patient presents with    Pre-op Exam     Right knee       History of Present Illness   Lauree Hammans is a 63y o -year-old female who is in today to check her blood pressure, she does have August 1st right total knee replacement plan  She did have a full physical with our office in June, she was told by the orthopedic office to see us for preop clearance, does have upcoming appointment for preop clearance with Dr Kwon Beverage also    Overall she is doing well other than right knee along with hip pain  She does monitor blood pressure at home, that has been fine  She does note occasional substernal burning, noted with swallowing water at times, does not use over-the-counter acid medications  She has had no exertional complaints, stress testing few months ago was okay, no ischemia  Review of Systems   Review of Systems   Constitutional: Positive for fatigue (Usual fatigue related to work)  Negative for appetite change, fever and unexpected weight change  No prior issues with anesthesia, no personal or family history clotting disorder/thrombosis   HENT: Negative for sore throat and trouble swallowing  Respiratory: Negative for cough, chest tightness, shortness of breath and wheezing  Cardiovascular: Negative for chest pain, palpitations and leg swelling  Gastrointestinal: Negative for abdominal pain, blood in stool, nausea and vomiting  Occasional acid reflux with substernal burning   No change in bowel   Genitourinary: Negative for dysuria and hematuria  Musculoskeletal: Positive for arthralgias  Neurological: Negative for dizziness, syncope, light-headedness and headaches  Psychiatric/Behavioral: Negative for behavioral problems and confusion  The patient is nervous/anxious (Uses alprazolam 1/2 tablet every few months, still has old supply at home)          Active Problem List     Patient Active Problem List   Diagnosis    Anxiety    Chondromalacia patellae of right knee    Congenital renal dysplasia ( left )    Mixed hyperlipidemia    Primary osteoarthritis of right knee    Primary osteoarthritis of right hip    Primary osteoarthritis of left knee    Chronic pain of right knee       Past Medical History:  Reviewed    Past Surgical History:  Past Surgical History:   Procedure Laterality Date    COLONOSCOPY  03/2016    FL INJECTION RIGHT HIP (NON ARTHROGRAM)  5/10/2022    KIDNEY SURGERY Right     age 29   Ezio Daubs REDUCTION MAMMAPLASTY      WRIST SURGERY Right     fx         Family History:  Reviewed    Social History:  Reviewed    Objective     Vitals:    07/12/22 1105   BP: 128/80   BP Location: Left arm   Patient Position: Sitting   Cuff Size: Large   Pulse: 68   Resp: 18   SpO2: 98%   Weight: 83 kg (183 lb)   Height: 5' 4" (1 626 m)     Body mass index is 31 41 kg/m²  BP Readings from Last 3 Encounters:   07/12/22 128/80   06/22/22 114/80   05/05/22 161/80       Wt Readings from Last 3 Encounters:   07/12/22 83 kg (183 lb)   06/22/22 83 5 kg (184 lb)   04/11/22 83 5 kg (184 lb)       Physical Exam  Constitutional:       General: She is not in acute distress  Appearance: Normal appearance  She is well-developed  She is not ill-appearing  Eyes:      General: No scleral icterus  Cardiovascular:      Rate and Rhythm: Normal rate and regular rhythm  Heart sounds: Normal heart sounds  No murmur heard  Pulmonary:      Effort: Pulmonary effort is normal  No respiratory distress  Breath sounds: Normal breath sounds  No wheezing, rhonchi or rales  Musculoskeletal:      Right lower leg: No edema  Left lower leg: No edema  Skin:     Coloration: Skin is not jaundiced  Neurological:      Mental Status: She is alert  Mental status is at baseline     Psychiatric:         Mood and Affect: Mood normal          Behavior: Behavior normal          ALLERGIES:  Allergies   Allergen Reactions    Acetazolamide Rash    Sulfa Antibiotics Rash       Current Medications     Current Outpatient Medications   Medication Sig Dispense Refill    ascorbic acid (VITAMIN C) 500 MG tablet Take 1 tablet (500 mg total) by mouth 2 (two) times a day 60 tablet 0    ferrous sulfate 324 (65 Fe) mg Take one tablet daily  30 tablet 0    FIBER PO Take by mouth daily      folic acid (FOLVITE) 1 mg tablet Take 1 tablet (1 mg total) by mouth daily 30 tablet 0    Multiple Vitamins-Minerals (MULTI FOR HER 50+ PO) Take by mouth daily      Psyllium (Metamucil) WAFR Take by mouth      ALPRAZolam (XANAX) 0 25 mg tablet Take 1 tablet (0 25 mg total) by mouth daily as needed for anxiety 30 tablet 0    enoxaparin (LOVENOX) 40 mg/0 4 mL Inject 0 4 mL (40 mg total) under the skin daily for 28 days To start postoperatively 11 2 mL 0     No current facility-administered medications for this visit  No orders of the defined types were placed in this encounter          Ana Gu DO

## 2022-07-12 NOTE — PATIENT INSTRUCTIONS
She had a routine physical with other provider here in June  She did have recent blood work, CBC, CMP appears stable other than mild elevation fasting bilirubin which is not unusual       A1c 5 5, TSH 1 89  Cholesterol 212 with HDL 55, , 2 5% 10 year cardiovascular risk    She did undergo EKG with stress testing in March/April of this year, that showed no ischemia  Chest x-ray was also normal had emergency room in March  Blood pressure today is 128/80, home readings have been fine  Creatinine 0 73  She does have congenital dysplasia left kidney  I see no need for further cardiac testing  She is medically stable for upcoming knee replacement  She does have occasional acid reflux, she can use over-the-counter 20 mg Famotidine/Pepcid once or twice daily as needed, call us if symptoms worsen, if does worsen we can refer for EGD  She is up-to-date with colonoscopy screening, her colonoscopy in May of 2021 was normal, redo 10 years  Mammogram screening is up-to-date, that was okay in November  She is scheduled for a full preop evaluation with Dr Nunez, recheck with us at least yearly      She did receive 3rd COVID vaccine at [facility] pharmacy

## 2022-07-18 ENCOUNTER — OFFICE VISIT (OUTPATIENT)
Dept: INTERNAL MEDICINE CLINIC | Facility: CLINIC | Age: 62
End: 2022-07-18
Payer: COMMERCIAL

## 2022-07-18 VITALS
SYSTOLIC BLOOD PRESSURE: 112 MMHG | OXYGEN SATURATION: 99 % | HEIGHT: 64 IN | BODY MASS INDEX: 31.31 KG/M2 | DIASTOLIC BLOOD PRESSURE: 78 MMHG | WEIGHT: 183.4 LBS | HEART RATE: 66 BPM

## 2022-07-18 DIAGNOSIS — M17.11 PRIMARY OSTEOARTHRITIS OF RIGHT KNEE: ICD-10-CM

## 2022-07-18 DIAGNOSIS — F41.9 ANXIETY: Primary | ICD-10-CM

## 2022-07-18 DIAGNOSIS — G89.29 CHRONIC PAIN OF RIGHT KNEE: ICD-10-CM

## 2022-07-18 DIAGNOSIS — M25.561 CHRONIC PAIN OF RIGHT KNEE: ICD-10-CM

## 2022-07-18 DIAGNOSIS — Z01.818 PRE-OPERATIVE CLEARANCE: ICD-10-CM

## 2022-07-18 PROCEDURE — 99244 OFF/OP CNSLTJ NEW/EST MOD 40: CPT | Performed by: INTERNAL MEDICINE

## 2022-07-18 NOTE — TELEPHONE ENCOUNTER
Upon review of the In Basket request and the patient's chart, initial outreach has been made via fax, please see Contacts section for details       Thank you  Harrison Garvin

## 2022-07-19 ENCOUNTER — APPOINTMENT (OUTPATIENT)
Dept: PREADMISSION TESTING | Facility: HOSPITAL | Age: 62
End: 2022-07-19
Payer: COMMERCIAL

## 2022-07-19 NOTE — PRE-PROCEDURE INSTRUCTIONS
Pre-Surgery Instructions:   Medication Instructions    ALPRAZolam (XANAX) 0 25 mg tablet PRN    ascorbic acid (VITAMIN C) 500 MG tablet Instructions provided by MD    enoxaparin (LOVENOX) 40 mg/0 4 mL Post Op - follow MD instructions    ferrous sulfate 324 (65 Fe) mg Instructions provided by MD Rajesh Onofre FIBER PO Take as directed    folic acid (FOLVITE) 1 mg tablet Instructions provided by MD    Multiple Vitamins-Minerals (MULTI FOR HER 50+ PO) Instructions provided by MD Rajesh Onofre Psyllium (Metamucil) WAFR Take as directed      My Surgical Experience    The following information was developed to assist you to prepare for your operation  What do I need to do before coming to the hospital?   Arrange for a responsible person to drive you to and from the hospital    Arrange care for your children at home  Children are not allowed in the recovery areas of the hospital   Plan to wear clothing that is easy to put on and take off  If you are having shoulder surgery, wear a shirt that buttons or zippers in the front  Bathing  o Shower the evening before and the morning of your surgery with an antibacterial soap  Please refer to the Pre Op Showering Instructions for Surgery Patients Sheet   o Remove nail polish and all body piercing jewelry  o Do not shave any body part for at least 24 hours before surgery-this includes face, arms, legs and upper body  Food  o Nothing to eat or drink after midnight the night before your surgery   This includes candy and chewing gum  o Exception: If your surgery is after 12:00pm (noon), you may have clear liquids such as 7-Up®, ginger ale, apple or cranberry juice, Jell-O®, water, or clear broth until 8:00 am  o Do not drink milk or juice with pulp on the morning before surgery  o Do not drink alcohol 24 hours before surgery  Medicine  o Follow instructions you received from your surgeon about which medicines you may take on the day of surgery  o If instructed to take medicine on the morning of surgery, take pills with just a small sip of water  Call your prescribing doctor for specific infroamtion on what to do if you take insulin    What should I bring to the hospital?    Bring:  Mitcheal Carlos or a walker, if you have them, for foot or knee surgery   A list of the daily medicines, vitamins, minerals, herbals and nutritional supplements you take  Include the dosages of medicines and the time you take them each day   Glasses, dentures or hearing aids   Minimal clothing; you will be wearing hospital sleepwear   Photo ID; required to verify your identity   If you have a Living Will or Power of , bring a copy of the documents   If you have an ostomy, bring an extra pouch and any supplies you use    Do not bring   Medicines or inhalers   Money, valuables or jewelry    What other information should I know about the day of surgery?  Notify your surgeons if you develop a cold, sore throat, cough, fever, rash or any other illness   Report to the Ambulatory Surgical/Same Day Surgery Unit   You will be instructed to stop at Registration only if you have not been pre-registered   Inform your  fi they do not stay that they will be asked by the staff to leave a phone number where they can be reached   Be available to be reached before surgery  In the event the operating room schedule changes, you may be asked to come in earlier or later than expected    *It is important to tell your doctor and others involved in your health care if you are taking or have been taking any non-prescription drugs, vitamins, minerals, herbals or other nutritional supplements   Any of these may interact with some food or medicines and cause a reaction

## 2022-07-20 ENCOUNTER — EVALUATION (OUTPATIENT)
Dept: PHYSICAL THERAPY | Facility: CLINIC | Age: 62
End: 2022-07-20
Payer: COMMERCIAL

## 2022-07-20 DIAGNOSIS — G89.29 CHRONIC PAIN OF RIGHT KNEE: ICD-10-CM

## 2022-07-20 DIAGNOSIS — M17.11 PRIMARY OSTEOARTHRITIS OF RIGHT KNEE: ICD-10-CM

## 2022-07-20 DIAGNOSIS — M25.561 CHRONIC PAIN OF RIGHT KNEE: ICD-10-CM

## 2022-07-20 PROCEDURE — 97110 THERAPEUTIC EXERCISES: CPT | Performed by: PHYSICAL THERAPIST

## 2022-07-20 PROCEDURE — 97161 PT EVAL LOW COMPLEX 20 MIN: CPT | Performed by: PHYSICAL THERAPIST

## 2022-07-20 NOTE — TELEPHONE ENCOUNTER
Upon review of the In Basket request we were able to locate, review, and update the patient chart as requested for CRC: Colonoscopy  Any additional questions or concerns should be emailed to the Practice Liaisons via [email or web address] email, please do not reply via In Basket      Thank you  Geno Coates

## 2022-07-20 NOTE — PROGRESS NOTES
PT Evaluation     Today's date: 2022  Patient name: Angus Marcano  : 1960  MRN: [id number]  Referring provider: Todd Hernandez  Dx:   Encounter Diagnosis     ICD-10-CM    1  Primary osteoarthritis of right knee  M17 11 Ambulatory referral to Physical Therapy   2  Chronic pain of right knee  M25 561 Ambulatory referral to Physical Therapy    G89 29                   Assessment  Assessment details: Pt presents with s/s consistent with R knee OA  She is scheduled for R TKA 22 and will benefit from skilled PT services post-operatively to address her impairments in order to decrease pain and restore function  Impairments: abnormal gait, abnormal muscle firing, abnormal or restricted ROM, abnormal movement, activity intolerance, impaired physical strength, lacks appropriate home exercise program, pain with function, weight-bearing intolerance and poor body mechanics  Understanding of Dx/Px/POC: good   Prognosis: good    Goals  STG - 4 wks  1) pt will demonstrate PROM 0-90 deg  2) pt will d/c FWW    MTG - 8 wks  1) pt will demonstrate PROM 0-110 deg  2) pt will d/c SPC  3) pt will report no difficulty with light ADLs    LTG - 12 wks  1) pt will demonstrate PROM 0-120 deg  2) pt will demonstrate normalized gait  3) pt will report reciprocal gait on stairs  4) pt will initiate daily walking routine for fitness    Plan  Planned modality interventions: cryotherapy  Planned therapy interventions: manual therapy, balance/weight bearing training, body mechanics training, neuromuscular re-education, patient education, strengthening, stretching, therapeutic activities, therapeutic exercise, home exercise program, gait training, functional ROM exercises and flexibility  Frequency: 2x week  Duration in weeks: 12  Treatment plan discussed with: patient        Subjective Evaluation    History of Present Illness  Mechanism of injury: Pt is a 63 y o  female with PMHx significant for R hip OA, anxiety   She reports 3-yr Hx of worsening R knee pain  She is scheduled for R TKA 22 and presents for pre-operative assessment  Pain  Current pain ratin  At best pain ratin  At worst pain ratin  Quality: sharp, pressure and dull ache  Relieving factors: rest  Progression: worsening    Social Support  Steps to enter house: yes (2 KATE with landing on each)  Stairs in house: yes (FF to basement with laundry)   Lives in: [place]  Lives with: spouse      Diagnostic Tests  X-ray: abnormal (Significant narrowing of the medial joint space with minimal osteophytic spurring  This has progressed from the prior study)  Treatments  Previous treatment: medication and injection treatment  Current treatment: medication  Patient Goals  Patient goals for therapy: decreased edema, decreased pain, improved balance, increased motion, increased strength, independence with ADLs/IADLs and return to sport/leisure activities  Patient goal: daily fast walk for 30 minutes        Objective     Observations     Right Knee   Positive for edema (1+ infrapatellar)  Passive Range of Motion     Right Knee   Flexion: 129 degrees with pain  Extension: 2 degrees with pain    Mobility   Patellar Mobility:     Right Knee   Hypomobile: medial, lateral, superior and inferior     Strength/Myotome Testing     Right Hip   Planes of Motion   Flexion: 3+  Extension: 4  Abduction: 4-  Adduction: 4    Right Knee   Flexion: 4  Extension: 4-  Quadriceps contraction: fair    General Comments:      Knee Comments  Gait: no AD: decreased R stance time, moderately limited R TKE at midstance, mod L trunk lean at R late stance/push-off             Precautions:  PMHx: R hip OA, anxiety  Dx: R knee OA, R TKA scheduled for 22    Manuals             R knee PROM                                                    Neuro Re-Ed                                                                                                        Ther Ex             Bike: rocking             Heel slides 5"x10            Quad sets 5"x5            Supine GA stretch 10"x3            SAQ 3"/  1x10            LAQ                                       Ther Activity                                       Gait Training             Gait training on floor  FWW           Step-ups  FWW  4"/           Modalities

## 2022-07-27 DIAGNOSIS — Z47.1 AFTERCARE FOLLOWING RIGHT KNEE JOINT REPLACEMENT SURGERY: Primary | ICD-10-CM

## 2022-07-27 DIAGNOSIS — Z96.651 AFTERCARE FOLLOWING RIGHT KNEE JOINT REPLACEMENT SURGERY: Primary | ICD-10-CM

## 2022-07-30 ENCOUNTER — ANESTHESIA EVENT (OUTPATIENT)
Dept: PERIOP | Facility: HOSPITAL | Age: 62
End: 2022-07-30
Payer: COMMERCIAL

## 2022-07-30 NOTE — ANESTHESIA PREPROCEDURE EVALUATION
Procedure:  ARTHROPLASTY KNEE TOTAL W ROBOT (Right Knee)    Past Medical History:   Diagnosis Date    Chest pain syndrome 3/29/2022    Renal dysplasia     nonfunctioning left life-long     Lab Results   Component Value Date    WBC 6 49 07/06/2022    HGB 14 1 07/06/2022    HCT 42 9 07/06/2022    MCV 91 07/06/2022     07/06/2022     Lab Results   Component Value Date    INR 0 92 07/06/2022    PROTIME 12 0 07/06/2022       Physical Exam    Airway    Mallampati score: II  TM Distance: >3 FB  Neck ROM: full     Dental   No notable dental hx     Cardiovascular  Rhythm: regular, Rate: normal,     Pulmonary  Breath sounds clear to auscultation,     Other Findings  Intercisor Distance > 3cm          Anesthesia Plan  ASA Score- 2     Anesthesia Type- spinal with ASA Monitors  Additional Monitors:   Airway Plan:     Comment: Discussed benefits/risks of neuraxial anesthesia including possibility of discomfort at site of injection, post-dural puncture headache, block failure, and more rare complications such as bleeding, infection, and permanent nerve damage  If block fails or there is difficulty placing neuraxial block, general anesthesia was discussed as back-up plan  Patient understands and wishes to proceed  All questions answered  Discussed nerve block to assist with post-operative analgesia  Discussed possibility of uncommon complications including permanent nerve injury, damage to blood vessels, infection local anesthetic toxicity, and nerve block failure  Patient understands and wishes to proceed          Plan Factors-Exercise tolerance (METS): >4 METS  Chart reviewed  EKG reviewed  Existing labs reviewed  Induction-     Postoperative Plan- Plan for postoperative opioid use  Informed Consent- Anesthetic plan and risks discussed with patient  I personally reviewed this patient with the CRNA  Discussed and agreed on the Anesthesia Plan with the XIMENA Steve

## 2022-08-01 ENCOUNTER — HOSPITAL ENCOUNTER (OUTPATIENT)
Facility: HOSPITAL | Age: 62
Setting detail: OUTPATIENT SURGERY
Discharge: HOME | End: 2022-08-02
Attending: ORTHOPAEDIC SURGERY | Admitting: ORTHOPAEDIC SURGERY
Payer: COMMERCIAL

## 2022-08-01 ENCOUNTER — ANESTHESIA (OUTPATIENT)
Dept: PERIOP | Facility: HOSPITAL | Age: 62
End: 2022-08-01
Payer: COMMERCIAL

## 2022-08-01 DIAGNOSIS — M17.11 PRIMARY OSTEOARTHRITIS OF RIGHT KNEE: ICD-10-CM

## 2022-08-01 DIAGNOSIS — M25.561 CHRONIC PAIN OF RIGHT KNEE: Primary | ICD-10-CM

## 2022-08-01 DIAGNOSIS — Z96.651 STATUS POST TOTAL KNEE REPLACEMENT, RIGHT: ICD-10-CM

## 2022-08-01 DIAGNOSIS — G89.29 CHRONIC PAIN OF RIGHT KNEE: Primary | ICD-10-CM

## 2022-08-01 DIAGNOSIS — M17.12 PRIMARY OSTEOARTHRITIS OF LEFT KNEE: ICD-10-CM

## 2022-08-01 LAB
ABO GROUP BLD: NORMAL
RH BLD: POSITIVE

## 2022-08-01 PROCEDURE — C1776 JOINT DEVICE (IMPLANTABLE): HCPCS | Performed by: ORTHOPAEDIC SURGERY

## 2022-08-01 PROCEDURE — 27447 TOTAL KNEE ARTHROPLASTY: CPT | Performed by: ORTHOPAEDIC SURGERY

## 2022-08-01 PROCEDURE — NC001 PR NO CHARGE: Performed by: ORTHOPAEDIC SURGERY

## 2022-08-01 PROCEDURE — C9290 INJ, BUPIVACAINE LIPOSOME: HCPCS | Performed by: STUDENT IN AN ORGANIZED HEALTH CARE EDUCATION/TRAINING PROGRAM

## 2022-08-01 PROCEDURE — 99244 OFF/OP CNSLTJ NEW/EST MOD 40: CPT | Performed by: INTERNAL MEDICINE

## 2022-08-01 PROCEDURE — C1713 ANCHOR/SCREW BN/BN,TIS/BN: HCPCS | Performed by: ORTHOPAEDIC SURGERY

## 2022-08-01 PROCEDURE — S2900 ROBOTIC SURGICAL SYSTEM: HCPCS | Performed by: ORTHOPAEDIC SURGERY

## 2022-08-01 DEVICE — ATTUNE KNEE SYSTEM FEMORAL POSTERIOR STABILIZED NARROW SIZE 4N RIGHT CEMENTED
Type: IMPLANTABLE DEVICE | Site: KNEE | Status: FUNCTIONAL
Brand: ATTUNE

## 2022-08-01 DEVICE — ATTUNE KNEE SYSTEM TIBIAL BASE FIXED BEARING SIZE 3 CEMENTED
Type: IMPLANTABLE DEVICE | Site: KNEE | Status: FUNCTIONAL
Brand: ATTUNE

## 2022-08-01 DEVICE — ATTUNE PATELLA MEDIALIZED DOME 32MM CEMENTED AOX
Type: IMPLANTABLE DEVICE | Site: KNEE | Status: FUNCTIONAL
Brand: ATTUNE

## 2022-08-01 DEVICE — ATTUNE KNEE SYSTEM TIBIAL INSERT FIXED BEARING POSTERIOR STABILIZED 4 7MM AOX
Type: IMPLANTABLE DEVICE | Site: KNEE | Status: FUNCTIONAL
Brand: ATTUNE

## 2022-08-01 DEVICE — SMARTSET HV HIGH VISCOSITY BONE CEMENT 40G
Type: IMPLANTABLE DEVICE | Site: KNEE | Status: FUNCTIONAL
Brand: SMARTSET

## 2022-08-01 RX ORDER — CALCIUM CARBONATE 200(500)MG
1000 TABLET,CHEWABLE ORAL DAILY PRN
Status: DISCONTINUED | OUTPATIENT
Start: 2022-08-01 | End: 2022-08-02 | Stop reason: HOSPADM

## 2022-08-01 RX ORDER — SODIUM CHLORIDE, SODIUM LACTATE, POTASSIUM CHLORIDE, CALCIUM CHLORIDE 600; 310; 30; 20 MG/100ML; MG/100ML; MG/100ML; MG/100ML
125 INJECTION, SOLUTION INTRAVENOUS CONTINUOUS
Status: DISCONTINUED | OUTPATIENT
Start: 2022-08-01 | End: 2022-08-02 | Stop reason: HOSPADM

## 2022-08-01 RX ORDER — ALPRAZOLAM 0.25 MG/1
0.25 TABLET ORAL DAILY PRN
Status: DISCONTINUED | OUTPATIENT
Start: 2022-08-01 | End: 2022-08-02 | Stop reason: HOSPADM

## 2022-08-01 RX ORDER — MAGNESIUM HYDROXIDE/ALUMINUM HYDROXICE/SIMETHICONE 120; 1200; 1200 MG/30ML; MG/30ML; MG/30ML
30 SUSPENSION ORAL EVERY 6 HOURS PRN
Status: DISCONTINUED | OUTPATIENT
Start: 2022-08-01 | End: 2022-08-02 | Stop reason: HOSPADM

## 2022-08-01 RX ORDER — SENNOSIDES 8.6 MG
1 TABLET ORAL DAILY
Status: DISCONTINUED | OUTPATIENT
Start: 2022-08-02 | End: 2022-08-02 | Stop reason: HOSPADM

## 2022-08-01 RX ORDER — SODIUM CHLORIDE, SODIUM LACTATE, POTASSIUM CHLORIDE, CALCIUM CHLORIDE 600; 310; 30; 20 MG/100ML; MG/100ML; MG/100ML; MG/100ML
100 INJECTION, SOLUTION INTRAVENOUS CONTINUOUS
Status: DISCONTINUED | OUTPATIENT
Start: 2022-08-01 | End: 2022-08-02 | Stop reason: HOSPADM

## 2022-08-01 RX ORDER — HYDROMORPHONE HCL/PF 1 MG/ML
0.4 SYRINGE (ML) INJECTION
Status: DISCONTINUED | OUTPATIENT
Start: 2022-08-01 | End: 2022-08-01 | Stop reason: HOSPADM

## 2022-08-01 RX ORDER — GABAPENTIN 300 MG/1
300 CAPSULE ORAL ONCE
Status: COMPLETED | OUTPATIENT
Start: 2022-08-01 | End: 2022-08-01

## 2022-08-01 RX ORDER — FENTANYL CITRATE 50 UG/ML
INJECTION, SOLUTION INTRAMUSCULAR; INTRAVENOUS AS NEEDED
Status: DISCONTINUED | OUTPATIENT
Start: 2022-08-01 | End: 2022-08-01

## 2022-08-01 RX ORDER — OXYCODONE HYDROCHLORIDE 10 MG/1
10 TABLET ORAL EVERY 4 HOURS PRN
Status: DISCONTINUED | OUTPATIENT
Start: 2022-08-01 | End: 2022-08-02 | Stop reason: HOSPADM

## 2022-08-01 RX ORDER — HYDROMORPHONE HCL/PF 1 MG/ML
0.5 SYRINGE (ML) INJECTION EVERY 2 HOUR PRN
Status: DISCONTINUED | OUTPATIENT
Start: 2022-08-01 | End: 2022-08-02 | Stop reason: HOSPADM

## 2022-08-01 RX ORDER — CEFAZOLIN SODIUM 2 G/50ML
2000 SOLUTION INTRAVENOUS ONCE
Status: DISCONTINUED | OUTPATIENT
Start: 2022-08-01 | End: 2022-08-01 | Stop reason: HOSPADM

## 2022-08-01 RX ORDER — SENNOSIDES 8.8 MG/5ML
8.8 LIQUID ORAL
Status: DISCONTINUED | OUTPATIENT
Start: 2022-08-01 | End: 2022-08-02 | Stop reason: HOSPADM

## 2022-08-01 RX ORDER — BUPIVACAINE HYDROCHLORIDE 2.5 MG/ML
INJECTION, SOLUTION EPIDURAL; INFILTRATION; INTRACAUDAL
Status: COMPLETED | OUTPATIENT
Start: 2022-08-01 | End: 2022-08-01

## 2022-08-01 RX ORDER — CEFAZOLIN SODIUM 2 G/50ML
2000 SOLUTION INTRAVENOUS EVERY 8 HOURS
Status: COMPLETED | OUTPATIENT
Start: 2022-08-02 | End: 2022-08-02

## 2022-08-01 RX ORDER — FOLIC ACID 1 MG/1
1 TABLET ORAL DAILY
Status: DISCONTINUED | OUTPATIENT
Start: 2022-08-02 | End: 2022-08-02 | Stop reason: HOSPADM

## 2022-08-01 RX ORDER — DOCUSATE SODIUM 100 MG/1
100 CAPSULE, LIQUID FILLED ORAL 2 TIMES DAILY
Status: DISCONTINUED | OUTPATIENT
Start: 2022-08-01 | End: 2022-08-02 | Stop reason: HOSPADM

## 2022-08-01 RX ORDER — CHLORHEXIDINE GLUCONATE 0.12 MG/ML
15 RINSE ORAL ONCE
Status: COMPLETED | OUTPATIENT
Start: 2022-08-01 | End: 2022-08-01

## 2022-08-01 RX ORDER — PROPOFOL 10 MG/ML
INJECTION, EMULSION INTRAVENOUS CONTINUOUS PRN
Status: DISCONTINUED | OUTPATIENT
Start: 2022-08-01 | End: 2022-08-01

## 2022-08-01 RX ORDER — ONDANSETRON 2 MG/ML
INJECTION INTRAMUSCULAR; INTRAVENOUS AS NEEDED
Status: DISCONTINUED | OUTPATIENT
Start: 2022-08-01 | End: 2022-08-01

## 2022-08-01 RX ORDER — MIDAZOLAM HYDROCHLORIDE 2 MG/2ML
INJECTION, SOLUTION INTRAMUSCULAR; INTRAVENOUS AS NEEDED
Status: DISCONTINUED | OUTPATIENT
Start: 2022-08-01 | End: 2022-08-01

## 2022-08-01 RX ORDER — ENOXAPARIN SODIUM 100 MG/ML
40 INJECTION SUBCUTANEOUS DAILY
Status: DISCONTINUED | OUTPATIENT
Start: 2022-08-02 | End: 2022-08-02 | Stop reason: HOSPADM

## 2022-08-01 RX ORDER — BUPIVACAINE HYDROCHLORIDE 7.5 MG/ML
INJECTION, SOLUTION INTRASPINAL AS NEEDED
Status: DISCONTINUED | OUTPATIENT
Start: 2022-08-01 | End: 2022-08-01

## 2022-08-01 RX ORDER — METHOCARBAMOL 750 MG/1
750 TABLET, FILM COATED ORAL EVERY 6 HOURS SCHEDULED
Status: DISCONTINUED | OUTPATIENT
Start: 2022-08-02 | End: 2022-08-02 | Stop reason: HOSPADM

## 2022-08-01 RX ORDER — ONDANSETRON 2 MG/ML
4 INJECTION INTRAMUSCULAR; INTRAVENOUS ONCE AS NEEDED
Status: DISCONTINUED | OUTPATIENT
Start: 2022-08-01 | End: 2022-08-01 | Stop reason: HOSPADM

## 2022-08-01 RX ORDER — ASCORBIC ACID 500 MG
500 TABLET ORAL 2 TIMES DAILY
Status: DISCONTINUED | OUTPATIENT
Start: 2022-08-01 | End: 2022-08-02 | Stop reason: HOSPADM

## 2022-08-01 RX ORDER — ONDANSETRON 2 MG/ML
4 INJECTION INTRAMUSCULAR; INTRAVENOUS EVERY 6 HOURS PRN
Status: DISCONTINUED | OUTPATIENT
Start: 2022-08-01 | End: 2022-08-02 | Stop reason: HOSPADM

## 2022-08-01 RX ORDER — ACETAMINOPHEN 325 MG/1
975 TABLET ORAL ONCE
Status: COMPLETED | OUTPATIENT
Start: 2022-08-01 | End: 2022-08-01

## 2022-08-01 RX ORDER — DEXAMETHASONE SODIUM PHOSPHATE 10 MG/ML
INJECTION, SOLUTION INTRAMUSCULAR; INTRAVENOUS AS NEEDED
Status: DISCONTINUED | OUTPATIENT
Start: 2022-08-01 | End: 2022-08-01

## 2022-08-01 RX ORDER — FENTANYL CITRATE/PF 50 MCG/ML
25 SYRINGE (ML) INJECTION
Status: DISCONTINUED | OUTPATIENT
Start: 2022-08-01 | End: 2022-08-01 | Stop reason: HOSPADM

## 2022-08-01 RX ORDER — ACETAMINOPHEN 325 MG/1
975 TABLET ORAL EVERY 8 HOURS
Status: DISCONTINUED | OUTPATIENT
Start: 2022-08-01 | End: 2022-08-02 | Stop reason: HOSPADM

## 2022-08-01 RX ORDER — OXYCODONE HYDROCHLORIDE 5 MG/1
5 TABLET ORAL EVERY 4 HOURS PRN
Status: DISCONTINUED | OUTPATIENT
Start: 2022-08-01 | End: 2022-08-02 | Stop reason: HOSPADM

## 2022-08-01 RX ORDER — PROPOFOL 10 MG/ML
INJECTION, EMULSION INTRAVENOUS AS NEEDED
Status: DISCONTINUED | OUTPATIENT
Start: 2022-08-01 | End: 2022-08-01

## 2022-08-01 RX ORDER — CEFAZOLIN SODIUM 1 G/3ML
INJECTION, POWDER, FOR SOLUTION INTRAMUSCULAR; INTRAVENOUS AS NEEDED
Status: DISCONTINUED | OUTPATIENT
Start: 2022-08-01 | End: 2022-08-01

## 2022-08-01 RX ORDER — GABAPENTIN 300 MG/1
300 CAPSULE ORAL
Status: DISCONTINUED | OUTPATIENT
Start: 2022-08-01 | End: 2022-08-02 | Stop reason: HOSPADM

## 2022-08-01 RX ORDER — SIMETHICONE 80 MG
80 TABLET,CHEWABLE ORAL 4 TIMES DAILY PRN
Status: DISCONTINUED | OUTPATIENT
Start: 2022-08-01 | End: 2022-08-02 | Stop reason: HOSPADM

## 2022-08-01 RX ORDER — MAGNESIUM HYDROXIDE 1200 MG/15ML
LIQUID ORAL AS NEEDED
Status: DISCONTINUED | OUTPATIENT
Start: 2022-08-01 | End: 2022-08-01 | Stop reason: HOSPADM

## 2022-08-01 RX ADMIN — BUPIVACAINE HYDROCHLORIDE 20 ML: 2.5 INJECTION, SOLUTION EPIDURAL; INFILTRATION; INTRACAUDAL at 14:40

## 2022-08-01 RX ADMIN — GABAPENTIN 300 MG: 300 CAPSULE ORAL at 21:34

## 2022-08-01 RX ADMIN — PROPOFOL 40 MG: 10 INJECTION, EMULSION INTRAVENOUS at 15:59

## 2022-08-01 RX ADMIN — SODIUM CHLORIDE, SODIUM LACTATE, POTASSIUM CHLORIDE, AND CALCIUM CHLORIDE: .6; .31; .03; .02 INJECTION, SOLUTION INTRAVENOUS at 16:16

## 2022-08-01 RX ADMIN — DEXAMETHASONE SODIUM PHOSPHATE 10 MG: 10 INJECTION, SOLUTION INTRAMUSCULAR; INTRAVENOUS at 15:52

## 2022-08-01 RX ADMIN — PROPOFOL 40 MG: 10 INJECTION, EMULSION INTRAVENOUS at 15:53

## 2022-08-01 RX ADMIN — PROPOFOL 80 MCG/KG/MIN: 10 INJECTION, EMULSION INTRAVENOUS at 15:52

## 2022-08-01 RX ADMIN — ONDANSETRON 4 MG: 2 INJECTION INTRAMUSCULAR; INTRAVENOUS at 17:03

## 2022-08-01 RX ADMIN — MIDAZOLAM 2 MG: 1 INJECTION INTRAMUSCULAR; INTRAVENOUS at 14:39

## 2022-08-01 RX ADMIN — OXYCODONE HYDROCHLORIDE AND ACETAMINOPHEN 500 MG: 500 TABLET ORAL at 21:34

## 2022-08-01 RX ADMIN — ACETAMINOPHEN 975 MG: 325 TABLET ORAL at 12:27

## 2022-08-01 RX ADMIN — ACETAMINOPHEN 975 MG: 325 TABLET ORAL at 21:34

## 2022-08-01 RX ADMIN — CEFAZOLIN 2000 MG: 1 INJECTION, POWDER, FOR SOLUTION INTRAMUSCULAR; INTRAVENOUS at 15:52

## 2022-08-01 RX ADMIN — PHENYLEPHRINE HYDROCHLORIDE 40 MCG/MIN: 10 INJECTION INTRAVENOUS at 15:51

## 2022-08-01 RX ADMIN — CHLORHEXIDINE GLUCONATE 15 ML: 1.2 SOLUTION ORAL at 12:27

## 2022-08-01 RX ADMIN — FENTANYL CITRATE 100 MCG: 50 INJECTION INTRAMUSCULAR; INTRAVENOUS at 14:39

## 2022-08-01 RX ADMIN — SODIUM CHLORIDE, SODIUM LACTATE, POTASSIUM CHLORIDE, AND CALCIUM CHLORIDE: .6; .31; .03; .02 INJECTION, SOLUTION INTRAVENOUS at 15:41

## 2022-08-01 RX ADMIN — TRANEXAMIC ACID 1000 MG: 100 INJECTION, SOLUTION INTRAVENOUS at 15:58

## 2022-08-01 RX ADMIN — BUPIVACAINE 20 ML: 13.3 INJECTION, SUSPENSION, LIPOSOMAL INFILTRATION at 14:40

## 2022-08-01 RX ADMIN — BUPIVACAINE HYDROCHLORIDE IN DEXTROSE 1.4 ML: 7.5 INJECTION, SOLUTION SUBARACHNOID at 15:51

## 2022-08-01 RX ADMIN — DOCUSATE SODIUM 100 MG: 100 CAPSULE, LIQUID FILLED ORAL at 21:34

## 2022-08-01 RX ADMIN — BUPIVACAINE HYDROCHLORIDE 10 ML: 2.5 INJECTION, SOLUTION EPIDURAL; INFILTRATION; INTRACAUDAL at 14:40

## 2022-08-01 RX ADMIN — GABAPENTIN 300 MG: 300 CAPSULE ORAL at 12:27

## 2022-08-01 NOTE — CONSULTS
Office Visit    5/5/2022  Viola Back MD    Orthopedic Surgery  Primary osteoarthritis of right knee +6 more    Dx  Left Knee - Follow-up  Right Knee - Follow-up  Right Hip - Pain    Reason for Visit       Progress Notes  Gisele Alvarez MD (Physician) [facility]  Expand All Collapse All  Assessment:    Diagnosis ICD-10-CM Associated Orders   1  Primary osteoarthritis of right knee  M17 11     2  Chronic pain of right knee  M25 561       G89 29     3  Primary osteoarthritis of left knee  M17 12     4  Primary osteoarthritis of right hip  M16 11 FL injection right hip (non arthrogram)   5  Pre-operative laboratory examination  Z01 812     6  Pre-operative cardiovascular examination  Z01 810     7  Aftercare following right knee joint replacement surgery  Z47 1       Z96 651           Plan:  Diagnostics reviewed and physical exam performed  Diagnosis, treatment options and associated risks were discussed with the patient including no treatment, nonsurgical treatment and potential for surgical intervention  The patient was given the opportunity to ask questions regarding each  Her hip x-rays show very mild osteoarthritis and appears that her hip symptoms are exacerbated by her right knee advanced osteoarthritis  Referral placed to radiology for a right hip intra-articular injection of cortisone for symptomatic improvement  Quality of life decision to pursue an elective right total knee arthroplasty  Risks and benefits discussed, consents obtained  Pre-operative vitamins and post-operative lovenox sent to her pharmacy  [facility]     To do next visit:  Return for post-op with x-rays right knee      The above stated was discussed in layman's terms and the patient expressed understanding    All questions were answered to the patient's satisfaction               Scribe Attestation    I,:  Ishmael Ruiz am acting as a scribe while in the presence of the attending physician :       I,:  Shantell Herman MD personally performed the services described in this documentation    as scribed in my presence  :                 Subjective:   Lauree Hammans is a 63 y o  female who presents today for repeat evaluation of her bilateral knees and for evaluation of her right hip due to pain  She has known knee osteoarthritis, right worse than left  At her visit 3 months ago she completed a 3 shot Visco series to both knees with some improvement at her left knee however no significant improvement at her right knee  She finds that her right knee symptoms can be severe and unrelenting at times  It limits her day-to-day activities as well as impede on her quality of life  As result of her right knee symptoms which cause her to limp it is aggravated her right hip/groin pain  She has difficulty turning in bed as well as putting on her off her socks or shoes  When laying in bed she does get right leg numbness  She has a history of sciatica with lower back issues         Review of systems negative unless otherwise specified in HPI  Review of Systems     Medical History        Past Medical History:   Diagnosis Date    Renal dysplasia       nonfunctioning left life-long            Surgical History         Past Surgical History:   Procedure Laterality Date    COLONOSCOPY   03/2016    KIDNEY SURGERY Right       age 29   Annika [place] REDUCTION MAMMAPLASTY        WRIST SURGERY Right       fx            History reviewed  No pertinent family history      Social History            Occupational History    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   Tobacco Use    Smoking status: Former Smoker    Smokeless tobacco: Never Used   Substance and Sexual Activity    Alcohol use:  Yes       Comment: social    Drug use: No    Sexual activity: Not on file            Current Outpatient Medications:     ALPRAZolam (XANAX) 0 25 mg tablet, Take 1 tablet (0 25 mg total) by mouth daily as needed for anxiety, Disp: 30 tablet, Rfl: 0    FIBER PO, Take by mouth daily, Disp: , Rfl:     meloxicam (Mobic) 15 mg tablet, Take 1 tablet (15 mg total) by mouth daily (Patient not taking: Reported on 3/29/2022 ), Disp: 30 tablet, Rfl: 1    Multiple Vitamins-Minerals (MULTI FOR HER 50+ PO), Take by mouth daily, Disp: , Rfl:     Psyllium (Metamucil) WAFR, Take by mouth, Disp: , Rfl:           Allergies   Allergen Reactions    Acetazolamide Rash    Sulfa Antibiotics Rash                   Vitals:     05/05/22 1253   BP: 161/80   Pulse: 78         Objective:                     Right Knee Exam      Muscle Strength   The patient has normal right knee strength      Tenderness   The patient is experiencing tenderness in the medial joint line      Range of Motion   Extension: 0   Flexion: 110 (With crepitation and stiffness)      Other   Erythema: absent  Sensation: normal  Swelling: mild  Effusion: effusion (trace) present     Comments:    Minimal warmth  No signs of infection        Left Knee Exam      Muscle Strength   The patient has normal left knee strength      Tenderness   The patient is experiencing tenderness in the medial joint line      Range of Motion   Extension: 0   Flexion: 120 (With crepitation and stiffness)      Other   Erythema: absent  Sensation: normal  Swelling: mild  Effusion: no effusion present     Comments:    Right greater than left varus knee deformities          Right Hip Exam      Tenderness   The patient is experiencing tenderness in the greater trochanter      Range of Motion   Flexion: 100   External rotation: 40   Internal rotation: 20 (mild groin discomfort)      Muscle Strength   The patient has normal right hip strength      Other   Sensation: normal     Comments:    Equal leg lengths                 Diagnostics, reviewed and taken today if performed as documented:     The attending physician has personally reviewed the pertinent films in PACS and interpretation is as follows:     Right hip and pelvis x-rays taken and reviewed in the office today and show: mild intra-articular degenerative changes but otherwise well preserved  Limited visualization of her lumbar spine which does show modest degenerative changes          Procedures, if performed today:     Procedures     None performed       Portions of the record may have been created with voice recognition software   Occasional wrong word or "sound a like" substitutions may have occurred due to the inherent limitations of voice recognition software   Read the chart carefully and recognize, using context, where substitutions have occurred  Instructions       Return for post-op with x-rays right knee     Diagnosis ICD-10-CM Associated Orders   1  Primary osteoarthritis of right knee  M17 11     2  Chronic pain of right knee  M25 561       G89 29     3  Primary osteoarthritis of left knee  M17 12     4  Primary osteoarthritis of both hips, very mild  M16 0     5  Pre-operative laboratory examination  Z01 812     6  Pre-operative cardiovascular examination  Z01 810     7  Aftercare following right knee joint replacement surgery  Z47 1       Z96 651              No follow-ups on file      Knee Replacement   AMBULATORY CARE:   What you need to know about knee replacement:  Knee replacement is surgery to replace all or part of your knee joint  It is also called knee arthroplasty          How to prepare for knee replacement:   · Weeks before your surgery:       ? Your healthcare provider will check your overall health  He or she will ask about your current knee pain or stiffness  Tell your provider how pain or stiffness affects your daily activities or ability to play sports  He or she may also ask about fatigue, anxiety, or depression you may have       ? Some medicines will need to be stopped weeks before surgery  These medicines include blood thinning medicine, such as aspirin and ibuprofen  It also includes some antirheumatic medicines  Make sure your healthcare provider knows all medicines you are taking  Also ask how long before surgery to stop taking them      ? Your healthcare provider may have you do exercises to strengthen your leg muscles before surgery      ? You may need x-rays to help your healthcare provider plan your surgery  Ask about any other tests you may need      · The night before and the day of surgery:       ? Your healthcare provider may tell you not to eat or drink anything after midnight on the day of your surgery       ? You will be told what medicines you can or cannot take the morning of surgery      What will happen during knee replacement:   · You may be given general anesthesia to keep you asleep and free from pain during surgery  You may instead be given regional anesthesia, such as spinal or epidural anesthesia, or a peripheral nerve block  Regional anesthesia keeps you numb from the waist down, but you will be awake during surgery       · Your surgeon will make an incision over your knee joint  He or she will remove the damaged parts of your knee joint and replace them with a knee implant  The knee implant may be made of metal and plastic  Your surgeon may secure it with medical cement       · Your surgeon will move the muscles and other tissues around your joint back into place  He or she will close your incision with stitches or staples  He or she may use strips of medical tape and a bandage to cover your wound         What to expect after knee replacement:   · It is normal to have increased stiffness and pain after surgery  Your pain and stiffness should get better with exercise      · Do not get out of bed until your healthcare provider says it is okay  The physical therapist will help you walk after your surgery  When you walk the same day after surgery, it helps decrease pain and improves the function of your knee   You may use crutches or a walker      · You may be in the hospital 1 to 4 days, or you may go home shortly after surgery  Your healthcare provider may talk to you about rehabilitation you can do at home  A physical therapist can teach you exercises to help strengthen your knee and prevent stiffness  You may also need occupational therapy to teach you the best ways to bathe and dress       · You may  be given a joint replacement ID card  The card will tell which joint was replaced and when it was replaced  Tell all healthcare providers about your joint replacement surgery      Risks of knee replacement:  You may bleed more than expected or get an infection  Nerves or blood vessels may be damaged during surgery  After surgery, your knee may be stiff or numb  You may continue to have knee pain  You may get a blood clot in your leg  This may become life-threatening  Your implant may get loose or move out of place  The implant may get worn out over time and need to be replaced  Call your local emergency number (911 in the [address] [facility]) for any of the following:   · You feel lightheaded, short of breath, and have chest pain       · You cough up blood      Seek care immediately if:   · Your leg feels warm, tender, and painful  It may look swollen and red      · You cannot walk or move your knee      · Blood soaks through your bandage      · Your incision wound comes apart      · Your incision area is red, swollen, or draining pus      Call your doctor or surgeon if:   · You have a fever or chills      · You have trouble moving or bending your knee      · You have new knee pain or pain that does not get better with medicine      · You have questions or concerns about your condition or care      Medicines: You may  need any of the following:  · Prescription pain medicine  may be given  Ask your healthcare provider how to take this medicine safely  Some prescription pain medicines contain acetaminophen  Do not take other medicines that contain acetaminophen without talking to your healthcare provider   Too much acetaminophen may cause liver damage  Prescription pain medicine may cause constipation  Ask your healthcare provider how to prevent or treat constipation       · NSAIDs , such as ibuprofen, help decrease swelling, pain, and fever  This medicine is available with or without a doctor's order  NSAIDs can cause stomach bleeding or kidney problems in certain people  If you take blood thinner medicine, always ask your healthcare provider if NSAIDs are safe for you  Always read the medicine label and follow directions      · Blood thinners  help prevent blood clots  Clots can cause strokes, heart attacks, and death  The following are general safety guidelines to follow while you are taking a blood thinner:     ? Watch for bleeding and bruising while you take blood thinners  Watch for bleeding from your gums or nose  Watch for blood in your urine and bowel movements  Use a soft washcloth on your skin, and a soft toothbrush to brush your teeth  This can keep your skin and gums from bleeding  If you shave, use an electric shaver  Do not play contact sports       ? Tell your dentist and other healthcare providers that you take a blood thinner  Wear a bracelet or necklace that says you take this medicine       ? Do not start or stop any other medicines unless your healthcare provider tells you to  Many medicines cannot be used with blood thinners      ? Take your blood thinner exactly as prescribed by your healthcare provider  Do not skip does or take less than prescribed  Tell your provider right away if you forget to take your blood thinner, or if you take too much      ? Warfarin  is a blood thinner that you may need to take  The following are things you should be aware of if you take warfarin:      § Foods and medicines can affect the amount of warfarin in your blood  Do not make major changes to your diet while you take warfarin  Warfarin works best when you eat about the same amount of vitamin K every day   Vitamin K is found in green leafy vegetables and certain other foods  Ask for more information about what to eat when you are taking warfarin      § You will need to see your healthcare provider for follow-up visits when you are on warfarin  You will need regular blood tests  These tests are used to decide how much medicine you need      · Take your medicine as directed  Contact your healthcare provider if you think your medicine is not helping or if you have side effects  Tell him or her if you are allergic to any medicine  Keep a list of the medicines, vitamins, and herbs you take  Include the amounts, and when and why you take them  Bring the list or the pill bottles to follow-up visits  Carry your medicine list with you in case of an emergency      Self-care:   · Apply ice  on your knee for 15 to 20 minutes every hour or as directed  Use an ice pack, or put crushed ice in a plastic bag  Cover it with a towel  Ice helps prevent tissue damage and decreases swelling and pain       · Do not get the area wet  until your healthcare provider says it is okay  When your provider says it is okay, carefully wash the area with soap and water  Dry the area and put on new, clean bandages as directed      · Care for your incision area  as directed  Do not put powders or lotions over the area  If you have strips of medical tape over the incision area, let them fall off on their own  If they do not fall off within 14 days, gently remove them  Check the area for signs of infection, such as swelling, redness, or pus      · Go to physical or occupational therapy , if directed  A physical therapist will teach you exercises to build muscle strength, decrease pain and swelling, and prevent blood clots  An occupational therapist will show you how to do daily activities safely  He or she may recommend assistive devices to help you dress, bathe, and  objects   The assistive devices will help you prevent knee damage from twisting or bending      Prevent blood clots:   · Wear pressure stockings as directed  Pressure stockings help keep blood from pooling in your leg veins  Your healthcare provider can prescribe stockings that are right for you  Do not buy over-the-counter pressure stockings unless your healthcare provider says it is okay  They may not fit correctly or may have elastic that cuts off your circulation  Ask your healthcare provider when to start wearing pressure stockings and how long to wear them each day           · Do not cross your legs for 6 weeks or as directed  Your risk for blood clots is greater when you cross your legs  You might also move your implant out of place      Activity guidelines:   · Know your limits  Start activities slowly and give yourself rest periods  Pain and swelling can increase when you do too much  Do not do an activity until your healthcare provider says you are ready      · Use assistive devices as directed  Your thigh muscles will be weak after surgery  Assistive devices will help you not fall       · Go up the stairs  by placing your non-operated leg on the step first  Then bring your operated leg to the same step  Repeat      · Go down stairs  by placing your operated leg down on the step first  Then bring your non-operated leg to the same step  Repeat      · Do light housekeeping only  Ask your healthcare provider which housekeeping activities are okay for you  Do not vacuum, change sheets on a bed, or anything that makes you reach up, bend, or twist      · Do not drive for at least 6 weeks  or until your healthcare provider says it is okay      · Do not play contact sports, tennis, golf, or ski  until your healthcare provider says it is okay  These activities can loosen your knee implant      Prevent falls:     ·   Remove all loose carpets and cords    These can cause you to trip and fall      · Use a shower bench or chair  when you take a shower to limit the time you are standing      · Use a toilet seat riser with arms  if your toilet seat is low  A toilet seat riser will help prevent bending or twisting your knee      Metal detectors and MRIs after joint replacement surgery:   · The metal in your joint may set off metal detectors, such as at an airport  Tell the officials about your joint replacement surgery  You may also want to show your joint replacement ID card, if you were given one      · MRIs are considered safe for people with joint replacements  The type of metal used is not magnetic  Tell all healthcare providers about your joint replacement surgery      Follow up with your healthcare provider as directed: Your provider may contact you weeks after your surgery  He or she may ask if surgery helped relieve your pain or stiffness  Tell your provider how well you are able to do your daily activities after surgery  Also tell him or her if you are having any problems with mobility or range of motion  Write down your questions so you remember to ask them during your visits  © [employer] 2022 Information is for End User's use only and may not be sold, redistributed or otherwise used for commercial purposes  All illustrations and images included in CareNotes® are the copyrighted property of A [employer] M [employer] Inc  or [employer]   The above information is an  only  It is not intended as medical advice for individual conditions or treatments  Talk to your doctor, nurse or pharmacist before following any medical regimen to see if it is safe and effective for you                 COVID Immunization Verification (Printed 5/5/2022),   COVID Lab Result Verification (Printed 5/5/2022),   After Visit Summary (Automatic SnapShot taken 7/29/2022)            Additional Documentation    Vitals:  /80   Pulse 78   Ht 5' 4" (1 626 m)   BMI 31 58 kg/m²   BSA 1 89 m²      More Vitals   SmartForms:   [facility] PRE-CHARTING Cecillia Simmonds PCMH/PCSP WRAP UP REQUIREMENTS ADVANCED     [facility] SCRIBE ATTESTATION      (2 more)   Encounter Info:  Billing Info,   History,   Allergies,   Detailed Report            Media  From this encounter  Forms - Scan on 7/18/2022 11:43 AM: absence dept-faxed         Orders Placed       Labs     Comprehensive metabolic panel     Anemia Panel w/Reflex     APTT     CBC and differential     Protime-INR     Hemoglobin A1C W/EAG Estimation     PAT Covid Screening     Type and screen     Keyur Stefani  (6 more)     Imaging     FL injection right hip (non arthrogram)        Other Orders     Ambulatory referral to Physical Therapy Authorized     Case request operating room: ARTHROPLASTY KNEE TOTAL W ROBOT Once        All Encounter Results       Medication Changes       Ascorbic Acid 500 mg Oral 2 times daily     Enoxaparin Sodium 40 mg Subcutaneous Daily, To start postoperatively     Ferrous Sulfate 324 (65 Fe) mg Take one tablet daily       Folic Acid 1 mg Oral Daily     Medication List         Visit Diagnoses       Primary osteoarthritis of right knee     Chronic pain of right knee     Primary osteoarthritis of left knee     Primary osteoarthritis of right hip     Pre-operative laboratory examination     Pre-operative cardiovascular examination     Aftercare following right knee joint replacement surgery     Problem List             Encounters with Related Results     Appointment (7/6/2022)   Hospital Encounter (5/10/2022)

## 2022-08-01 NOTE — ANESTHESIA POSTPROCEDURE EVALUATION
Post-Op Assessment Note    CV Status:  Stable  Pain Score: 0    Pain management: adequate     Mental Status:  Alert and awake   Hydration Status:  Euvolemic   PONV Controlled:  Controlled   Airway Patency:  Patent      Post Op Vitals Reviewed: Yes      Staff: CRNA         No complications documented      BP   114/60   Temp (!) 97 1 °F (36 2 °C) (08/01/22 1727)    Pulse 60 (08/01/22 1727)   Resp 18 (08/01/22 1727)    SpO2 98 % (08/01/22 1727)

## 2022-08-01 NOTE — ANESTHESIA PROCEDURE NOTES
Spinal Block    Patient location during procedure: OR  Start time: 8/1/2022 3:51 PM  Reason for block: procedure for pain and at surgeon's request  Staffing  Performed: Anesthesiologist   Anesthesiologist: Fernando Alas MD  Resident/CRNA: Sun Scott CRNA  Preanesthetic Checklist  Completed: patient identified, IV checked, site marked, risks and benefits discussed, surgical consent, monitors and equipment checked, pre-op evaluation and timeout performed  Spinal Block  Patient position: sitting  Prep: ChloraPrep  Patient monitoring: cardiac monitor, frequent blood pressure checks and continuous pulse ox  Approach: midline  Location: L3-4  Injection technique: single-shot  Needle  Needle type: pencil-tip   Needle gauge: 25 G  Needle length: 10 cm  Assessment  Sensory level: T4  Events: cerebrospinal fluid  Injection Assessment:  negative aspiration for heme, no paresthesia on injection and positive aspiration for clear CSF    Post-procedure:  adhesive bandage applied, pressure dressing applied, secured with tape, site cleaned and sterile dressing applied

## 2022-08-01 NOTE — DISCHARGE INSTRUCTIONS
Discharge Instructions - Orthopedics  Zachery Needs 63 y o  female MRN: [id number]  Unit/Bed#: APU 01    Weight Bearing Status:                                           As tolerated to right lower extremity    DVT prophylaxis  Continue course 28 days of Lovenox    Pain:  Continue analgesics as directed    Dressing Instructions:   Please keep clean, dry and intact until follow up     Appt Instructions: If you do not have your appointment, please call the clinic at [phone] t  Otherwise followup as scheduled     Contact the office sooner if you experience any increased numbness/tingling in the extremities  Miscellaneous:   Follow-up in 1 week as scheduled

## 2022-08-01 NOTE — INTERVAL H&P NOTE
H&P reviewed  After examining the patient I find no changes in the patients condition since the H&P had been written  Vitals:    08/01/22 1216   BP: 150/63   Pulse: 68   Temp: 99 4 °F (37 4 °C)   SpO2: 94%   Head:  Present  CVS:  RRR  Lungs:  Clear bilateral  Abdomen:  Present  Assessment:  Adult female osteoarthritis the right knee that remained symptomatic despite appropriate nonsurgical treatments  Plan:   To OR for right total knee arthroplasty

## 2022-08-01 NOTE — CONSULTS
Internal Medicine  Consultation Note    Patient: Miri Lopez  Age/sex: 63 y o  female  Medical Record #: [id number]    Assessment/Plan    Status Post right Total KNEE ARTHROPLASTY   Continue post op pain control measures as prescribed   Follow bowel regimen to help decrease narcotic induced constipation   Follow post operative hemoglobin with serial CBC and treat accordingly   Monitor WBC and fever curve post op while encouraging use of incentive spirometer   DVT prophylaxis in place and reviewed  Anxiety  · Continue Xanax as needed  Single functioning kidney  · Monitor BMP  · Avoid hypotension and nephrotoxic medications  PRE-OP HGB LEVEL: 14 1    Subjective/ HPI: Miri Lopez was seen and examined  Hx of KNEE pain failed out patient conservative measures  Elected to undergo total KNEE arthroplasty We are asked to see patient for post op management of underlying medical co-morbidities as outlined above  Pt did well intra and post operatively with good hemodynamics  Pt currently comfortable and without any reported post op nausea  ROS:   A 10 point ROS was performed; negative except as noted above  Social History:    Substance Use History:   Social History     Substance and Sexual Activity   Alcohol Use Yes    Alcohol/week: 10 0 standard drinks    Types: 5 Cans of beer, 5 Standard drinks or equivalent per week    Comment: daily     Social History     Tobacco Use   Smoking Status Former Smoker    Packs/day: 0 25    Years: 15 00    Pack years: 3 75    Types: Cigarettes   Smokeless Tobacco Never Used     Social History     Substance and Sexual Activity   Drug Use No       Family History:    History reviewed  No pertinent family history        Review of Scheduled Meds:  Current Facility-Administered Medications   Medication Dose Route Frequency Provider Last Rate    acetaminophen  975 mg Oral Once Demetria Paz MD      bupivacaine liposomal  20 mL Injection Once Bright Briseida Kim MD      cefazolin  2,000 mg Intravenous Once Malinda Barnard MD      chlorhexidine  15 mL Swish & Spit Once Malinda Barnard MD      gabapentin  300 mg Oral Once Malinda Barnard MD      lactated ringers  125 mL/hr Intravenous Continuous Malinda Barnard MD      tranexamic acid (CYKLOKAPRON) IV bolus  1,000 mg Intravenous Once Malinda Barnard MD         Labs: Invalid input(s): LABGLOM, CMP                   Lab Results   Component Value Date    URINECX No Growth <1000 cfu/mL 12/07/2020       Input and Output Summary (last 24 hours):     No intake or output data in the 24 hours ending 08/01/22 1159    Imaging:     No orders to display       *Labs /Radiology studiesLabs reviewed  *Medications reviewed and reconciled as needed  *Please refer to order section for additional ordered labs studies  *Case discussed with primary attending during morning huddle case rounds    Vitals:   No data recorded  There is no height or weight on file to calculate BMI  Physical Exam:   General Appearance: no distress, conversive  HEENT: PERRLA, conjuctiva normal; oropharynx clear; mucous membranes moist;   Neck:  Supple, no lymphadenopathy or thyromegaly  Lungs: CTA, normal respiratory effort, no retractions, expiratory effort normal  CV: regular rate and rhythm , PMI normal   ABD: soft non tender, no masses , no hepatic or splenomegaly  EXT: DP pulses intact, no lymphadenopathy, no edema ;  right KNEE dressing in place  Skin: normal turgor, normal texture, no rash  Psych: affect normal, mood normal  Neuro: AAOx3          Invasive Devices  Report    Peripheral Intravenous Line  Duration           Peripheral IV 03/28/22 Left Antecubital 125 days                   Code Status: No Order  Current Length of Stay: 0 day(s)    Total floor / unit time spent today 1 hour  Coordination of patient's care was performed in conjunction with primary service   Time invested included review of patient's labs, vitals, and management of their comorbidities with continued monitoring, examination of patient as well as answering patient questions, documenting her findings and creating progress note in electronic medical record,  ordering appropriate diagnostic testing  ** Please Note: Fluency Direct voice to text software may have been used in the creation of this document   Audio transcription errors may occur**

## 2022-08-01 NOTE — OP NOTE
OPERATIVE REPORT  PATIENT NAME: Vaibhav Stubbs    :  1960  MRN: [id number]  Pt Location: [facility] OR ROOM 18    SURGERY DATE: 2022    Surgeon(s) and Role:     * Domonique Mercado MD - Primary     * Edrick Jeans, PA-C - Assisting     * Mercedes Saavedra MD - Assisting    Preop Diagnosis:  Primary osteoarthritis of right knee [M17 11]  Chronic pain of right knee [M25 561, G89 29]    Post-Op Diagnosis Codes:     * Primary osteoarthritis of right knee [M17 11]     * Chronic pain of right knee [M25 561, G89 29]    Procedure(s) (LRB):  ARTHROPLASTY KNEE TOTAL W ROBOT (Right)    Specimen(s):  * No specimens in log *    Estimated Blood Loss:   100 mL    Drains:  Closed/Suction Drain Right;Lateral Knee Accordion 10 Fr  (Active)   Number of days: 0       Urethral Catheter Latex 16 Fr  (Active)   Collection Container Standard drainage bag 22 1654   Securement Method Securing device (Describe) 22 1550   Number of days: 0       Anesthesia Type:   Spinal w/ Femoral Nerve Block    Operative Indications:  Primary osteoarthritis of right knee [M17 11]  Chronic pain of right knee [M25 561, G89 29]      Operative Findings:  depuy attune   Femur-4N   Poly-7   Tibia-3   BIGGTGB-30    Complications:   None    Procedure and Technique: Following induction of adequate level of spinal anesthesia, [place] catheters and sterilely introduced this patient's bladder  Antibiotics administered  The right thigh was then fitted with a thigh-high tourniquet  The right lower extremity then underwent sterile prep and drape  The right lower extremity was exsanguinated gravity, tourniquet inflated to 300 mmHg  A midline knee incision was created the knee in flexion  Full-thickness flaps raised in order to access the extensor mechanism  A medial arthrotomy was created open up the knee joint  Two half-pins were placed in the proximal tibia, 2 half-pins were placed within the distal femur    In doing so, modules were created  The rays were then attached the modules  Checkpoints made the proximal tibia distal femur  The knee was then registered  The surgery was planned out on the computer  The plan was finalized  The robot was docked  The 1st maneuver involved the distal femoral cut, followed by the anterior posterior cuts  The chamfer cuts then completed the process  The proximal tibia cut was made next  Care was taken preserve the integrity medial collateral, lateral collateral, and posterior structures during these maneuvers  The box cut was then made for the posterior stabilized unit, and remnant medial and lateral meniscectomy was then performed  Trials were inserted, the knee was taken through range of motion, found to be capable full extension, good flexion, stable throughout  The patella was then resurfaced will utilize the manufacture's equipment, was found to be a size 32 mm button  The trial components removed and the knee was prepared for insertion of cemented components  Cemented tibia, cemented femur, trial poly, cemented patella placed  Excess cement was removed, the knee was brought into extension  The cement was allowed to cure  The trial poly was taken out, the knee was packed off  The tourniquet was deflated, hemostasis was secured  The insert polyethylene was then snapped into position  The knee was taken through a final range of motion, found to be capable full extension, good flexion, stable throughout, and excellent patellar tracking  Satisfied with the extent of surgery, the wounds then flushed with saline closed  A Betadine soak was initiated  A drain was placed deep brought out via separate lateral stab incision  The arthrotomy was closed number Vicryl suture  The subcu tissues closed 2 Vicryl suture  The skin was closed staples  Sterile dressings were applied    She was then transferred to recovery room stable condition plans to include physical therapy weight-bearing to tolerance, she will require DVT prophylaxis with Lovenox   I was present for the entire procedure    Patient Disposition:  PACU       SIGNATURE: Leonard Atkinson MD  DATE: August 1, 2022  TIME: 5:14 PM

## 2022-08-01 NOTE — H&P (VIEW-ONLY)
Office Visit    5/5/2022  Viola Back MD    Orthopedic Surgery  Primary osteoarthritis of right knee +6 more    Dx  Left Knee - Follow-up  Right Knee - Follow-up  Right Hip - Pain    Reason for Visit       Progress Notes  Lara Blake MD (Physician) [facility] Orthopedic Surgery  Expand All Collapse All  Assessment:    Diagnosis ICD-10-CM Associated Orders   1  Primary osteoarthritis of right knee  M17 11     2  Chronic pain of right knee  M25 561       G89 29     3  Primary osteoarthritis of left knee  M17 12     4  Primary osteoarthritis of right hip  M16 11 FL injection right hip (non arthrogram)   5  Pre-operative laboratory examination  Z01 812     6  Pre-operative cardiovascular examination  Z01 810     7  Aftercare following right knee joint replacement surgery  Z47 1       Z96 651           Plan:  Diagnostics reviewed and physical exam performed  Diagnosis, treatment options and associated risks were discussed with the patient including no treatment, nonsurgical treatment and potential for surgical intervention  The patient was given the opportunity to ask questions regarding each  Her hip x-rays show very mild osteoarthritis and appears that her hip symptoms are exacerbated by her right knee advanced osteoarthritis  Referral placed to radiology for a right hip intra-articular injection of cortisone for symptomatic improvement  Quality of life decision to pursue an elective right total knee arthroplasty  Risks and benefits discussed, consents obtained  Pre-operative vitamins and post-operative lovenox sent to her pharmacy  [facility]     To do next visit:  Return for post-op with x-rays right knee      The above stated was discussed in layman's terms and the patient expressed understanding    All questions were answered to the patient's satisfaction               Scribe Attestation    I,:  Jose Santiago am acting as a scribe while in the presence of the attending physician :       I,:  Joy Weber MD personally performed the services described in this documentation    as scribed in my presence  :                 Subjective:   Whit Gunter is a 63 y o  female who presents today for repeat evaluation of her bilateral knees and for evaluation of her right hip due to pain  She has known knee osteoarthritis, right worse than left  At her visit 3 months ago she completed a 3 shot Visco series to both knees with some improvement at her left knee however no significant improvement at her right knee  She finds that her right knee symptoms can be severe and unrelenting at times  It limits her day-to-day activities as well as impede on her quality of life  As result of her right knee symptoms which cause her to limp it is aggravated her right hip/groin pain  She has difficulty turning in bed as well as putting on her off her socks or shoes  When laying in bed she does get right leg numbness  She has a history of sciatica with lower back issues         Review of systems negative unless otherwise specified in HPI  Review of Systems     Medical History        Past Medical History:   Diagnosis Date    Renal dysplasia       nonfunctioning left life-long            Surgical History         Past Surgical History:   Procedure Laterality Date    COLONOSCOPY   03/2016    KIDNEY SURGERY Right       age 29   [facility] REDUCTION MAMMAPLAST[facility]        WRIST SURGERY Right       fx            History reviewed  No pertinent family history      Social History            Occupational History    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   Tobacco Use    Smoking status: Former Smoker    Smokeless tobacco: Never Used   Substance and Sexual Activity    Alcohol use:  Yes       Comment: social    Drug use: No    Sexual activity: Not on file            Current Outpatient Medications:     ALPRAZolam (XANAX) 0 25 mg tablet, Take 1 tablet (0 25 mg total) by mouth daily as needed for anxiety, Disp: 30 tablet, Rfl: 0    FIBER PO, Take by mouth daily, Disp: , Rfl:     meloxicam (Mobic) 15 mg tablet, Take 1 tablet (15 mg total) by mouth daily (Patient not taking: Reported on 3/29/2022 ), Disp: 30 tablet, Rfl: 1    Multiple Vitamins-Minerals (MULTI FOR HER 50+ PO), Take by mouth daily, Disp: , Rfl:     Psyllium (Metamucil) WAFR, Take by mouth, Disp: , Rfl:           Allergies   Allergen Reactions    Acetazolamide Rash    Sulfa Antibiotics Rash                   Vitals:     05/05/22 1253   BP: 161/80   Pulse: 78         Objective:                     Right Knee Exam      Muscle Strength   The patient has normal right knee strength      Tenderness   The patient is experiencing tenderness in the medial joint line      Range of Motion   Extension: 0   Flexion: 110 (With crepitation and stiffness)      Other   Erythema: absent  Sensation: normal  Swelling: mild  Effusion: effusion (trace) present     Comments:    Minimal warmth  No signs of infection        Left Knee Exam      Muscle Strength   The patient has normal left knee strength      Tenderness   The patient is experiencing tenderness in the medial joint line      Range of Motion   Extension: 0   Flexion: 120 (With crepitation and stiffness)      Other   Erythema: absent  Sensation: normal  Swelling: mild  Effusion: no effusion present     Comments:    Right greater than left varus knee deformities          Right Hip Exam      Tenderness   The patient is experiencing tenderness in the greater trochanter      Range of Motion   Flexion: 100   External rotation: 40   Internal rotation: 20 (mild groin discomfort)      Muscle Strength   The patient has normal right hip strength      Other   Sensation: normal     Comments:    Equal leg lengths                 Diagnostics, reviewed and taken today if performed as documented:     The attending physician has personally reviewed the pertinent films in PACS and interpretation is as follows:     Right hip and pelvis x-rays taken and reviewed in the office today and show: mild intra-articular degenerative changes but otherwise well preserved  Limited visualization of her lumbar spine which does show modest degenerative changes          Procedures, if performed today:     Procedures     None performed       Portions of the record may have been created with voice recognition software   Occasional wrong word or "sound a like" substitutions may have occurred due to the inherent limitations of voice recognition software   Read the chart carefully and recognize, using context, where substitutions have occurred  Instructions       Return for post-op with x-rays right knee     Diagnosis ICD-10-CM Associated Orders   1  Primary osteoarthritis of right knee  M17 11     2  Chronic pain of right knee  M25 561       G89 29     3  Primary osteoarthritis of left knee  M17 12     4  Primary osteoarthritis of both hips, very mild  M16 0     5  Pre-operative laboratory examination  Z01 812     6  Pre-operative cardiovascular examination  Z01 810     7  Aftercare following right knee joint replacement surgery  Z47 1       Z96 651              No follow-ups on file      Knee Replacement   AMBULATORY CARE:   What you need to know about knee replacement:  Knee replacement is surgery to replace all or part of your knee joint  It is also called knee arthroplasty          How to prepare for knee replacement:   · Weeks before your surgery:       ? Your healthcare provider will check your overall health  He or she will ask about your current knee pain or stiffness  Tell your provider how pain or stiffness affects your daily activities or ability to play sports  He or she may also ask about fatigue, anxiety, or depression you may have       ? Some medicines will need to be stopped weeks before surgery  These medicines include blood thinning medicine, such as aspirin and ibuprofen  It also includes some antirheumatic medicines  Make sure your healthcare provider knows all medicines you are taking  Also ask how long before surgery to stop taking them      ? Your healthcare provider may have you do exercises to strengthen your leg muscles before surgery      ? You may need x-rays to help your healthcare provider plan your surgery  Ask about any other tests you may need      · The night before and the day of surgery:       ? Your healthcare provider may tell you not to eat or drink anything after midnight on the day of your surgery       ? You will be told what medicines you can or cannot take the morning of surgery      What will happen during knee replacement:   · You may be given general anesthesia to keep you asleep and free from pain during surgery  You may instead be given regional anesthesia, such as spinal or epidural anesthesia, or a peripheral nerve block  Regional anesthesia keeps you numb from the waist down, but you will be awake during surgery       · Your surgeon will make an incision over your knee joint  He or she will remove the damaged parts of your knee joint and replace them with a knee implant  The knee implant may be made of metal and plastic  Your surgeon may secure it with medical cement       · Your surgeon will move the muscles and other tissues around your joint back into place  He or she will close your incision with stitches or staples  He or she may use strips of medical tape and a bandage to cover your wound         What to expect after knee replacement:   · It is normal to have increased stiffness and pain after surgery  Your pain and stiffness should get better with exercise      · Do not get out of bed until your healthcare provider says it is okay  The physical therapist will help you walk after your surgery  When you walk the same day after surgery, it helps decrease pain and improves the function of your knee   You may use crutches or a walker      · You may be in the hospital 1 to 4 days, or you may go home shortly after surgery  Your healthcare provider may talk to you about rehabilitation you can do at home  A physical therapist can teach you exercises to help strengthen your knee and prevent stiffness  You may also need occupational therapy to teach you the best ways to bathe and dress       · You may  be given a joint replacement ID card  The card will tell which joint was replaced and when it was replaced  Tell all healthcare providers about your joint replacement surgery      Risks of knee replacement:  You may bleed more than expected or get an infection  Nerves or blood vessels may be damaged during surgery  After surgery, your knee may be stiff or numb  You may continue to have knee pain  You may get a blood clot in your leg  This may become life-threatening  Your implant may get loose or move out of place  The implant may get worn out over time and need to be replaced  Call your local emergency number (911 in the [address] [facility]) for any of the following:   · You feel lightheaded, short of breath, and have chest pain       · You cough up blood      Seek care immediately if:   · Your leg feels warm, tender, and painful  It may look swollen and red      · You cannot walk or move your knee      · Blood soaks through your bandage      · Your incision wound comes apart      · Your incision area is red, swollen, or draining pus      Call your doctor or surgeon if:   · You have a fever or chills      · You have trouble moving or bending your knee      · You have new knee pain or pain that does not get better with medicine      · You have questions or concerns about your condition or care      Medicines: You may  need any of the following:  · Prescription pain medicine  may be given  Ask your healthcare provider how to take this medicine safely  Some prescription pain medicines contain acetaminophen  Do not take other medicines that contain acetaminophen without talking to your healthcare provider   Too much acetaminophen may cause liver damage  Prescription pain medicine may cause constipation  Ask your healthcare provider how to prevent or treat constipation       · NSAIDs , such as ibuprofen, help decrease swelling, pain, and fever  This medicine is available with or without a doctor's order  NSAIDs can cause stomach bleeding or kidney problems in certain people  If you take blood thinner medicine, always ask your healthcare provider if NSAIDs are safe for you  Always read the medicine label and follow directions      · Blood thinners  help prevent blood clots  Clots can cause strokes, heart attacks, and death  The following are general safety guidelines to follow while you are taking a blood thinner:     ? Watch for bleeding and bruising while you take blood thinners  Watch for bleeding from your gums or nose  Watch for blood in your urine and bowel movements  Use a soft washcloth on your skin, and a soft toothbrush to brush your teeth  This can keep your skin and gums from bleeding  If you shave, use an electric shaver  Do not play contact sports       ? Tell your dentist and other healthcare providers that you take a blood thinner  Wear a bracelet or necklace that says you take this medicine       ? Do not start or stop any other medicines unless your healthcare provider tells you to  Many medicines cannot be used with blood thinners      ? Take your blood thinner exactly as prescribed by your healthcare provider  Do not skip does or take less than prescribed  Tell your provider right away if you forget to take your blood thinner, or if you take too much      ? Warfarin  is a blood thinner that you may need to take  The following are things you should be aware of if you take warfarin:      § Foods and medicines can affect the amount of warfarin in your blood  Do not make major changes to your diet while you take warfarin  Warfarin works best when you eat about the same amount of vitamin K every day   Vitamin K is found in green leafy vegetables and certain other foods  Ask for more information about what to eat when you are taking warfarin      § You will need to see your healthcare provider for follow-up visits when you are on warfarin  You will need regular blood tests  These tests are used to decide how much medicine you need      · Take your medicine as directed  Contact your healthcare provider if you think your medicine is not helping or if you have side effects  Tell him or her if you are allergic to any medicine  Keep a list of the medicines, vitamins, and herbs you take  Include the amounts, and when and why you take them  Bring the list or the pill bottles to follow-up visits  Carry your medicine list with you in case of an emergency      Self-care:   · Apply ice  on your knee for 15 to 20 minutes every hour or as directed  Use an ice pack, or put crushed ice in a plastic bag  Cover it with a towel  Ice helps prevent tissue damage and decreases swelling and pain       · Do not get the area wet  until your healthcare provider says it is okay  When your provider says it is okay, carefully wash the area with soap and water  Dry the area and put on new, clean bandages as directed      · Care for your incision area  as directed  Do not put powders or lotions over the area  If you have strips of medical tape over the incision area, let them fall off on their own  If they do not fall off within 14 days, gently remove them  Check the area for signs of infection, such as swelling, redness, or pus      · Go to physical or occupational therapy , if directed  A physical therapist will teach you exercises to build muscle strength, decrease pain and swelling, and prevent blood clots  An occupational therapist will show you how to do daily activities safely  He or she may recommend assistive devices to help you dress, bathe, and  objects   The assistive devices will help you prevent knee damage from twisting or bending      Prevent blood clots:   · Wear pressure stockings as directed  Pressure stockings help keep blood from pooling in your leg veins  Your healthcare provider can prescribe stockings that are right for you  Do not buy over-the-counter pressure stockings unless your healthcare provider says it is okay  They may not fit correctly or may have elastic that cuts off your circulation  Ask your healthcare provider when to start wearing pressure stockings and how long to wear them each day           · Do not cross your legs for 6 weeks or as directed  Your risk for blood clots is greater when you cross your legs  You might also move your implant out of place      Activity guidelines:   · Know your limits  Start activities slowly and give yourself rest periods  Pain and swelling can increase when you do too much  Do not do an activity until your healthcare provider says you are ready      · Use assistive devices as directed  Your thigh muscles will be weak after surgery  Assistive devices will help you not fall       · Go up the stairs  by placing your non-operated leg on the step first  Then bring your operated leg to the same step  Repeat      · Go down stairs  by placing your operated leg down on the step first  Then bring your non-operated leg to the same step  Repeat      · Do light housekeeping only  Ask your healthcare provider which housekeeping activities are okay for you  Do not vacuum, change sheets on a bed, or anything that makes you reach up, bend, or twist      · Do not drive for at least 6 weeks  or until your healthcare provider says it is okay      · Do not play contact sports, tennis, golf, or ski  until your healthcare provider says it is okay  These activities can loosen your knee implant      Prevent falls:     ·   Remove all loose carpets and cords    These can cause you to trip and fall      · Use a shower bench or chair  when you take a shower to limit the time you are standing      · Use a toilet seat riser with arms  if your toilet seat is low  A toilet seat riser will help prevent bending or twisting your knee      Metal detectors and MRIs after joint replacement surgery:   · The metal in your joint may set off metal detectors, such as at an airport  Tell the officials about your joint replacement surgery  You may also want to show your joint replacement ID card, if you were given one      · MRIs are considered safe for people with joint replacements  The type of metal used is not magnetic  Tell all healthcare providers about your joint replacement surgery      Follow up with your healthcare provider as directed: Your provider may contact you weeks after your surgery  He or she may ask if surgery helped relieve your pain or stiffness  Tell your provider how well you are able to do your daily activities after surgery  Also tell him or her if you are having any problems with mobility or range of motion  Write down your questions so you remember to ask them during your visits  © [employer] 2022 Information is for End User's use only and may not be sold, redistributed or otherwise used for commercial purposes  All illustrations and images included in CareNotes® are the copyrighted property of A D A M , Inc  or [facility]  The above information is an  only  It is not intended as medical advice for individual conditions or treatments  Talk to your doctor, nurse or pharmacist before following any medical regimen to see if it is safe and effective for you                 COVID Immunization Verification (Printed 5/5/2022),   COVID Lab Result Verification (Printed 5/5/2022),   After Visit Summary (Automatic SnapShot taken 7/29/2022)            Additional Documentation    Vitals:  /80   Pulse 78   Ht 5' 4" (1 626 m)   BMI 31 58 kg/m²   BSA 1 89 m²      More Vitals   SmartForms:   [facility] PRE-CHARTING Daniel Alegria [facility]/PCSP WRAP UP REQUIREMENTS ADVANCED     [facility] SCRIBE ATTESTATION      (2 more)   Encounter Info:  Billing Info,   History,   Allergies,   Detailed Report            Media  From this encounter  Forms - Scan on 7/18/2022 11:43 AM: absence dept-faxed         Orders Placed       Labs     Comprehensive metabolic panel     Anemia Panel w/Reflex     APTT     CBC and differential     Protime-INR     Hemoglobin A1C W/EAG Estimation     PAT Covid Screening     Type and screen     Karrie Alexander  (6 more)     Imaging     FL injection right hip (non arthrogram)        Other Orders     Ambulatory referral to Physical Therapy Authorized     Case request operating room: ARTHROPLASTY KNEE TOTAL W ROBOT Once        All Encounter Results       Medication Changes       Ascorbic Acid 500 mg Oral 2 times daily     Enoxaparin Sodium 40 mg Subcutaneous Daily, To start postoperatively     Ferrous Sulfate 324 (65 Fe) mg Take one tablet daily       Folic Acid 1 mg Oral Daily     Medication List         Visit Diagnoses       Primary osteoarthritis of right knee     Chronic pain of right knee     Primary osteoarthritis of left knee     Primary osteoarthritis of right hip     Pre-operative laboratory examination     Pre-operative cardiovascular examination     Aftercare following right knee joint replacement surgery     Problem List             Encounters with Related Results     Appointment (7/6/2022)   Hospital Encounter (5/10/2022)

## 2022-08-01 NOTE — ANESTHESIA PROCEDURE NOTES
Peripheral Block    Patient location during procedure: holding area  Start time: 8/1/2022 2:40 PM  Reason for block: at surgeon's request and post-op pain management  Staffing  Performed: Anesthesiologist   Anesthesiologist: Carolina Simons MD  Preanesthetic Checklist  Completed: patient identified, IV checked, site marked, risks and benefits discussed, surgical consent, monitors and equipment checked, pre-op evaluation and timeout performed  Peripheral Block  Patient position: supine  Prep: ChloraPrep  Patient monitoring: continuous pulse ox and frequent blood pressure checks  Block type: adductor canal block  Laterality: right  Injection technique: single-shot  Procedures: ultrasound guided, Ultrasound guidance required for the procedure to increase accuracy and safety of medication placement and decrease risk of complications  Ultrasound permanent image savedbupivacaine (PF) (MARCAINE) 0 25 % 10 mL - Perineural   10 mL - 8/1/2022 2:40:00 PM  Needle  Needle type: Stimuplex   Needle gauge: 20G    Needle length: 10 cm  Needle localization: ultrasound guidance  Assessment  Injection assessment: incremental injection and local visualized surrounding nerve on ultrasound  Paresthesia pain: none  patient tolerated the procedure well with no immediate complications  Additional Notes  Uncomplicated adductor canal block  Dose: 10ml 0 25% bupivacaine + 10ml exparel  Superficial femoral artery identified

## 2022-08-01 NOTE — ANESTHESIA PROCEDURE NOTES
Peripheral Block    Patient location during procedure: pre-op  Start time: 8/1/2022 2:40 PM  Reason for block: at surgeon's request and post-op pain management  Staffing  Performed: Anesthesiologist   Anesthesiologist: Roro Fernandez MD  Preanesthetic Checklist  Completed: patient identified, IV checked, site marked, risks and benefits discussed, surgical consent, monitors and equipment checked, pre-op evaluation and timeout performed  Peripheral Block  Patient position: supine  Prep: ChloraPrep  Patient monitoring: heart rate, continuous pulse ox and frequent blood pressure checks  Block type: ipack block  Laterality: right  Injection technique: single-shot  Procedures: ultrasound guided, Ultrasound guidance required for the procedure to increase accuracy and safety of medication placement and decrease risk of complications  Ultrasound permanent image savedbupivacaine (PF) (MARCAINE) 0 25 % 10 mL - Perineural   20 mL - 8/1/2022 2:40:00 PM  Needle  Needle type: Stimuplex   Needle gauge: 20G    Needle length: 10 cm  Needle localization: ultrasound guidance  Assessment  Injection assessment: incremental injection, local visualized surrounding nerve on ultrasound, negative aspiration for heme and no paresthesia on injection  patient tolerated the procedure well with no immediate complications  Additional Notes  Uncomplicated iPACK block  LA deposited above shaft of femur just proximal to femoral condyles  Dose: 20ml 0 25% bupivacaine + 10ml exparel

## 2022-08-01 NOTE — ANESTHESIA PROCEDURE NOTES
Peripheral Block    Patient location during procedure: pre-op  Start time: 8/1/2022 2:40 PM  Reason for block: at surgeon's request and post-op pain management  Staffing  Performed: Anesthesiologist   Anesthesiologist: Cathryn Price MD  Preanesthetic Checklist  Completed: patient identified, IV checked, site marked, risks and benefits discussed, surgical consent, monitors and equipment checked, pre-op evaluation and timeout performed  Peripheral Block  Patient position: supine  Prep: ChloraPrep  Patient monitoring: heart rate, continuous pulse ox and frequent blood pressure checks  Anesthesia block type: Genicular  Injection technique: single-shot  Procedures: ultrasound guided, Ultrasound guidance required for the procedure to increase accuracy and safety of medication placement and decrease risk of complications  Ultrasound permanent image savedbupivacaine (PF) (MARCAINE) 0 25 % 10 mL - Perineural   20 mL - 8/1/2022 2:40:00 PM  Needle  Needle type: [email or web address]   Needle gauge: 20G    Needle length: 10 cm  Needle localization: ultrasound guidance  Assessment  Injection assessment: incremental injection, local visualized surrounding nerve on ultrasound, negative aspiration for heme and no paresthesia on injection  Paresthesia pain: none  patient tolerated the procedure well with no immediate complications  Additional Notes  Targets: Superior-medial, superior-lateral, inferior-medial  Supplemented with vastus medialis block  Dose: 5ml 0 25% bupivacaine per nerve for total of 20ml 0 25% bupivacaine  Adjuvants: 3mg perineural decadron

## 2022-08-02 VITALS
SYSTOLIC BLOOD PRESSURE: 116 MMHG | HEART RATE: 70 BPM | BODY MASS INDEX: 31.24 KG/M2 | DIASTOLIC BLOOD PRESSURE: 78 MMHG | RESPIRATION RATE: 16 BRPM | WEIGHT: 183 LBS | OXYGEN SATURATION: 94 % | HEIGHT: 64 IN | TEMPERATURE: 98.1 F

## 2022-08-02 PROBLEM — Z96.651 STATUS POST TOTAL KNEE REPLACEMENT, RIGHT: Status: ACTIVE | Noted: 2022-08-02

## 2022-08-02 LAB
ANION GAP SERPL CALCULATED.3IONS-SCNC: 6 MMOL/L (ref 4–13)
BASOPHILS # BLD MANUAL: 0 THOUSAND/UL (ref 0–0.1)
BASOPHILS NFR MAR MANUAL: 0 % (ref 0–1)
BUN SERPL-MCNC: 12 MG/DL (ref 5–25)
CALCIUM SERPL-MCNC: 9.3 MG/DL (ref 8.3–10.1)
CHLORIDE SERPL-SCNC: 110 MMOL/L (ref 96–108)
CO2 SERPL-SCNC: 24 MMOL/L (ref 21–32)
CREAT SERPL-MCNC: 0.7 MG/DL (ref 0.6–1.3)
EOSINOPHIL # BLD MANUAL: 0 THOUSAND/UL (ref 0–0.4)
EOSINOPHIL NFR BLD MANUAL: 0 % (ref 0–6)
ERYTHROCYTE [DISTWIDTH] IN BLOOD BY AUTOMATED COUNT: 12 % (ref 11.6–15.1)
GFR SERPL CREATININE-BSD FRML MDRD: 95 ML/MIN/1.73SQ M
GLUCOSE SERPL-MCNC: 141 MG/DL (ref 65–140)
HCT VFR BLD AUTO: 40.5 % (ref 34.8–46.1)
HGB BLD-MCNC: 13.6 G/DL (ref 11.5–15.4)
LYMPHOCYTES # BLD AUTO: 0.49 THOUSAND/UL (ref 0.6–4.47)
LYMPHOCYTES # BLD AUTO: 3 % (ref 14–44)
MCH RBC QN AUTO: 30.4 PG (ref 26.8–34.3)
MCHC RBC AUTO-ENTMCNC: 33.6 G/DL (ref 31.4–37.4)
MCV RBC AUTO: 90 FL (ref 82–98)
MONOCYTES # BLD AUTO: 0.49 THOUSAND/UL (ref 0–1.22)
MONOCYTES NFR BLD: 3 % (ref 4–12)
NEUTROPHILS # BLD MANUAL: 15.21 THOUSAND/UL (ref 1.85–7.62)
NEUTS BAND NFR BLD MANUAL: 4 % (ref 0–8)
NEUTS SEG NFR BLD AUTO: 89 % (ref 43–75)
PLATELET # BLD AUTO: 261 THOUSANDS/UL (ref 149–390)
PLATELET BLD QL SMEAR: ADEQUATE
PMV BLD AUTO: 9.9 FL (ref 8.9–12.7)
POTASSIUM SERPL-SCNC: 3.9 MMOL/L (ref 3.5–5.3)
RBC # BLD AUTO: 4.48 MILLION/UL (ref 3.81–5.12)
RBC MORPH BLD: NORMAL
SODIUM SERPL-SCNC: 140 MMOL/L (ref 135–147)
VARIANT LYMPHS # BLD AUTO: 1 %
WBC # BLD AUTO: 16.35 THOUSAND/UL (ref 4.31–10.16)

## 2022-08-02 PROCEDURE — 97110 THERAPEUTIC EXERCISES: CPT

## 2022-08-02 PROCEDURE — 85007 BL SMEAR W/DIFF WBC COUNT: CPT | Performed by: PHYSICIAN ASSISTANT

## 2022-08-02 PROCEDURE — 85027 COMPLETE CBC AUTOMATED: CPT | Performed by: PHYSICIAN ASSISTANT

## 2022-08-02 PROCEDURE — 80048 BASIC METABOLIC PNL TOTAL CA: CPT | Performed by: PHYSICIAN ASSISTANT

## 2022-08-02 PROCEDURE — 97163 PT EVAL HIGH COMPLEX 45 MIN: CPT

## 2022-08-02 PROCEDURE — NC001 PR NO CHARGE: Performed by: ORTHOPAEDIC SURGERY

## 2022-08-02 PROCEDURE — 99213 OFFICE O/P EST LOW 20 MIN: CPT | Performed by: INTERNAL MEDICINE

## 2022-08-02 PROCEDURE — 99204 OFFICE O/P NEW MOD 45 MIN: CPT | Performed by: NURSE PRACTITIONER

## 2022-08-02 PROCEDURE — 97166 OT EVAL MOD COMPLEX 45 MIN: CPT

## 2022-08-02 RX ORDER — OXYCODONE HYDROCHLORIDE 5 MG/1
5 TABLET ORAL EVERY 4 HOURS PRN
Qty: 30 TABLET | Refills: 0 | Status: SHIPPED | OUTPATIENT
Start: 2022-08-02 | End: 2022-08-12

## 2022-08-02 RX ADMIN — SENNOSIDES 8.6 MG: 8.6 TABLET, FILM COATED ORAL at 08:43

## 2022-08-02 RX ADMIN — OXYCODONE HYDROCHLORIDE 10 MG: 10 TABLET ORAL at 04:14

## 2022-08-02 RX ADMIN — CEFAZOLIN SODIUM 2000 MG: 2 SOLUTION INTRAVENOUS at 08:43

## 2022-08-02 RX ADMIN — ACETAMINOPHEN 975 MG: 325 TABLET ORAL at 04:14

## 2022-08-02 RX ADMIN — Medication 1 TABLET: at 08:42

## 2022-08-02 RX ADMIN — ENOXAPARIN SODIUM 40 MG: 40 INJECTION SUBCUTANEOUS at 04:15

## 2022-08-02 RX ADMIN — METHOCARBAMOL TABLETS 750 MG: 750 TABLET, COATED ORAL at 00:15

## 2022-08-02 RX ADMIN — DOCUSATE SODIUM 100 MG: 100 CAPSULE, LIQUID FILLED ORAL at 08:43

## 2022-08-02 RX ADMIN — SODIUM CHLORIDE, SODIUM LACTATE, POTASSIUM CHLORIDE, AND CALCIUM CHLORIDE 100 ML/HR: .6; .31; .03; .02 INJECTION, SOLUTION INTRAVENOUS at 00:16

## 2022-08-02 RX ADMIN — OXYCODONE HYDROCHLORIDE 5 MG: 5 TABLET ORAL at 14:12

## 2022-08-02 RX ADMIN — FOLIC ACID 1 MG: 1 TABLET ORAL at 08:43

## 2022-08-02 RX ADMIN — OXYCODONE HYDROCHLORIDE AND ACETAMINOPHEN 500 MG: 500 TABLET ORAL at 08:42

## 2022-08-02 RX ADMIN — METHOCARBAMOL TABLETS 750 MG: 750 TABLET, COATED ORAL at 12:47

## 2022-08-02 RX ADMIN — ACETAMINOPHEN 975 MG: 325 TABLET ORAL at 12:48

## 2022-08-02 RX ADMIN — CEFAZOLIN SODIUM 2000 MG: 2 SOLUTION INTRAVENOUS at 00:15

## 2022-08-02 RX ADMIN — METHOCARBAMOL TABLETS 750 MG: 750 TABLET, COATED ORAL at 06:35

## 2022-08-02 RX ADMIN — OXYCODONE HYDROCHLORIDE 5 MG: 5 TABLET ORAL at 09:18

## 2022-08-02 NOTE — PHYSICAL THERAPY NOTE
PHYSICAL THERAPY EVALUATION  NAME:  Ashlie Trevizo  DATE: 08/02/22    AGE:   63 y o   Mrn:   [id number]  ADMIT DX:  Primary osteoarthritis of right knee [M17 11]  Chronic pain of right knee [M25 561, G89 29]    Past Medical History:   Diagnosis Date    Chest pain syndrome 3/29/2022    Renal dysplasia     nonfunctioning left life-long       Past Surgical History:   Procedure Laterality Date    COLONOSCOPY  03/2016    FL INJECTION RIGHT HIP (NON ARTHROGRAM)  5/10/2022    KIDNEY SURGERY Right     age 29    REDUCTION MAMMAPLASTY      WRIST SURGERY Right     fx       Length Of Stay: 0    PHYSICAL THERAPY EVALUATION:       08/02/22 0820   Note Type   Note type Evaluation   Pain Assessment   Pain Assessment Tool 0-10   Pain Score No Pain   Restrictions/Precautions   Weight Bearing Precautions Per Order Yes   RLE Weight Bearing Per Order WBAT   Other Precautions Fall Risk;Multiple lines  (hemovac drain)   Home Living   Type of [address] [place] [address] One level;Stairs to enter with rails  (2 KATE)   Home Equipment   (none as per pt)   Additional Comments Living with spouse who is able to assist as needed   Prior Function   Level of [place] Independent with ADLs and functional mobility   Lives With Spouse; Family   Receives Help From Family   ADL Assistance Independent   Falls in the last 6 months 0   Vocational Full time employment   Comments Patient denies use of an assistive device for ambulation prior to admission   General   Family/Caregiver Present No   Cognition   Overall Cognitive Status WFL   Arousal/Participation Alert   Attention Within functional limits   Orientation Level Oriented X4   Memory Within functional limits   Following Commands Follows all commands and directions without difficulty   RUE Assessment   RUE Assessment WFL   LUE Assessment   LUE Assessment WFL   RLE Assessment   RLE Assessment X  (knee AROM limited 2* pain)   Strength RLE   RLE Overall Strength 3/5   LLE Assessment   LLE Assessment WFL   Strength LLE   LLE Overall Strength 4/5   Bed Mobility   Additional Comments NA, Pt OOB in chair at time of PT eval   Transfers   Sit to Stand 5  Supervision   Additional items Increased time required   Stand to Sit 5  Supervision   Additional items Increased time required   Ambulation/Elevation   Gait pattern Short stride; Foward flexed;Decreased R stance   Gait Assistance 5  Supervision   Assistive Device Rolling walker   Distance 60ft x 2   Stair Management Assistance 5  Supervision   Stair Management Technique With walker   Number of Stairs 1  (curb step)   Balance   Static Sitting Fair   Static Standing Fair -   Ambulatory Fair -   Endurance Deficit   Endurance Deficit Yes   Endurance Deficit Description fatigue   Activity Tolerance   Activity Tolerance Patient limited by fatigue   Medical Staff Made Aware Baldev OT; Gilbert Moss OT student; OT present for co evaluation due to patient's current medical presentation   Nurse Made Aware Patient appropriate to be seen and mobilized per nursing   Assessment   Prognosis Good   Problem List Decreased strength;Decreased range of motion;Decreased endurance;Decreased mobility;Pain   Assessment Pt is 63 y o  female seen for PT [facility] on 8/1/2022  Two pt identifiers were used to confirm  Pt presented fos scheduled ARTHROPLASTY KNEE TOTAL W ROBOT (Right) which was performed on 08/01/2022   Pt with a primary dx of:  Primary osteoarthritis of right knee s/p ARTHROPLASTY KNEE TOTAL W ROBOT (Right)  PT now consulted for assessment of mobility and d/c needs  Pt with Activity as tolerated orders  Pts current co morbidities affecting treatment include:  Renal dysplasia, and other personal factors including steps to enter home   Pts current clinical presentation is Unstable/ Unpredictable (high complexity) due to Ongoing medical management for primary dx, Increased reliance on more restrictive AD compared to baseline, Decreased activity tolerance compared to baseline, Increased assistance needed from caregiver at current time, Continuous pulse oximetry monitoring , s/p surgical intervention    Upon evaluation, pt currently is requiring  Supervision for transfers and Supervision for ambulation w/ RW  Pt presents at PT eval functioning below baseline and currently w/ overall mobility deficits 2* to: RLE weakness, decreased ROM, impaired balance, decreased endurance, gait deviations, pain, decreased activity tolerance compared to baseline, fall risk  Pt currently at a fall risk 2* to impairments listed above  Based on the aforementioned PT evaluation, pt will continue to benefit from skilled Acute PT interventions to address stated impairments; to maximize functional mobility; for ongoing pt/ family training; and DME needs  At conclusion of PT session pt returned back in chair with phone and call bell within reach  Pt denies any further questions at this time  PT is currently recommending Home with increased family support and Outpatient PT  PT will continue to follow during hospital stay  Barriers to Discharge None   Barriers to Discharge Comments Patient denies any mobility or safety concerns about returning home at time of discharge   Goals   Patient Goals " to go home"   [facility] Expiration Date 08/12/22   Short Term Goal #1 In 10 days pt will complete: 1) Bed mobility skills with mod I to increase safety and independence as well as decrease caregiver burden  2) Functional transfers with mod I to promote increased independence, safety, and QOL  3) Ambulate 150' using least restrictive AD with mod I without LOB and stable vitals so that pt can negotiate previous living environment safely and promote independence with functional mobility and return to PLOF  4) Stair training up/ down 1 step/s using rail/s with mod I so that pt can enter/negotiate previous living environment safely and decrease fall risk   5) Improve balance grades by 1/2 grade to increase safety with all mobility and decrease fall risk  6) Improve BLE strength by 1/2 grade to help increase overall functional mobility and decrease fall risk  Plan   Treatment/Interventions Functional transfer training;LE strengthening/ROM; Elevations; Therapeutic exercise; Endurance training;Patient/family training;Equipment eval/education; Bed mobility;Gait training;Spoke to nursing;OT   PT Frequency Twice a day; Other (Comment)  (7x a week, BID)   Recommendation   PT Discharge Recommendation Home with outpatient rehabilitation   Equipment Recommended [address] Recommended Wheeled walker   AM-PAC Basic Mobility Inpatient   Turning in Bed Without Bedrails 4   Lying on Back to Sitting on Edge of Flat Bed 4   Moving Bed to Chair 4   Standing Up From Chair 4   Walk in Room 3   Climb 3-5 Stairs 3   Basic Mobility Inpatient Raw Score 22   Basic Mobility Standardized Score 47 4   Highest Level Of Mobility   JH-HLM Goal 7: Walk 25 feet or more   JH-HLM Achieved 7: Walk 25 feet or more   Modified Dalia Scale   Modified [place] Scale 4   Barthel Index   Feeding 10   Bathing 0   Grooming Score 5   Dressing Score 5   Bladder Score 10   Bowels Score 10   Toilet Use Score 10   Transfers (Bed/Chair) Score 10   Mobility (Level Surface) Score 10   Stairs Score 5   Barthel Index Score 75   Additional Treatment Session   Start Time 0517   End Time 0820   Treatment Assessment Patient agreeable to participate in PT treatment session post PT eval   Patient was able to perform all lower extremity TherEx seated out of bed in chair without any increase in complaints  Verbal cuing and tactile cuing was required for proper form pacing  Slight physical assistance required during some exercises due to decreased strength and pain  At conclusion of PT treatment session patient was assisted back into chair with all needs within reach    D/C recommendation when medically cleared his home with increased support, OPPT   Exercises   [address] [facility] Sets Sitting;15 reps;AROM; Right  (x 2 sets)   Heelslides Sitting;15 reps;AROM; Right  (x 2 sets)   Knee AROM Long Arc Quad Sitting;15 reps;AAROM; Right  (x 2 sets)   Marching Sitting;15 reps;AAROM; Right   Portions of the documentation may have been created using voice recognition software  Occasional wrong word or sound alike substitutions may have occurred due to the inherent limitations of the voice recognition software  Read the chart carefully and recognize, using context, where substitutions have occurred      Justyn Carrasco, PT, DPT

## 2022-08-02 NOTE — PLAN OF CARE
Problem: PHYSICAL THERAPY ADULT  Goal: Performs mobility at highest level of function for planned discharge setting  See evaluation for individualized goals  Description: Treatment/Interventions: Functional transfer training, LE strengthening/ROM, Elevations, Therapeutic exercise, Endurance training, Patient/family training, Equipment eval/education, Bed mobility, Gait training, Spoke to nursing, OT  Equipment Recommended: Obie Castleman       See flowsheet documentation for full assessment, interventions and recommendations  Note: Prognosis: Good  Problem List: Decreased strength, Decreased range of motion, Decreased endurance, Decreased mobility, Pain  Assessment: Pt is 63 y o  female seen for PT evaluation at [facility] on 8/1/2022  Two pt identifiers were used to confirm  Pt presented fos scheduled ARTHROPLASTY KNEE TOTAL W ROBOT (Right) which was performed on 08/01/2022   Pt with a primary dx of:  Primary osteoarthritis of right knee s/p ARTHROPLASTY KNEE TOTAL W ROBOT (Right)  PT now consulted for assessment of mobility and d/c needs  Pt with Activity as tolerated orders  Pts current co morbidities affecting treatment include:  Renal dysplasia, and other personal factors including steps to enter home  Pts current clinical presentation is Unstable/ Unpredictable (high complexity) due to Ongoing medical management for primary dx, Increased reliance on more restrictive AD compared to baseline, Decreased activity tolerance compared to baseline, Increased assistance needed from caregiver at current time, Continuous pulse oximetry monitoring , s/p surgical intervention    Upon evaluation, pt currently is requiring  Supervision for transfers and Supervision for ambulation w/ RW   Pt presents at PT eval functioning below baseline and currently w/ overall mobility deficits 2* to: RLE weakness, decreased ROM, impaired balance, decreased endurance, gait deviations, pain, decreased activity tolerance compared to baseline, fall risk  Pt currently at a fall risk 2* to impairments listed above  Based on the aforementioned PT evaluation, pt will continue to benefit from skilled Acute PT interventions to address stated impairments; to maximize functional mobility; for ongoing pt/ family training; and DME needs  At conclusion of PT session pt returned back in chair with phone and call bell within reach  Pt denies any further questions at this time  PT is currently recommending Home with increased family support and Outpatient PT  PT will continue to follow during hospital stay  Barriers to Discharge: None  Barriers to Discharge Comments: Patient denies any mobility or safety concerns about returning home at time of discharge  PT Discharge Recommendation: Home with outpatient rehabilitation    See flowsheet documentation for full assessment

## 2022-08-02 NOTE — OCCUPATIONAL THERAPY NOTE
Occupational Therapy Evaluation     Patient Name: Pravin Pagan  EOIED'Z Date: 8/2/2022  Problem List  Principal Problem:    Status post total knee replacement, right  Active Problems:    Primary osteoarthritis of right knee    Past Medical History  Past Medical History:   Diagnosis Date    Chest pain syndrome 3/29/2022    Renal dysplasia     nonfunctioning left life-long     Past Surgical History  Past Surgical History:   Procedure Laterality Date    COLONOSCOPY  03/2016    [facility] INJECTION RIGHT HIP (NON ARTHROGRAM)  5/10/2022    KIDNEY SURGERY Right     age 29    REDUCTION MAMMAPLASTY      WRIST SURGERY Right     fx         08/02/22 0805   OT Last Visit   OT Visit Date 08/02/22   Note Type   Note type Evaluation   Restrictions/Precautions   Weight Bearing Precautions Per Order Yes   RLE Weight Bearing Per Order (S)  WBAT   Other Precautions Fall Risk;Multiple lines  (TKA)   Pain Assessment   Pain Assessment Tool 0-10   Pain Score No Pain   Patient's Stated Pain Goal No pain   Hospital Pain Intervention(s) Repositioned; Ambulation/increased activity; Emotional support   Home Living   Type of [address] One level  (2STE, no railings)   Bathroom Shower/Tub Walk-in shower   Bathroom Toilet Standard   Bathroom Accessibility Accessible   Prior Function   Level of [place] Independent with ADLs and functional mobility   Lives With Spouse   Receives Help From Family   ADL Assistance Independent   IADLs Independent   Falls in the last 6 months 0   Vocational Full time employment   Lifestyle   Autonomy Pt was I w/ ADLs/IADLs   Reciprocal Relationships Pt lives with  who will be home at all times and able to assist  Pt has additional family support from son and daughter-in law who live locally  Service to Others Pt works full-time as a hiring staff associate at [employer]     Intrinsic Gratification Pt enjoys horse riding and reading   Psychosocial   Psychosocial (WDL) WDL   ADL   Eating Assistance 7  Independent   Grooming Assistance 7  Independent   UB Bathing Assistance 5  Supervision/Setup   LB Bathing Assistance 5  Supervision/Setup   UB Dressing Assistance 5  Supervision/Setup   LB Dressing Assistance 5  Supervision/Setup   Toileting Assistance  5  Supervision/Setup   Functional Assistance 5  Supervision/Setup   Bed Mobility   Additional Comments NA, pt was OOB in recliner chair prior to eval   Transfers   Sit to Stand 5  Supervision   Additional items Increased time required   Stand to Sit 5  Supervision   Additional items Increased time required   Additional Comments Pt was left OOB in recliner chair w all items within reach   Functional Mobility   Functional Mobility 5  Supervision   Additional items Rolling walker   Balance   Static Sitting Fair   Dynamic Sitting Fair   Static Standing [address] -   Activity Tolerance   Activity Tolerance Patient tolerated treatment well; Other (Comment)  (Period of dizziness, resolved after deep breathing techniques, able to continue remainder of session )   Nurse Made Aware Pt appropriate to be seen   RUE Assessment   RUE Assessment WFL   LUE Assessment   LUE Assessment WFL   Cognition   Overall Cognitive Status WFL   Arousal/Participation Alert; Cooperative   Attention Within functional limits   Orientation Level Oriented X4   Memory Within functional limits   Following Commands Follows all commands and directions without difficulty   Comments Pt was asked appropriate questions regarding recent surgery intervention  Pt was overall pleasant, cooperative and appropriate   Assessment   Assessment Pt is a 64 yo F, who underwent an elective R TKA on 8/1/22  Pt is WBAT on RLE  Pt's problem list includes: h/o renal dysplasia  Pt's PLOF was I w/ ADLs/IADLs, living with spouse in a [facility]  Currently, pt is overall supervision with ADLs  Pt completed functional transfers/mobility at a supervision level w/ RW   Pt experienced a period of dizziness that was resolved after deep breathing and was able to tolerate remainder of session  Pt's limitations include: WBS, fatigue, decreased activity tolerance, decreased endurance, decreased ability to complete ADLs, dizziness, and mild balance deficits  Pt was educated on compensatory techniques, energy conservation techniques, DME recommendations and proper use of DME, as well as increased participation w nursing to compete self care  Pt was provided a TKA information handout to assist in a safe discharge  The patient's raw score on the AM-PAC Daily Activity inpatient short form is 21, standardized score is 44 27, greater than 39 4  Patients at this level are likely to benefit from discharge to home  Please refer to the recommendation of the Occupational Therapist for safe discharge planning  OT recommends pt returns home with increased family support and OUTPT therapy services  Pt reports spouse will be home at all times to assist  Pt also reports local family that can assist  Pt agreeable  OT recommends a commode at this time  Pt no longer requires additional acute care OT services  D/c OT     Goals   Patient Goals To return home   Recommendation   OT Discharge Recommendation No rehabilitation needs   Equipment Recommended Bedside commode   AM-PAC Daily Activity Inpatient   Lower Body Dressing 3   Bathing 3   Toileting 3   Upper Body Dressing 4   Grooming 4   Eating 4   Daily Activity Raw Score 21   Daily Activity Standardized Score (Calc for Raw Score >=11) 44 27   AM-PAC Applied Cognition Inpatient   Following a Speech/Presentation 4   Understanding Ordinary Conversation 4   Taking Medications 4   Remembering Where Things Are Placed or Put Away 4   Remembering List of 4-5 Errands 4   Taking Care of Complicated Tasks 4   Applied Cognition Raw Score 24   Applied Cognition Standardized Score 62 21     Gerardine Sis, OTS

## 2022-08-02 NOTE — CASE MANAGEMENT
Case Management Discharge Planning Note    Patient name Gabe Torres  Location /-95 MRN [id number]  : 1960 Date 2022       Current Admission Date: 2022  Current Admission Diagnosis:Status post total knee replacement, right   Patient Active Problem List    Diagnosis Date Noted    Status post total knee replacement, right 2022    Primary osteoarthritis of right hip 2022    Primary osteoarthritis of left knee 2022    Chronic pain of right knee 2022    Primary osteoarthritis of right knee 2019    Mixed hyperlipidemia 2018    Chondromalacia patellae of right knee 2016    Anxiety 2014    Congenital renal dysplasia ( left ) 2014      LOS (days): 0  Geometric Mean LOS (GMLOS) (days):   Days to GMLOS:     OBJECTIVE:     Current admission status: Outpatient Surgery     Preferred Pharmacy:   [address] [place] [address] [address] [facility] [address]  [place] [address]  Phone: [phone] Fax: [phone]    Primary Care Provider: Carmela Teran DO    Primary Insurance: BLUE CROSS  Secondary Insurance:     DISCHARGE DETAILS:     DME Referral Provided  Referral made for DME?: Yes  DME referral completed for the following items[de-identified] Obie Castleman, Bedside Commode (DME ordered and delivered at bedside  Signature received on delivery ticket )  DME Supplier Name[de-identified] [employer]    Other Referral/Resources/Interventions Provided:  Interventions: Outpatient PT  Referral Comments: Pt confirmed her OP PT appointments have been scheduled; Significant other will provide transportation to/from medical appointments  Additional Comments: No further CM needs identified  Significant other is present to provide transportation home today

## 2022-08-02 NOTE — PLAN OF CARE
Problem: PAIN - ADULT  Goal: Verbalizes/displays adequate comfort level or baseline comfort level  Description: Interventions:  - Encourage patient to monitor pain and request assistance  - Assess pain using appropriate pain scale  - Administer analgesics based on type and severity of pain and evaluate response  - Implement non-pharmacological measures as appropriate and evaluate response  - Consider cultural and social influences on pain and pain management  - Notify physician/advanced practitioner if interventions unsuccessful or patient reports new pain  Outcome: Progressing     Problem: INFECTION - ADULT  Goal: Absence or prevention of progression during hospitalization  Description: INTERVENTIONS:  - Assess and monitor for signs and symptoms of infection  - Monitor lab/diagnostic results  - Monitor all insertion sites, i e  indwelling lines, tubes, and drains  - Monitor endotracheal if appropriate and nasal secretions for changes in amount and color  - Golf appropriate cooling/warming therapies per order  - Administer medications as ordered  - Instruct and encourage patient and family to use good hand hygiene technique  - Identify and instruct in appropriate isolation precautions for identified infection/condition  Outcome: Progressing  Goal: Absence of fever/infection during neutropenic period  Description: INTERVENTIONS:  - Monitor WBC    Outcome: Progressing     Problem: SAFETY ADULT  Goal: Patient will remain free of falls  Description: INTERVENTIONS:  - Educate patient/family on patient safety including physical limitations  - Instruct patient to call for assistance with activity   - Consult OT/PT to assist with strengthening/mobility   - Keep Call bell within reach  - Keep bed low and locked with side rails adjusted as appropriate  - Keep care items and personal belongings within reach  - Initiate and maintain comfort rounds  - Make Fall Risk Sign visible to staff  - Offer Toileting every 2 Hours, in advance of need  - Initiate/Maintain bed alarm  - Obtain necessary fall risk management equipment:   - Apply yellow socks and bracelet for high fall risk patients  - Consider moving patient to room near nurses station  Outcome: Progressing  Goal: Maintain or return to baseline ADL function  Description: INTERVENTIONS:  -  Assess patient's ability to carry out ADLs; assess patient's baseline for ADL function and identify physical deficits which impact ability to perform ADLs (bathing, care of mouth/teeth, toileting, grooming, dressing, etc )  - Assess/evaluate cause of self-care deficits   - Assess range of motion  - Assess patient's mobility; develop plan if impaired  - Assess patient's need for assistive devices and provide as appropriate  - Encourage maximum independence but intervene and supervise when necessary  - Involve family in performance of ADLs  - Assess for home care needs following discharge   - Consider OT consult to assist with ADL evaluation and planning for discharge  - Provide patient education as appropriate  Outcome: Progressing  Goal: Maintains/Returns to pre admission functional level  Description: INTERVENTIONS:  - Perform BMAT or MOVE assessment daily    - Set and communicate daily mobility goal to care team and patient/family/caregiver  - Collaborate with rehabilitation services on mobility goals if consulted  - Perform Range of Motion 3 times a day  - Reposition patient every 2 hours    - Dangle patient 3 times a day  - Stand patient 3 times a day  - Ambulate patient 3 times a day  - Out of bed to chair 3 times a day   - Out of bed for meals 3 times a day  - Out of bed for toileting  - Record patient progress and toleration of activity level   Outcome: Progressing     Problem: DISCHARGE PLANNING  Goal: Discharge to home or other facility with appropriate resources  Description: INTERVENTIONS:  - Identify barriers to discharge w/patient and caregiver  - Arrange for needed discharge resources and transportation as appropriate  - Identify discharge learning needs (meds, wound care, etc )  - Arrange for interpretive services to assist at discharge as needed  - Refer to Case Management Department for coordinating discharge planning if the patient needs post-hospital services based on physician/advanced practitioner order or complex needs related to functional status, cognitive ability, or social support system  Outcome: Progressing     Problem: Knowledge Deficit  Goal: Patient/family/caregiver demonstrates understanding of disease process, treatment plan, medications, and discharge instructions  Description: Complete learning assessment and assess knowledge base    Interventions:  - Provide teaching at level of understanding  - Provide teaching via preferred learning methods  Outcome: Progressing

## 2022-08-02 NOTE — PROGRESS NOTES
Peripheral Nerve Block Follow-up Note - Acute Pain Service    Reji Morrell 63 y o  female MRN: [id number]  Unit/Bed#: -01 Encounter: [id number]      Assessment:   Principal Problem:    Status post total knee replacement, right  Active Problems:    Primary osteoarthritis of right knee    Reji Morrell is a 63y o  year old female who is postop day 1 from a right total knee arthroplasty  Plan:   - Right adductor/IPACK with Exparel and genicular blocks are functioning appropriately with expected sensory deficits    Multimodal analgesia  · Tylenol 975 mg every 8 hours scheduled  · Gabapentin 300 mg at bedtime  · Estimated Creatinine Clearance: 90 2 mL/min (by C-G formula based on SCr of 0 7 mg/dL)  · Robaxin 750 mg every 6 hours scheduled  · Discontinue IV Dilaudid  · Oxycodone 5 mg every 4 hours as needed for moderate pain  · Oxycodone 10 mg every 4 hours as needed for severe pain    Bowel management  · Colace 100 mg twice a day  · Senokot at bedtime    Pain adequately controlled with current medication regimen and peripheral blocks  Okay for discharge from a pain management standpoint  Would recommend to continue Tylenol, Robaxin and low-dose oxycodone at time of discharge  Treatment recommendations and plan of care reviewed with bedside nursing staff  APS will sign off at this time  Thank you for the consult  All opioids and other analgesics to be written at discretion of primary team  Please contact Acute Pain Service - [facility] via [employer] from [phone] with additional questions or concerns  See Margaret or Yasmeen for additional contacts and after hours information  Pain History  Current pain location(s): right knee  Pain Scale:   0-7  Quality: aching  24 hour history:  Patient is resting in bed, no acute distress  Reporting mild aching right knee pain that increases with ambulation  Pain improves with oxycodone administration  Tolerating current analgesic regimen    No right lower extremity weakness or paresthesias  Opioid requirement previous 24 hours: Oxycodone 20mg  I have reviewed the patient's controlled substance dispensing history in the Prescription Drug Monitoring Program in compliance with the [facility] regulations before prescribing any controlled substances         Meds/Allergies   all current active meds have been reviewed, current meds:   Current Facility-Administered Medications   Medication Dose Route Frequency    acetaminophen (TYLENOL) tablet 975 mg  975 mg Oral Q8H    ALPRAZolam (XANAX) tablet 0 25 mg  0 25 mg Oral Daily PRN    aluminum-magnesium hydroxide-simethicone (MYLANTA) oral suspension 30 mL  30 mL Oral Q6H PRN    ascorbic acid (VITAMIN C) tablet 500 mg  500 mg Oral BID    calcium carbonate (TUMS) chewable tablet 1,000 mg  1,000 mg Oral Daily PRN    docusate sodium (COLACE) capsule 100 mg  100 mg Oral BID    enoxaparin (LOVENOX) subcutaneous injection 40 mg  40 mg Subcutaneous Daily    folic acid (FOLVITE) tablet 1 mg  1 mg Oral Daily    gabapentin (NEURONTIN) capsule 300 mg  300 mg Oral HS    HYDROmorphone (DILAUDID) injection 0 5 mg  0 5 mg Intravenous Q2H PRN    lactated ringers bolus 1,000 mL  1,000 mL Intravenous Once PRN    And    lactated ringers bolus 1,000 mL  1,000 mL Intravenous Once PRN    lactated ringers infusion  125 mL/hr Intravenous Continuous    lactated ringers infusion  100 mL/hr Intravenous Continuous    methocarbamol (ROBAXIN) tablet 750 mg  750 mg Oral Q6H [address]    multivitamin-minerals (CENTRUM) tablet 1 tablet  1 tablet Oral Daily    ondansetron (ZOFRAN) injection 4 mg  4 mg Intravenous Q6H PRN    oxyCODONE (ROXICODONE) immediate release tablet 10 mg  10 mg Oral Q4H PRN    oxyCODONE (ROXICODONE) IR tablet 5 mg  5 mg Oral Q4H PRN    senna (SENOKOT) tablet 8 6 mg  1 tablet Oral Daily    senna oral syrup 8 8 mg  8 8 mg Oral HS    simethicone (MYLICON) chewable tablet 80 mg  80 mg Oral 4x Daily PRN    sodium chloride 0 9 % bolus 1,000 mL 1,000 mL Intravenous Once PRN    And    sodium chloride 0 9 % bolus 1,000 mL  1,000 mL Intravenous Once PRN    and PTA meds:   Prior to Admission Medications   Prescriptions Last Dose Informant Patient Reported? Taking? ALPRAZolam (XANAX) 0 25 mg tablet Past Month at Unknown time Self No Yes   Sig: Take 1 tablet (0 25 mg total) by mouth daily as needed for anxiety   FIBER PO 7/31/2022 at 0800 Self Yes Yes   Sig: Take by mouth daily   Multiple Vitamins-Minerals (MULTI FOR HER 50+ PO) 7/31/2022 at 0800 Self Yes Yes   Sig: Take by mouth daily   Psyllium (Metamucil) WAFR 7/31/2022 at Unknown time Self Yes Yes   Sig: Take by mouth   ascorbic acid (VITAMIN C) 500 MG tablet 7/31/2022 at 0800 Self No Yes   Sig: Take 1 tablet (500 mg total) by mouth 2 (two) times a day   enoxaparin (LOVENOX) 40 mg/0 4 mL   No Yes   Sig: Inject 0 4 mL (40 mg total) under the skin daily for 28 days To start postoperatively   ferrous sulfate 324 (65 Fe) mg 7/30/2022 Self No Yes   Sig: Take one tablet daily  folic acid (FOLVITE) 1 mg tablet 7/30/2022 Self No Yes   Sig: Take 1 tablet (1 mg total) by mouth daily      Facility-Administered Medications: None       Allergies   Allergen Reactions    Acetazolamide Rash    Sulfa Antibiotics Rash       Objective     Temp:  [97 1 °F (36 2 °C)-99 4 °F (37 4 °C)] 98 1 °F (36 7 °C)  HR:  [48-76] 70  Resp:  [11-21] 16  BP: ()/(52-87) 116/78    Physical Exam  Vitals reviewed  Constitutional:       General: She is awake  She is not in acute distress  Appearance: Normal appearance  She is not ill-appearing, toxic-appearing or diaphoretic  HENT:      Head: Normocephalic and atraumatic  Nose: Nose normal  No congestion or rhinorrhea  Mouth/Throat:      Mouth: Mucous membranes are moist    Eyes:      Extraocular Movements: Extraocular movements intact  Pulmonary:      Effort: Pulmonary effort is normal  No tachypnea, bradypnea or respiratory distress     Musculoskeletal: General: No tenderness  Cervical back: Normal range of motion  Right lower leg: No edema  Left lower leg: No edema  Skin:     General: Skin is warm and dry  Coloration: Skin is not pale  Findings: No rash  Comments: Right knee ace wrap dressing intact   Neurological:      Mental Status: She is alert and oriented to person, place, and time  Mental status is at baseline  Sensory: Sensory deficit (right knee) present  Motor: No tremor  Psychiatric:         Attention and Perception: Attention normal          Mood and Affect: Mood normal  Mood is not anxious  Speech: Speech normal          Behavior: Behavior normal  Behavior is cooperative  Cognition and Memory: Cognition and memory normal            Lab Results:   Results from last 7 days   Lab Units 08/02/22  0627   WBC Thousand/uL 16 35*   HEMOGLOBIN g/dL 13 6   HEMATOCRIT % 40 5   PLATELETS Thousands/uL 261      Results from last 7 days   Lab Units 08/02/22 0627   POTASSIUM mmol/L 3 9   CHLORIDE mmol/L 110*   CO2 mmol/L 24   BUN mg/dL 12   CREATININE mg/dL 0 70   CALCIUM mg/dL 9 3         Please note that the APS provides consultative services regarding pain management only  With the exception of ketamine, peripheral nerve catheters, and epidural infusions (and except when indicated), final decisions regarding starting or changing doses of analgesic medications are at the discretion of the consulting service  Off hours consultation and/or medication management is generally not available      ERNESTINA Webb  Acute Pain Service

## 2022-08-02 NOTE — PROGRESS NOTES
Progress Note - Orthopedics   Miri Lopez 63 y o  female MRN: [id number]      Subjective:  No acute events overnight  No acute distress  Denies fevers, chills, SOB  Objective:  A 10 point ROS was performed; negative except as noted above  Labs:  Lab Results   Component Value Date/Time    WBC 6 49 07/06/2022 07:49 AM    WBC 7 8 11/18/2016 08:01 AM    WBC 6-10 (A) 11/18/2016 08:01 AM    HGB 14 1 07/06/2022 07:49 AM    HGB 14 8 11/18/2016 08:01 AM       Physical:  Vitals:    08/02/22 0407   BP: 107/87   Pulse: 70   Resp: 16   Temp: 97 6 °F (36 4 °C)   SpO2: 93%     Musculoskeletal: right Lower Extremity  · Dressing C/D/I  · SILT whit/saph/sp/dp/tib nerve distributions   · HV drain in place  · +FHL/EHL, +ankle DF/PF      · Toes WWP w bCR    Assessment:    63 y o  female post op day #1 from Right TKA    Plan:  · WBAT RLE  · Pull drain later today  · PT/OT  · Pain control  · DVT ppx  · Will continue to assess for acute blood loss anemia  · Dispo: PT, drain, Gurpreet Stiles MD

## 2022-08-02 NOTE — DISCHARGE SUMMARY
ORTHOPEDICS DISCHARGE SUMMARY  Cornelius Trevizo 63 y o  female MRN: [id number]  Unit/Bed#: -05    Attending Physician: [facility] Attendings:[id number]}    Admitting diagnosis: Primary osteoarthritis of right knee [M17 11]  Chronic pain of right knee [M25 561, G89 29]    Discharge diagnosis: Primary osteoarthritis of right knee [M17 11]  Chronic pain of right knee [M25 561, G89 29]    Date of admission: 8/1/2022    Date of discharge: 08/02/22         Procedure: ***    HPI:  This is a 63y o  year old female that presented to the office with signs and symptoms of {right left bilateral:71814} knee osteoarthritis  They tried and failed conservative treatment measures and wished to proceed with surgical intervention  The risks, benefits, and complications of the procedure were discussed with the patient and informed consent was obtained  Hospital Course: The patient was admitted to the hospital on 8/1/2022 and underwent an uncomplicated {right left OJLNPDFLE:38943} total knee arthroplasty  They were transferred to the floor after a brief stay in the post-anesthesia care unit  Their pain was well managed with IV and oral pain medications  They began therapy on post operative day #1  Lovenox*** was also started for DVT prophylaxis 12 hours post operatively***  Hemovac drain was removed on POD1   ***  On discharge date pt was cleared by PT and the medicine team and determined to be safe for discharge  Daily discussion was had with the patient, nursing staff, orthopaedic team, and family members if present  All questions were answered to the patients satisifaction  0   Lab Value Date/Time    HGB 13 6 08/02/2022 0627    HGB 14 1 07/06/2022 0749    HGB 15 1 03/28/2022 2003    HGB 14 6 03/17/2021 0817    HGB 14 8 11/18/2016 0801       ***Greater than 2 gram drop which qualifies for diagnosis of acute blood loss anemia    Vital signs remained stable and pt was resuscitated with IVF as needed   Body mass index is 31 41 kg/m²  {Obesity:29734}  Recommend {MU obesity counselin}  Discharge Instructions: The patient was discharged weight bearing as tolerated to the {right left bilateral:81579} lower extremity  Lovenox*** will be continued for 28 days  Continue PT/OT  Take pain medications as instructed  Discharge Medications: For the complete list of discharge medications, please refer to the patient's medication reconciliation

## 2022-08-02 NOTE — PROGRESS NOTES
Internal Medicine Progress Note  Patient: Madie Pearl  Age/sex: 63 y o  female  Medical Record #: [id number]      ASSESSMENT/PLAN: (Interval History)  Madie Pearl is seen and examined and management for following issues:    Status Post right Total KNEE ARTHROPLASTY   Pain controlled   Continue encourage incentive spirometry; monitor fever curve   DVT prophylaxis in place and reviewed  Results from last 7 days   Lab Units 08/02/22  0627   WBC Thousand/uL 16 35*   HEMOGLOBIN g/dL 13 6   HEMATOCRIT % 40 5   PLATELETS Thousands/uL 261         Anxiety  · Continue Xanax as needed      Single functioning kidney  · Monitor BMP  · Avoid hypotension and nephrotoxic medications  Leukocytosis  · Reactive from surgery    OK for dc from medicine standpoint   PRE-OP HGB LEVEL: 14 1  The above assessment and plan was reviewed and updated as determined by my evaluation of the patient on 8/2/2022      Labs:   Results from last 7 days   Lab Units 08/02/22  0627   WBC Thousand/uL 16 35*   HEMOGLOBIN g/dL 13 6   HEMATOCRIT % 40 5   PLATELETS Thousands/uL 261     Results from last 7 days   Lab Units 08/02/22  0627   SODIUM mmol/L 140   POTASSIUM mmol/L 3 9   CHLORIDE mmol/L 110*   CO2 mmol/L 24   BUN mg/dL 12   CREATININE mg/dL 0 70   CALCIUM mg/dL 9 3                   Review of Scheduled Meds:  Current Facility-Administered Medications   Medication Dose Route Frequency Provider Last Rate    acetaminophen  975 mg Oral Q8H Efrain Saavedra MD      ALPRAZolam  0 25 mg Oral Daily PRN Efrain Saavedra MD      aluminum-magnesium hydroxide-simethicone  30 mL Oral Q6H PRN Efrain Saavedra MD      ascorbic acid  500 mg Oral BID Efrain Saavedra MD      calcium carbonate  1,000 mg Oral Daily PRN Efrain Saavedra MD      cefazolin  2,000 mg Intravenous Q8H Efrain Saavedra MD 2,000 mg (08/02/22 0843)    docusate sodium  100 mg Oral BID Efrain Saavedra MD      enoxaparin  40 mg Subcutaneous Daily Miriam Saavedra MD      folic acid  1 mg Oral Daily Miriam Saavedra MD      gabapentin  300 mg Oral HS Hellen Merlos MD      HYDROmorphone  0 5 mg Intravenous Q2H PRN Miriam Saavedra MD      lactated ringers  1,000 mL Intravenous Once PRN Miriam Saavedra MD      And    lactated ringers  1,000 mL Intravenous Once PRN Hellen Merlos MD      lactated ringers  125 mL/hr Intravenous Continuous Hellen Merlos MD 0  (08/01/22 1615)    lactated ringers  100 mL/hr Intravenous Continuous Michelledimas Yarbrough CRNA 100 mL/hr (08/02/22 0016)    methocarbamol  750 mg Oral Q6H [address] Hellen Merlos MD      multivitamin-minerals  1 tablet Oral Daily Miriam Saavedra MD      ondansetron  4 mg Intravenous Q6H PRN Maren Washington PA-C      oxyCODONE  10 mg Oral Q4H PRN Hellen Merlos MD      oxyCODONE  5 mg Oral Q4H PRN Hellen Merlos MD      senna  1 tablet Oral Daily Hellen Merlos MD      senna  8 8 mg Oral HS Hellen Merlos MD      simethicone  80 mg Oral 4x Daily PRN Miriam Saavedra MD      sodium chloride  1,000 mL Intravenous Once PRN Miriam Saavedra MD      And    sodium chloride  1,000 mL Intravenous Once PRN Hellen Merlos MD         Subjective/ HPI: Patient seen and examined  Patients overnight issues or events were reviewed with nursing or staff during rounds or morning huddle session  New or overnight issues include the following:     Pt without any overnight events or reported nursing issues      ROS:   A 10 point ROS was performed; negative except as noted above         Imaging:     No orders to display       *Labs /Radiology studies Reviewed  *Medications  reviewed and reconciled as needed  *Please refer to order section for additional ordered labs studies  *Case discussed with primary attending during morning huddle case rounds    Physical Examination:  Vitals:   Vitals:    08/01/22 2122 08/02/22 0014 08/02/22 0407 08/02/22 0645   BP: 132/65 107/57 107/87 116/78   Pulse: 72 76 70 70   Resp:  16 16    Temp: 97 9 °F (36 6 °C) 98 3 °F (36 8 °C) 97 6 °F (36 4 °C) 98 1 °F (36 7 °C)   SpO2: 92% 94% 93% 94%   Weight:       Height:           General Appearance: no distress, conversive  HEENT: PERRLA, conjuctiva normal; oropharynx clear; mucous membranes moist;   Neck:  Supple, no lymphadenopathy or thyromegaly  Lungs: CTA, normal respiratory effort, no retractions, expiratory effort normal  CV: regular rate and rhythm , PMI normal   ABD: soft non tender, no masses , no hepatic or splenomegaly  EXT: DP pulses intact, no lymphadenopathy, no edema; right knee surgical dressing in place  Skin: normal turgor, normal texture, no rash  Psych: affect normal, mood normal  Neuro: AAOx3    : olivares removed ; due to void    The above physical exam was reviewed and updated as determined by my evaluation of the patient on 8/2/2022  Invasive Devices  Report    Peripheral Intravenous Line  Duration           Peripheral IV 08/01/22 Dorsal (posterior); Right Hand <1 day          Drain  Duration           Closed/Suction Drain Right;Lateral Knee Accordion 10 Fr  <1 day                   VTE Pharmacologic Prophylaxis: Enoxaparin  Code Status: Level 1 - Full Code  Current Length of Stay: 0 day(s)      Total floor / unit time spent today 30 minutes  Coordination of patient's care was performed in conjunction with primary service  Time invested included review of patient's labs, vitals, and management of their comorbidities with continued monitoring, examination of patient as well as answering patient questions, documenting her findings and creating progress note in electronic medical record,  ordering appropriate diagnostic testing  ** Please Note:  voice to text software may have been used in the creation of this document   Although proof errors in transcription or interpretation are a potential of such software**

## 2022-08-04 ENCOUNTER — TELEPHONE (OUTPATIENT)
Dept: OTHER | Facility: HOSPITAL | Age: 62
End: 2022-08-04

## 2022-08-04 ENCOUNTER — OFFICE VISIT (OUTPATIENT)
Dept: PHYSICAL THERAPY | Facility: CLINIC | Age: 62
End: 2022-08-04
Payer: COMMERCIAL

## 2022-08-04 ENCOUNTER — TELEPHONE (OUTPATIENT)
Dept: OBGYN CLINIC | Facility: HOSPITAL | Age: 62
End: 2022-08-04

## 2022-08-04 DIAGNOSIS — M17.11 PRIMARY OSTEOARTHRITIS OF RIGHT KNEE: Primary | ICD-10-CM

## 2022-08-04 DIAGNOSIS — Z96.651 STATUS POST RIGHT KNEE REPLACEMENT: ICD-10-CM

## 2022-08-04 DIAGNOSIS — G89.29 CHRONIC PAIN OF RIGHT KNEE: ICD-10-CM

## 2022-08-04 DIAGNOSIS — M25.561 CHRONIC PAIN OF RIGHT KNEE: ICD-10-CM

## 2022-08-04 LAB
DME PARACHUTE DELIVERY DATE ACTUAL: NORMAL
DME PARACHUTE DELIVERY DATE REQUESTED: NORMAL
DME PARACHUTE ITEM DESCRIPTION: NORMAL
DME PARACHUTE ITEM DESCRIPTION: NORMAL
DME PARACHUTE ORDER STATUS: NORMAL
DME PARACHUTE SUPPLIER NAME: NORMAL
DME PARACHUTE SUPPLIER PHONE: NORMAL

## 2022-08-04 PROCEDURE — 97116 GAIT TRAINING THERAPY: CPT | Performed by: PHYSICAL THERAPIST

## 2022-08-04 PROCEDURE — 97164 PT RE-EVAL EST PLAN CARE: CPT | Performed by: PHYSICAL THERAPIST

## 2022-08-04 PROCEDURE — 97110 THERAPEUTIC EXERCISES: CPT | Performed by: PHYSICAL THERAPIST

## 2022-08-04 PROCEDURE — 97140 MANUAL THERAPY 1/> REGIONS: CPT | Performed by: PHYSICAL THERAPIST

## 2022-08-04 NOTE — TELEPHONE ENCOUNTER
1244 - Contacted Pt via telephone r/t lack of vital sign transmission at noon today  Pt answered and stated she had gone to her physical therapy appointment today and mailed the device back  Pt stated she was advised to return the device by noon on Thursday  Vital signs had been trending WNL  Advised the Pt to contact Surgeon, medical MD, or ortho navigator with any medical concerns  Voiced understanding  She denies any issues at this time

## 2022-08-04 NOTE — PROGRESS NOTES
PT Re-Evaluation     Today's date: 2022  Patient name: Neelima Sullivan  : 1960  MRN: [id number]  Referring provider: Emory Keith  Dx:   Encounter Diagnosis     ICD-10-CM    1  Primary osteoarthritis of right knee  M17 11    2  Chronic pain of right knee  M25 561     G89 29                   Assessment  Assessment details: Pt demonstrates post-operative impairments of R knee strength, ROM, edema, WB tolerance, gait, and pain  She will benefit from skilled PT services to address her impairments in order to decrease pain and restore function  Impairments: abnormal gait, abnormal muscle firing, abnormal or restricted ROM, abnormal movement, activity intolerance, impaired physical strength, lacks appropriate home exercise program, pain with function, weight-bearing intolerance and poor body mechanics  Understanding of Dx/Px/POC: good   Prognosis: good    Goals  STG - 4 wks  1) pt will demonstrate PROM 0-90 deg  2) pt will d/c FWW    MTG - 8 wks  1) pt will demonstrate PROM 0-110 deg  2) pt will d/c SPC  3) pt will report no difficulty with light ADLs    LTG - 12 wks  1) pt will demonstrate PROM 0-120 deg  2) pt will demonstrate normalized gait  3) pt will report reciprocal gait on stairs  4) pt will initiate daily walking routine for fitness    Plan  Planned modality interventions: cryotherapy  Planned therapy interventions: manual therapy, balance/weight bearing training, body mechanics training, neuromuscular re-education, patient education, strengthening, stretching, therapeutic activities, therapeutic exercise, home exercise program, gait training, functional ROM exercises and flexibility  Frequency: 2x week  Duration in weeks: 12  Treatment plan discussed with: patient and family        Subjective Evaluation    History of Present Illness  Mechanism of injury: Pt is a 63 y o  female with PMHx significant for R hip OA, anxiety  She reports 3-yr Hx of worsening R knee pain   She presents to PT s/p R TKA 22  She reports high pain levels with R knee ROM, low pain levels at rest in supine with pain medications  She reports being fearful of pain with R knee ROM  Pain  Current pain ratin  At best pain ratin  At worst pain ratin  Quality: sharp, pressure, dull ache and tight  Relieving factors: rest  Progression: worsening    Social Support  Steps to enter house: yes (2 KATE with landing on each)  Stairs in house: yes (FF to basement with laundry)   L[facility] in: [facility]  Lives with: spouse      Diagnostic Tests  X-ray: abnormal (Significant narrowing of the medial joint space with minimal osteophytic spurring  This has progressed from the prior study)  Treatments  Previous treatment: medication and injection treatment  Current treatment: medication  Patient Goals  Patient goals for therapy: decreased edema, decreased pain, improved balance, increased motion, increased strength, independence with ADLs/IADLs and return to sport/leisure activities  Patient goal: daily fast walk for 30 minutes        Objective     Observations     Right Knee   Positive for edema (3+ distal thigh, knee, calf)  Passive Range of Motion     Right Knee   Flexion: 50 degrees with pain  Extension: 5 degrees with pain    Mobility   Patellar Mobility:     Right Knee   Hypomobile: medial, lateral, superior and inferior     Strength/Myotome Testing     Right Hip   Planes of Motion   Flexion: 2-    Right Knee   Flexion: 3-  Extension: 2-  Quadriceps contraction: poor    General Comments:      Knee Comments  Gait: FWW: poor R push-off, mod R hip hiking to compensate for poor knee flexion at push-off             Precautions:  PMHx: R hip OA, anxiety, WBAT  Dx: R TKA 22    Manuals  8/           R knee PROM  10 min                                                  Neuro Re-Ed                                                                                                        Ther Ex             Bike: rocking  nv Heel slides 5"x10 5"x5 5"x15          Quad sets 5"x5 5"x5 5"x15          Supine GA stretch 10"x3 10"x2 10"x3          SAQ 3"/  1x10 AAROM  1x5 AAROM  2x10          LAQ                                       Ther Activity                                       Gait Training             Gait training on floor  FWW  1x200 ft           Step-ups  FWW  4"/  1x5 FWW  4"/  1x10          Modalities

## 2022-08-04 NOTE — TELEPHONE ENCOUNTER
Patient contacted for a postoperative follow up assessment  Patient reports 0/10 pain when sitting and 2-3/10 when walking with RW  Patient states My leg feels heavy until I get it moving   Patient confirmed post-op PT appointment, 8/4 at 10AM       Patient is taking Oxycodone 5mg TID(PRN), Tylenol 1000mg TID, Lovenox injections daily, OTC stool softener  Patient has had a BM and is passing gas  Patient denies increase in swelling and dressing is clean, dry and intact  Patient is icing the site regularly  Patient denies nausea, vomiting, abdominal pain, chest pain, shortness of breath, fever, dizziness and calf pain   Patient confirmed post-op appointment with surgeon on 8/9 at 2:45PM and SOC IM at 3:45PM  Patient does not have any other questions or concerns at this time

## 2022-08-08 ENCOUNTER — OFFICE VISIT (OUTPATIENT)
Dept: PHYSICAL THERAPY | Facility: CLINIC | Age: 62
End: 2022-08-08
Payer: COMMERCIAL

## 2022-08-08 DIAGNOSIS — Z96.651 STATUS POST RIGHT KNEE REPLACEMENT: Primary | ICD-10-CM

## 2022-08-08 DIAGNOSIS — G89.29 CHRONIC PAIN OF RIGHT KNEE: ICD-10-CM

## 2022-08-08 DIAGNOSIS — M25.561 CHRONIC PAIN OF RIGHT KNEE: ICD-10-CM

## 2022-08-08 DIAGNOSIS — M17.11 PRIMARY OSTEOARTHRITIS OF RIGHT KNEE: ICD-10-CM

## 2022-08-08 PROCEDURE — 97110 THERAPEUTIC EXERCISES: CPT | Performed by: PHYSICAL THERAPIST

## 2022-08-08 PROCEDURE — 97116 GAIT TRAINING THERAPY: CPT | Performed by: PHYSICAL THERAPIST

## 2022-08-08 PROCEDURE — 97140 MANUAL THERAPY 1/> REGIONS: CPT | Performed by: PHYSICAL THERAPIST

## 2022-08-08 NOTE — PROGRESS NOTES
Daily Note     Today's date: 2022  Patient name: Mindi Courser  : 1960  MRN: [id number]  Referring provider: Amy Jimenez,*  Dx:   Encounter Diagnosis     ICD-10-CM    1  Status post right knee replacement  Z96 651    2  Primary osteoarthritis of right knee  M17 11    3  Chronic pain of right knee  M25 561     G89 29                   Subjective: Pt reports good compliance with HEP  She reports feeling exhausted the rest of the day following last visit, generalized malaise and some nausea while trying to wean off of post-op narcotics  Objective: See treatment diary below  PROM: 5-78 deg    Assessment: Pt demonstrated improved PROM and quad activation despite pain and malaise  Plan: Cont  POC  Precautions:  PMHx: R hip OA, anxiety, WBAT  Dx: R TKA 22    Manuals           R knee PROM  10 min 10 min                                                 Neuro Re-Ed                                                                                                        Ther Ex             Bike: rocking  nv S=9  5 min          Heel slides 5"x10 5"x5 5"x10          Quad sets 5"x5 5"x5 5"x15          Supine GA stretch 10"x3 10"x2 10"x3          SAQ 3"/  1x10 AAROM  1x5 AAROM  2x10 AAROM  3x10         LAQ             STS   Foam  1x5                       Ther Activity                                       Gait Training             Gait training on floor  FWW  1x200 ft FWW  1x400 ft          Step-ups  FWW  4"/  1x5 FWW  4"/  1x10          Modalities

## 2022-08-09 ENCOUNTER — OFFICE VISIT (OUTPATIENT)
Dept: OBGYN CLINIC | Facility: HOSPITAL | Age: 62
End: 2022-08-09

## 2022-08-09 ENCOUNTER — HOSPITAL ENCOUNTER (OUTPATIENT)
Dept: RADIOLOGY | Facility: HOSPITAL | Age: 62
Discharge: HOME | End: 2022-08-09
Attending: ORTHOPAEDIC SURGERY
Payer: COMMERCIAL

## 2022-08-09 ENCOUNTER — OFFICE VISIT (OUTPATIENT)
Dept: INTERNAL MEDICINE CLINIC | Facility: CLINIC | Age: 62
End: 2022-08-09
Payer: COMMERCIAL

## 2022-08-09 VITALS
BODY MASS INDEX: 31.41 KG/M2 | HEART RATE: 84 BPM | DIASTOLIC BLOOD PRESSURE: 70 MMHG | SYSTOLIC BLOOD PRESSURE: 111 MMHG | HEIGHT: 64 IN

## 2022-08-09 VITALS
DIASTOLIC BLOOD PRESSURE: 60 MMHG | SYSTOLIC BLOOD PRESSURE: 100 MMHG | WEIGHT: 183 LBS | BODY MASS INDEX: 31.24 KG/M2 | HEIGHT: 64 IN

## 2022-08-09 DIAGNOSIS — F41.9 ANXIETY: Primary | ICD-10-CM

## 2022-08-09 DIAGNOSIS — Z96.651 AFTERCARE FOLLOWING RIGHT KNEE JOINT REPLACEMENT SURGERY: Primary | ICD-10-CM

## 2022-08-09 DIAGNOSIS — Z47.1 AFTERCARE FOLLOWING RIGHT KNEE JOINT REPLACEMENT SURGERY: ICD-10-CM

## 2022-08-09 DIAGNOSIS — G89.29 CHRONIC PAIN OF RIGHT KNEE: ICD-10-CM

## 2022-08-09 DIAGNOSIS — M25.561 CHRONIC PAIN OF RIGHT KNEE: ICD-10-CM

## 2022-08-09 DIAGNOSIS — M16.11 PRIMARY OSTEOARTHRITIS OF RIGHT HIP: ICD-10-CM

## 2022-08-09 DIAGNOSIS — Z47.1 AFTERCARE FOLLOWING RIGHT KNEE JOINT REPLACEMENT SURGERY: Primary | ICD-10-CM

## 2022-08-09 DIAGNOSIS — M17.11 PRIMARY OSTEOARTHRITIS OF RIGHT KNEE: ICD-10-CM

## 2022-08-09 DIAGNOSIS — E78.2 MIXED HYPERLIPIDEMIA: ICD-10-CM

## 2022-08-09 DIAGNOSIS — Z96.651 AFTERCARE FOLLOWING RIGHT KNEE JOINT REPLACEMENT SURGERY: ICD-10-CM

## 2022-08-09 DIAGNOSIS — Z96.651 STATUS POST TOTAL KNEE REPLACEMENT, RIGHT: ICD-10-CM

## 2022-08-09 PROCEDURE — 73560 X-RAY EXAM OF KNEE 1 OR 2: CPT

## 2022-08-09 PROCEDURE — 99024 POSTOP FOLLOW-UP VISIT: CPT | Performed by: ORTHOPAEDIC SURGERY

## 2022-08-09 PROCEDURE — 99214 OFFICE O/P EST MOD 30 MIN: CPT | Performed by: INTERNAL MEDICINE

## 2022-08-09 NOTE — PROGRESS NOTES
SOC-INTERNAL MEDICINE POST-OPERATIVE VISIT      NAME: Raul Trevizo  AGE: 63 y o  SEX: female  : 1960     DATE: 2022     Internal Medicine Pre-Operative Evaluation:     Reason for Visit: Post-operative follow up SOC-IM     Surgery: Arthroplasty of right knee  Surgeon: Dr Girish Suarez  Primary Care  Provider: Igor King DO    Interval History     Pt is seen for post operative follow up for elective arthroplasty  Post operatively they are doing well, they are utilizing pain meds prescribed by their surgical team effectively  We discussed non pharmacologic measures such as elevating when sitting, applying ice in 20 minute intervals with barrier in between  I have also encouraged them to get up at least every 30 minutes and ambulate short distances to prevent edema and blood clots  They verify taking their post operative DVT prophylaxis as directed by their surgeon  The patient has had BM since discharge  I did recommend using stool softeners in combination with narcotics  I also encouraged plenty of hydration, again ambulating short distances every 30 minutes as well as high fiber diet  They are taking DVT prophylaxis medications as directed without any issues    They continue to participate in therapy without any issues and as directed by their surgeon  They have resumed all of their pre operative medications as instructed  They have scheduled f/u with orthopedics as well as with their PCP           Assessment  Plan:     Status Post Total Joint Arthroplasty  · doing well post right knee  arthroplasty  · Following surgeon recommended DVT prophylaxis  · attending physical therapy sessions and progressing  · current pain scale reported /10  · follow up appointment scheduled for incision check and surgical follow up with orthopedics  · medication reconciliation performed  · patient to resume follow up care with their PCP for ongoing care of their pre-surgical co-morbidities      Patient's additional co morbidities as below which will have ongoing medical management per the patients PCP:    Patient Active Problem List   Diagnosis    Anxiety    Chondromalacia patellae of right knee    Congenital renal dysplasia ( left )    Mixed hyperlipidemia    Primary osteoarthritis of right knee    Primary osteoarthritis of right hip    Primary osteoarthritis of left knee    Chronic pain of right knee    Status post total knee replacement, right         Review of Systems:      No TIA's or unusual headaches, no dysphagia  No prolonged cough  No dyspnea or chest pain on exertion  No abdominal pain, change in bowel habits, black or bloody stools  No urinary tract or BPH symptoms  Pt with mild pain in surgical joint  Current Medications:     Current Outpatient Medications   Medication Instructions    ALPRAZolam (XANAX) 0 25 mg, Oral, Daily PRN    ascorbic acid (VITAMIN C) 500 mg, Oral, 2 times daily    enoxaparin (LOVENOX) 40 mg, Subcutaneous, Daily, To start postoperatively    ferrous sulfate 324 (65 Fe) mg Take one tablet daily      FIBER PO Oral, Daily    folic acid (FOLVITE) 1 mg, Oral, Daily    Multiple Vitamins-Minerals (MULTI FOR HER 50+ PO) Oral, Daily    oxyCODONE (ROXICODONE) 5 mg, Oral, Every 4 hours PRN    Psyllium (Metamucil) WAFR Oral          Past History:       Past Medical History:   Diagnosis Date    Chest pain syndrome 3/29/2022    Renal dysplasia     nonfunctioning left life-long    Past Surgical History:   Procedure Laterality Date    COLON[facility]COPY  03/2016   [facility] INJECTION RIGHT HIP (NON ARTHROGRAM)  5/10/2022    KIDNEY SURGERY Right     age 29    [place] TOTAL KNEE ARTHROPLASTY Right 8/1/2022    Procedure: ARTHROPLASTY KNEE TOTAL W ROBOT;  Surgeon: Joy Weber MD;  [facility];  Service: Orthopedics    REDUCTION MAMMAPLASTY      WRIST SURGERY Right     fx         Physical Exam:      /60   Ht 5' 4" (1 626 m)   Wt 83 kg (183 lb)   BMI 31 41 kg/m² General Appearance: no distress, conversive  HEENT: PERRLA, conjuctiva normal; oropharynx clear; mucous membranes moist;   Neck:  Supple, no lymphadenopathy or thyromegaly  Lungs: CTA, normal respiratory effort, no retractions, expiratory effort normal  CV: regular rate and rhythm , PMI normal   ABD: soft non tender, no masses , no hepatic or splenomegaly  EXT: DP pulses intact, no lymphadenopathy, no edema ;  right KNEE dressing in place  Skin: normal turgor, normal texture, no rash  Psych: affect normal, mood normal  Neuro: AAOx3      Time of visit including pre-visit chart review, visit and post-visit coordination of plan and care , review of pre-surgical lab work, preparation and time spent documenting note in electronic medical record, time spent face-to-face in physical examination answering patient questions: 30 minutes       [place], [address]

## 2022-08-09 NOTE — PATIENT INSTRUCTIONS
Follow up with orthopedic surgery as scheduled for incision and post operative care  Follow up with PCP for ongoing medical care as previous to surgery  You are released form care from the surgical optimization medical team however we remain available should you have any questions in the future  Call [phone] with any future concerns or questions related to your surgery

## 2022-08-09 NOTE — PROGRESS NOTES
Assessment:   Diagnosis ICD-10-CM Associated Orders   1  Aftercare following right knee joint replacement surgery  Z47 1     Z96 651    2  Primary osteoarthritis of right knee  M17 11    3  Chronic pain of right knee  M25 561     G89 29        Plan:  Doing well 8 days post op Right TKA  Pt to continue Physical Therapy and pain management  Pt advised to hold off on oral surgery ([place])  Pt advised to increased activity and weightbearing as tolerated  The patient can shower letting soapy water over incision, yet should not to submerge the incision, and then pat dry  To do next visit:  Return in about 1 week (around 8/16/2022) for Staple removal     The above stated was discussed in layman's terms and the patient expressed understanding  All questions were answered to the patient's satisfaction  Scribe Attestation    I,:  Carlos Dejuan am acting as a scribe while in the presence of the attending physician :       I,:  Leonard Atkinson MD personally performed the services described in this documentation    as scribed in my presence :             Subjective:   Kyler Florence is a 63 y o  female who presents today for a post-operative evaluation of Her right knee   Pain 3-4/10  Pt states she is only taking 1 Percocet only at night for sleep and is sleeping well  Pt states she is able to move around for bathroom, feeding, dressing well for about 15 minutes       Review of systems negative unless otherwise specified in HPI  Review of Systems    Past Medical History:   Diagnosis Date    Chest pain syndrome 3/29/2022    Renal dysplasia     nonfunctioning left life-long       Past Surgical History:   Procedure Laterality Date    COLONOSCOPY  03/2016   [facility] INJECTION RIGHT HIP (NON ARTHROGRAM)  5/10/2022    KIDNEY SURGERY Right     age 29    [place] TOTAL KNEE ARTHROPLASTY Right 8/1/2022    Procedure: ARTHROPLASTY KNEE TOTAL W ROBOT;  Surgeon: Leonard Atkinson MD;  [facility];  Service: Orthopedics    REDUCTION MAMMAPLASTY      WRIST SURGERY Right     fx       History reviewed  No pertinent family history  Social History     Occupational History    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   Tobacco Use    Smoking status: Former Smoker     Packs/day: 0 25     Years: 15 00     Pack years: 3 75     Types: Cigarettes    Smokeless tobacco: Never Used   Vaping Use    Vaping Use: Never used   Substance and Sexual Activity    Alcohol use: Yes     Alcohol/week: 10 0 standard drinks     Types: 5 Cans of beer, 5 Standard drinks or equivalent per week     Comment: daily    Drug use: No    Sexual activity: Yes     Partners: Male     Birth control/protection: Post-menopausal         Current Outpatient Medications:     ALPRAZolam (XANAX) 0 25 mg tablet, Take 1 tablet (0 25 mg total) by mouth daily as needed for anxiety, Disp: 30 tablet, Rfl: 0    ascorbic acid (VITAMIN C) 500 MG tablet, Take 1 tablet (500 mg total) by mouth 2 (two) times a day, Disp: 60 tablet, Rfl: 0    enoxaparin (LOVENOX) 40 mg/0 4 mL, Inject 0 4 mL (40 mg total) under the skin daily for 28 days To start postoperatively, Disp: 11 2 mL, Rfl: 0    ferrous sulfate 324 (65 Fe) mg, Take one tablet daily  , Disp: 30 tablet, Rfl: 0    FIBER PO, Take by mouth daily, Disp: , Rfl:     folic acid (FOLVITE) 1 mg tablet, Take 1 tablet (1 mg total) by mouth daily, Disp: 30 tablet, Rfl: 0    Multiple Vitamins-Minerals (MULTI FOR HER 50+ PO), Take by mouth daily, Disp: , Rfl:     oxyCODONE (Roxicodone) 5 immediate release tablet, Take 1 tablet (5 mg total) by mouth every 4 (four) hours as needed for moderate pain for up to 10 days Max Daily Amount: 30 mg, Disp: 30 tablet, Rfl: 0    Psyllium (Metamucil) WAFR, Take by mouth, Disp: , Rfl:     Allergies   Allergen Reactions    Acetazolamide Rash    Sulfa Antibiotics Rash          Vitals:    08/09/22 1433   BP: 111/70   Pulse: 84       Objective:                    Right Knee Exam     Range of Motion Extension:  -15 abnormal   Flexion:  90 abnormal     Other   Scars: present  Swelling: moderate  Effusion: effusion present        Well healed anterior knee scar  Incision covered with bandage and wrapped with ace bandage      Diagnostics, reviewed and taken today if performed as documented: The attending physician has personally reviewed the pertinent films in PACS and interpretation is as follows:    x-ray:  Well aligned prosthesis with no acute changes  Procedures, if performed today:    Procedures    None performed     Portions of the record may have been created with voice recognition software  Occasional wrong word or "sound a like" substitutions may have occurred due to the inherent limitations of voice recognition software  Read the chart carefully and recognize, using context, where substitutions have occurred

## 2022-08-11 ENCOUNTER — OFFICE VISIT (OUTPATIENT)
Dept: PHYSICAL THERAPY | Facility: CLINIC | Age: 62
End: 2022-08-11
Payer: COMMERCIAL

## 2022-08-11 DIAGNOSIS — G89.29 CHRONIC PAIN OF RIGHT KNEE: ICD-10-CM

## 2022-08-11 DIAGNOSIS — M25.561 CHRONIC PAIN OF RIGHT KNEE: ICD-10-CM

## 2022-08-11 DIAGNOSIS — M17.11 PRIMARY OSTEOARTHRITIS OF RIGHT KNEE: ICD-10-CM

## 2022-08-11 DIAGNOSIS — Z96.651 STATUS POST RIGHT KNEE REPLACEMENT: Primary | ICD-10-CM

## 2022-08-11 PROCEDURE — 97110 THERAPEUTIC EXERCISES: CPT | Performed by: PHYSICAL THERAPIST

## 2022-08-11 PROCEDURE — 97140 MANUAL THERAPY 1/> REGIONS: CPT | Performed by: PHYSICAL THERAPIST

## 2022-08-11 PROCEDURE — 97116 GAIT TRAINING THERAPY: CPT | Performed by: PHYSICAL THERAPIST

## 2022-08-15 ENCOUNTER — OFFICE VISIT (OUTPATIENT)
Dept: PHYSICAL THERAPY | Facility: CLINIC | Age: 62
End: 2022-08-15
Payer: COMMERCIAL

## 2022-08-15 DIAGNOSIS — G89.29 CHRONIC PAIN OF RIGHT KNEE: ICD-10-CM

## 2022-08-15 DIAGNOSIS — M17.11 PRIMARY OSTEOARTHRITIS OF RIGHT KNEE: ICD-10-CM

## 2022-08-15 DIAGNOSIS — M25.561 CHRONIC PAIN OF RIGHT KNEE: ICD-10-CM

## 2022-08-15 DIAGNOSIS — Z96.651 STATUS POST RIGHT KNEE REPLACEMENT: Primary | ICD-10-CM

## 2022-08-15 PROCEDURE — 97116 GAIT TRAINING THERAPY: CPT | Performed by: PHYSICAL THERAPIST

## 2022-08-15 PROCEDURE — 97110 THERAPEUTIC EXERCISES: CPT | Performed by: PHYSICAL THERAPIST

## 2022-08-15 PROCEDURE — 97140 MANUAL THERAPY 1/> REGIONS: CPT | Performed by: PHYSICAL THERAPIST

## 2022-08-15 NOTE — PROGRESS NOTES
Daily Note     Today's date: 8/15/2022  Patient name: Scooby Siu  : 1960  MRN: [id number]  Referring provider: Stephania Ornelas,*  Dx:   Encounter Diagnosis     ICD-10-CM    1  Status post right knee replacement  Z96 651    2  Primary osteoarthritis of right knee  M17 11    3  Chronic pain of right knee  M25 561     G89 29                   Subjective: patient reports to day is not a good day  She has not slept well, maybe only 8 hours the past two days  She also mentions she took a pain pill and her stomach feels a little off  She states she will do the best she can today  She mentions she sees the surgeon tomorrow to get staples removed  Objective: See treatment diary below      Assessment: Patient tolerated treatment well  She demonstrates good technique throughout the session  She was able to complete and progress program as noted below  She does well with SPC ambulation but continues to be challenged with LAQ  She would benefit from continued PT  Plan: Continue per plan of care  Precautions:  PMHx: R hip OA, anxiety, WBAT  Dx: R TKA 22     Manuals  8/4 8/8 8/11 8/15        R knee PROM  10 min 10 min 10 min 10 min                                               Neuro Re-Ed                                                                                                        Ther Ex             Bike: rocking  nv S=9  5 min S=9  5 min S=9  5 min        Heel slides 5"x10 5"x5 5"x10 5"x20 5"x20        Quad sets 5"x5 5"x5 5"x15 5"x20 5"x20        Supine GA stretch 10"x3 10"x2 10"x3 10"x3 10"x5        SAQ 3"/  1x10 AAROM  1x5 AAROM  2x10          LAQ    AAROM  2x10 AAROM  3x10        STS   Foam  1x5 nv Foam  1x10                     Ther Activity                                       Gait Training             Gait training on floor  FWW  1x200 ft FWW  1x400 ft FWW  1x150 ft  SPC  1x250 ft   FFW  1x lap  SPC  1x 3 laps        Step-ups/  downs  FWW  4"/  1x5 FWW  4"/  1x10 Rail  4"/  1x10 Rail 4  4"  1x10        Modalities

## 2022-08-16 ENCOUNTER — OFFICE VISIT (OUTPATIENT)
Dept: OBGYN CLINIC | Facility: HOSPITAL | Age: 62
End: 2022-08-16

## 2022-08-16 VITALS
HEIGHT: 64 IN | DIASTOLIC BLOOD PRESSURE: 63 MMHG | SYSTOLIC BLOOD PRESSURE: 131 MMHG | HEART RATE: 76 BPM | BODY MASS INDEX: 31.41 KG/M2

## 2022-08-16 DIAGNOSIS — Z96.651 AFTERCARE FOLLOWING RIGHT KNEE JOINT REPLACEMENT SURGERY: Primary | ICD-10-CM

## 2022-08-16 DIAGNOSIS — Z96.651 STATUS POST TOTAL RIGHT KNEE REPLACEMENT: ICD-10-CM

## 2022-08-16 DIAGNOSIS — Z47.1 AFTERCARE FOLLOWING RIGHT KNEE JOINT REPLACEMENT SURGERY: Primary | ICD-10-CM

## 2022-08-16 PROCEDURE — 99024 POSTOP FOLLOW-UP VISIT: CPT | Performed by: ORTHOPAEDIC SURGERY

## 2022-08-16 NOTE — PROGRESS NOTES
Assessment:  1  Aftercare following right knee joint replacement surgery     2  Status post total right knee replacement         Plan:    13 y o  female 2 weeks status post right total knee arthroplasty   · Staples removed today, incision is clean dry and intact  No signs of infection  · Continue physical therapy  · Progress from walker to cane      To do next visit:  Return in about 4 weeks (around 9/13/2022) for Recheck  The above stated was discussed in layman's terms and the patient expressed understanding  All questions were answered to the patient's satisfaction  Scribe Attestation    I,:  Katherine Bahena am acting as a scribe while in the presence of the attending physician :       I,:  Jaci Michelle MD personally performed the services described in this documentation    as scribed in my presence :             Subjective:   Teri Ochoa is a 63 y o  female who presents today for her second post-operative evaluation of her right knee total arthroplasty  Patient states that her pain continues to improve  She presents today with a walker for ambulation  Review of systems negative unless otherwise specified in HPI    Past Medical History:   Diagnosis Date    Chest pain syndrome 3/29/2022    Renal dysplasia     nonfunctioning left life-long       Past Surgical History:   Procedure Laterality Date    COLONOSCOPY  03/2016   [facility] INJECTION RIGHT HIP (NON ARTHROGRAM)  5/10/2022    KIDNEY SURGERY Right     age 29    [place] TOTAL KNEE ARTHROPLASTY Right 8/1/2022    Procedure: ARTHROPLASTY KNEE TOTAL W ROBOT;  Surgeon: Jaci Michelle MD;  [facility];  Service: Orthopedics    REDUCTION MAMMAPLASTY      WRIST SURGERY Right     fx       History reviewed  No pertinent family history      Social History     Occupational History    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   Tobacco Use    Smoking status: Former Smoker     Packs/day: 0 25     Years: 15 00     Pack years: 3 75     Types: Cigarettes    Smokeless tobacco: Never Used   Vaping Use    Vaping Use: Never used   Substance and Sexual Activity    Alcohol use: Yes     Alcohol/week: 10 0 standard drinks     Types: 5 Cans of beer, 5 Standard drinks or equivalent per week     Comment: daily    Drug use: No    Sexual activity: Yes     Partners: Male     Birth control/protection: Post-menopausal         Current Outpatient Medications:     ALPRAZolam (XANAX) 0 25 mg tablet, Take 1 tablet (0 25 mg total) by mouth daily as needed for anxiety, Disp: 30 tablet, Rfl: 0    ascorbic acid (VITAMIN C) 500 MG tablet, Take 1 tablet (500 mg total) by mouth 2 (two) times a day, Disp: 60 tablet, Rfl: 0    enoxaparin (LOVENOX) 40 mg/0 4 mL, Inject 0 4 mL (40 mg total) under the skin daily for 28 days To start postoperatively, Disp: 11 2 mL, Rfl: 0    FIBER PO, Take by mouth daily, Disp: , Rfl:     Multiple Vitamins-Minerals (MULTI FOR HER 50+ PO), Take by mouth daily, Disp: , Rfl:     Psyllium (Metamucil) WAFR, Take by mouth, Disp: , Rfl:     Allergies   Allergen Reactions    Acetazolamide Rash    Sulfa Antibiotics Rash            Vitals:    08/16/22 1250   BP: 131/63   Pulse: 76       Objective:                    Right Knee Exam     Range of Motion   Extension: -15   Flexion: 100     Other   Erythema: absent  Sensation: normal  Swelling: moderate    Comments:  Incision clean dry intact             Diagnostics, reviewed and taken today if performed as documented:    None performed            Procedures, if performed today:      None performed      Portions of the record may have been created with voice recognition software  Occasional wrong word or "sound a like" substitutions may have occurred due to the inherent limitations of voice recognition software  Read the chart carefully and recognize, using context, where substitutions have occurred

## 2022-08-18 ENCOUNTER — OFFICE VISIT (OUTPATIENT)
Dept: PHYSICAL THERAPY | Facility: CLINIC | Age: 62
End: 2022-08-18
Payer: COMMERCIAL

## 2022-08-18 DIAGNOSIS — M25.561 CHRONIC PAIN OF RIGHT KNEE: ICD-10-CM

## 2022-08-18 DIAGNOSIS — Z96.651 STATUS POST RIGHT KNEE REPLACEMENT: Primary | ICD-10-CM

## 2022-08-18 DIAGNOSIS — M17.11 PRIMARY OSTEOARTHRITIS OF RIGHT KNEE: ICD-10-CM

## 2022-08-18 DIAGNOSIS — G89.29 CHRONIC PAIN OF RIGHT KNEE: ICD-10-CM

## 2022-08-18 PROCEDURE — 97140 MANUAL THERAPY 1/> REGIONS: CPT | Performed by: PHYSICAL THERAPIST

## 2022-08-18 PROCEDURE — 97110 THERAPEUTIC EXERCISES: CPT | Performed by: PHYSICAL THERAPIST

## 2022-08-18 PROCEDURE — 97116 GAIT TRAINING THERAPY: CPT | Performed by: PHYSICAL THERAPIST

## 2022-08-18 NOTE — PROGRESS NOTES
Daily Note     Today's date: 2022  Patient name: Neelima Sullivan  : 1960  MRN: [id number]  Referring provider: Emory Keith,*  Dx:   Encounter Diagnosis     ICD-10-CM    1  Status post right knee replacement  Z96 651    2  Primary osteoarthritis of right knee  M17 11    3  Chronic pain of right knee  M25 561     G89 29                   Subjective: Patient reports that she got the staples removed yesterday  Objective: See treatment diary below      Assessment: Patient tolerated treatment well  Able to complete program as noted below  She does well with SPC ambulation after some cuing for heel toe gait pattern  She would benefit from continued physical therapy to further progress strength and functional abilities  Plan: Continue per plan of care  Precautions:  PMHx: R hip OA, anxiety, WBAT  Dx: R TKA 22     Manuals  8/4 8/8 8/11 8/15 8/18       R knee PROM  10 min 10 min 10 min 10 min 10 min                                              Neuro Re-Ed                                                                                                        Ther Ex             Bike: rocking  nv S=9  5 min S=9  5 min S=9  5 min S=8   5 min       Heel slides 5"x10 5"x5 5"x10 5"x20 5"x20 5" x20       Quad sets 5"x5 5"x5 5"x15 5"x20 5"x20 5"x25       Supine GA stretch 10"x3 10"x2 10"x3 10"x3 10"x5 15"x5       SAQ 3"/  1x10 AAROM  1x5 AAROM  2x10          LAQ    AAROM  2x10 AAROM  3x10 [id number]       STS   Foam  1x5 nv Foam  1x10 Foam  1x15                    Ther Activity                                       Gait Training             Gait training on floor  FWW  1x200 ft FWW  1x400 ft FWW  1x150 ft  SPC  1x250 ft   FFW  1x lap  SPC  1x 3 laps SPC 1x5 laps       Step-ups/  downs  FWW  4"/  1x5 FWW  4"/  1x10 Rail  4"/  1x10 Rail 4  4"  1x10 Rail 4" 1x10       Modalities

## 2022-08-22 ENCOUNTER — OFFICE VISIT (OUTPATIENT)
Dept: PHYSICAL THERAPY | Facility: CLINIC | Age: 62
End: 2022-08-22
Payer: COMMERCIAL

## 2022-08-22 DIAGNOSIS — Z96.651 STATUS POST RIGHT KNEE REPLACEMENT: Primary | ICD-10-CM

## 2022-08-22 DIAGNOSIS — G89.29 CHRONIC PAIN OF RIGHT KNEE: ICD-10-CM

## 2022-08-22 DIAGNOSIS — M17.11 PRIMARY OSTEOARTHRITIS OF RIGHT KNEE: ICD-10-CM

## 2022-08-22 DIAGNOSIS — M25.561 CHRONIC PAIN OF RIGHT KNEE: ICD-10-CM

## 2022-08-22 PROCEDURE — 97110 THERAPEUTIC EXERCISES: CPT | Performed by: PHYSICAL THERAPIST

## 2022-08-22 PROCEDURE — 97140 MANUAL THERAPY 1/> REGIONS: CPT | Performed by: PHYSICAL THERAPIST

## 2022-08-22 PROCEDURE — 97116 GAIT TRAINING THERAPY: CPT | Performed by: PHYSICAL THERAPIST

## 2022-08-22 NOTE — PROGRESS NOTES
Daily Note     Today's date: 2022  Patient name: Ana Singletary  : 1960  MRN: [id number]  Referring provider: Esha Vann,*  Dx:   Encounter Diagnosis     ICD-10-CM    1  Status post right knee replacement  Z96 651    2  Primary osteoarthritis of right knee  M17 11    3  Chronic pain of right knee  M25 561     G89 29                   Subjective: Pt reports R quad tightness and weakness  Objective: See treatment diary below  PROM: 3-99 deg    Assessment: Pt demonstrated improved gait, WB tolerance, quad stability, and ROM  Pt able to perform a SLR with mod quad lag  Plan: Cont  POC  Precautions:  PMHx: R hip OA, anxiety, WBAT  Dx: R TKA 22     Manuals 7/20 8/4 8/8 8/11 8/15 8/18 8/22      R knee PROM  10 min 10 min 10 min 10 min 10 min 15 min                                             Neuro Re-Ed                                                                                                        Ther Ex             Bike: rocking  nv S=9  5 min S=9  5 min S=9  5 min S=8   5 min S8  L1  5 min      Heel slides 5"x10 5"x5 5"x10 5"x20 5"x20 5" x20 5"x20      Quad sets 5"x5 5"x5 5"x15 5"x20 5"x20 5"x25 5"x20      Supine GA stretch 10"x3 10"x2 10"x3 10"x3 10"x5 15"x5 HEP      SAQ 3"/  1x10 AAROM  1x5 AAROM  2x10          LAQ    AAROM  2x10 AAROM  3x10 [id number] AROM  3x10      STS   Foam  1x5 nv Foam  1x10 Foam  1x15 Foam  1x10 Foam  2x10     SLR       1x3      Ther Activity                                       Gait Training             Gait training on floor  FWW  1x200 ft FWW  1x400 ft FWW  1x150 ft  SPC  1x250 ft   FFW  1x lap  SPC  1x 3 laps SPC 1x5 laps SPC  1x400 ft  No AD  1x150 ft      Step-ups/  downs  FWW  4"/  1x5 FWW  4"/  1x10 Rail  4"/  1x10 Rail 4  4"  1x10 Rail 4" 1x10 4"/  1x10  6"/  1x10      Modalities

## 2022-08-24 ENCOUNTER — OFFICE VISIT (OUTPATIENT)
Dept: PHYSICAL THERAPY | Facility: CLINIC | Age: 62
End: 2022-08-24
Payer: COMMERCIAL

## 2022-08-24 DIAGNOSIS — G89.29 CHRONIC PAIN OF RIGHT KNEE: Primary | ICD-10-CM

## 2022-08-24 DIAGNOSIS — M25.561 CHRONIC PAIN OF RIGHT KNEE: Primary | ICD-10-CM

## 2022-08-24 DIAGNOSIS — Z96.651 STATUS POST RIGHT KNEE REPLACEMENT: ICD-10-CM

## 2022-08-24 DIAGNOSIS — M17.11 PRIMARY OSTEOARTHRITIS OF RIGHT KNEE: ICD-10-CM

## 2022-08-24 PROCEDURE — 97116 GAIT TRAINING THERAPY: CPT | Performed by: PHYSICAL THERAPIST

## 2022-08-24 PROCEDURE — 97110 THERAPEUTIC EXERCISES: CPT | Performed by: PHYSICAL THERAPIST

## 2022-08-24 PROCEDURE — 97140 MANUAL THERAPY 1/> REGIONS: CPT | Performed by: PHYSICAL THERAPIST

## 2022-08-24 NOTE — PROGRESS NOTES
Daily Note     Today's date: 2022  Patient name: Mae Adams  : 1960  MRN: [id number]  Referring provider: Samia Duggan  Dx:   Encounter Diagnosis     ICD-10-CM    1  Chronic pain of right knee  M25 561     G89 29    2  Status post right knee replacement  Z96 651    3  Primary osteoarthritis of right knee  M17 11                   Subjective: Pt reports R knee and quad stiffness  Objective: See treatment diary below  PROM: 7-101 deg    Assessment: Pt demonstrated a significant loss of extension PROM but mildly improved flexion  Plan: Cont  POC  Emphasize EXT > FLEX ROM  Precautions:  PMHx: R hip OA, anxiety, WBAT  Dx: R TKA 22     Manuals 7/20 8/4 8/8 8/11 8/15 8/18 8/22 8/24     R knee PROM  10 min 10 min 10 min 10 min 10 min 15 min 15 min                                            Neuro Re-Ed                                                                                                        Ther Ex             Bike: rocking  nv S=9  5 min S=9  5 min S=9  5 min S=8   5 min S8  L1  5 min S7  L1  5 min     Heel slides 5"x10 5"x5 5"x10 5"x20 5"x20 5" x20 5"x20 5"x20 5"x20    Quad sets 5"x5 5"x5 5"x15 5"x20 5"x20 5"x25 5"x20 HEP     Supine GA stretch 10"x3 10"x2 10"x3 10"x3 10"x5 15"x5 HEP HEP     Seated bag hangs for extension         2#  3 min                 SAQ 3"/  1x10 AAROM  1x5 AAROM  2x10          LAQ    AAROM  2x10 AAROM  3x10 [id number] AROM  3x10 AROM  3x15 HEP    STS   Foam  1x5 nv Foam  1x10 Foam  1x15 Foam  1x10 Foam  2x10     SLR       1x3 1x3 1x5    Ther Activity                                       Gait Training             Gait training on floor  FWW  1x200 ft FWW  1x400 ft FWW  1x150 ft  SPC  1x250 ft   FFW  1x lap  SPC  1x 3 laps SPC 1x5 laps SPC  1x400 ft  No AD  1x150 ft SPC  1x400 ft No AD  1x250 ft    Step-ups/  downs  FWW  4"/  1x5 FWW  4"/  1x10 Rail  4"/  1x10 Rail 4  4"  1x10 Rail 4" 1x10 4"/  1x10  6"/  1x10 6"/  1x10     Modalities

## 2022-08-29 ENCOUNTER — OFFICE VISIT (OUTPATIENT)
Dept: PHYSICAL THERAPY | Facility: CLINIC | Age: 62
End: 2022-08-29
Payer: COMMERCIAL

## 2022-08-29 DIAGNOSIS — M25.561 CHRONIC PAIN OF RIGHT KNEE: Primary | ICD-10-CM

## 2022-08-29 DIAGNOSIS — Z96.651 STATUS POST RIGHT KNEE REPLACEMENT: ICD-10-CM

## 2022-08-29 DIAGNOSIS — G89.29 CHRONIC PAIN OF RIGHT KNEE: Primary | ICD-10-CM

## 2022-08-29 DIAGNOSIS — M17.11 PRIMARY OSTEOARTHRITIS OF RIGHT KNEE: ICD-10-CM

## 2022-08-29 PROCEDURE — 97110 THERAPEUTIC EXERCISES: CPT | Performed by: PHYSICAL THERAPIST

## 2022-08-29 PROCEDURE — 97140 MANUAL THERAPY 1/> REGIONS: CPT | Performed by: PHYSICAL THERAPIST

## 2022-08-29 PROCEDURE — 97116 GAIT TRAINING THERAPY: CPT | Performed by: PHYSICAL THERAPIST

## 2022-08-29 NOTE — PROGRESS NOTES
Daily Note     Today's date: 2022  Patient name: Suyapa Hunter  : 1960  MRN: [id number]  Referring provider: Vince Bran  Dx:   Encounter Diagnosis     ICD-10-CM    1  Chronic pain of right knee  M25 561     G89 29    2  Status post right knee replacement  Z96 651    3  Primary osteoarthritis of right knee  M17 11        Start Time: 1100  Stop Time: 1139  Total time in clinic (min): 39 minutes    Subjective: Pt reports more pain related to her sciatic than her knee over the past couple days  She reports only taking 1/2 a pill today instead of her usual whole pill  She inquired about discharging her RW in favor of a SPC today  Objective: See treatment diary below  PROM: 3-115 deg    Assessment: Pt demonstrated limited knee flexion during swing phase of gait slightly improved with cueing and after repeated laps  Due to patient's walking path at home, trialled using the [facility] on an incline with good stability noted  Discussed discharging RW but still using the AD in the community if fatigued  Improved extension and flexion PROM noted today  Plan: Cont  POC  Emphasize EXT > FLEX ROM  Precautions:  PMHx: R hip OA, anxiety, WBAT  Dx: R TKA 22     Manuals  8/4 8/8 8/11 8/15 8/18 8/22 8/24 8/29    R knee PROM  10 min 10 min 10 min 10 min 10 min 15 min 15 min 15 min                                           Neuro Re-Ed                                                                                                        Ther Ex             Bike: rocking  nv S=9  5 min S=9  5 min S=9  5 min S=8   5 min S8  L1  5 min S7  L1  5 min S7 L1 5 min    Heel slides 5"x10 5"x5 5"x10 5"x20 5"x20 5" x20 5"x20 5"x20 5"x20    Quad sets 5"x5 5"x5 5"x15 5"x20 5"x20 5"x25 5"x20 HEP 5"x20    Supine GA stretch 10"x3 10"x2 10"x3 10"x3 10"x5 15"x5 HEP HEP     Seated bag hangs for extension         2#  3 min                 SAQ 3"/  1x10 AAROM  1x5 AAROM  2x10          LAQ    AAROM  2x10 AAROM  3x10 [id number] AROM  3x10 AROM  3x15 HEP    STS   Foam  1x5 nv Foam  1x10 Foam  1x15 Foam  1x10 Foam  2x10 Foam 2x10    SLR       1x3 1x3 1x5 AAROM    Ther Activity                                       Gait Training             Gait training on floor  FWW  1x200 ft FWW  1x400 ft FWW  1x150 ft  SPC  1x250 ft   FFW  1x lap  SPC  1x 3 laps SPC 1x5 laps SPC  1x400 ft  No AD  1x150 ft SPC  1x400 ft No AD  1x250 ft    Gait training uneven surfaces         SPC [id number]    Step-ups/  downs  FWW  4"/  1x5 FWW  4"/  1x10 Rail  4"/  1x10 Rail 4  4"  1x10 Rail 4" 1x10 4"/  1x10  6"/  1x10 6"/  1x10 6"/ 1x10    Modalities

## 2022-08-30 ENCOUNTER — TELEPHONE (OUTPATIENT)
Dept: OBGYN CLINIC | Facility: HOSPITAL | Age: 62
End: 2022-08-30

## 2022-08-30 DIAGNOSIS — M54.50 LOW BACK PAIN, UNSPECIFIED BACK PAIN LATERALITY, UNSPECIFIED CHRONICITY, UNSPECIFIED WHETHER SCIATICA PRESENT: Primary | ICD-10-CM

## 2022-08-30 NOTE — TELEPHONE ENCOUNTER
DR Maikel Delgado  RE: Questions in regard to surgery  CB: [phone]    Patient called stating that she has several questions in regard to surgery  Caller asked to speak to clinical team       1  Can she use Ibuprofen vs tylenol? She is experiencing LBP with sciatic symptoms which are affecting knee rehabilitation  Knee rehab is going well despite LBP  2  Can she have a script to have her LB addressed while going to her knee rehabilitation? 3  Is it possible to put vitamin E over incision or should she continue to avoid all lotions over the incision and area?

## 2022-09-01 ENCOUNTER — OFFICE VISIT (OUTPATIENT)
Dept: PHYSICAL THERAPY | Facility: CLINIC | Age: 62
End: 2022-09-01
Payer: COMMERCIAL

## 2022-09-01 DIAGNOSIS — M17.11 PRIMARY OSTEOARTHRITIS OF RIGHT KNEE: Primary | ICD-10-CM

## 2022-09-01 DIAGNOSIS — M54.16 LUMBAR RADICULOPATHY: ICD-10-CM

## 2022-09-01 DIAGNOSIS — M25.561 CHRONIC PAIN OF RIGHT KNEE: ICD-10-CM

## 2022-09-01 DIAGNOSIS — Z96.651 STATUS POST RIGHT KNEE REPLACEMENT: ICD-10-CM

## 2022-09-01 DIAGNOSIS — G89.29 CHRONIC PAIN OF RIGHT KNEE: ICD-10-CM

## 2022-09-01 PROCEDURE — 97110 THERAPEUTIC EXERCISES: CPT | Performed by: PHYSICAL THERAPIST

## 2022-09-01 PROCEDURE — 97140 MANUAL THERAPY 1/> REGIONS: CPT | Performed by: PHYSICAL THERAPIST

## 2022-09-01 NOTE — PROGRESS NOTES
Daily Note     Today's date: 2022  Patient name: Stephen Mcghee  : 1960  MRN: [id number]  Referring provider: Adams Faustin  Dx:   Encounter Diagnosis     ICD-10-CM    1  Primary osteoarthritis of right knee  M17 11    2  Chronic pain of right knee  M25 561     G89 29    3  Status post right knee replacement  Z96 651    4  Lumbar radiculopathy  M54 16                   Subjective: Pt presents with a Rx for lumbar radiculopathy  She reports LBP radiating into the posterior RLE into the dorsal foot x 3 wks  She reports sitting and lying supine worsen, walking and standing improve  Objective: See treatment diary below  PROM: 3-108 deg  Lumbar assessment:  Repeated motions: FIS: no change, EIS: no change, EIL: centralized    Assessment: Pt presents with s/s consistent with a L4/5 posterior derangement with an extension DP  She was instructed on sitting posture correct and updated HEP to centralize RLE from sitting or lying  Plan: Cont  R knee POC  Assess response to L/S POC  Precautions:  PMHx: R hip OA, anxiety, WBAT  Dx: R TKA 22, L4/5 posterior derangement with an extension DP    Manuals  8/4 8/8 8/11 8/15 8/18 8/22 8/24 8/29 9/1   R knee PROM  10 min 10 min 10 min 10 min 10 min 15 min 15 min 15 min 10 min                                          Neuro Re-Ed                                                                                                        Ther Ex             Bike: rocking  nv S=9  5 min S=9  5 min S=9  5 min S=8   5 min S8  L1  5 min S7  L1  5 min S7 L1 5 min S8  3 min  S6  3 min   Heel slides 5"x10 5"x5 5"x10 5"x20 5"x20 5" x20 5"x20 5"x20 5"x20 5"x20   Quad sets 5"x5 5"x5 5"x15 5"x20 5"x20 5"x25 5"x20 HEP 5"x20 HEP   Supine GA stretch 10"x3 10"x2 10"x3 10"x3 10"x5 15"x5 HEP HEP  HEP   Standing GA stretch          nv   Seated bag hangs for extension         2#  3 min 3#  3 min   Standing lumbar extension          3x10  HEP   Prone press-ups 3x10  HEP                             SAQ 3"/  1x10 AAROM  1x5 AAROM  2x10          LAQ    AAROM  2x10 AAROM  3x10 [id number] AROM  3x10 AROM  3x15 HEP    STS   Foam  1x5 nv Foam  1x10 Foam  1x15 Foam  1x10 Foam  2x10 Foam 2x10 Foam  2x10   SLR       1x3 1x3 1x5 AAROM 1x10   Ther Activity                                       Gait Training             Gait training on floor  FWW  1x200 ft FWW  1x400 ft FWW  1x150 ft  SPC  1x250 ft   FFW  1x lap  SPC  1x 3 laps SPC 1x5 laps SPC  1x400 ft  No AD  1x150 ft SPC  1x400 ft No AD  1x250 ft SPC  1x400 ft   Gait training uneven surfaces         SPC [id number]    Step-ups/  downs  FWW  4"/  1x5 FWW  4"/  1x10 Rail  4"/  1x10 Rail 4  4"  1x10 Rail 4" 1x10 4"/  1x10  6"/  1x10 6"/  1x10 6"/ 1x10 nv   Modalities

## 2022-09-06 ENCOUNTER — OFFICE VISIT (OUTPATIENT)
Dept: PHYSICAL THERAPY | Facility: CLINIC | Age: 62
End: 2022-09-06
Payer: COMMERCIAL

## 2022-09-06 DIAGNOSIS — M25.561 CHRONIC PAIN OF RIGHT KNEE: ICD-10-CM

## 2022-09-06 DIAGNOSIS — M17.11 PRIMARY OSTEOARTHRITIS OF RIGHT KNEE: ICD-10-CM

## 2022-09-06 DIAGNOSIS — G89.29 CHRONIC PAIN OF RIGHT KNEE: ICD-10-CM

## 2022-09-06 DIAGNOSIS — Z96.651 STATUS POST RIGHT KNEE REPLACEMENT: ICD-10-CM

## 2022-09-06 DIAGNOSIS — M54.16 LUMBAR RADICULOPATHY: Primary | ICD-10-CM

## 2022-09-06 PROCEDURE — 97116 GAIT TRAINING THERAPY: CPT | Performed by: PHYSICAL THERAPIST

## 2022-09-06 PROCEDURE — 97140 MANUAL THERAPY 1/> REGIONS: CPT | Performed by: PHYSICAL THERAPIST

## 2022-09-06 PROCEDURE — 97110 THERAPEUTIC EXERCISES: CPT | Performed by: PHYSICAL THERAPIST

## 2022-09-06 NOTE — PROGRESS NOTES
Daily Note     Today's date: 2022  Patient name: Teri Ochoa  : 1960  MRN: [id number]  Referring provider: Shey Rock,*  Dx:   Encounter Diagnosis     ICD-10-CM    1  Lumbar radiculopathy  M54 16    2  Chronic pain of right knee  M25 561     G89 29    3  Status post right knee replacement  Z96 651    4  Primary osteoarthritis of right knee  M17 11                   Subjective: Pt reports d/c'ing narcotic pain medications, less AD use, and improved RLE radicular pain and sleeping tolerance with extension DP POC  Objective: See treatment diary below  PROM: 3-111 deg    Assessment: Pt demonstrated improved flexion PROM and improved tolerance to manual PROM overall  Plan: Cont  POC  Precautions:  PMHx: R hip OA, anxiety, WBAT  Dx: R TKA 22, L4/5 posterior derangement with an extension DP    Manuals    R knee PROM 10 min         10 min                                          Neuro Re-Ed             SLS EO 60"x1                                                                                          Ther Ex             Bike S=6  5 min         S8  3 min  S6  3 min   Heel slides          5"x20   Quad sets          HEP   Supine GA stretch          HEP   Standing GA stretch 15"x3         nv   Seated bag hangs for extension HEP         3#  3 min   Standing lumbar extension HEP         3x10  HEP   Prone press-ups HEP         3x10  HEP                             SAQ             LAQ             STS Foam  2x10 No foam  2x10        Foam  2x10   SLR 2x10 3x10        1x10   Ther Activity                                       Gait Training             Gait training on floor No AD  1x400 ft         SPC  1x400 ft   Gait training uneven surfaces nv            Step-ups/  downs 6"/  2x10         nv   Modalities

## 2022-09-08 ENCOUNTER — EVALUATION (OUTPATIENT)
Dept: PHYSICAL THERAPY | Facility: CLINIC | Age: 62
End: 2022-09-08
Payer: COMMERCIAL

## 2022-09-08 DIAGNOSIS — Z96.651 STATUS POST RIGHT KNEE REPLACEMENT: ICD-10-CM

## 2022-09-08 DIAGNOSIS — G89.29 CHRONIC PAIN OF RIGHT KNEE: Primary | ICD-10-CM

## 2022-09-08 DIAGNOSIS — M17.11 PRIMARY OSTEOARTHRITIS OF RIGHT KNEE: ICD-10-CM

## 2022-09-08 DIAGNOSIS — M25.561 CHRONIC PAIN OF RIGHT KNEE: Primary | ICD-10-CM

## 2022-09-08 DIAGNOSIS — M54.16 LUMBAR RADICULOPATHY: ICD-10-CM

## 2022-09-08 PROCEDURE — 97116 GAIT TRAINING THERAPY: CPT | Performed by: PHYSICAL THERAPIST

## 2022-09-08 PROCEDURE — 97110 THERAPEUTIC EXERCISES: CPT | Performed by: PHYSICAL THERAPIST

## 2022-09-08 PROCEDURE — 97140 MANUAL THERAPY 1/> REGIONS: CPT | Performed by: PHYSICAL THERAPIST

## 2022-09-08 NOTE — PROGRESS NOTES
PT Re-Evaluation     Today's date: 2022  Patient name: Reji Morrell  : 1960  MRN: [id number]  Referring provider: Skylar Strickland  Dx:   Encounter Diagnosis     ICD-10-CM    1  Chronic pain of right knee  M25 561     G89 29    2  Lumbar radiculopathy  M54 16    3  Status post right knee replacement  Z96 651    4  Primary osteoarthritis of right knee  M17 11                   Assessment  Assessment details: Pt demonstrates improved ROM, strength, quad stability, edema, gait, WB tolerance, and pain  She will continue to benefit from skilled PT services to address her impairments in order to further improve pain and function    Impairments: abnormal gait, abnormal muscle firing, abnormal or restricted ROM, abnormal movement, activity intolerance, impaired physical strength, lacks appropriate home exercise program, pain with function, weight-bearing intolerance and poor body mechanics  Understanding of Dx/Px/POC: good   Prognosis: good    Goals  STG - 4 wks  1) pt will demonstrate PROM 0-90 deg (partially met)  2) pt will d/c FWW (met)    MTG - 8 wks  1) pt will demonstrate PROM 0-110 deg (partially met)  2) pt will d/c SPC (not met  3) pt will report no difficulty with light ADLs (not met)    LTG - 12 wks  1) pt will demonstrate PROM 0-120 deg  2) pt will demonstrate normalized gait  3) pt will report reciprocal gait on stairs  4) pt will initiate daily walking routine for fitness    Plan  Planned modality interventions: cryotherapy  Planned therapy interventions: manual therapy, balance/weight bearing training, body mechanics training, neuromuscular re-education, patient education, strengthening, stretching, therapeutic activities, therapeutic exercise, home exercise program, gait training, functional ROM exercises and flexibility  Frequency: 2x week  Duration in weeks: 6  Treatment plan discussed with: patient and family        Subjective Evaluation    History of Present Illness  Mechanism of injury: Pt is a 63 y o  female with PMHx significant for R hip OA, anxiety  She reports 3-yr Hx of worsening R knee pain  She presents to PT s/p R TKA 22  She reports 3/10 R knee pain at worst with prolonged WB, SPC use for community ambulation, and inability to use stairs to basement due to steepness and lack of confidence  She has d/c'ed narcotic pain meds  Pt also reports a 75% reduction in pain since starting lumbar extension DP POC  Pain  Current pain ratin  At best pain ratin  At worst pain rating: 3  Quality: pressure, dull ache, tight and pulling  Relieving factors: rest  Progression: improved    Social Support  Steps to enter house: yes (2 KATE with landing on each)  Stairs in house: yes (FF to basement with laundry)   Lives in: [facility]  Lives with: spouse      Diagnostic Tests  X-ray: abnormal (Significant narrowing of the medial joint space with minimal osteophytic spurring  This has progressed from the prior study)  Treatments  Previous treatment: medication, injection treatment and physical therapy  Current treatment: physical therapy  Patient Goals  Patient goals for therapy: decreased edema, decreased pain, improved balance, increased motion, increased strength, independence with ADLs/IADLs and return to sport/leisure activities  Patient goal: daily fast walk for 30 minutes        Objective     Observations     Right Knee   Positive for edema (3+ knee)       Passive Range of Motion     Right Knee   Flexion: 114 degrees with pain  Extension: 2 degrees with pain  Mechanical Assessment    Cervical      Thoracic      Lumbar    Standing flexion: repeated movements   Pain location:no change  Standing extension: repeated movements  Pain location: no change  Lying extension: repeated movements  Pain location: centralized    Strength/Myotome Testing     Right Hip   Planes of Motion   Flexion: 3+  Extension: 4-  Abduction: 4-  Adduction: 4-    Right Knee   Flexion: 4-  Extension: 3+  Quadriceps contraction: fair    Additional Strength Details  SLR: mild quad lag    General Comments:      Knee Comments  Gait: no AD: mildly decreased R stance time with stiff-knee gait pattern    Outdoor ambulation: no AD: mild difficulty and fair ecc quad stability on declines, curbs    SLS: EO: 5 sec             Precautions:  PMHx: R hip OA, anxiety, WBAT  Dx: R TKA 8/1/22, L4/5 posterior derangement with an extension DP    Manuals 9/8            R knee PROM 15 min                                                   Neuro Re-Ed             SLS EO 60"x1                                                                                          Ther Ex             Bike L1  S8  3 min  S7  3 min            Heel slides 5"x20            SLR 3x10            Standing GA stretch 15"x3            Supine HA stretch             Standing lumbar extension 3x10  HEP            Prone press-ups 3x10  HEP            STS 2x10 2x10                                                  Ther Activity                                       Gait Training             Gait training outdoors 6 min            Step-ups/  downs 6"/  1x20  No rail            Modalities

## 2022-09-12 ENCOUNTER — OFFICE VISIT (OUTPATIENT)
Dept: PHYSICAL THERAPY | Facility: CLINIC | Age: 62
End: 2022-09-12
Payer: COMMERCIAL

## 2022-09-12 DIAGNOSIS — M25.561 CHRONIC PAIN OF RIGHT KNEE: Primary | ICD-10-CM

## 2022-09-12 DIAGNOSIS — G89.29 CHRONIC PAIN OF RIGHT KNEE: Primary | ICD-10-CM

## 2022-09-12 DIAGNOSIS — M17.11 PRIMARY OSTEOARTHRITIS OF RIGHT KNEE: ICD-10-CM

## 2022-09-12 DIAGNOSIS — Z96.651 STATUS POST RIGHT KNEE REPLACEMENT: ICD-10-CM

## 2022-09-12 DIAGNOSIS — M54.16 LUMBAR RADICULOPATHY: ICD-10-CM

## 2022-09-12 PROCEDURE — 97116 GAIT TRAINING THERAPY: CPT | Performed by: PHYSICAL THERAPIST

## 2022-09-12 PROCEDURE — 97110 THERAPEUTIC EXERCISES: CPT | Performed by: PHYSICAL THERAPIST

## 2022-09-12 PROCEDURE — 97140 MANUAL THERAPY 1/> REGIONS: CPT | Performed by: PHYSICAL THERAPIST

## 2022-09-12 NOTE — PROGRESS NOTES
Daily Note     Today's date: 2022  Patient name: James Hernández  : 1960  MRN: [id number]  Referring provider: Jackie Alejandro  Dx:   Encounter Diagnosis     ICD-10-CM    1  Chronic pain of right knee  M25 561     G89 29    2  Lumbar radiculopathy  M54 16    3  Status post right knee replacement  Z96 651    4  Primary osteoarthritis of right knee  M17 11                   Subjective: Pt reports improved ambulation tolerance and gait stability but notes increased stiffness x 36 hrs  Objective: See treatment diary below  PROM: 2-112 deg    Assessment: Pt demonstrated mildly decreased flexion but improved gait stability and stair tolerance  Plan: Cont  POC  Precautions:  PMHx: R hip OA, anxiety, WBAT  Dx: R TKA 22, L4/5 posterior derangement with an extension DP    Manuals            R knee PROM 15 min 15 min                                                  Neuro Re-Ed             SLS EO 60"x1 60"x1                                                                                         Ther Ex             Bike L1  S8  3 min  S7  3 min L1  S8  2 min  S7  2 min  S6  2 min           Heel slides 5"x20 5"x20           SLR 3x10 3x10           Standing GA stretch 15"x3 15"x3           Supine HA stretch             Standing lumbar extension 3x10  HEP            Prone press-ups 3x10  HEP            STS 2x10 2x10 3x10                                                 Ther Activity                                       Gait Training             Gait training outdoors 6 min 7 min           Step-ups/  downs 6"/  1x20  No rail 8"/  1x20 rail           Modalities

## 2022-09-13 ENCOUNTER — OFFICE VISIT (OUTPATIENT)
Dept: OBGYN CLINIC | Facility: HOSPITAL | Age: 62
End: 2022-09-13

## 2022-09-13 VITALS
WEIGHT: 184 LBS | SYSTOLIC BLOOD PRESSURE: 120 MMHG | HEART RATE: 91 BPM | HEIGHT: 64 IN | DIASTOLIC BLOOD PRESSURE: 73 MMHG | BODY MASS INDEX: 31.41 KG/M2

## 2022-09-13 DIAGNOSIS — Z47.1 AFTERCARE FOLLOWING RIGHT KNEE JOINT REPLACEMENT SURGERY: Primary | ICD-10-CM

## 2022-09-13 DIAGNOSIS — Z96.651 AFTERCARE FOLLOWING RIGHT KNEE JOINT REPLACEMENT SURGERY: Primary | ICD-10-CM

## 2022-09-13 PROCEDURE — 99024 POSTOP FOLLOW-UP VISIT: CPT | Performed by: ORTHOPAEDIC SURGERY

## 2022-09-13 RX ORDER — CEPHALEXIN 500 MG/1
CAPSULE ORAL
Qty: 20 CAPSULE | Refills: 2 | Status: SHIPPED | OUTPATIENT
Start: 2022-09-13 | End: 2024-08-01

## 2022-09-13 NOTE — PROGRESS NOTES
Assessment:   Diagnosis ICD-10-CM Associated Orders   1  Aftercare following right knee joint replacement surgery  Z47 1     Z96 651        Plan:      Continue outpatient PT   Order for prophylactic antibiotics placed today  Allow 6 more weeks until scheduling any non urgent dental procedure  Range of motion and strength allows for resumption of driving      To do next visit:  Return in about 6 weeks (around 10/25/2022) for re-check with x-rays, or sooner if symptoms worsen or fail to improve  The above stated was discussed in layman's terms and the patient expressed understanding  All questions were answered to the patient's satisfaction  Scribe Attestation    I,:  Valarie Dumont am acting as a scribe while in the presence of the attending physician :       I,:  Gisele Alvarez MD personally performed the services described in this documentation    as scribed in my presence :             Subjective:   Rebel Landry is a 63 y o  female who presents today 6 weeks s/p right knee TKA  Patient reports overall she feels she is doing well and is so far satisfied with her recovery  She rates her pain 1/10 at worst      Patient expressed interest in pursuing a knee replacement on the contralateral knee when she is able  She also wants to know when she is able to shave the area of her scar  She presents today using a cane for ambulatory assistance  He has been attending physical therapy progressing well  Denies any calf or thigh pain  Denies any fevers chills             Review of systems negative unless otherwise specified in HPI  Review of Systems    Past Medical History:   Diagnosis Date    Chest pain syndrome 3/29/2022    Renal dysplasia     nonfunctioning left life-long       Past Surgical History:   Procedure Laterality Date    COLONOSCOPY  03/2016   [facility] INJECTION RIGHT HIP (NON ARTHROGRAM)  5/10/2022    KIDNEY SURGERY Right     age 29    ID TOTAL KNEE ARTHROPLASTY Right 8/1/2022 Procedure: ARTHROPLASTY KNEE TOTAL W ROBOT;  Surgeon: Phillip Jacobson MD;  Location: [facility];  Service: Orthopedics    REDUCTION MAMMAPLASTY      WRIST SURGERY Right     fx       History reviewed  No pertinent family history  Social History     Occupational History    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   Tobacco Use    Smoking status: Former Smoker     Packs/day: 0 25     Years: 15 00     Pack years: 3 75     Types: Cigarettes    Smokeless tobacco: Never Used   Vaping Use    Vaping Use: Never used   Substance and Sexual Activity    Alcohol use: Yes     Alcohol/week: 10 0 standard drinks     Types: 5 Cans of beer, 5 Standard drinks or equivalent per week     Comment: daily    Drug use: No    Sexual activity: Yes     Partners: Male     Birth control/protection: Post-menopausal         Current Outpatient Medications:     ALPRAZolam (XANAX) 0 25 mg tablet, Take 1 tablet (0 25 mg total) by mouth daily as needed for anxiety, Disp: 30 tablet, Rfl: 0    ascorbic acid (VITAMIN C) 500 MG tablet, Take 1 tablet (500 mg total) by mouth 2 (two) times a day, Disp: 60 tablet, Rfl: 0    FIBER PO, Take by mouth daily, Disp: , Rfl:     Multiple Vitamins-Minerals (MULTI FOR HER 50+ PO), Take by mouth daily, Disp: , Rfl:     Psyllium (Metamucil) WAFR, Take by mouth, Disp: , Rfl:     Allergies   Allergen Reactions    Acetazolamide Rash    Sulfa Antibiotics Rash            Vitals:    09/13/22 1401   BP: 120/73   Pulse: 91       Objective:                    Right Knee Exam     Muscle Strength   The patient has normal right knee strength  Tenderness   The patient is experiencing no tenderness  Range of Motion   Extension: 10   Flexion: 110     Other   Scars: present  Swelling: mild    Comments:   Well healing anterior incision  Calf compartments soft and supple  Sensation intact  Toes are warm sensate and mobile              Diagnostics, reviewed and taken today if performed as documented:    None performed Procedures, if performed today:    None performed      Portions of the record may have been created with voice recognition software  Occasional wrong word or "sound a like" substitutions may have occurred due to the inherent limitations of voice recognition software  Read the chart carefully and recognize, using context, where substitutions have occurred

## 2022-09-15 ENCOUNTER — OFFICE VISIT (OUTPATIENT)
Dept: PHYSICAL THERAPY | Facility: CLINIC | Age: 62
End: 2022-09-15
Payer: COMMERCIAL

## 2022-09-15 DIAGNOSIS — M17.11 PRIMARY OSTEOARTHRITIS OF RIGHT KNEE: ICD-10-CM

## 2022-09-15 DIAGNOSIS — G89.29 CHRONIC PAIN OF RIGHT KNEE: Primary | ICD-10-CM

## 2022-09-15 DIAGNOSIS — M25.561 CHRONIC PAIN OF RIGHT KNEE: Primary | ICD-10-CM

## 2022-09-15 DIAGNOSIS — Z96.651 STATUS POST RIGHT KNEE REPLACEMENT: ICD-10-CM

## 2022-09-15 PROCEDURE — 97110 THERAPEUTIC EXERCISES: CPT

## 2022-09-15 PROCEDURE — 97140 MANUAL THERAPY 1/> REGIONS: CPT

## 2022-09-15 PROCEDURE — 97116 GAIT TRAINING THERAPY: CPT

## 2022-09-15 NOTE — PROGRESS NOTES
Daily Note     Today's date: 9/15/2022  Patient name: Vaibhav Stubbs  : 1960  MRN: [id number]  Referring provider: Krystian Ledesma  Dx:   Encounter Diagnosis     ICD-10-CM    1  Chronic pain of right knee  M25 561     G89 29    2  Status post right knee replacement  Z96 651    3  Primary osteoarthritis of right knee  M17 11                   Subjective: Pt reports increased walking distance around her neighborhood  Objective: See treatment diary below  PROM: 2-117 deg    Assessment: Pt demonstrated good stability with walking in grass without SPC but poor SLS balance  Pt would continue to benefit from using a SPC at this time  Pt demonstrated difficulty limiting trunk flexion during step ups  Plan: Cont  POC  Precautions:  PMHx: R hip OA, anxiety, WBAT  Dx: R TKA 22, L4/5 posterior derangement with an extension DP    Manuals 9/8 9/12 9/15          R knee PROM 15 min 15 min 15 min                                                 Neuro Re-Ed             SLS EO 60"x1 60"x1 60"x1                                                                                        Ther Ex             Bike L1  S8  3 min  S7  3 min L1  S8  2 min  S7  2 min  S6  2 min L1  S8  2 min  S7  2 min  S6  2 min          Heel slides 5"x20 5"x20 5"x20          SLR 3x10 3x10 3x10          Standing GA stretch 15"x3 15"x3 15"x3          Supine HA stretch             Standing lumbar extension 3x10  HEP            Prone press-ups 3x10  HEP            STS 2x10 2x10 3x10                                                 Ther Activity                                       Gait Training             Gait training outdoors 6 min 7 min 6 min          Step-ups/  downs 6"/  1x20  No rail 8"/  1x20 rail 8"   1x20 rail          Modalities

## 2022-09-20 ENCOUNTER — OFFICE VISIT (OUTPATIENT)
Dept: PHYSICAL THERAPY | Facility: CLINIC | Age: 62
End: 2022-09-20
Payer: COMMERCIAL

## 2022-09-20 DIAGNOSIS — M54.16 LUMBAR RADICULOPATHY: ICD-10-CM

## 2022-09-20 DIAGNOSIS — G89.29 CHRONIC PAIN OF RIGHT KNEE: Primary | ICD-10-CM

## 2022-09-20 DIAGNOSIS — Z96.651 STATUS POST RIGHT KNEE REPLACEMENT: ICD-10-CM

## 2022-09-20 DIAGNOSIS — M25.561 CHRONIC PAIN OF RIGHT KNEE: Primary | ICD-10-CM

## 2022-09-20 DIAGNOSIS — M17.11 PRIMARY OSTEOARTHRITIS OF RIGHT KNEE: ICD-10-CM

## 2022-09-20 PROCEDURE — 97140 MANUAL THERAPY 1/> REGIONS: CPT | Performed by: PHYSICAL THERAPIST

## 2022-09-20 PROCEDURE — 97110 THERAPEUTIC EXERCISES: CPT | Performed by: PHYSICAL THERAPIST

## 2022-09-20 PROCEDURE — 97112 NEUROMUSCULAR REEDUCATION: CPT | Performed by: PHYSICAL THERAPIST

## 2022-09-20 PROCEDURE — 97116 GAIT TRAINING THERAPY: CPT | Performed by: PHYSICAL THERAPIST

## 2022-09-20 NOTE — PROGRESS NOTES
Daily Note     Today's date: 2022  Patient name: Nalini Herrera  : 1960  MRN: [id number]  Referring provider: Blanca Taylor  Dx:   Encounter Diagnosis     ICD-10-CM    1  Chronic pain of right knee  M25 561     G89 29    2  Status post right knee replacement  Z96 651    3  Primary osteoarthritis of right knee  M17 11    4  Lumbar radiculopathy  M54 16                   Subjective: Pt reports R hip and knee weakness in WB and increased LBP over the past few days  Objective: See treatment diary below  PROM: 2-110 deg    Assessment: Pt demonstrated fair gait stability without AD on grass, decreased flexion PROM  Plan: Cont  POC  Precautions:  PMHx: R hip OA, anxiety, WBAT  Dx: R TKA 22, L4/5 posterior derangement with an extension DP    Manuals 9/8 9/12 9/15          R knee PROM 15 min 15 min 15 min                                                 Neuro Re-Ed             SLS EO 60"x1 60"x1 60"x1 60"x1         Standing hip ABD    2x10                                                                          Ther Ex             Bike L1  S8  3 min  S7  3 min L1  S8  2 min  S7  2 min  S6  2 min L1  S8  2 min  S7  2 min  S6  2 min          Heel slides 5"x20 5"x20 5"x20 HEP         SLR 3x10 3x10 3x10 3x10  HEP 3x15        Standing GA stretch 15"x3 15"x3 15"x3 15"x3         Supine HA stretch             Standing lumbar extension 3x10  HEP            Prone press-ups 3x10  HEP            STS 2x10 2x10 3x10 3x10                                                Ther Activity                                       Gait Training             Gait training outdoors 6 min 7 min 6 min 6 min         Step-ups/  downs 6"/  1x20  No rail 8"/  1x20 rail 8"   1x20 rail 8"/  1x20 rail         Modalities

## 2022-09-23 ENCOUNTER — OFFICE VISIT (OUTPATIENT)
Dept: PHYSICAL THERAPY | Facility: CLINIC | Age: 62
End: 2022-09-23
Payer: COMMERCIAL

## 2022-09-23 DIAGNOSIS — M25.561 CHRONIC PAIN OF RIGHT KNEE: Primary | ICD-10-CM

## 2022-09-23 DIAGNOSIS — M54.16 LUMBAR RADICULOPATHY: ICD-10-CM

## 2022-09-23 DIAGNOSIS — M17.11 PRIMARY OSTEOARTHRITIS OF RIGHT KNEE: ICD-10-CM

## 2022-09-23 DIAGNOSIS — G89.29 CHRONIC PAIN OF RIGHT KNEE: Primary | ICD-10-CM

## 2022-09-23 DIAGNOSIS — Z96.651 STATUS POST RIGHT KNEE REPLACEMENT: ICD-10-CM

## 2022-09-23 PROCEDURE — 97116 GAIT TRAINING THERAPY: CPT | Performed by: PHYSICAL THERAPIST

## 2022-09-23 PROCEDURE — 97140 MANUAL THERAPY 1/> REGIONS: CPT | Performed by: PHYSICAL THERAPIST

## 2022-09-23 PROCEDURE — 97110 THERAPEUTIC EXERCISES: CPT | Performed by: PHYSICAL THERAPIST

## 2022-09-23 NOTE — PROGRESS NOTES
Daily Note     Today's date: 2022  Patient name: Neelima Sullivan  : 1960  MRN: [id number]  Referring provider: Emory Keith  Dx:   Encounter Diagnosis     ICD-10-CM    1  Chronic pain of right knee  M25 561     G89 29    2  Primary osteoarthritis of right knee  M17 11    3  Lumbar radiculopathy  M54 16    4  Status post right knee replacement  Z96 651                   Subjective: Pt reports improved R knee pain and function, moderate LBP with occasional radiating into posterior RLE with prolonged sitting or bending  Objective: See treatment diary below  PROM: 2-115 deg    Assessment: Pt demonstrated improved ROM and quad strength  Plan: Cont  POC  Precautions:  PMHx: R hip OA, anxiety, WBAT  Dx: R TKA 22, L4/5 posterior derangement with an extension DP    Manuals 9/8 9/12 9/15  9/23        R knee PROM 15 min 15 min 15 min  15 min                                               Neuro Re-Ed             SLS EO 60"x1 60"x1 60"x1 60"x1 60"x1        Standing hip ABD    2x10 2x10 3x10                                                                        Ther Ex             Bike L1  S8  3 min  S7  3 min L1  S8  2 min  S7  2 min  S6  2 min L1  S8  2 min  S7  2 min  S6  2 min  L1  S7  2 min  S6  2 min  S5  2 min        Heel slides 5"x20 5"x20 5"x20 HEP         SLR 3x10 3x10 3x10 3x10  HEP 3x10  HEP 3x15       Standing GA stretch 15"x3 15"x3 15"x3 15"x3 15"x3        Supine HA stretch             Standing lumbar extension 3x10  HEP            Prone press-ups 3x10  HEP            STS 2x10 2x10 3x10 3x10 3x10                                               Ther Activity                                       Gait Training             Gait training outdoors 6 min 7 min 6 min 6 min 6 min        Step-ups/  downs 6"/  1x20  No rail 8"/  1x20 rail 8"   1x20 rail 8"/  1x20 rail 8"/  1x20 rail        Modalities

## 2022-09-26 ENCOUNTER — OFFICE VISIT (OUTPATIENT)
Dept: PHYSICAL THERAPY | Facility: CLINIC | Age: 62
End: 2022-09-26
Payer: COMMERCIAL

## 2022-09-26 DIAGNOSIS — M17.11 PRIMARY OSTEOARTHRITIS OF RIGHT KNEE: ICD-10-CM

## 2022-09-26 DIAGNOSIS — G89.29 CHRONIC PAIN OF RIGHT KNEE: Primary | ICD-10-CM

## 2022-09-26 DIAGNOSIS — M25.561 CHRONIC PAIN OF RIGHT KNEE: Primary | ICD-10-CM

## 2022-09-26 DIAGNOSIS — Z96.651 STATUS POST RIGHT KNEE REPLACEMENT: ICD-10-CM

## 2022-09-26 DIAGNOSIS — M54.16 LUMBAR RADICULOPATHY: ICD-10-CM

## 2022-09-26 PROCEDURE — 97112 NEUROMUSCULAR REEDUCATION: CPT

## 2022-09-26 PROCEDURE — 97116 GAIT TRAINING THERAPY: CPT

## 2022-09-26 PROCEDURE — 97140 MANUAL THERAPY 1/> REGIONS: CPT

## 2022-09-26 PROCEDURE — 97110 THERAPEUTIC EXERCISES: CPT

## 2022-09-26 NOTE — PROGRESS NOTES
Daily Note     Today's date: 2022  Patient name: Catarino Diaz  : 1960  MRN: [id number]  Referring provider: Davis Padron  Dx:   Encounter Diagnosis     ICD-10-CM    1  Chronic pain of right knee  M25 561     G89 29    2  Primary osteoarthritis of right knee  M17 11    3  Lumbar radiculopathy  M54 16    4  Status post right knee replacement  Z96 651                   Subjective: Pt reports she is challenged with going down steps  Pt reports R LE radicular symptoms at night with sleeping in side lying  Objective: See treatment diary below  PROM: 2-118 deg    Assessment: Pt demonstrated one LOB during outdoor gait training  Pt continues to demonstrate difficulty with 8" stair training due to fair quad strength  Plan: Cont  POC  Precautions:  PMHx: R hip OA, anxiety, WBAT  Dx: R TKA 22, L4/5 posterior derangement with an extension DP    Manuals 9/8 9/12 9/15  9/23 9/26       R knee PROM 15 min 15 min 15 min  15 min 12 min                                              Neuro Re-Ed             SLS EO 60"x1 60"x1 60"x1 60"x1 60"x1 60"x1       Standing hip ABD    2x10 2x10 3x10                                                                        Ther Ex             Bike L1  S8  3 min  S7  3 min L1  S8  2 min  S7  2 min  S6  2 min L1  S8  2 min  S7  2 min  S6  2 min  L1  S7  2 min  S6  2 min  S5  2 min L1  S7  2 min  S6  2 min  S5  2 min       Heel slides 5"x20 5"x20 5"x20 HEP         SLR 3x10 3x10 3x10 3x10  HEP 3x10  HEP 3x15       Standing GA stretch 15"x3 15"x3 15"x3 15"x3 15"x3 15"x3       Supine HA stretch             Standing lumbar extension 3x10  HEP            Prone press-ups 3x10  HEP            STS 2x10 2x10 3x10 3x10 3x10 3x10                                              Ther Activity                                       Gait Training             Gait training outdoors 6 min 7 min 6 min 6 min 6 min 6 min       Step-ups/  downs 6"/  1x20  No rail 8"/  1x20 rail 8"   1x20 rail 8"/  1x20 rail 8"/  1x20 rail 8"/ 1x20 rail       Modalities

## 2022-09-29 ENCOUNTER — OFFICE VISIT (OUTPATIENT)
Dept: PHYSICAL THERAPY | Facility: CLINIC | Age: 62
End: 2022-09-29
Payer: COMMERCIAL

## 2022-09-29 DIAGNOSIS — Z96.651 STATUS POST RIGHT KNEE REPLACEMENT: ICD-10-CM

## 2022-09-29 DIAGNOSIS — G89.29 CHRONIC PAIN OF RIGHT KNEE: Primary | ICD-10-CM

## 2022-09-29 DIAGNOSIS — M25.561 CHRONIC PAIN OF RIGHT KNEE: Primary | ICD-10-CM

## 2022-09-29 DIAGNOSIS — M54.16 LUMBAR RADICULOPATHY: ICD-10-CM

## 2022-09-29 DIAGNOSIS — M17.11 PRIMARY OSTEOARTHRITIS OF RIGHT KNEE: ICD-10-CM

## 2022-09-29 PROCEDURE — 97110 THERAPEUTIC EXERCISES: CPT | Performed by: PHYSICAL THERAPIST

## 2022-09-29 PROCEDURE — 97116 GAIT TRAINING THERAPY: CPT | Performed by: PHYSICAL THERAPIST

## 2022-09-29 PROCEDURE — 97140 MANUAL THERAPY 1/> REGIONS: CPT | Performed by: PHYSICAL THERAPIST

## 2022-09-29 NOTE — PROGRESS NOTES
Daily Note     Today's date: 2022  Patient name: Neelima Sullivan  : 1960  MRN: [id number]  Referring provider: Emory Keith  Dx:   Encounter Diagnosis     ICD-10-CM    1  Chronic pain of right knee  M25 561     G89 29    2  Lumbar radiculopathy  M54 16    3  Status post right knee replacement  Z96 651    4  Primary osteoarthritis of right knee  M17 11                   Subjective: Pt reports mod difficulty, no pain with stairs, especially initiating  Objective: See treatment diary below  PROM: 1-114 deg    Assessment: Pt demonstrated improved gait stability on grass and small inclines on sidewalk, improved extension ROM, and mild quad lag with SLR  Plan: Cont  POC  Precautions:  PMHx: R hip OA, anxiety, WBAT  Dx: R TKA 22, L4/5 posterior derangement with an extension DP    Manuals 9/8 9/12 9/15  9/23 9/26 9/29      R knee PROM 15 min 15 min 15 min  15 min 12 min 10 min                                             Neuro Re-Ed             SLS EO 60"x1 60"x1 60"x1 60"x1 60"x1 60"x1 HEP 60"x2     Standing hip ABD    2x10 2x10 3x10 HEP 3x10                                                                      Ther Ex             Bike L1  S8  3 min  S7  3 min L1  S8  2 min  S7  2 min  S6  2 min L1  S8  2 min  S7  2 min  S6  2 min  L1  S7  2 min  S6  2 min  S5  2 min L1  S7  2 min  S6  2 min  S5  2 min L1  S7  2 min  S6  2 min  S5  2 min      Heel slides 5"x20 5"x20 5"x20 HEP   5"x20      SLR 3x10 3x10 3x10 3x10  HEP 3x10  HEP 3x15 3x15      Standing GA stretch 15"x3 15"x3 15"x3 15"x3 15"x3 15"x3 15"x3      Supine HA stretch             Standing lumbar extension 3x10  HEP            Prone press-ups 3x10  HEP            STS 2x10 2x10 3x10 3x10 3x10 3x10 3x10                                             Ther Activity                                       Gait Training             Gait training outdoors 6 min 7 min 6 min 6 min 6 min 6 min 6 min      Step-ups/  downs 6"/  1x20  No rail 8"/  1x20 rail 8"   1x20 rail 8"/  1x20 rail 8"/  1x20 rail 8"/ 1x20 rail 8"/  1x20  rail      Modalities

## 2022-10-03 ENCOUNTER — APPOINTMENT (OUTPATIENT)
Dept: PHYSICAL THERAPY | Facility: CLINIC | Age: 62
End: 2022-10-03

## 2022-10-04 ENCOUNTER — OFFICE VISIT (OUTPATIENT)
Dept: PHYSICAL THERAPY | Facility: CLINIC | Age: 62
End: 2022-10-04
Payer: COMMERCIAL

## 2022-10-04 DIAGNOSIS — M17.11 PRIMARY OSTEOARTHRITIS OF RIGHT KNEE: ICD-10-CM

## 2022-10-04 DIAGNOSIS — M54.16 LUMBAR RADICULOPATHY: ICD-10-CM

## 2022-10-04 DIAGNOSIS — Z96.651 STATUS POST RIGHT KNEE REPLACEMENT: ICD-10-CM

## 2022-10-04 DIAGNOSIS — M25.561 CHRONIC PAIN OF RIGHT KNEE: Primary | ICD-10-CM

## 2022-10-04 DIAGNOSIS — G89.29 CHRONIC PAIN OF RIGHT KNEE: Primary | ICD-10-CM

## 2022-10-04 PROCEDURE — 97116 GAIT TRAINING THERAPY: CPT | Performed by: PHYSICAL THERAPIST

## 2022-10-04 PROCEDURE — 97140 MANUAL THERAPY 1/> REGIONS: CPT | Performed by: PHYSICAL THERAPIST

## 2022-10-04 PROCEDURE — 97110 THERAPEUTIC EXERCISES: CPT | Performed by: PHYSICAL THERAPIST

## 2022-10-04 NOTE — PROGRESS NOTES
Daily Note     Today's date: 10/4/2022  Patient name: Reji Morrell  : 1960  MRN: [id number]  Referring provider: Piper Oneil  Dx:   Encounter Diagnosis     ICD-10-CM    1  Chronic pain of right knee  M25 561     G89 29    2  Lumbar radiculopathy  M54 16    3  Primary osteoarthritis of right knee  M17 11    4  Status post right knee replacement  Z96 651                   Subjective: Pt reports difficulty with stairs, unable to carry a laundry basket on basement stairs due to step height  Objective: See treatment diary below  PROM: 1-114 deg    Assessment: Pt demonstrated improved quad control on stairs even with laundry basket  Plan: Cont  POC  Precautions:  PMHx: R hip OA, anxiety, WBAT  Dx: R TKA 22, L4/5 posterior derangement with an extension DP    Manuals 9/8 9/12 9/15  9/23 9/26 9/29 10/4     R knee PROM 15 min 15 min 15 min  15 min 12 min 10 min 10 min                                            Neuro Re-Ed             SLS EO 60"x1 60"x1 60"x1 60"x1 60"x1 60"x1 HEP 60"x2     Standing hip ABD    2x10 2x10 3x10 HEP 3x10                                                                      Ther Ex             Bike L1  S8  3 min  S7  3 min L1  S8  2 min  S7  2 min  S6  2 min L1  S8  2 min  S7  2 min  S6  2 min  L1  S7  2 min  S6  2 min  S5  2 min L1  S7  2 min  S6  2 min  S5  2 min L1  S7  2 min  S6  2 min  S5  2 min L1  S7  2 min  S6  2 min  S5  2 min     Heel slides 5"x20 5"x20 5"x20 HEP   5"x20      SLR 3x10 3x10 3x10 3x10  HEP 3x10  HEP 3x15 3x15 3x10     Standing GA stretch 15"x3 15"x3 15"x3 15"x3 15"x3 15"x3 15"x3 15"x3     Supine HA stretch             Standing lumbar extension 3x10  HEP            Prone press-ups 3x10  HEP            STS 2x10 2x10 3x10 3x10 3x10 3x10 3x10 1x10                                            Ther Activity                                       Gait Training             Gait training outdoors 6 min 7 min 6 min 6 min 6 min 6 min 6 min np Step-ups/  Niyah Saas with [employer]        6"/  1x10  8"/  1x20 8"/  3x10    Step-ups/  downs 6"/  1x20  No rail 8"/  1x20 rail 8"   1x20 rail 8"/  1x20 rail 8"/  1x20 rail 8"/ 1x20 rail 8"/  1x20  rail 8"/  1x20 10"/  2x10    Modalities

## 2022-10-06 ENCOUNTER — OFFICE VISIT (OUTPATIENT)
Dept: PHYSICAL THERAPY | Facility: CLINIC | Age: 62
End: 2022-10-06
Payer: COMMERCIAL

## 2022-10-06 DIAGNOSIS — M25.561 CHRONIC PAIN OF RIGHT KNEE: ICD-10-CM

## 2022-10-06 DIAGNOSIS — M17.11 PRIMARY OSTEOARTHRITIS OF RIGHT KNEE: ICD-10-CM

## 2022-10-06 DIAGNOSIS — M54.16 LUMBAR RADICULOPATHY: ICD-10-CM

## 2022-10-06 DIAGNOSIS — Z96.651 STATUS POST RIGHT KNEE REPLACEMENT: Primary | ICD-10-CM

## 2022-10-06 DIAGNOSIS — G89.29 CHRONIC PAIN OF RIGHT KNEE: ICD-10-CM

## 2022-10-06 PROCEDURE — 97110 THERAPEUTIC EXERCISES: CPT | Performed by: PHYSICAL THERAPIST

## 2022-10-06 PROCEDURE — 97140 MANUAL THERAPY 1/> REGIONS: CPT | Performed by: PHYSICAL THERAPIST

## 2022-10-06 NOTE — PROGRESS NOTES
Daily Note     Today's date: 10/6/2022  Patient name: Jadiel Turner  : 1960  MRN: [id number]  Referring provider: Dolly Hawkins,*  Dx:   Encounter Diagnosis     ICD-10-CM    1  Status post right knee replacement  Z96 651    2  Lumbar radiculopathy  M54 16    3  Chronic pain of right knee  M25 561     G89 29    4  Primary osteoarthritis of right knee  M17 11                   Subjective: Pt reports no soreness after stair progressions  Objective: See treatment diary below  PROM: 0-115 deg    Assessment: Pt demonstrated ROM and stair tolerance  Plan: Cont  POC  Precautions:  PMHx: R hip OA, anxiety, WBAT  Dx: R TKA 22, L4/5 posterior derangement with an extension DP    Manuals 9/8 9/12 9/15  9/23 9/26 9/29 10/4 10/6    R knee PROM 15 min 15 min 15 min  15 min 12 min 10 min 10 min 10 min                                           Neuro Re-Ed             SLS EO 60"x1 60"x1 60"x1 60"x1 60"x1 60"x1 HEP 60"x2 60"x2    Standing hip ABD    2x10 2x10 3x10 HEP 3x10 3x10 3x15                                                                    Ther Ex             Bike L1  S8  3 min  S7  3 min L1  S8  2 min  S7  2 min  S6  2 min L1  S8  2 min  S7  2 min  S6  2 min  L1  S7  2 min  S6  2 min  S5  2 min L1  S7  2 min  S6  2 min  S5  2 min L1  S7  2 min  S6  2 min  S5  2 min L1  S7  2 min  S6  2 min  S5  2 min L1  S7  2 min  S6  2 min  S5  2 min    Heel slides 5"x20 5"x20 5"x20 HEP   5"x20      SLR 3x10 3x10 3x10 3x10  HEP 3x10  HEP 3x15 3x15 3x10 3x10    Standing GA stretch 15"x3 15"x3 15"x3 15"x3 15"x3 15"x3 15"x3 15"x3 15"x3    Supine HA stretch             Standing lumbar extension 3x10  HEP            Prone press-ups 3x10  HEP            STS 2x10 2x10 3x10 3x10 3x10 3x10 3x10 1x10 1x10                                           Ther Activity                                       Gait Training             Gait training outdoors 6 min 7 min 6 min 6 min 6 min 6 min 6 min np     Step-ups/  Downs with laundry basket        6"/  1x10  8"/  1x20 8"/  2x10    Step-ups/  downs 6"/  1x20  No rail 8"/  1x20 rail 8"   1x20 rail 8"/  1x20 rail 8"/  1x20 rail 8"/ 1x20 rail 8"/  1x20  rail 8"/  1x20     Modalities

## 2022-10-10 ENCOUNTER — OFFICE VISIT (OUTPATIENT)
Dept: PHYSICAL THERAPY | Facility: CLINIC | Age: 62
End: 2022-10-10
Payer: COMMERCIAL

## 2022-10-10 ENCOUNTER — APPOINTMENT (OUTPATIENT)
Dept: PHYSICAL THERAPY | Facility: CLINIC | Age: 62
End: 2022-10-10

## 2022-10-10 DIAGNOSIS — M25.561 CHRONIC PAIN OF RIGHT KNEE: ICD-10-CM

## 2022-10-10 DIAGNOSIS — M17.11 PRIMARY OSTEOARTHRITIS OF RIGHT KNEE: ICD-10-CM

## 2022-10-10 DIAGNOSIS — G89.29 CHRONIC PAIN OF RIGHT KNEE: ICD-10-CM

## 2022-10-10 DIAGNOSIS — M54.16 LUMBAR RADICULOPATHY: ICD-10-CM

## 2022-10-10 DIAGNOSIS — Z96.651 STATUS POST RIGHT KNEE REPLACEMENT: Primary | ICD-10-CM

## 2022-10-10 PROCEDURE — 97140 MANUAL THERAPY 1/> REGIONS: CPT | Performed by: PHYSICAL THERAPIST

## 2022-10-10 PROCEDURE — 97110 THERAPEUTIC EXERCISES: CPT | Performed by: PHYSICAL THERAPIST

## 2022-10-10 NOTE — PROGRESS NOTES
Daily Note     Today's date: 10/10/2022  Patient name: Tony Enciso  : 1960  MRN: [id number]  Referring provider: Everardo Almendarez,*  Dx:   Encounter Diagnosis     ICD-10-CM    1  Status post right knee replacement  Z96 651    2  Lumbar radiculopathy  M54 16    3  Chronic pain of right knee  M25 561     G89 29    4  Primary osteoarthritis of right knee  M17 11                   Subjective: Pt reports improved stair tolerance at home  She reports increased R knee stiffness after doing more than usual over the weekend  Objective: See treatment diary below  PROM: 1-113 deg    Assessment: Pt demonstrated improved stair tolerance > ecc lowering but mildly decreased PROM  Plan: Cont  POC  Precautions:  PMHx: R hip OA, anxiety, WBAT  Dx: R TKA 22, L4/5 posterior derangement with an extension DP    Manuals 9/8 9/12 9/15  9/23 9/26 9/29 10/4 10/6 10/10   R knee PROM 15 min 15 min 15 min  15 min 12 min 10 min 10 min 10 min 10 min                                          Neuro Re-Ed             SLS EO 60"x1 60"x1 60"x1 60"x1 60"x1 60"x1 HEP 60"x2 60"x2    Standing hip ABD    2x10 2x10 3x10 HEP 3x10 3x10 less UE  3x10                                                                    Ther Ex             Bike L1  S8  3 min  S7  3 min L1  S8  2 min  S7  2 min  S6  2 min L1  S8  2 min  S7  2 min  S6  2 min  L1  S7  2 min  S6  2 min  S5  2 min L1  S7  2 min  S6  2 min  S5  2 min L1  S7  2 min  S6  2 min  S5  2 min L1  S7  2 min  S6  2 min  S5  2 min L1  S7  2 min  S6  2 min  S5  2 min L1  S7  2 min  S6  2 min  S5  2 min   Heel slides 5"x20 5"x20 5"x20 HEP   5"x20      SLR 3x10 3x10 3x10 3x10  HEP 3x10  HEP 3x15 3x15 3x10 3x10 3x12   Standing GA stretch 15"x3 15"x3 15"x3 15"x3 15"x3 15"x3 15"x3 15"x3 15"x3 15"x3   Supine HA stretch             Standing lumbar extension 3x10  HEP            Prone press-ups 3x10  HEP            STS 2x10 2x10 3x10 3x10 3x10 3x10 3x10 1x10 1x10 1x10 Ther Activity                                       Gait Training             Gait training outdoors 6 min 7 min 6 min 6 min 6 min 6 min 6 min np     Step-ups/  Downs with laundry basket        6"/  1x10  8"/  1x20 8"/  2x10 8"/  2x10   Step-ups/  downs 6"/  1x20  No rail 8"/  1x20 rail 8"   1x20 rail 8"/  1x20 rail 8"/  1x20 rail 8"/ 1x20 rail 8"/  1x20  rail 8"/  1x20  8"/  1x10   Modalities

## 2022-10-11 ENCOUNTER — APPOINTMENT (OUTPATIENT)
Dept: PHYSICAL THERAPY | Facility: CLINIC | Age: 62
End: 2022-10-11

## 2022-10-12 ENCOUNTER — OFFICE VISIT (OUTPATIENT)
Dept: PHYSICAL THERAPY | Facility: CLINIC | Age: 62
End: 2022-10-12
Payer: COMMERCIAL

## 2022-10-12 DIAGNOSIS — G89.29 CHRONIC PAIN OF RIGHT KNEE: ICD-10-CM

## 2022-10-12 DIAGNOSIS — M25.561 CHRONIC PAIN OF RIGHT KNEE: ICD-10-CM

## 2022-10-12 DIAGNOSIS — M17.11 PRIMARY OSTEOARTHRITIS OF RIGHT KNEE: ICD-10-CM

## 2022-10-12 DIAGNOSIS — Z96.651 STATUS POST RIGHT KNEE REPLACEMENT: Primary | ICD-10-CM

## 2022-10-12 DIAGNOSIS — M54.16 LUMBAR RADICULOPATHY: ICD-10-CM

## 2022-10-12 PROCEDURE — 97140 MANUAL THERAPY 1/> REGIONS: CPT

## 2022-10-12 PROCEDURE — 97110 THERAPEUTIC EXERCISES: CPT

## 2022-10-12 NOTE — PROGRESS NOTES
Daily Note     Today's date: 10/12/2022  Patient name: Angus Marcano  : 1960  MRN: [id number]  Referring provider: Todd Hernandez,*  Dx:   Encounter Diagnosis     ICD-10-CM    1  Status post right knee replacement  Z96 651    2  Lumbar radiculopathy  M54 16    3  Chronic pain of right knee  M25 561     G89 29    4  Primary osteoarthritis of right knee  M17 11        Start Time: 1100  Stop Time: 1145  Total time in clinic (min): 45 minutes    Subjective: Patient notes feeling less stiff this visit compared to Monday  Objective: See treatment diary below  PROM: 1-115°    Assessment: Patient still has trouble with eccentric control during step downs with and without the laundry basket  Plan: Cont  POC  Precautions:  PMHx: R hip OA, anxiety, WBAT  Dx: R TKA 22, L4/5 posterior derangement with an extension DP    Manuals 10/12    9/23 9/26 9/29 10/4 10/6 10/10   R knee PROM 10 min    15 min 12 min 10 min 10 min 10 min 10 min                                          Neuro Re-Ed             SLS EO     60"x1 60"x1 HEP 60"x2 60"x2    Standing hip ABD less UE  3x10    2x10 3x10 HEP 3x10 3x10 less UE  3x10                                                                    Ther Ex             Bike L1  S7  2 min  S6  2 min  S5  2 min    L1  S7  2 min  S6  2 min  S5  2 min L1  S7  2 min  S6  2 min  S5  2 min L1  S7  2 min  S6  2 min  S5  2 min L1  S7  2 min  S6  2 min  S5  2 min L1  S7  2 min  S6  2 min  S5  2 min L1  S7  2 min  S6  2 min  S5  2 min   Heel slides       5"x20      SLR 3x12    3x10  HEP 3x15 3x15 3x10 3x10 3x12   Standing GA stretch 15"x3    15"x3 15"x3 15"x3 15"x3 15"x3 15"x3   Supine HA stretch             Standing lumbar extension             Prone press-ups             STS 1x10    3x10 3x10 3x10 1x10 1x10 1x10                                          Ther Activity                                       Gait Training             Gait training outdoors     6 min 6 min 6 min np Step-ups/  Ami Elly with laundry basket 5#  8"/  2x10       6"/  1x10  8"/  1x20 8"/  2x10 8"/  2x10   Step-ups/  downs 8"/  1x10    8"/  1x20 rail 8"/ 1x20 rail 8"/  1x20  rail 8"/  1x20  8"/  1x10   Modalities

## 2022-10-17 ENCOUNTER — OFFICE VISIT (OUTPATIENT)
Dept: PHYSICAL THERAPY | Facility: CLINIC | Age: 62
End: 2022-10-17
Payer: COMMERCIAL

## 2022-10-17 DIAGNOSIS — M25.561 CHRONIC PAIN OF RIGHT KNEE: ICD-10-CM

## 2022-10-17 DIAGNOSIS — M17.11 PRIMARY OSTEOARTHRITIS OF RIGHT KNEE: ICD-10-CM

## 2022-10-17 DIAGNOSIS — Z96.651 STATUS POST RIGHT KNEE REPLACEMENT: Primary | ICD-10-CM

## 2022-10-17 DIAGNOSIS — G89.29 CHRONIC PAIN OF RIGHT KNEE: ICD-10-CM

## 2022-10-17 DIAGNOSIS — M54.16 LUMBAR RADICULOPATHY: ICD-10-CM

## 2022-10-17 PROCEDURE — 97110 THERAPEUTIC EXERCISES: CPT | Performed by: PHYSICAL THERAPIST

## 2022-10-17 PROCEDURE — 97140 MANUAL THERAPY 1/> REGIONS: CPT | Performed by: PHYSICAL THERAPIST

## 2022-10-17 NOTE — PROGRESS NOTES
Daily Note     Today's date: 10/17/2022  Patient name: Stephen Mcghee  : 1960  MRN: [id number]  Referring provider: Carissa Nguyen,*  Dx:   Encounter Diagnosis     ICD-10-CM    1  Status post right knee replacement  Z96 651    2  Lumbar radiculopathy  M54 16    3  Chronic pain of right knee  M25 561     G89 29    4  Primary osteoarthritis of right knee  M17 11                   Subjective: Pt reports generalized BLE fatigue and soreness after doing a lot of standing and ADLs ysterday    Objective: See treatment diary below  PROM: 0-116°    Assessment: Pt demonstrated improved PROM despite subjective reports  Pt continues to demonstrate mild difficulty with descending > ascending 8" stairs  Plan: Cont  POC  Precautions:  PMHx: R hip OA, anxiety, WBAT  Dx: R TKA 22, L4/5 posterior derangement with an extension DP    Manuals 10/12 10/17           R knee PROM 10 min 10 min                                                  Neuro Re-Ed             SLS EO             Standing hip ABD less UE  3x10 Less UE  3x10 Less UE  3x15                                                                           Ther Ex             Bike L1  S7  2 min  S6  2 min  S5  2 min L1  S7  2 min  S6  2 min  S5  2 min           Heel slides             SLR 3x12 3x12 3x15          Standing GA stretch 15"x3 15"x3           Supine HA stretch             Standing lumbar extension             Prone press-ups             STS 1x10 1x10                                                  Ther Activity                                       Gait Training             Gait training outdoors             Step-ups/  Downs with laundry basket 5#  8"/  2x10            Step-ups/  downs 8"/  1x10 8"/  1x25           Modalities

## 2022-10-19 ENCOUNTER — APPOINTMENT (OUTPATIENT)
Dept: PHYSICAL THERAPY | Facility: CLINIC | Age: 62
End: 2022-10-19

## 2022-10-20 ENCOUNTER — EVALUATION (OUTPATIENT)
Dept: PHYSICAL THERAPY | Facility: CLINIC | Age: 62
End: 2022-10-20
Payer: COMMERCIAL

## 2022-10-20 ENCOUNTER — APPOINTMENT (OUTPATIENT)
Dept: PHYSICAL THERAPY | Facility: CLINIC | Age: 62
End: 2022-10-20

## 2022-10-20 DIAGNOSIS — G89.29 CHRONIC PAIN OF RIGHT KNEE: ICD-10-CM

## 2022-10-20 DIAGNOSIS — Z96.651 AFTERCARE FOLLOWING RIGHT KNEE JOINT REPLACEMENT SURGERY: Primary | ICD-10-CM

## 2022-10-20 DIAGNOSIS — M25.561 CHRONIC PAIN OF RIGHT KNEE: ICD-10-CM

## 2022-10-20 DIAGNOSIS — Z96.651 STATUS POST RIGHT KNEE REPLACEMENT: Primary | ICD-10-CM

## 2022-10-20 DIAGNOSIS — M17.11 PRIMARY OSTEOARTHRITIS OF RIGHT KNEE: ICD-10-CM

## 2022-10-20 DIAGNOSIS — M54.16 LUMBAR RADICULOPATHY: ICD-10-CM

## 2022-10-20 DIAGNOSIS — Z47.1 AFTERCARE FOLLOWING RIGHT KNEE JOINT REPLACEMENT SURGERY: Primary | ICD-10-CM

## 2022-10-20 PROCEDURE — 97116 GAIT TRAINING THERAPY: CPT | Performed by: PHYSICAL THERAPIST

## 2022-10-20 PROCEDURE — 97140 MANUAL THERAPY 1/> REGIONS: CPT | Performed by: PHYSICAL THERAPIST

## 2022-10-20 PROCEDURE — 97110 THERAPEUTIC EXERCISES: CPT | Performed by: PHYSICAL THERAPIST

## 2022-10-20 NOTE — PROGRESS NOTES
PT Re-Evaluation     Today's date: 10/20/2022  Patient name: Agnus Marcano  : 1960  MRN: [id number]  Referring provider: Todd Hernandez,*  Dx:   Encounter Diagnosis     ICD-10-CM    1  Status post right knee replacement  Z96 651    2  Lumbar radiculopathy  M54 16    3  Chronic pain of right knee  M25 561     G89 29    4  Primary osteoarthritis of right knee  M17 11                   Assessment  Assessment details: Pt demonstrated improved ROM, strength, stair tolerance, gait, and pain  She will benefit from 2 more weeks of skilled PT services to address her remaining impairments of gait stability on stairs and quad strength before d/c to a maintenance HEP at that time    Impairments: abnormal gait, abnormal muscle firing, abnormal or restricted ROM, abnormal movement, activity intolerance, impaired physical strength, lacks appropriate home exercise program, pain with function, weight-bearing intolerance and poor body mechanics  Understanding of Dx/Px/POC: good   Prognosis: good    Goals  STG - 4 wks  1) pt will demonstrate PROM 0-90 deg (met)  2) pt will d/c FWW (met)    MTG - 8 wks  1) pt will demonstrate PROM 0-110 deg (met)  2) pt will d/c SPC (met)  3) pt will report no difficulty with light ADLs (not met)    LTG - 12 wks  1) pt will demonstrate PROM 0-120 deg (not met)  2) pt will demonstrate normalized gait (not met)  3) pt will report reciprocal gait on stairs (partially met)  4) pt will initiate daily walking routine for fitness (not met)    Plan  Planned modality interventions: cryotherapy  Planned therapy interventions: manual therapy, balance/weight bearing training, body mechanics training, neuromuscular re-education, patient education, strengthening, stretching, therapeutic activities, therapeutic exercise, home exercise program, gait training, functional ROM exercises and flexibility  Frequency: 2x week  Duration in weeks: 2  Treatment plan discussed with: patient and family        Subjective Evaluation    History of Present Illness  Mechanism of injury: Pt is a 63 y o  female with PMHx significant for R hip OA, anxiety  She reports 3-yr Hx of worsening R knee pain  She reports a resolution of LBP and LE radicular symptoms since starting L/S POC but still has generalized, non-radiating achiness t/o B hips and legs  She reports improved stair tolerance but notes some weakness, difficulty, and lack of confidence on stairs without a railing  Pain  Current pain ratin  At best pain ratin  At worst pain ratin  Quality: pressure, dull ache, tight and pulling  Relieving factors: rest  Progression: improved    Social Support  Steps to enter house: yes (2 KATE with landing on each)  Stairs in house: yes (FF to basement with laundry)   L[facility] in: [facility]  Lives with: spouse      Diagnostic Tests  X-ray: abnormal (Significant narrowing of the medial joint space with minimal osteophytic spurring  This has progressed from the prior study)  Treatments  Previous treatment: medication, injection treatment and physical therapy  Current treatment: physical therapy  Patient Goals  Patient goals for therapy: decreased edema, decreased pain, improved balance, increased motion, increased strength, independence with ADLs/IADLs and return to sport/leisure activities  Patient goal: daily fast walk for 30 minutes        Objective     Observations     Right Knee   Positive for edema (2+ knee)       Passive Range of Motion     Right Knee   Flexion: 117 degrees with pain  Extension: 0 degrees   Mechanical Assessment    Cervical      Thoracic      Lumbar    Standing flexion: repeated movements   Pain location:no change  Standing extension: repeated movements  Pain location: no change  Lying extension: repeated movements  Pain location: centralized    Strength/Myotome Testing     Right Hip   Planes of Motion   Flexion: 3+  Extension: 4-  Abduction: 4-  Adduction: 4-    Right Knee   Flexion: 4  Extension: 4-  Quadriceps contraction: fair    Additional Strength Details  SLR: mild quad lag    General Comments:      Knee Comments  Gait: no AD: mildly R stiff-knee gait pattern    Stairs: mild difficulty going down 8" steps with laundry basket, occasional railing use when starting a flight of 8" stairs             Precautions:  PMHx: R hip OA, anxiety, WBAT  Dx: R TKA 8/1/22, L4/5 posterior derangement with an extension DP    Manuals 10/20            R knee PROM 10 min                                                   Neuro Re-Ed             Standing hip ABD 3x10  HEP                                                                                          Ther Ex             Bike L1  S7  2 min  S6  2 min  S5  2 min                         SLR 3x15 1#  3x10           Standing GA stretch 15"x3            clamshells  G/  3x10           SL leg extension machine 10#  3x10                         STS 1x10 HEP                                                  Ther Activity                                       Gait Training             Step-ups/  Downs with laundry basket 5#  8"/  1x10            Step-ups/  downs 8"/  1x20  No rail            Modalities

## 2022-10-24 ENCOUNTER — APPOINTMENT (OUTPATIENT)
Dept: PHYSICAL THERAPY | Facility: CLINIC | Age: 62
End: 2022-10-24

## 2022-10-24 ENCOUNTER — OFFICE VISIT (OUTPATIENT)
Dept: PHYSICAL THERAPY | Facility: CLINIC | Age: 62
End: 2022-10-24
Payer: COMMERCIAL

## 2022-10-24 DIAGNOSIS — G89.29 CHRONIC PAIN OF RIGHT KNEE: ICD-10-CM

## 2022-10-24 DIAGNOSIS — Z96.651 STATUS POST RIGHT KNEE REPLACEMENT: Primary | ICD-10-CM

## 2022-10-24 DIAGNOSIS — M25.561 CHRONIC PAIN OF RIGHT KNEE: ICD-10-CM

## 2022-10-24 DIAGNOSIS — M54.16 LUMBAR RADICULOPATHY: ICD-10-CM

## 2022-10-24 DIAGNOSIS — M17.11 PRIMARY OSTEOARTHRITIS OF RIGHT KNEE: ICD-10-CM

## 2022-10-24 PROCEDURE — 97110 THERAPEUTIC EXERCISES: CPT

## 2022-10-24 PROCEDURE — 97116 GAIT TRAINING THERAPY: CPT

## 2022-10-24 PROCEDURE — 97530 THERAPEUTIC ACTIVITIES: CPT

## 2022-10-24 PROCEDURE — 97140 MANUAL THERAPY 1/> REGIONS: CPT

## 2022-10-24 NOTE — PROGRESS NOTES
Daily Note     Today's date: 10/24/2022  Patient name: Alison Balderrama  : 1960  MRN: [id number]  Referring provider: Alix Pike,*  Dx:   Encounter Diagnosis     ICD-10-CM    1  Status post right knee replacement  Z96 651    2  Lumbar radiculopathy  M54 16    3  Chronic pain of right knee  M25 561     G89 29    4  Primary osteoarthritis of right knee  M17 11                   Subjective: Pt reports her knee feels a bit stiff today, her neck and back are bothering her today  Objective: See treatment diary below      Assessment: Tolerated treatment well  Pt tolerates progressions this session well without increase in pain, minor muscular fatigue noted  She reports when performing step ups she feels herself shifting her weight away from the R LE  Good effort noted throughout session  Patient would benefit from continued PT      Plan: Continue per plan of care  Precautions:  PMHx: R hip OA, anxiety, WBAT  Dx: R TKA 22, L4/5 posterior derangement with an extension DP    Manuals 10/20 10/24           R knee PROM 10 min 10 min                                                  Neuro Re-Ed             Standing hip ABD 3x10  HEP 3x10                                                                                         Ther Ex             Bike L1  S7  2 min  S6  2 min  S5  2 min L1  3 mins/3 mins with seat closer                        SLR 3x15 1#  3x10           Standing GA stretch 15"x3 15"x3           clamshells  G/  3x10           SL leg extension machine 10#  3x10 10#  3x10                        STS 1x10 HEP                                                  Ther Activity                                       Gait Training             Step-ups/  Downs with laundry basket 5#  8"/  1x10 5#  8"/  1x10           Step-ups/  downs 8"/  1x20  No rail 8"/  1x20  No rail           Modalities

## 2022-10-25 ENCOUNTER — TELEPHONE (OUTPATIENT)
Dept: OBGYN CLINIC | Facility: HOSPITAL | Age: 62
End: 2022-10-25

## 2022-10-25 ENCOUNTER — APPOINTMENT (OUTPATIENT)
Dept: PHYSICAL THERAPY | Facility: CLINIC | Age: 62
End: 2022-10-25

## 2022-10-25 NOTE — TELEPHONE ENCOUNTER
A message has been left with patient, Dr Mia Bowers is going to be in the OR longer than expected on Thursday and we are looking to change her appointment to 4:15pm on Thursday  Practice Admin phone number left for return call [phone]

## 2022-10-27 ENCOUNTER — OFFICE VISIT (OUTPATIENT)
Dept: OBGYN CLINIC | Facility: HOSPITAL | Age: 62
End: 2022-10-27

## 2022-10-27 ENCOUNTER — HOSPITAL ENCOUNTER (OUTPATIENT)
Dept: RADIOLOGY | Facility: HOSPITAL | Age: 62
Discharge: HOME | End: 2022-10-27
Attending: ORTHOPAEDIC SURGERY
Payer: COMMERCIAL

## 2022-10-27 ENCOUNTER — OFFICE VISIT (OUTPATIENT)
Dept: PHYSICAL THERAPY | Facility: CLINIC | Age: 62
End: 2022-10-27
Payer: COMMERCIAL

## 2022-10-27 VITALS
HEIGHT: 64 IN | DIASTOLIC BLOOD PRESSURE: 78 MMHG | HEART RATE: 91 BPM | BODY MASS INDEX: 31.58 KG/M2 | SYSTOLIC BLOOD PRESSURE: 123 MMHG

## 2022-10-27 DIAGNOSIS — Z96.651 S/P TOTAL KNEE ARTHROPLASTY, RIGHT: ICD-10-CM

## 2022-10-27 DIAGNOSIS — M17.11 PRIMARY OSTEOARTHRITIS OF RIGHT KNEE: ICD-10-CM

## 2022-10-27 DIAGNOSIS — M17.12 ARTHRITIS OF LEFT KNEE: Primary | ICD-10-CM

## 2022-10-27 DIAGNOSIS — M54.16 LUMBAR RADICULOPATHY: ICD-10-CM

## 2022-10-27 DIAGNOSIS — G89.29 CHRONIC PAIN OF RIGHT KNEE: ICD-10-CM

## 2022-10-27 DIAGNOSIS — M16.11 ARTHRITIS OF RIGHT HIP: ICD-10-CM

## 2022-10-27 DIAGNOSIS — Z47.1 AFTERCARE FOLLOWING RIGHT KNEE JOINT REPLACEMENT SURGERY: ICD-10-CM

## 2022-10-27 DIAGNOSIS — Z96.651 STATUS POST RIGHT KNEE REPLACEMENT: Primary | ICD-10-CM

## 2022-10-27 DIAGNOSIS — M25.561 CHRONIC PAIN OF RIGHT KNEE: ICD-10-CM

## 2022-10-27 DIAGNOSIS — Z96.651 AFTERCARE FOLLOWING RIGHT KNEE JOINT REPLACEMENT SURGERY: ICD-10-CM

## 2022-10-27 PROCEDURE — 73560 X-RAY EXAM OF KNEE 1 OR 2: CPT

## 2022-10-27 PROCEDURE — 97110 THERAPEUTIC EXERCISES: CPT

## 2022-10-27 PROCEDURE — 97530 THERAPEUTIC ACTIVITIES: CPT

## 2022-10-27 PROCEDURE — 97112 NEUROMUSCULAR REEDUCATION: CPT

## 2022-10-27 PROCEDURE — 99024 POSTOP FOLLOW-UP VISIT: CPT | Performed by: ORTHOPAEDIC SURGERY

## 2022-10-27 RX ORDER — CHLORHEXIDINE GLUCONATE 0.12 MG/ML
15 RINSE ORAL ONCE
OUTPATIENT
Start: 2022-10-27 | End: 2022-10-27

## 2022-10-27 RX ORDER — ACETAMINOPHEN 325 MG/1
975 TABLET ORAL ONCE
OUTPATIENT
Start: 2022-10-27 | End: 2022-10-27

## 2022-10-27 RX ORDER — ASCORBIC ACID 500 MG
500 TABLET ORAL 2 TIMES DAILY
Qty: 60 TABLET | Refills: 0 | Status: SHIPPED | OUTPATIENT
Start: 2022-10-27 | End: 2022-11-26

## 2022-10-27 RX ORDER — MULTIVITAMIN
1 TABLET ORAL DAILY
Qty: 30 TABLET | Refills: 1 | Status: SHIPPED | OUTPATIENT
Start: 2022-10-27

## 2022-10-27 RX ORDER — ENOXAPARIN SODIUM 100 MG/ML
40 INJECTION SUBCUTANEOUS DAILY
Qty: 11.2 ML | Refills: 0 | Status: SHIPPED | OUTPATIENT
Start: 2022-10-27 | End: 2022-11-24

## 2022-10-27 RX ORDER — SODIUM CHLORIDE, SODIUM LACTATE, POTASSIUM CHLORIDE, CALCIUM CHLORIDE 600; 310; 30; 20 MG/100ML; MG/100ML; MG/100ML; MG/100ML
125 INJECTION, SOLUTION INTRAVENOUS CONTINUOUS
OUTPATIENT
Start: 2022-10-27

## 2022-10-27 RX ORDER — GABAPENTIN 300 MG/1
300 CAPSULE ORAL ONCE
OUTPATIENT
Start: 2022-10-27 | End: 2022-10-27

## 2022-10-27 RX ORDER — FERROUS SULFATE TAB EC 324 MG (65 MG FE EQUIVALENT) 324 (65 FE) MG
324 TABLET DELAYED RESPONSE ORAL
Qty: 30 TABLET | Refills: 0 | Status: SHIPPED | OUTPATIENT
Start: 2022-10-27 | End: 2022-11-26

## 2022-10-27 RX ORDER — FOLIC ACID 1 MG/1
1 TABLET ORAL DAILY
Qty: 30 TABLET | Refills: 0 | Status: SHIPPED | OUTPATIENT
Start: 2022-10-27 | End: 2022-11-26

## 2022-10-27 RX ORDER — CEFAZOLIN SODIUM 2 G/50ML
2000 SOLUTION INTRAVENOUS ONCE
OUTPATIENT
Start: 2022-10-27 | End: 2022-10-27

## 2022-10-27 NOTE — PROGRESS NOTES
Assessment:   Diagnosis ICD-10-CM Associated Orders   1  Arthritis of left knee  M17 12 enoxaparin (LOVENOX) 40 mg/0 4 mL     ascorbic acid (VITAMIN C) 500 MG tablet     ferrous sulfate 324 (65 Fe) mg     folic acid (FOLVITE) 1 mg tablet     Multiple Vitamin (multivitamin) tablet   2  S/P total knee arthroplasty, right  Z96 651    3  Arthritis of right hip  M16 11 FL injection right hip (non arthrogram)       Plan:    Patient was seen and examined today  Patient understood risks and benefits and would like to proceed with surgical intervention for her left knee in the form of TKA  She will be scheduled for the surgery and will see us next on date of surgery  To do next visit:  Return on date of surgery  The above stated was discussed in layman's terms and the patient expressed understanding  All questions were answered to the patient's satisfaction  Subjective:   Yani Ramirez is a 63 y o  female who presents three months after right TKA  Patient is doing very well from her right total knee arthroplasty standpoint  She has continued to work with physical therapy  She states that she is able to range her knee from 0-118 degrees at physical therapy  She is very happy with her progress, and would like to schedule her left total knee arthroplasty  Review of systems negative unless otherwise specified in HPI    Past Medical History:   Diagnosis Date   • Chest pain syndrome 3/29/2022   • Renal dysplasia     nonfunctioning left life-long       Past Surgical History:   Procedure Laterality Date   • COLONOSCOPY  03/2016   • FL INJECTION RIGHT HIP (NON ARTHROGRAM)  5/10/2022   • KIDNEY SURGERY Right     age [id number] [address]   • FL TOTAL KNEE ARTHROPLASTY Right 8/1/2022    Procedure: ARTHROPLASTY KNEE TOTAL W ROBOT;  Surgeon: Alonso Samayoa MD;  Location: [facility];  Service: Orthopedics   • REDUCTION MAMMAPLASTY     • WRIST SURGERY Right     fx       History reviewed   No pertinent family history  Social History     Occupational History   • 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   Tobacco Use   • Smoking status: Former Smoker     Packs/day: 0 25     Years: 15 00     Pack years: 3 75     Types: Cigarettes   • Smokeless tobacco: Never Used   Vaping Use   • Vaping Use: Never used   Substance and Sexual Activity   • Alcohol use: Yes     Alcohol/week: 10 0 standard drinks     Types: 5 Cans of beer, 5 Standard drinks or equivalent per week     Comment: daily   • Drug use: No   • Sexual activity: Yes     Partners: Male     Birth control/protection: Post-menopausal         Current Outpatient Medications:   •  ascorbic acid (VITAMIN C) 500 MG tablet, Take 1 tablet (500 mg total) by mouth 2 (two) times a day, Disp: 60 tablet, Rfl: 0  •  enoxaparin (LOVENOX) 40 mg/0 4 mL, Inject 0 4 mL (40 mg total) under the skin daily for 28 days To start post op, Disp: 11 2 mL, Rfl: 0  •  ferrous sulfate 324 (65 Fe) mg, Take 1 tablet (324 mg total) by mouth daily before breakfast, Disp: 30 tablet, Rfl: 0  •  folic acid (FOLVITE) 1 mg tablet, Take 1 tablet (1 mg total) by mouth daily, Disp: 30 tablet, Rfl: 0  •  Multiple Vitamin (multivitamin) tablet, Take 1 tablet by mouth daily, Disp: 30 tablet, Rfl: 1  •  ALPRAZolam (XANAX) 0 25 mg tablet, Take 1 tablet (0 25 mg total) by mouth daily as needed for anxiety, Disp: 30 tablet, Rfl: 0  •  ascorbic acid (VITAMIN C) 500 MG tablet, Take 1 tablet (500 mg total) by mouth 2 (two) times a day, Disp: 60 tablet, Rfl: 0  •  cephalexin (KEFLEX) 500 mg capsule, Take 4 tablets one hour before dental visits  , Disp: 20 capsule, Rfl: 2  •  FIBER PO, Take by mouth daily, Disp: , Rfl:   •  Multiple Vitamins-Minerals (MULTI FOR HER 50+ PO), Take by mouth daily, Disp: , Rfl:   •  Psyllium (Metamucil) WAFR, Take by mouth, Disp: , Rfl:     Allergies   Allergen Reactions   • Acetazolamide Rash   • Sulfa Antibiotics Rash            Vitals:    10/27/22 1618   BP: 123/78   Pulse: 91       Objective:  Right Knee Exam     Tenderness   The patient is experiencing no tenderness  Range of Motion   Extension: normal   Flexion: normal     Tests   Varus: negative Valgus: negative    Other   Erythema: absent  Scars: present  Sensation: normal  Pulse: present  Swelling: none  Effusion: no effusion present      Left Knee Exam     Tenderness   The patient is experiencing tenderness in the medial joint line  Range of Motion   Extension: normal   Flexion: normal     Tests   Varus: negative Valgus: negative  Drawer:  Anterior - negative     Posterior - negative    Other   Erythema: absent  Scars: absent  Sensation: normal  Pulse: present  Swelling: none  Effusion: no effusion present               Diagnostics, reviewed and taken today if performed as documented: The attending physician has personally reviewed the pertinent films in PACS and interpretation is as follows:    X-ray of right knee demonstrate neutral alignment and well-seated hardware without any evidence of aseptic loosening  Procedures, if performed today:  None performed      Portions of the record may have been created with voice recognition software  Occasional wrong word or "sound a like" substitutions may have occurred due to the inherent limitations of voice recognition software  Read the chart carefully and recognize, using context, where substitutions have occurred

## 2022-10-27 NOTE — LETTER
October 27, 2022     Patient: Alison Balderrama  YOB: 1960  Date of Visit: 10/27/2022      To Whom it May Concern:    Serg Crockett is under my professional care  Yousifvicenta Doll was seen in my office on 10/27/2022  Antony Doll may return to work on 11/14/2022  If you have any questions or concerns, please don't hesitate to call           Sincerely,          Alena Nogueira MD        CC: No Recipients

## 2022-10-27 NOTE — PROGRESS NOTES
Daily Note     Today's date: 10/27/2022  Patient name: Anjum eD Leon  : 1960  MRN: [id number]  Referring provider: Kelly Estrella,*  Dx:   Encounter Diagnosis     ICD-10-CM    1  Status post right knee replacement  Z96 651    2  Lumbar radiculopathy  M54 16    3  Chronic pain of right knee  M25 561     G89 29    4  Primary osteoarthritis of right knee  M17 11        Start Time: 1100  Stop Time: 1200  Total time in clinic (min): 60 minutes     Subjective: Patient notes R knee stiffness prior to therapy  Objective: See treatment diary below    Assessment: Tolerated treatment well  Patient PROM is staying consistent  She had good eccentric control during step downs and step downs with 5# in the laundry basket  Patient would benefit from continued PT  Plan: Continue per plan of care  Precautions:  PMHx: R hip OA, anxiety, WBAT  Dx: R TKA 22, L4/5 posterior derangement with an extension DP    Manuals 10/20 10/24 10/27          R knee PROM 10 min 10 min 10 min                                                 Neuro Re-Ed             Standing hip ABD 3x10  HEP 3x10 3x10                                                                                        Ther Ex             Bike L1  S7  2 min  S6  2 min  S5  2 min L1  3 mins/3 mins with seat closer L1  3 mins/3 mins with seat closer                       SLR 3x15 1#  3x10 1#  3x10          Standing GA stretch 15"x3 15"x3 15"x3          clamshells  G/  3x10 GTB  3x10          SL leg extension machine 10#  3x10 10#  3x10 10#  3x10                       STS 1x10 HEP                                                  Ther Activity                                       Gait Training             Step-ups/  Downs with laundry basket 5#  8"/  1x10 5#  8"/  1x10 5#  8"/  1x10          Step-ups/  downs 8"/  1x20  No rail 8"/  1x20  No rail 8"/  1x20  No rail          Modalities

## 2022-10-28 NOTE — NURSING NOTE
Call placed to patient to discuss upcoming appointment at [facility] radiology department and complete consultation with patient  Patient is having right hip CSI  utilizing fluoroscopy guidance  Reviewed  patient's allergies , no current anticoagulant medication present per patient, no questions when asked if any questions or concerns  Reminded patient of location and time expected for procedure, Patient expressed understanding by verbalizing and repeating instructions

## 2022-10-31 ENCOUNTER — APPOINTMENT (OUTPATIENT)
Dept: PHYSICAL THERAPY | Facility: CLINIC | Age: 62
End: 2022-10-31

## 2022-11-02 ENCOUNTER — APPOINTMENT (OUTPATIENT)
Dept: PHYSICAL THERAPY | Facility: CLINIC | Age: 62
End: 2022-11-02

## 2022-11-03 ENCOUNTER — HOSPITAL ENCOUNTER (OUTPATIENT)
Dept: RADIOLOGY | Facility: HOSPITAL | Age: 62
Discharge: HOME | End: 2022-11-03

## 2022-11-03 ENCOUNTER — OFFICE VISIT (OUTPATIENT)
Dept: PHYSICAL THERAPY | Facility: CLINIC | Age: 62
End: 2022-11-03

## 2022-11-03 DIAGNOSIS — M16.11 ARTHRITIS OF RIGHT HIP: ICD-10-CM

## 2022-11-03 DIAGNOSIS — M25.561 CHRONIC PAIN OF RIGHT KNEE: ICD-10-CM

## 2022-11-03 DIAGNOSIS — Z96.651 STATUS POST RIGHT KNEE REPLACEMENT: Primary | ICD-10-CM

## 2022-11-03 DIAGNOSIS — M54.16 LUMBAR RADICULOPATHY: ICD-10-CM

## 2022-11-03 DIAGNOSIS — M17.11 PRIMARY OSTEOARTHRITIS OF RIGHT KNEE: ICD-10-CM

## 2022-11-03 DIAGNOSIS — G89.29 CHRONIC PAIN OF RIGHT KNEE: ICD-10-CM

## 2022-11-03 RX ORDER — BUPIVACAINE HYDROCHLORIDE 2.5 MG/ML
5 INJECTION, SOLUTION EPIDURAL; INFILTRATION; INTRACAUDAL
Status: DISCONTINUED | OUTPATIENT
Start: 2022-11-03 | End: 2022-11-04 | Stop reason: HOSPADM

## 2022-11-03 RX ORDER — LIDOCAINE HYDROCHLORIDE 10 MG/ML
5 INJECTION, SOLUTION EPIDURAL; INFILTRATION; INTRACAUDAL; PERINEURAL
Status: DISCONTINUED | OUTPATIENT
Start: 2022-11-03 | End: 2022-11-04 | Stop reason: HOSPADM

## 2022-11-03 RX ORDER — METHYLPREDNISOLONE ACETATE 80 MG/ML
80 INJECTION, SUSPENSION INTRA-ARTICULAR; INTRALESIONAL; INTRAMUSCULAR; SOFT TISSUE
Status: DISCONTINUED | OUTPATIENT
Start: 2022-11-03 | End: 2022-11-04 | Stop reason: HOSPADM

## 2022-11-03 RX ADMIN — IOHEXOL 5 ML: 300 INJECTION, SOLUTION INTRAVENOUS at 11:23

## 2022-11-03 NOTE — PROGRESS NOTES
Daily Note     Today's date: 11/3/2022  Patient name: Tadeo Mcfarland  : 1960  MRN: [id number]  Referring provider: Rita Lux,*  Dx:   Encounter Diagnosis     ICD-10-CM    1  Status post right knee replacement  Z96 651    2  Lumbar radiculopathy  M54 16    3  Chronic pain of right knee  M25 561     G89 29    4  Primary osteoarthritis of right knee  M17 11                   Subjective: Pt reports no R knee pain, some residual difficulty with stairs  Objective: See treatment diary below  PROM: 0-117 deg    Assessment: Pt demonstrated good ROM, mild difficulty with ecc control on 8" stairs with a laundry basket  Plan: Cont  POC  Precautions:  PMHx: R hip OA, anxiety, WBAT  Dx: R TKA 22, L4/5 posterior derangement with an extension DP    Manuals 10/20 10/24 10/27 11/3         R knee PROM 10 min 10 min 10 min 10 min                                                Neuro Re-Ed             Standing hip ABD 3x10  HEP 3x10 3x10 3x10                                                                                       Ther Ex             Bike L1  S7  2 min  S6  2 min  S5  2 min L1  3 mins/3 mins with seat closer L1  3 mins/3 mins with seat closer L1  S6  3 min  S5  2 min                      SLR 3x15 1#  3x10 1#  3x10 1#  3x12         Standing GA stretch 15"x3 15"x3 15"x3 15"x3         clamshells  G/  3x10 GTB  3x10 G/  3x15         SL leg extension machine 10#  3x10 10#  3x10 10#  3x10 nv         ecc lateral step-downs    4"/  2x10         STS 1x10 HEP                                                  Ther Activity                                       Gait Training             Step-ups/  Downs with laundry basket 5#  8"/  1x10 5#  8"/  1x10 5#  8"/  1x10 5#  8"/  1x20         Step-ups/  downs 8"/  1x20  No rail 8"/  1x20  No rail 8"/  1x20  No rail          Modalities

## 2022-11-07 ENCOUNTER — OFFICE VISIT (OUTPATIENT)
Dept: PHYSICAL THERAPY | Facility: CLINIC | Age: 62
End: 2022-11-07

## 2022-11-07 DIAGNOSIS — M17.11 PRIMARY OSTEOARTHRITIS OF RIGHT KNEE: ICD-10-CM

## 2022-11-07 DIAGNOSIS — G89.29 CHRONIC PAIN OF RIGHT KNEE: ICD-10-CM

## 2022-11-07 DIAGNOSIS — M54.16 LUMBAR RADICULOPATHY: ICD-10-CM

## 2022-11-07 DIAGNOSIS — Z96.651 STATUS POST RIGHT KNEE REPLACEMENT: Primary | ICD-10-CM

## 2022-11-07 DIAGNOSIS — M25.561 CHRONIC PAIN OF RIGHT KNEE: ICD-10-CM

## 2022-11-07 NOTE — PROGRESS NOTES
Daily Note     Today's date: 2022  Patient name: Richie Sawyer  : 1960  MRN: [id number]  Referring provider: Yovana Aguillon,*  Dx:   Encounter Diagnosis     ICD-10-CM    1  Status post right knee replacement  Z96 651    2  Lumbar radiculopathy  M54 16    3  Chronic pain of right knee  M25 561     G89 29    4  Primary osteoarthritis of right knee  M17 11                   Subjective: Pt reports no R knee pain, feeling pretty good overall  Notes that she has been compliant with her HEP  Objective: See treatment diary below      Assessment: Pt continues to do well with current program this session with her R knee moving well from a mobility standpoint  Mostly challenged with basket carry but able to complete  Plan: Cont  POC  Precautions:  PMHx: R hip OA, anxiety, WBAT  Dx: R TKA 22, L4/5 posterior derangement with an extension DP    Manuals 10/20 10/24 10/27 11/3 11/7        R knee PROM 10 min 10 min 10 min 10 min 10 min                                               Neuro Re-Ed             Standing hip ABD 3x10  HEP 3x10 3x10 3x10 3x10                                                                                      Ther Ex             Bike L1  S7  2 min  S6  2 min  S5  2 min L1  3 mins/3 mins with seat closer L1  3 mins/3 mins with seat closer L1  S6  3 min  S5  2 min L1  S6  3 min  S5  2 min                     SLR 3x15 1#  3x10 1#  3x10 1#  3x12 1# 3x12        Standing GA stretch 15"x3 15"x3 15"x3 15"x3 15"x3        clamshells  G/  3x10 GTB  3x10 G/  3x15 G/ 3x15        SL leg extension machine 10#  3x10 10#  3x10 10#  3x10 nv 15# 3x10        ecc lateral step-downs    4"/  2x10 4"/ 2x10        STS 1x10 HEP                                                  Ther Activity                                       Gait Training             Step-ups/  Downs with laundry basket 5#  8"/  1x10 5#  8"/  1x10 5#  8"/  1x10 5#  8"/  1x20 5#  8"/  1x20        Step-ups/  downs 8"/  1x20  No rail 8"/  1x20  No rail 8"/  1x20  No rail          Modalities

## 2022-11-10 ENCOUNTER — OFFICE VISIT (OUTPATIENT)
Dept: PHYSICAL THERAPY | Facility: CLINIC | Age: 62
End: 2022-11-10

## 2022-11-10 DIAGNOSIS — Z96.651 STATUS POST RIGHT KNEE REPLACEMENT: Primary | ICD-10-CM

## 2022-11-10 DIAGNOSIS — M54.16 LUMBAR RADICULOPATHY: ICD-10-CM

## 2022-11-10 DIAGNOSIS — M25.561 CHRONIC PAIN OF RIGHT KNEE: ICD-10-CM

## 2022-11-10 DIAGNOSIS — M17.11 PRIMARY OSTEOARTHRITIS OF RIGHT KNEE: ICD-10-CM

## 2022-11-10 DIAGNOSIS — G89.29 CHRONIC PAIN OF RIGHT KNEE: ICD-10-CM

## 2022-11-10 NOTE — PROGRESS NOTES
Daily Note     Today's date: 11/10/2022  Patient name: Ben Jewell  : 1960  MRN: [id number]  Referring provider: Aisha Velez,*  Dx:   Encounter Diagnosis     ICD-10-CM    1  Status post right knee replacement  Z96 651    2  Lumbar radiculopathy  M54 16    3  Chronic pain of right knee  M25 561     G89 29    4  Primary osteoarthritis of right knee  M17 11        Start Time: 830  Stop Time: 905  Total time in clinic (min): 35 minutes    Subjective: Patient has no complaints of R knee pain  However, she notes her L knee is now very painful with most daily activities  She notes concerns returning to work  Objective: See treatment diary below    Assessment: Sujatha demonstrates good understanding of her exercise routine and has been able to maintain her PROM  Plan: Re-Eval NV to determine continuation or discontinuation of skilled PT  Precautions:  PMHx: R hip OA, anxiety, WBAT  Dx: R TKA 22, L4/5 posterior derangement with an extension DP    Manuals 10/20 10/24 10/27 11/3 11/7 11/10       R knee PROM 10 min 10 min 10 min 10 min 10 min 10 min                                              Neuro Re-Ed             Standing hip ABD 3x10  HEP 3x10 3x10 3x10 3x10 3x10                                                                                     Ther Ex             Bike L1  S7  2 min  S6  2 min  S5  2 min L1  3 mins/3 mins with seat closer L1  3 mins/3 mins with seat closer L1  S6  3 min  S5  2 min L1  S6  3 min  S5  2 min L1  S5  5 min                    SLR 3x15 1#  3x10 1#  3x10 1#  3x12 1# 3x12 1# 3x12       Standing GA stretch 15"x3 15"x3 15"x3 15"x3 15"x3 15"x3       clamshells  G/  3x10 GTB  3x10 G/  3x15 G/ 3x15 GTB  3x15       SL leg extension machine 10#  3x10 10#  3x10 10#  3x10 nv 15# 3x10 15#  3x10       ecc lateral step-downs    4"/  2x10 4"/ 2x10 4"/ 2x10       STS 1x10 HEP                                                  Ther Activity Gait Training             Step-ups/  Quique Marin with laundry basket 5#  8"/  1x10 5#  8"/  1x10 5#  8"/  1x10 5#  8"/  1x20 5#  8"/  1x20        Step-ups/  downs 8"/  1x20  No rail 8"/  1x20  No rail 8"/  1x20  No rail          Modalities

## 2022-11-15 ENCOUNTER — EVALUATION (OUTPATIENT)
Dept: PHYSICAL THERAPY | Facility: CLINIC | Age: 62
End: 2022-11-15

## 2022-11-15 DIAGNOSIS — M25.561 CHRONIC PAIN OF RIGHT KNEE: ICD-10-CM

## 2022-11-15 DIAGNOSIS — M17.11 PRIMARY OSTEOARTHRITIS OF RIGHT KNEE: ICD-10-CM

## 2022-11-15 DIAGNOSIS — Z96.651 STATUS POST RIGHT KNEE REPLACEMENT: Primary | ICD-10-CM

## 2022-11-15 DIAGNOSIS — G89.29 CHRONIC PAIN OF RIGHT KNEE: ICD-10-CM

## 2022-11-15 DIAGNOSIS — M54.16 LUMBAR RADICULOPATHY: ICD-10-CM

## 2022-11-15 NOTE — PROGRESS NOTES
Daily Note     Today's date: 11/15/2022  Patient name: Whit Gunter  : 1960  MRN: [id number]  Referring provider: Georgette Burgess,*  Dx:   Encounter Diagnosis     ICD-10-CM    1  Status post right knee replacement  Z96 651    2  Chronic pain of right knee  M25 561     G89 29    3  Primary osteoarthritis of right knee  M17 11    4  Lumbar radiculopathy  M54 16                   Subjective: Pt reports no R knee pain or functional limitations  She reports moderate L knee pain with WB  Objective: See treatment diary below  Updated HEP  PROM: 0-115 deg    Assessment: Pt demonstrated good understanding of updated HEP and is appropriate for d/c from skilled PT services  Plan: D/C to HEP  Precautions:  PMHx: R hip OA, anxiety, WBAT  Dx: R TKA 22, L4/5 posterior derangement with an extension DP    Manuals 10/20 10/24 10/27 11/3 11/7 11/10 11/15      R knee PROM 10 min 10 min 10 min 10 min 10 min 10 min 10 min                                             Neuro Re-Ed             Standing hip ABD 3x10  HEP 3x10 3x10 3x10 3x10 3x10 held                                                                                    Ther Ex             Bike L1  S7  2 min  S6  2 min  S5  2 min L1  3 mins/3 mins with seat closer L1  3 mins/3 mins with seat closer L1  S6  3 min  S5  2 min L1  S6  3 min  S5  2 min L1  S5  5 min L1  S5  5 min                   SLR 3x15 1#  3x10 1#  3x10 1#  3x12 1# 3x12 1# 3x12 1#  3x15      Standing GA stretch 15"x3 15"x3 15"x3 15"x3 15"x3 15"x3 15"x3      clamshells  G/  3x10 GTB  3x10 G/  3x15 G/ 3x15 GTB  3x15 G/  3x20      SL leg extension machine 10#  3x10 10#  3x10 10#  3x10 nv 15# 3x10 15#  3x10 15#  3x10      ecc lateral step-downs    4"/  2x10 4"/ 2x10 4"/ 2x10 4"/  3x10      STS 1x10 HEP                                                  Ther Activity                                       Gait Training             Step-ups/  Downs with laundry basket 5#  8"/  1x10 5#  8"/  1x10 5#  8"/  1x10 5#  8"/  1x20 5#  8"/  1x20        Step-ups/  downs 8"/  1x20  No rail 8"/  1x20  No rail 8"/  1x20  No rail          Modalities

## 2023-01-03 ENCOUNTER — TELEPHONE (OUTPATIENT)
Dept: OBGYN CLINIC | Facility: HOSPITAL | Age: 63
End: 2023-01-03

## 2023-01-03 DIAGNOSIS — M17.12 ARTHRITIS OF LEFT KNEE: ICD-10-CM

## 2023-01-03 RX ORDER — FERROUS SULFATE TAB EC 324 MG (65 MG FE EQUIVALENT) 324 (65 FE) MG
324 TABLET DELAYED RESPONSE ORAL
Qty: 30 TABLET | Refills: 0 | Status: SHIPPED | OUTPATIENT
Start: 2023-01-03 | End: 2023-02-14

## 2023-01-03 RX ORDER — FOLIC ACID 1 MG/1
1 TABLET ORAL DAILY
Qty: 30 TABLET | Refills: 0 | Status: SHIPPED | OUTPATIENT
Start: 2023-01-03 | End: 2023-02-14

## 2023-01-03 NOTE — TELEPHONE ENCOUNTER
Caller: Patient    Doctor: Dr Garth Vincent    Reason for call: Patient called stating she went to get the Ferrous Sulfate 324 (65 Fe)mg and the pharmacy states that they never received the script  Patient asking if another script can be sent to pharmacy below and if she can received a call when it is sent       Call back#: [facility] [address]

## 2023-01-17 ENCOUNTER — TELEPHONE (OUTPATIENT)
Dept: OBGYN CLINIC | Facility: HOSPITAL | Age: 63
End: 2023-01-17

## 2023-01-17 ENCOUNTER — APPOINTMENT (OUTPATIENT)
Dept: LAB | Facility: CLINIC | Age: 63
End: 2023-01-17

## 2023-01-17 DIAGNOSIS — M17.12 ARTHRITIS OF LEFT KNEE: Primary | ICD-10-CM

## 2023-01-17 DIAGNOSIS — M17.12 ARTHRITIS OF LEFT KNEE: ICD-10-CM

## 2023-01-17 LAB
ABO GROUP BLD: NORMAL
ALBUMIN SERPL BCP-MCNC: 3.7 G/DL (ref 3.5–5)
ALP SERPL-CCNC: 80 U/L (ref 46–116)
ALT SERPL W P-5'-P-CCNC: 29 U/L (ref 12–78)
ANION GAP SERPL CALCULATED.3IONS-SCNC: 5 MMOL/L (ref 4–13)
APTT PPP: 22 SECONDS (ref 23–37)
AST SERPL W P-5'-P-CCNC: 18 U/L (ref 5–45)
BASOPHILS # BLD AUTO: 0.06 THOUSANDS/ÂΜL (ref 0–0.1)
BASOPHILS NFR BLD AUTO: 1 % (ref 0–1)
BILIRUB SERPL-MCNC: 0.65 MG/DL (ref 0.2–1)
BLD GP AB SCN SERPL QL: NEGATIVE
BUN SERPL-MCNC: 16 MG/DL (ref 5–25)
CALCIUM SERPL-MCNC: 9.6 MG/DL (ref 8.3–10.1)
CHLORIDE SERPL-SCNC: 107 MMOL/L (ref 96–108)
CO2 SERPL-SCNC: 27 MMOL/L (ref 21–32)
CREAT SERPL-MCNC: 0.67 MG/DL (ref 0.6–1.3)
EOSINOPHIL # BLD AUTO: 0.17 THOUSAND/ÂΜL (ref 0–0.61)
EOSINOPHIL NFR BLD AUTO: 2 % (ref 0–6)
ERYTHROCYTE [DISTWIDTH] IN BLOOD BY AUTOMATED COUNT: 12.7 % (ref 11.6–15.1)
EST. AVERAGE GLUCOSE BLD GHB EST-MCNC: 108 MG/DL
GFR SERPL CREATININE-BSD FRML MDRD: 95 ML/MIN/1.73SQ M
GLUCOSE P FAST SERPL-MCNC: 101 MG/DL (ref 65–99)
HBA1C MFR BLD: 5.4 %
HCT VFR BLD AUTO: 43.6 % (ref 34.8–46.1)
HGB BLD-MCNC: 14.3 G/DL (ref 11.5–15.4)
IMM GRANULOCYTES # BLD AUTO: 0.03 THOUSAND/UL (ref 0–0.2)
IMM GRANULOCYTES NFR BLD AUTO: 0 % (ref 0–2)
INR PPP: 0.89 (ref 0.84–1.19)
LYMPHOCYTES # BLD AUTO: 1.64 THOUSANDS/ÂΜL (ref 0.6–4.47)
LYMPHOCYTES NFR BLD AUTO: 24 % (ref 14–44)
MCH RBC QN AUTO: 28.9 PG (ref 26.8–34.3)
MCHC RBC AUTO-ENTMCNC: 32.8 G/DL (ref 31.4–37.4)
MCV RBC AUTO: 88 FL (ref 82–98)
MONOCYTES # BLD AUTO: 0.64 THOUSAND/ÂΜL (ref 0.17–1.22)
MONOCYTES NFR BLD AUTO: 9 % (ref 4–12)
NEUTROPHILS # BLD AUTO: 4.45 THOUSANDS/ÂΜL (ref 1.85–7.62)
NEUTS SEG NFR BLD AUTO: 64 % (ref 43–75)
NRBC BLD AUTO-RTO: 0 /100 WBCS
PLATELET # BLD AUTO: 181 THOUSANDS/UL (ref 149–390)
PMV BLD AUTO: 12 FL (ref 8.9–12.7)
POTASSIUM SERPL-SCNC: 3.6 MMOL/L (ref 3.5–5.3)
PROT SERPL-MCNC: 7.6 G/DL (ref 6.4–8.4)
PROTHROMBIN TIME: 12.2 SECONDS (ref 11.6–14.5)
RBC # BLD AUTO: 4.94 MILLION/UL (ref 3.81–5.12)
RH BLD: POSITIVE
SODIUM SERPL-SCNC: 139 MMOL/L (ref 135–147)
SPECIMEN EXPIRATION DATE: NORMAL
WBC # BLD AUTO: 6.99 THOUSAND/UL (ref 4.31–10.16)

## 2023-01-17 NOTE — TELEPHONE ENCOUNTER
Preoperative Elective Admission Assessment-S/w pt    Living Situation:   Pt lives with  in single level ranch home  Does have #16 steps to basement level, #1 step to enter home  First Floor Setup:  Yes     DME: Pt does have her RW, cane and BSC  Patient's Current Level of Function: independent with ADLs and ambulation  Post-op Caregiver:      Post-op Transport:      Outpatient Physical Therapy Site:  Site: [facility]  [employer]  pre and post-op appts scheduled? Y     Medication Management:  Self   Preferred Pharmacy for Post-op Medications: [facility], [place] - [address] [place] [address]  Blood Management Vitamin Regimen: Pt is taking as prescribed  Denies questions or issues  Post-op anticoagulant: Pt has lovenox at home and it is ready for post-op use only  DC Plan: Pt plans for DC to home and plans to attend OP PT  Barriers to DC identified preoperatively: None    BMI: 31 58    Care companion: agreeable and enrolled  Patient Education:  Pt educated on post-op pain, early mobilization (POD0), Average inpt LOS, OP PT goal   Patient educated that our goal is to appropriately discharge patient based off their post-op function while striving to maintain maximal independence  The goal is to discharge patient to home and for them to attend outpatient physical therapy  Assigned to care team? Yes    CHW referral placed for medicare with  PCP/[facility] wellness PCP? N/A    SW referral: N/A    Pt encouraged to call with any questions, concerns or issues

## 2023-01-20 ENCOUNTER — OFFICE VISIT (OUTPATIENT)
Dept: OBGYN CLINIC | Facility: CLINIC | Age: 63
End: 2023-01-20

## 2023-01-20 VITALS
HEIGHT: 64 IN | DIASTOLIC BLOOD PRESSURE: 78 MMHG | BODY MASS INDEX: 31.69 KG/M2 | SYSTOLIC BLOOD PRESSURE: 124 MMHG | WEIGHT: 185.6 LBS

## 2023-01-20 DIAGNOSIS — Z01.419 ENCOUNTER FOR GYNECOLOGICAL EXAMINATION (GENERAL) (ROUTINE) WITHOUT ABNORMAL FINDINGS: Primary | ICD-10-CM

## 2023-01-20 DIAGNOSIS — Z12.31 ENCOUNTER FOR SCREENING MAMMOGRAM FOR MALIGNANT NEOPLASM OF BREAST: ICD-10-CM

## 2023-01-20 NOTE — PROGRESS NOTES
Internal Medicine Pre-Operative Evaluation:     Reason for Visit: Pre-operative Evaluation for Risk Stratification and Optimization    Patient ID: Saeed Calles is a 63 y o  female  Surgery: Arthroplasty of left knee  Referring Provider: Dr Inez Stauffer      Recommendations to Proceed withSurgery    Patient is considered to be Low risk for Medium risk procedure  After evaluation and discussion with patient with emphasis that all surgery has some degree of inherent risk it is determined this procedure is of acceptable risk  medically  Patient may proceed with planned procedure      Assessment      Primary osteoarthritis left knee  • Failed conservative measures  • Electing to undergo total joint arthroplasty    Pre-operative Medical Evaluation for planned surgery  • Patient meets preoperative quality goals as noted below  • Recommendations as listed in PLAN section below  • Contact surgical nurse  navigator with any questions regarding preoperative plan or schedule  Single functioning kidney  · Monitor bmp  · Avoid hypotension  · Avoid NSAIDS    Recent R TKA  · 8/2022  · No issues post operatively    Anxiety  · Cont xanax as needed        Patient Active Problem List   Diagnosis   • Anxiety   • Chondromalacia patellae of right knee   • Congenital renal dysplasia ( left )   • Mixed hyperlipidemia   • Primary osteoarthritis of right knee   • Primary osteoarthritis of right hip   • Primary osteoarthritis of left knee   • Chronic pain of right knee   • Status post total knee replacement, right        Plan:     1  Further preoperative workup as follows:   - none no further testing required may proceed with surgery    2   Medication Management/Recommendations:   - continue all current medicines through morning of surgery with sip of water except the following:  - hold aspirin 7 days prior to surgery  - avoid use of NSAID such as motrin, advil, aleve for 7 days prior to surgery  - hold all OTC herbal or nutritional supplements 7 days before surgery    3  Patient requires further consultation with:   No Consults Required    4  Discharge Planning / Barriers to Discharge  none identified - patients has post discharge therapy plan in place, transportation arranged for discharge day, adequate family support at home to assist with discharge to home  Subjective:           History of Present Illness:     Berna Scott is a 63 y o  female who presents to the office today for a preoperative consultation at the request of surgeon  The patient understands this is an elective procedure and not emergent  They are electing to undergo planned procedure with an understanding that all surgery has inherent risk  They have worked with their surgeon and failed conservative treatment measures  Today they present for preoperative risk assessment and recommendations for optimization in preparation for surgery  Pt seen in surgical optimization center for upcoming proposed surgery  They have failed previous conservative measures and have elected surgical intervention  Pt meets presurgical lab and BMI optimization goals  Upon interview questioning patient is able to perform greater than 4 METs workload in daily life without any reported cardio-pulmonary symptoms  Pt underwent recent R TKA in August and did well post operatively        ROS: No TIA's or unusual headaches, no dysphagia  No prolonged cough  No dyspnea or chest pain on exertion  No abdominal pain, change in bowel habits, black or bloody stools  No urinary tract or BPH symptoms  Positive reported pain in arthritic joint  Positive difficulty with gait  No skin rashes or issues              Objective:      /80   Pulse 63   Ht 5' 4 5" (1 638 m)   Wt 84 6 kg (186 lb 9 6 oz)   SpO2 98%   BMI 31 54 kg/m²       General Appearance: no distress, conversive  HEENT: PERRLA, conjuctiva normal; oropharynx clear; mucous membranes moist;   Neck:  Supple, no lymphadenopathy or thyromegaly  Lungs: breath sounds normal, normal respiratory effort, no retractions, expiratory effort normal  CV: normal heart sounds S1/S2, PMI normal   ABD: soft non tender, no masses , no hepatic or splenomegaly  EXT: DP pulses intact, no lymphadenopathy, no edema  Skin: normal turgor, normal texture, no rash  Psych: affect normal, mood normal  Neuro: AAOx3        The following portions of the patient's history were reviewed and updated as appropriate: allergies, current medications, past family history, past medical history, past social history, past surgical history and problem list      Past History:       Past Medical History:   Diagnosis Date   • Anxiety     MENOPAUSE   • Arthritis     KNEES   • Chest pain syndrome 03/29/2022   • Chronic pain disorder    • Diverticulosis    • GERD (gastroesophageal reflux disease)    • Hypertension     1 episode last year went to ER; no problems since   • Renal dysplasia     nonfunctioning left life-long    Past Surgical History:   Procedure Laterality Date   • COLONOSCOPY  03/2016   • FL INJECTION RIGHT HIP (NON ARTHROGRAM)  5/10/2022   • FL INJECTION RIGHT HIP (NON ARTHROGRAM)  11/3/2022   • KIDNEY SURGERY Right     age 29   • NV ARTHRP KNE CONDYLE&PLATU MEDIAL&LAT COMPARTMENTS Right 8/1/2022    Procedure: ARTHROPLASTY KNEE TOTAL W ROBOT;  Surgeon: Mena Flores MD;  Location: [facility];  Service: Orthopedics   • REDUCTION MAMMAPLASTY     • WRIST SURGERY Right     fx          Social History     Tobacco Use   • Smoking status: Former     Packs/day: 0 25     Years: 15 00     Pack years: 3 75     Types: Cigarettes   • Smokeless tobacco: Never   Vaping Use   • Vaping Use: Never used   Substance Use Topics   • Alcohol use:  Yes     Alcohol/week: 10 0 standard drinks     Types: 5 Cans of beer, 5 Standard drinks or equivalent per week     Comment: daily   • Drug use: No     Family History   Problem Relation Age of Onset   • Diabetes Father    • Breast cancer Neg Hx    • Ovarian cancer Neg Hx    • Colon cancer Neg Hx           Allergies: Allergies   Allergen Reactions   • Sulfa Antibiotics Rash        Current Medications:     Current Outpatient Medications   Medication Instructions   • ALPRAZolam (XANAX) 0 25 mg, Oral, Daily PRN   • ascorbic acid (VITAMIN C) 500 mg, Oral, 2 times daily   • Biotin w/ Vitamins C & E 1250-7 5-7 5 MCG-MG-UNT CHEW Oral   • cephalexin (KEFLEX) 500 mg capsule Take 4 tablets one hour before dental visits  • ferrous sulfate 324 mg, Oral, Daily before breakfast   • FIBER PO Oral, Daily   • folic acid (FOLVITE) 1 mg, Oral, Daily   • Multiple Vitamins-Minerals (MULTI FOR HER 50+ PO) Oral, Daily             PRE-OP WORKSHEET DATA    Assessment of Pre-Operative Risks     MLJ Quality Hard Stops:  BMI (<40) : Estimated body mass index is 31 54 kg/m² as calculated from the following:    Height as of this encounter: 5' 4 5" (1 638 m)  Weight as of this encounter: 84 6 kg (186 lb 9 6 oz)  Hgb ( >11): Lab Results   Component Value Date    HGB 14 3 01/17/2023     HbA1c (<7 0) :   Lab Results   Component Value Date    HGBA1C 5 4 01/17/2023     GFR (>60) (Less then 45 = Nephrology consult):  CrCl cannot be calculated (Patient's most recent lab result is older than the maximum 7 days allowed  )  Active Decompensated Chronic Conditions which would delay surgery  No acutely decompensated medical issues such as recent CVA, MI, new onset arrhythmia, severe aortic stenosis, CHF, uncontrolled COPD       Exercise Capacity: (if less the 4 mets consider functional assessment via cardiac stress testing or consultation)    · Able to walk 2 blocks without symptoms?: Yes  · Able to walk 1 flights without symptoms?: Yes    Assessment of intra and post operative respiratory, hemodynamic and thrombotic risks     Prior Anesthesia Reactions: No     Personal history of venous thromboembolic disease?  No    History of steroid use > 5 mg for >2 weeks within last year? No    Cardiac Risk Estimation: per the Revised Cardiac Risk Index (Circ  100:[id number], 1999),     The patient's risk factors for cardiac complications include :  none    Kelley Michaels has a in hospital cardiac risk of RCI RISK CLASS I (0 risk factors, risk of major cardiac compl  appr  0 5%) based on RCRI calculator          Pre-Op Data Reviewed:       Laboratory Results: I have personally reviewed the pertinent laboratory results/reports     EKG:I have personally reviewed pertinent reports    I personally reviewed and interpreted available tracings in the electronic medical record    Sinus bradycardia  Otherwise normal ECG  When compared with ECG of 28-MAR-2022 17:21, (unconfirmed)  QT has shortened  Confirmed by Jelani Oliver ([id number]) on 3/29/2022     OLD RECORDS: reviewed old records in the chart review section if EHR on day of visit      Previous cardiopulmonary studies within the past year:  · Echocardiogram: yes, 2022 ef 65%  · Cardiac Catheterization: no  · Stress Test: yes, 2022 negative      Time of visit including pre-visit chart review, visit and post-visit coordination of plan and care , review of pre-surgical lab work, preparation and time spent documenting note in electronic medical record, time spent face-to-face in physical examination answering patient questions by care team 60 minutes             [address] [place] [address]

## 2023-01-20 NOTE — PROGRESS NOTES
Mookie Trevizo   1960    CC:  Yearly exam    A:  Yearly exam      Problem List Items Addressed This Visit    None  Visit Diagnoses     Encounter for gynecological examination (general) (routine) without abnormal findings    -  Primary    Relevant Orders    Liquid-based pap, screening    Encounter for screening mammogram for malignant neoplasm of breast        Relevant Orders    Mammo screening bilateral w 3d & cad          P:   Pap collected today  We reviewed ASCCP guidelines for Pap testing  For one more then can d/c after 72  Mammo slip given   VAMSI: reviewed Kegels, PFPT, sling  She will try Kegels and RTO if interested in further intervention     RTO one year for yearly exam or sooner as needed  S:  63 y o  female here for yearly exam  She is postmenopausal and has had no vaginal bleeding  She denies vaginal discharge, itching, odor or dryness  Sexual activity: She is sexually active without pain, bleeding or dryness  Uses lubricant  STD testing: She does not want STD testing today  Menopausal age 54  Last Pap: 3/2019 NILM  Last Mammo: 11/2022 BIRADS 1  Last Colonoscopy: 5/2021 x10yr recall     Non-smoker, social drinker  Exercises regularly  Limited by osteoarthritis  S/p right knee replacement, for left knee next month    Family hx of breast cancer: denies  Family hx of ovarian cancer: denies  Family hx of colon cancer: denies      Current Outpatient Medications:   •  ALPRAZolam (XANAX) 0 25 mg tablet, Take 1 tablet (0 25 mg total) by mouth daily as needed for anxiety, Disp: 30 tablet, Rfl: 0  •  ascorbic acid (VITAMIN C) 500 MG tablet, Take 1 tablet (500 mg total) by mouth 2 (two) times a day, Disp: 60 tablet, Rfl: 0  •  Biotin w/ Vitamins C & E 1250-7 5-7 5 MCG-MG-UNT CHEW, Chew, Disp: , Rfl:   •  cephalexin (KEFLEX) 500 mg capsule, Take 4 tablets one hour before dental visits  , Disp: 20 capsule, Rfl: 2  •  ferrous sulfate 324 (65 Fe) mg, Take 1 tablet (324 mg total) by mouth daily before breakfast, Disp: 30 tablet, Rfl: 0  •  FIBER PO, Take by mouth daily, Disp: , Rfl:   •  folic acid (FOLVITE) 1 mg tablet, Take 1 tablet (1 mg total) by mouth daily, Disp: 30 tablet, Rfl: 0  •  Multiple Vitamins-Minerals (MULTI FOR HER 50+ PO), Take by mouth daily, Disp: , Rfl:   Social History     Socioeconomic History   • Marital status: /Civil Union     Spouse name: Not on file   • Number of children: Not on file   • Years of education: Not on file   • Highest education level: Not on file   Occupational History   • 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   Tobacco Use   • Smoking status: Former     Packs/day: 0 25     Years: 15 00     Pack years: 3 75     Types: Cigarettes   • Smokeless tobacco: Never   Vaping Use   • Vaping Use: Never used   Substance and Sexual Activity   • Alcohol use: Yes     Alcohol/week: 10 0 standard drinks     Types: 5 Cans of beer, 5 Standard drinks or equivalent per week     Comment: daily   • Drug use: No   • Sexual activity: Yes     Partners: Male     Birth control/protection: Post-menopausal   Other Topics Concern   • Not on file   Social History Narrative   • Not on file     Social Determinants of Health     Financial Resource Strain: Not on file   Food Insecurity: Not on file   Transportation Needs: Not on file   Physical Activity: Not on file   Stress: Not on file   Social Connections: Not on file   Intimate Partner Violence: Not on file   Housing Stability: Not on file     Family History   Problem Relation Age of Onset   • Diabetes Father    • Breast cancer Neg Hx    • Ovarian cancer Neg Hx    • Colon cancer Neg Hx      Past Medical History:   Diagnosis Date   • Chest pain syndrome 3/29/2022   • Renal dysplasia     nonfunctioning left life-long        Review of Systems   Respiratory: Negative  Cardiovascular: Negative  Gastrointestinal: Negative for constipation and diarrhea     Genitourinary: Negative for difficulty urinating, pelvic pain, vaginal bleeding, vaginal discharge, itching or odor  +VAMSI daily, wears pad    O:  Blood pressure 124/78, height 5' 4" (1 626 m), weight 84 2 kg (185 lb 9 6 oz)  Patient appears well and is not in distress  Neck is supple without masses  Breasts are symmetrical without mass, tenderness, nipple discharge, skin changes or adenopathy  S/p reduction  Abdomen is soft and nontender without masses  Vulva atrophic, but without lesions or rashes  Urethral meatus and urethra are normal  Bladder is normal to palpation  Vagina is normal without discharge or bleeding  Cervix is normal without discharge or lesion  Uterus and adnexa are normal, mobile, nontender without palpable mass    Rectovaginal exam without nodularity/masses/visible blood on glove

## 2023-01-20 NOTE — PATIENT INSTRUCTIONS
Contact surgical nurse  navigator with any questions regarding preoperative plan or schedule    Stop all over the counter supplements, herbal, naturopathic  medications for 1 week prior to surgery UNLESS prescribed by your surgeon  Hold NSAIDS (i e  advil, alleve, motrin, ibuprofen, celebrex) minimum 7 days prior to surgery  Hold Asprin minimum 7 days prior to surgery  Recommend using Tylenol ( acetaminophen ) 500mg every eight hours during the first week post discharge in conjunction with any additional pain medicine prescribed by your surgeon  Use bowel medicines prescribed by your surgeon ( colace) daily post op during the first 1-2 weeks to avoid post operative constipation issues  Call [phone] with any post discharge concerns or medical issues  The morning of surgery take only the following medication with small sip of water: nothing

## 2023-01-23 LAB
HPV HR 12 DNA CVX QL NAA+PROBE: NEGATIVE
HPV16 DNA CVX QL NAA+PROBE: NEGATIVE
HPV18 DNA CVX QL NAA+PROBE: NEGATIVE

## 2023-01-26 NOTE — PRE-PROCEDURE INSTRUCTIONS
Pre-Surgery Instructions:   Medication Instructions   • ALPRAZolam (XANAX) 0 25 mg tablet Uses PRN- OK to take day of surgery   • ascorbic acid (VITAMIN C) 500 MG tablet Instructions provided by MD   • Biotin w/ Vitamins C & E 1250-7 5-7 5 MCG-MG-UNT CHEW Stop taking 7 days prior to surgery  • ferrous sulfate 324 (65 Fe) mg Instructions provided by MD   • FIBER PO Hold day of surgery  • folic acid (FOLVITE) 1 mg tablet Instructions provided by MD   • Multiple Vitamins-Minerals (MULTI FOR HER 50+ PO) Instructions provided by MD     Pt verbalizes understanding of the following:    - Bathing instructions, has chg (neck down, no genitals) & wipes  - No lotions, powders, sprays, deodorant, jewelry, body piercings or make-up  - No shaving within 24hrs    - DO NOT EAT OR DRINK ANYTHING after midnight on the evening before your procedure including candy & gum   - ONLY SIPS OF WATER with your medications are allowed on the morning of your procedure  - Avoid OTC non-directed Vit/ Suppl/ Herbals 7 days prior to surgery to ensure no drug interactions with perioperative surgical/ anesthetic meds  - Avoid NSAIDs 3 days prior  - Avoid ASA containing products 5 days prior    - Bring a list of meds you take at home with your last dose noted    - Arrange for someone to drive you home after the procedure & stay with you until the next morning    - Bring insurance cards & photo id    - Leave all valuables such as credit cards, money & jewelry at home      - Notify surgeon if you develop any cold symptoms, change in your health history or develop open wounds     - Did the surgeon's office give you any other special instructions? HGB Rx  - Did you require any clearances?  Dr Nereida Jasmine 1/30 [facility]

## 2023-01-27 LAB
LAB AP GYN PRIMARY INTERPRETATION: NORMAL
Lab: NORMAL

## 2023-01-30 ENCOUNTER — OFFICE VISIT (OUTPATIENT)
Dept: INTERNAL MEDICINE CLINIC | Facility: CLINIC | Age: 63
End: 2023-01-30

## 2023-01-30 VITALS
HEART RATE: 63 BPM | BODY MASS INDEX: 31.09 KG/M2 | OXYGEN SATURATION: 98 % | DIASTOLIC BLOOD PRESSURE: 80 MMHG | HEIGHT: 65 IN | SYSTOLIC BLOOD PRESSURE: 124 MMHG | WEIGHT: 186.6 LBS

## 2023-01-30 DIAGNOSIS — M17.12 PRIMARY OSTEOARTHRITIS OF LEFT KNEE: Primary | ICD-10-CM

## 2023-01-30 DIAGNOSIS — F41.9 ANXIETY: ICD-10-CM

## 2023-01-30 DIAGNOSIS — Z01.818 PRE-OPERATIVE CLEARANCE: ICD-10-CM

## 2023-01-30 DIAGNOSIS — Q61.4 CONGENITAL RENAL DYSPLASIA: ICD-10-CM

## 2023-02-01 ENCOUNTER — PREP FOR PROCEDURE (OUTPATIENT)
Dept: OBGYN CLINIC | Facility: HOSPITAL | Age: 63
End: 2023-02-01

## 2023-02-06 ENCOUNTER — EVALUATION (OUTPATIENT)
Dept: PHYSICAL THERAPY | Facility: CLINIC | Age: 63
End: 2023-02-06

## 2023-02-06 ENCOUNTER — TELEPHONE (OUTPATIENT)
Dept: OBGYN CLINIC | Facility: MEDICAL CENTER | Age: 63
End: 2023-02-06

## 2023-02-06 DIAGNOSIS — Z47.1 AFTERCARE FOLLOWING LEFT KNEE JOINT REPLACEMENT SURGERY: Primary | ICD-10-CM

## 2023-02-06 DIAGNOSIS — M25.562 CHRONIC PAIN OF LEFT KNEE: Primary | ICD-10-CM

## 2023-02-06 DIAGNOSIS — Z96.652 AFTERCARE FOLLOWING LEFT KNEE JOINT REPLACEMENT SURGERY: Primary | ICD-10-CM

## 2023-02-06 DIAGNOSIS — G89.29 CHRONIC PAIN OF LEFT KNEE: Primary | ICD-10-CM

## 2023-02-06 NOTE — PROGRESS NOTES
PT Evaluation     Today's date: 2023  Patient name: Lauren Clay  : 1960  MRN: [id number]  Referring provider: Kayce Amaral  Dx:   Encounter Diagnosis     ICD-10-CM    1  Chronic pain of left knee  M25 562     G89 29                      Assessment  Assessment details: Pt presents with s/s consistent with L knee OA  She is scheduled for L TKA 23 and will benefit from skilled PT services post-operatively to address her impairments in order to decrease pain and restore function  Impairments: abnormal gait, abnormal muscle firing, abnormal or restricted ROM, abnormal movement, activity intolerance, impaired physical strength, lacks appropriate home exercise program, pain with function, weight-bearing intolerance and poor body mechanics  Understanding of Dx/Px/POC: good   Prognosis: good    Goals  STG - 4 wks  1) pt will demonstrate PROM 0-90 deg  2) pt will d/c FWW    MTG - 8 wks  1) pt will demonstrate PROM 0-110 deg  2) pt will d/c SPC  3) pt will report no difficulty with light ADLs    LTG - 12 wks  1) pt will demonstrate PROM 0-120 deg  2) pt will demonstrate normalized gait  3) pt will report reciprocal gait on stairs  4) pt will initiate daily walking routine for fitness    Plan  Planned modality interventions: cryotherapy  Planned therapy interventions: joint mobilization, manual therapy, balance/weight bearing training, body mechanics training, neuromuscular re-education, patient education, stretching, strengthening, therapeutic activities, therapeutic exercise, home exercise program, gait training, flexibility and functional ROM exercises  Frequency: 2x week  Duration in weeks: 12  Treatment plan discussed with: patient        Subjective Evaluation    History of Present Illness  Mechanism of injury: Pt is a 63 y o  female with PMHx significant for R hip OA, R TKA Aug '22, anxiety  She presents to PT for L TKA pre-operative assessment      Pain  Current pain ratin  At best pain ratin  At worst pain ratin  Location: L knee  Quality: sharp, grinding and tight  Relieving factors: rest  Aggravating factors: standing, walking and stair climbing    Social Support  Steps to enter house: yes (2 KATE)  Stairs in house: yes (FF to basement)   Lives in: one-story house  Lives with: spouse    Employment status: working  Treatments  No previous or current treatments  Patient Goals  Patient goals for therapy: decreased edema, decreased pain, improved balance, increased strength, independence with ADLs/IADLs, return to sport/leisure activities, return to work and increased motion          Objective     Observations   Left Knee   Negative for deformity and effusion  Passive Range of Motion   Left Knee   Flexion: 129 degrees with pain  Extension: 0 degrees     Mobility   Patellar Mobility:   Left Knee   WFL: medial, lateral, superior and inferior  Strength/Myotome Testing     Left Hip   Planes of Motion   Flexion: 4-  Extension: 4-  Abduction: 3+  Adduction: 4-    Left Knee   Flexion: 4-  Extension: 4-  Quadriceps contraction: fair    General Comments:      Knee Comments  STS: (+) R weight-shift    Gait: mildly decreased R stance time             Precautions:  PMHx: R hip OA, R TKA, anxiety  Dx: L knee OA, L TKA scheduled 23    Manuals 2/6            L knee OA                                                    Neuro Re-Ed                                                                                                        Ther Ex             Bike: rocking             Heel slides 5"x5            Supine GA stretch 20"x1            Quad sets 5"x15            LAQ AAROM 1x5                                                   Ther Activity                                       Gait Training             Step-ups  FWW  4"/           Gait training on floor  FWW                        Modalities

## 2023-02-06 NOTE — TELEPHONE ENCOUNTER
Caller: Patient    Doctor: Verónica Negron    Reason for call:     Patient is going to stop by the office to  her FMLA leave of absence paperwork, she is asking for it to be   Printed      Call back#: [phone]

## 2023-02-06 NOTE — TELEPHONE ENCOUNTER
Caller: Patient    Doctor: Rigoberto Vargas    Reason for call:   Patient called back and started she found them and will print them       Call back#: n/a

## 2023-02-09 ENCOUNTER — ANESTHESIA EVENT (OUTPATIENT)
Dept: PERIOP | Facility: HOSPITAL | Age: 63
End: 2023-02-09

## 2023-02-13 ENCOUNTER — ANESTHESIA (OUTPATIENT)
Dept: PERIOP | Facility: HOSPITAL | Age: 63
End: 2023-02-13

## 2023-02-13 ENCOUNTER — HOSPITAL ENCOUNTER (OUTPATIENT)
Facility: HOSPITAL | Age: 63
Setting detail: OUTPATIENT SURGERY
Discharge: HOME | End: 2023-02-14
Attending: ORTHOPAEDIC SURGERY | Admitting: ORTHOPAEDIC SURGERY

## 2023-02-13 DIAGNOSIS — M17.12 ARTHRITIS OF LEFT KNEE: Primary | ICD-10-CM

## 2023-02-13 LAB — GLUCOSE SERPL-MCNC: 71 MG/DL (ref 65–140)

## 2023-02-13 DEVICE — ATTUNE PATELLA MEDIALIZED DOME 32MM CEMENTED AOX
Type: IMPLANTABLE DEVICE | Site: KNEE | Status: FUNCTIONAL
Brand: ATTUNE

## 2023-02-13 DEVICE — ATTUNE KNEE SYSTEM FEMORAL POSTERIOR STABILIZED NARROW SIZE 4N LEFT CEMENTED
Type: IMPLANTABLE DEVICE | Site: KNEE | Status: FUNCTIONAL
Brand: ATTUNE

## 2023-02-13 DEVICE — ATTUNE KNEE SYSTEM TIBIAL BASE FIXED BEARING SIZE 3 CEMENTED
Type: IMPLANTABLE DEVICE | Site: KNEE | Status: FUNCTIONAL
Brand: ATTUNE

## 2023-02-13 DEVICE — SMARTSET HV HIGH VISCOSITY BONE CEMENT 40G
Type: IMPLANTABLE DEVICE | Site: KNEE | Status: FUNCTIONAL
Brand: SMARTSET

## 2023-02-13 DEVICE — ATTUNE KNEE SYSTEM TIBIAL INSERT FIXED BEARING POSTERIOR STABILIZED 4 7MM AOX
Type: IMPLANTABLE DEVICE | Site: KNEE | Status: FUNCTIONAL
Brand: ATTUNE

## 2023-02-13 RX ORDER — CEFAZOLIN SODIUM 2 G/50ML
2000 SOLUTION INTRAVENOUS EVERY 8 HOURS
Status: COMPLETED | OUTPATIENT
Start: 2023-02-13 | End: 2023-02-14

## 2023-02-13 RX ORDER — GABAPENTIN 100 MG/1
100 CAPSULE ORAL EVERY 8 HOURS
Status: DISCONTINUED | OUTPATIENT
Start: 2023-02-13 | End: 2023-02-14 | Stop reason: HOSPADM

## 2023-02-13 RX ORDER — POLYETHYLENE GLYCOL 3350 17 G/17G
17 POWDER, FOR SOLUTION ORAL DAILY
Status: DISCONTINUED | OUTPATIENT
Start: 2023-02-14 | End: 2023-02-14 | Stop reason: HOSPADM

## 2023-02-13 RX ORDER — HYDROMORPHONE HCL/PF 1 MG/ML
0.5 SYRINGE (ML) INJECTION
Status: DISCONTINUED | OUTPATIENT
Start: 2023-02-13 | End: 2023-02-13 | Stop reason: HOSPADM

## 2023-02-13 RX ORDER — OXYCODONE HYDROCHLORIDE 10 MG/1
10 TABLET ORAL EVERY 4 HOURS PRN
Status: DISCONTINUED | OUTPATIENT
Start: 2023-02-13 | End: 2023-02-14 | Stop reason: HOSPADM

## 2023-02-13 RX ORDER — ONDANSETRON 2 MG/ML
4 INJECTION INTRAMUSCULAR; INTRAVENOUS ONCE AS NEEDED
Status: DISCONTINUED | OUTPATIENT
Start: 2023-02-13 | End: 2023-02-13 | Stop reason: HOSPADM

## 2023-02-13 RX ORDER — SODIUM CHLORIDE, SODIUM LACTATE, POTASSIUM CHLORIDE, CALCIUM CHLORIDE 600; 310; 30; 20 MG/100ML; MG/100ML; MG/100ML; MG/100ML
125 INJECTION, SOLUTION INTRAVENOUS CONTINUOUS
Status: DISCONTINUED | OUTPATIENT
Start: 2023-02-13 | End: 2023-02-13

## 2023-02-13 RX ORDER — LIDOCAINE HYDROCHLORIDE 10 MG/ML
0.5 INJECTION, SOLUTION EPIDURAL; INFILTRATION; INTRACAUDAL; PERINEURAL ONCE AS NEEDED
Status: DISCONTINUED | OUTPATIENT
Start: 2023-02-13 | End: 2023-02-14 | Stop reason: HOSPADM

## 2023-02-13 RX ORDER — ENOXAPARIN SODIUM 100 MG/ML
40 INJECTION SUBCUTANEOUS EVERY 24 HOURS
Status: DISCONTINUED | OUTPATIENT
Start: 2023-02-14 | End: 2023-02-14 | Stop reason: HOSPADM

## 2023-02-13 RX ORDER — MIDAZOLAM HYDROCHLORIDE 2 MG/2ML
INJECTION, SOLUTION INTRAMUSCULAR; INTRAVENOUS
Status: DISPENSED
Start: 2023-02-13 | End: 2023-02-14

## 2023-02-13 RX ORDER — OXYCODONE HYDROCHLORIDE 5 MG/1
5 TABLET ORAL EVERY 4 HOURS PRN
Qty: 30 TABLET | Refills: 0 | Status: SHIPPED | OUTPATIENT
Start: 2023-02-13 | End: 2023-02-23

## 2023-02-13 RX ORDER — CEFAZOLIN SODIUM 1 G/3ML
INJECTION, POWDER, FOR SOLUTION INTRAMUSCULAR; INTRAVENOUS AS NEEDED
Status: DISCONTINUED | OUTPATIENT
Start: 2023-02-13 | End: 2023-02-13

## 2023-02-13 RX ORDER — MIDAZOLAM HYDROCHLORIDE 2 MG/2ML
INJECTION, SOLUTION INTRAMUSCULAR; INTRAVENOUS AS NEEDED
Status: DISCONTINUED | OUTPATIENT
Start: 2023-02-13 | End: 2023-02-13

## 2023-02-13 RX ORDER — ONDANSETRON 2 MG/ML
4 INJECTION INTRAMUSCULAR; INTRAVENOUS EVERY 6 HOURS PRN
Status: DISCONTINUED | OUTPATIENT
Start: 2023-02-13 | End: 2023-02-14 | Stop reason: HOSPADM

## 2023-02-13 RX ORDER — CHLORHEXIDINE GLUCONATE 0.12 MG/ML
15 RINSE ORAL ONCE
Status: COMPLETED | OUTPATIENT
Start: 2023-02-13 | End: 2023-02-13

## 2023-02-13 RX ORDER — FENTANYL CITRATE/PF 50 MCG/ML
25 SYRINGE (ML) INJECTION
Status: DISCONTINUED | OUTPATIENT
Start: 2023-02-13 | End: 2023-02-13 | Stop reason: HOSPADM

## 2023-02-13 RX ORDER — CALCIUM CARBONATE 200(500)MG
1000 TABLET,CHEWABLE ORAL DAILY PRN
Status: DISCONTINUED | OUTPATIENT
Start: 2023-02-13 | End: 2023-02-14 | Stop reason: HOSPADM

## 2023-02-13 RX ORDER — OXYCODONE HYDROCHLORIDE 5 MG/1
5 TABLET ORAL EVERY 4 HOURS PRN
Status: DISCONTINUED | OUTPATIENT
Start: 2023-02-13 | End: 2023-02-14 | Stop reason: HOSPADM

## 2023-02-13 RX ORDER — HYDROMORPHONE HCL/PF 1 MG/ML
0.5 SYRINGE (ML) INJECTION EVERY 2 HOUR PRN
Status: DISCONTINUED | OUTPATIENT
Start: 2023-02-13 | End: 2023-02-14 | Stop reason: HOSPADM

## 2023-02-13 RX ORDER — MAGNESIUM HYDROXIDE 1200 MG/15ML
LIQUID ORAL AS NEEDED
Status: DISCONTINUED | OUTPATIENT
Start: 2023-02-13 | End: 2023-02-13 | Stop reason: HOSPADM

## 2023-02-13 RX ORDER — LIDOCAINE HYDROCHLORIDE 10 MG/ML
INJECTION, SOLUTION EPIDURAL; INFILTRATION; INTRACAUDAL; PERINEURAL AS NEEDED
Status: DISCONTINUED | OUTPATIENT
Start: 2023-02-13 | End: 2023-02-13

## 2023-02-13 RX ORDER — CEFAZOLIN SODIUM 2 G/50ML
2000 SOLUTION INTRAVENOUS ONCE
Status: DISCONTINUED | OUTPATIENT
Start: 2023-02-13 | End: 2023-02-14 | Stop reason: HOSPADM

## 2023-02-13 RX ORDER — ENOXAPARIN SODIUM 100 MG/ML
40 INJECTION SUBCUTANEOUS DAILY
Qty: 11.2 ML | Refills: 0 | Status: SHIPPED | OUTPATIENT
Start: 2023-02-13 | End: 2023-03-13

## 2023-02-13 RX ORDER — BUPIVACAINE HYDROCHLORIDE 2.5 MG/ML
INJECTION, SOLUTION EPIDURAL; INFILTRATION; INTRACAUDAL
Status: COMPLETED | OUTPATIENT
Start: 2023-02-13 | End: 2023-02-13

## 2023-02-13 RX ORDER — ONDANSETRON 2 MG/ML
INJECTION INTRAMUSCULAR; INTRAVENOUS AS NEEDED
Status: DISCONTINUED | OUTPATIENT
Start: 2023-02-13 | End: 2023-02-13

## 2023-02-13 RX ORDER — BUPIVACAINE HYDROCHLORIDE 5 MG/ML
INJECTION, SOLUTION PERINEURAL
Status: COMPLETED | OUTPATIENT
Start: 2023-02-13 | End: 2023-02-13

## 2023-02-13 RX ORDER — SODIUM CHLORIDE, SODIUM LACTATE, POTASSIUM CHLORIDE, CALCIUM CHLORIDE 600; 310; 30; 20 MG/100ML; MG/100ML; MG/100ML; MG/100ML
1.5 INJECTION, SOLUTION INTRAVENOUS CONTINUOUS
Status: DISCONTINUED | OUTPATIENT
Start: 2023-02-13 | End: 2023-02-14 | Stop reason: HOSPADM

## 2023-02-13 RX ORDER — SENNOSIDES 8.6 MG
1 TABLET ORAL DAILY
Status: DISCONTINUED | OUTPATIENT
Start: 2023-02-14 | End: 2023-02-14 | Stop reason: HOSPADM

## 2023-02-13 RX ORDER — ALPRAZOLAM 0.25 MG/1
0.25 TABLET ORAL DAILY PRN
Status: DISCONTINUED | OUTPATIENT
Start: 2023-02-13 | End: 2023-02-14 | Stop reason: HOSPADM

## 2023-02-13 RX ORDER — METHOCARBAMOL 500 MG/1
500 TABLET, FILM COATED ORAL EVERY 6 HOURS SCHEDULED
Status: DISCONTINUED | OUTPATIENT
Start: 2023-02-13 | End: 2023-02-14 | Stop reason: HOSPADM

## 2023-02-13 RX ORDER — BUPIVACAINE HYDROCHLORIDE 7.5 MG/ML
INJECTION, SOLUTION INTRASPINAL AS NEEDED
Status: DISCONTINUED | OUTPATIENT
Start: 2023-02-13 | End: 2023-02-13

## 2023-02-13 RX ORDER — PROPOFOL 10 MG/ML
INJECTION, EMULSION INTRAVENOUS CONTINUOUS PRN
Status: DISCONTINUED | OUTPATIENT
Start: 2023-02-13 | End: 2023-02-13

## 2023-02-13 RX ORDER — ACETAMINOPHEN 325 MG/1
975 TABLET ORAL ONCE
Status: COMPLETED | OUTPATIENT
Start: 2023-02-13 | End: 2023-02-13

## 2023-02-13 RX ORDER — GABAPENTIN 300 MG/1
300 CAPSULE ORAL ONCE
Status: COMPLETED | OUTPATIENT
Start: 2023-02-13 | End: 2023-02-13

## 2023-02-13 RX ORDER — ACETAMINOPHEN 325 MG/1
975 TABLET ORAL EVERY 8 HOURS
Status: DISCONTINUED | OUTPATIENT
Start: 2023-02-13 | End: 2023-02-14 | Stop reason: HOSPADM

## 2023-02-13 RX ORDER — EPHEDRINE SULFATE 50 MG/ML
INJECTION INTRAVENOUS AS NEEDED
Status: DISCONTINUED | OUTPATIENT
Start: 2023-02-13 | End: 2023-02-13

## 2023-02-13 RX ADMIN — OXYCODONE HYDROCHLORIDE 5 MG: 5 TABLET ORAL at 19:22

## 2023-02-13 RX ADMIN — EPHEDRINE SULFATE 10 MG: 50 INJECTION INTRAVENOUS at 15:29

## 2023-02-13 RX ADMIN — MIDAZOLAM 2 MG: 1 INJECTION INTRAMUSCULAR; INTRAVENOUS at 14:45

## 2023-02-13 RX ADMIN — EPHEDRINE SULFATE 10 MG: 50 INJECTION INTRAVENOUS at 15:36

## 2023-02-13 RX ADMIN — CEFAZOLIN SODIUM 2000 MG: 2 SOLUTION INTRAVENOUS at 23:31

## 2023-02-13 RX ADMIN — BUPIVACAINE HYDROCHLORIDE IN DEXTROSE 1.4 ML: 7.5 INJECTION, SOLUTION SUBARACHNOID at 14:49

## 2023-02-13 RX ADMIN — BUPIVACAINE HYDROCHLORIDE 20 ML: 2.5 INJECTION, SOLUTION EPIDURAL; INFILTRATION; INTRACAUDAL at 14:37

## 2023-02-13 RX ADMIN — BUPIVACAINE 20 ML: 13.3 INJECTION, SUSPENSION, LIPOSOMAL INFILTRATION at 14:32

## 2023-02-13 RX ADMIN — OXYCODONE HYDROCHLORIDE 10 MG: 10 TABLET ORAL at 23:31

## 2023-02-13 RX ADMIN — GABAPENTIN 300 MG: 300 CAPSULE ORAL at 12:31

## 2023-02-13 RX ADMIN — HYDROMORPHONE HYDROCHLORIDE 0.5 MG: 1 INJECTION, SOLUTION INTRAMUSCULAR; INTRAVENOUS; SUBCUTANEOUS at 20:38

## 2023-02-13 RX ADMIN — LIDOCAINE HYDROCHLORIDE 2 ML: 10 INJECTION, SOLUTION EPIDURAL; INFILTRATION; INTRACAUDAL; PERINEURAL at 14:49

## 2023-02-13 RX ADMIN — BUPIVACAINE HYDROCHLORIDE 7 ML: 5 INJECTION, SOLUTION PERINEURAL at 14:32

## 2023-02-13 RX ADMIN — SODIUM CHLORIDE, SODIUM LACTATE, POTASSIUM CHLORIDE, AND CALCIUM CHLORIDE 1.5 ML/KG/HR: .6; .31; .03; .02 INJECTION, SOLUTION INTRAVENOUS at 23:33

## 2023-02-13 RX ADMIN — SODIUM CHLORIDE, SODIUM LACTATE, POTASSIUM CHLORIDE, AND CALCIUM CHLORIDE 1.5 ML/KG/HR: .6; .31; .03; .02 INJECTION, SOLUTION INTRAVENOUS at 18:18

## 2023-02-13 RX ADMIN — ACETAMINOPHEN 975 MG: 325 TABLET ORAL at 12:30

## 2023-02-13 RX ADMIN — ONDANSETRON 4 MG: 2 INJECTION INTRAMUSCULAR; INTRAVENOUS at 16:05

## 2023-02-13 RX ADMIN — MIDAZOLAM 2 MG: 1 INJECTION INTRAMUSCULAR; INTRAVENOUS at 14:29

## 2023-02-13 RX ADMIN — SODIUM CHLORIDE, SODIUM LACTATE, POTASSIUM CHLORIDE, AND CALCIUM CHLORIDE 125 ML/HR: .6; .31; .03; .02 INJECTION, SOLUTION INTRAVENOUS at 12:56

## 2023-02-13 RX ADMIN — CEFAZOLIN 2000 MG: 1 INJECTION, POWDER, FOR SOLUTION INTRAMUSCULAR; INTRAVENOUS at 15:04

## 2023-02-13 RX ADMIN — METHOCARBAMOL 500 MG: 500 TABLET ORAL at 23:31

## 2023-02-13 RX ADMIN — PROPOFOL 80 MCG/KG/MIN: 10 INJECTION, EMULSION INTRAVENOUS at 14:51

## 2023-02-13 RX ADMIN — SODIUM CHLORIDE, SODIUM LACTATE, POTASSIUM CHLORIDE, AND CALCIUM CHLORIDE 125 ML/HR: .6; .31; .03; .02 INJECTION, SOLUTION INTRAVENOUS at 17:25

## 2023-02-13 RX ADMIN — CHLORHEXIDINE GLUCONATE 0.12% ORAL RINSE 15 ML: 1.2 LIQUID ORAL at 12:11

## 2023-02-13 RX ADMIN — TRANEXAMIC ACID 1000 MG: 100 INJECTION INTRAVENOUS at 15:04

## 2023-02-13 NOTE — CONSULTS
Office Visit  10/27/2022  [address] [facility] [place]   Martin Gill MD  Orthopedic Surgery  Arthritis of left knee +2 more  Dx  Right Knee - Post-op  Reason for Visit     Progress Notes  Marifer Dailey MD (Resident) • • Orthopedic Surgery  Cosigned by: Martin Gill MD at 10/27/2022  5:39 PM       Attestation signed by Martin Gill MD at 10/27/2022  5:39 PM     Patient was seen and examined today  Case was reviewed with the physician resident today  I agree with the history, exam, assessment, plan as documented by the resident physician  Adult female 3 months removed right total knee replacement describes very little if any ongoing pain in the right knee, she describes return to weight-bearing pain in the right groin, and worsening weight-bearing pain in the left knee  Examination confirms a right hip with restriction of motion, this causes groin pain  Right knee has a healed anterior scar  Extension is almost full flexion is 110°  There is very little patellofemoral chatter  Calf compartments on the right are soft and supple  Toes on the right foot are warm, sensate, mobile  Left knee has intact soft tissue envelope, the knee is in varus  There is bony enlargement tenderness medially  There is crepitation flexion extension  I personally reviewed x-rays of the right knee my interpretation follows: Two views right knee show total knee replacement satisfactory position  I have also reviewed weight-bearing x-rays left knee that show profound medial and patellofemoral compartment arthritis  Assessment:  Adult female 3 months removed right total knee replacement with well clinical radiographic appearance as pertains the right knee  Right ear lexie use of the right knee is recommended, a dental antibiotic prophylaxis discussed, and she can return to work as of 11/14/2022    Focusing tension the right hip and groin can, she would benefit from an image guided injection, and finally we discussed care of the left knee  She has osteoarthritis left knee which remains pain and creates dysfunction despite appropriate nonsurgical treatments  Treatment options were discussed in detail  After review of the risks and benefits, consent was that her for left knee replacement  No guarantees given  Office follow-up as a postoperative patient is my recommendation            Expand All Collapse All  Assessment:    Diagnosis ICD-10-CM Associated Orders   1  Arthritis of left knee  M17 12 enoxaparin (LOVENOX) 40 mg/0 4 mL       ascorbic acid (VITAMIN C) 500 MG tablet       ferrous sulfate 324 (65 Fe) mg       folic acid (FOLVITE) 1 mg tablet       Multiple Vitamin (multivitamin) tablet   2  S/P total knee arthroplasty, right  Z96 651     3  Arthritis of right hip  M16 11 FL injection right hip (non arthrogram)         Plan:    Patient was seen and examined today  Patient understood risks and benefits and would like to proceed with surgical intervention for her left knee in the form of TKA  She will be scheduled for the surgery and will see us next on date of surgery      To do next visit:  Return on date of surgery      The above stated was discussed in layman's terms and the patient expressed understanding  All questions were answered to the patient's satisfaction                Subjective:   Magalis Gerard is a 63 y o  female who presents three months after right TKA  Patient is doing very well from her right total knee arthroplasty standpoint  She has continued to work with physical therapy  She states that she is able to range her knee from 0-118 degrees at physical therapy    She is very happy with her progress, and would like to schedule her left total knee arthroplasty           Review of systems negative unless otherwise specified in HPI     Medical History        Past Medical History:   Diagnosis Date   • Chest pain syndrome 3/29/2022   • Renal dysplasia       nonfunctioning left life-long            Surgical History         Past Surgical History:   Procedure Laterality Date   • COLONOSCOPY   03/2016   • FL INJECTION RIGHT HIP (NON ARTHROGRAM)   5/10/2022   • KIDNEY SURGERY Right       age 29   • TX TOTAL KNEE ARTHROPLASTY Right 8/1/2022     Procedure: ARTHROPLASTY KNEE TOTAL W ROBOT;  Surgeon: Anupam Calvin MD;  Location: [facility];  Service: Orthopedics   • REDUCTION MAMMAPLASTY       • WRIST SURGERY Right       fx            Family History   History reviewed  No pertinent family history         Social History            Occupational History   • 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   Tobacco Use   • Smoking status: Former Smoker       Packs/day: 0 25       Years: 15 00       Pack years: 3 75       Types: Cigarettes   • Smokeless tobacco: Never Used   Vaping Use   • Vaping Use: Never used   Substance and Sexual Activity   • Alcohol use:  Yes       Alcohol/week: 10 0 standard drinks       Types: 5 Cans of beer, 5 Standard drinks or equivalent per week       Comment: daily   • Drug use: No   • Sexual activity: Yes       Partners: Male       Birth control/protection: Post-menopausal            Current Outpatient Medications:   •  ascorbic acid (VITAMIN C) 500 MG tablet, Take 1 tablet (500 mg total) by mouth 2 (two) times a day, Disp: 60 tablet, Rfl: 0  •  enoxaparin (LOVENOX) 40 mg/0 4 mL, Inject 0 4 mL (40 mg total) under the skin daily for 28 days To start post op, Disp: 11 2 mL, Rfl: 0  •  ferrous sulfate 324 (65 Fe) mg, Take 1 tablet (324 mg total) by mouth daily before breakfast, Disp: 30 tablet, Rfl: 0  •  folic acid (FOLVITE) 1 mg tablet, Take 1 tablet (1 mg total) by mouth daily, Disp: 30 tablet, Rfl: 0  •  Multiple Vitamin (multivitamin) tablet, Take 1 tablet by mouth daily, Disp: 30 tablet, Rfl: 1  •  ALPRAZolam (XANAX) 0 25 mg tablet, Take 1 tablet (0 25 mg total) by mouth daily as needed for anxiety, Disp: 30 tablet, Rfl: 0  •  ascorbic acid (VITAMIN C) 500 MG tablet, Take 1 tablet (500 mg total) by mouth 2 (two) times a day, Disp: 60 tablet, Rfl: 0  •  cephalexin (KEFLEX) 500 mg capsule, Take 4 tablets one hour before dental visits  , Disp: 20 capsule, Rfl: 2  •  FIBER PO, Take by mouth daily, Disp: , Rfl:   •  Multiple Vitamins-Minerals (MULTI FOR HER 50+ PO), Take by mouth daily, Disp: , Rfl:   •  Psyllium (Metamucil) WAFR, Take by mouth, Disp: , Rfl:           Allergies   Allergen Reactions   • Acetazolamide Rash   • Sulfa Antibiotics Rash                   Vitals:     10/27/22 1618   BP: 123/78   Pulse: 91         Objective:  Right Knee Exam      Tenderness   The patient is experiencing no tenderness       Range of Motion   Extension: normal   Flexion: normal      Tests   Varus: negative Valgus: negative     Other   Erythema: absent  Scars: present  Sensation: normal  Pulse: present  Swelling: none  Effusion: no effusion present        Left Knee Exam      Tenderness   The patient is experiencing tenderness in the medial joint line      Range of Motion   Extension: normal   Flexion: normal      Tests   Varus: negative Valgus: negative  Drawer:  Anterior - negative     Posterior - negative     Other   Erythema: absent  Scars: absent  Sensation: normal  Pulse: present  Swelling: none  Effusion: no effusion present                    Diagnostics, reviewed and taken today if performed as documented:   The attending physician has personally reviewed the pertinent films in PACS and interpretation is as follows:    X-ray of right knee demonstrate neutral alignment and well-seated hardware without any evidence of aseptic loosening      Procedures, if performed today:  None performed       Portions of the record may have been created with voice recognition software   Occasional wrong word or "sound a like" substitutions may have occurred due to the inherent limitations of voice recognition software   Read the chart carefully and recognize, using context, where substitutions have occurred            Instructions       Return on date of surgery  COVID Immunization Verification [place] 10/27/2022),   COVID Lab Result Verification (Printed 10/27/2022),   After Visit Summary (Automatic SnapShot taken 10/27/2022)  Additional Documentation    Vitals:   /78   Pulse 91   Ht 5' 4" (1 626 m)   BMI 31 58 kg/m²   BSA 1 89 m²      More Vitals   SmartForms:   [employer] PRE-CHARTING      Encounter Info:   Billing Info,   History,   Allergies,   Detailed Report        Communications    View Encounter Conversation Summary     Letter sent to Sujatha Sawant  From this encounter  Forms - Scan on 1/23/2023 2:46 PM: disability - complete   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/7/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     Orders Placed       APTT     Comprehensive metabolic panel     Anemia Panel w/Reflex     CBC and differential     Protime-INR     Type and screen     Hemoglobin A1C W/EAG Estimation     FL injection right hip (non arthrogram)     Ambulatory referral to Physical Therapy Pending Review     Case request operating room: NILSA Lynch 9 Once     All Encounter Results     Medication Changes       Enoxaparin Sodium 40 mg Subcutaneous Daily, To start post op     Ferrous Sulfate 324 mg Oral Daily before breakfast     Folic Acid 1 mg Oral Daily     Multiple Vitamin 1 tablet Oral Daily     Ascorbic Acid        500 mg Oral 2 times daily     500 mg Oral 2 times daily     Medication List     Visit Diagnoses       Arthritis of left knee     S/P total knee arthroplasty, right     Arthritis of right hip     Problem List     Encounters with Related Results     Hospital Encounter (2/13/2023)   Appointment (1/17/2023)   Hospital Encounter (11/3/2022)

## 2023-02-13 NOTE — CONSULTS
Internal Medicine  Consultation Note    Patient: Lauren Clay  Age/sex: 63 y o  female  Medical Record #: [id number]    Assessment/Plan    Status Post left Total KNEE ARTHROPLASTY  • Continue post op pain control measures as prescribed  • Follow bowel regimen to help decrease narcotic induced constipation  •  Follow post operative hemoglobin with serial CBC and treat accordingly  • Monitor WBC and fever curve post op while encouraging use of incentive spirometer  • DVT prophylaxis in place and reviewed  Single functioning kidney  · Monitor BMP  · Avoid hypotension and nephrotoxic medications post-op  Anxiety  · Continue Xanax as needed  Recent Rt TKA  · 8/22  · No post-op issues  PRE-OP HGB LEVEL: 14 3    Subjective/ HPI: Lauren Clay was seen and examined  Hx of KNEE pain failed out patient conservative measures  Elected to undergo total KNEE arthroplasty We are asked to see patient for post op management of underlying medical co-morbidities as outlined above  Pt did well intra and post operatively with good hemodynamics  Pt currently comfortable and without any reported post op nausea  ROS:   A 10 point ROS was performed; negative except as noted above       Social History:    Substance Use History:   Social History     Substance and Sexual Activity   Alcohol Use Yes   • Alcohol/week: 10 0 standard drinks   • Types: 5 Cans of beer, 5 Standard drinks or equivalent per week    Comment: daily     Social History     Tobacco Use   Smoking Status Former   • Packs/day: 0 25   • Years: 15 00   • Pack years: 3 75   • Types: Cigarettes   Smokeless Tobacco Never     Social History     Substance and Sexual Activity   Drug Use No       Family History:    Family History   Problem Relation Age of Onset   • Diabetes Father    • Breast cancer Neg Hx    • Ovarian cancer Neg Hx    • Colon cancer Neg Hx          Review of Scheduled Meds:  Current Facility-Administered Medications   Medication Dose Route Frequency Provider Last Rate   • [MAR Hold] acetaminophen  975 mg Oral Once Андрей Jean MD     • cefazolin  2,000 mg Intravenous Once Андрей Jean MD     • chlorhexidine  15 mL Swish & Spit Once Андрей Jean MD     • [facility] Hold] gabapentin  300 mg Oral Once Андрей Jean MD     • lactated ringers  125 mL/hr Intravenous Continuous Jasvir Velazquez MD     • lactated ringers  125 mL/hr Intravenous Continuous Андрей Jean MD     • [facility] Hold] lidocaine (PF)  0 5 mL Infiltration Once PRN Jasvir Velazquez MD     • [facility] Hold] tranexamic acid (CYKLOKAPRON) IV bolus  1,000 mg Intravenous Once Андрей Jean MD         Labs: Invalid input(s): LABGLOM, CMP                   Lab Results   Component Value Date    URINECX No Growth <1000 cfu/mL 12/07/2020       Input and Output Summary (last 24 hours):     No intake or output data in the 24 hours ending 02/13/23 1206    Imaging:     No orders to display       *Labs /Radiology studiesLabs reviewed  *Medications reviewed and reconciled as needed  *Please refer to order section for additional ordered labs studies  *Case discussed with primary attending during morning huddle case rounds    Vitals:   No data recorded  Body mass index is 31 76 kg/m²       Physical Exam:   General Appearance: no distress, conversive  HEENT: PERRLA, conjuctiva normal; oropharynx clear; mucous membranes moist;   Neck:  Supple, no lymphadenopathy or thyromegaly  Lungs: CTA, normal respiratory effort, no retractions, expiratory effort normal  CV: regular rate and rhythm , PMI normal   ABD: soft non tender, no masses , no hepatic or splenomegaly  EXT: DP pulses intact, no lymphadenopathy, no edema ;  left KNEE dressing in place  Skin: normal turgor, normal texture, no rash  Psych: affect normal, mood normal  Neuro: AAOx3          Invasive Devices     None                    Code Status: Prior  Current Length of Stay: 0 day(s)    Total floor / unit time spent today 1 hour  Coordination of patient's care was performed in conjunction with primary service  Time invested included review of patient's labs, vitals, and management of their comorbidities with continued monitoring, examination of patient as well as answering patient questions, documenting her findings and creating progress note in electronic medical record,  ordering appropriate diagnostic testing  Medical decision making for the day was made by supervising physician unless otherwise noted in their attestation statement  ** Please Note: Fluency Direct voice to text software may have been used in the creation of this document   Audio transcription errors may occur** negative...

## 2023-02-13 NOTE — ANESTHESIA PROCEDURE NOTES
Spinal Block    Patient location during procedure: OR  Start time: 2/13/2023 2:50 PM  Reason for block: primary anesthetic  Staffing  Performed: CRNA   Anesthesiologist: Eugene Camarillo MD  Resident/CRNA: Vera Méndez CRNA  Preanesthetic Checklist  Completed: patient identified, IV checked, site marked, risks and benefits discussed, surgical consent, monitors and equipment checked, pre-op evaluation and timeout performed  Spinal Block  Patient position: sitting  Prep: ChloraPrep  Patient monitoring: heart rate, cardiac monitor, continuous pulse ox and frequent blood pressure checks  Approach: midline  Location: L4-5  Injection technique: single-shot  Needle  Needle type: pencil-tip   Needle gauge: 25 G  Needle length: 10 cm  Assessment  Sensory level: T4  Events: cerebrospinal fluid  Injection Assessment:  negative aspiration for heme and no paresthesia on injection    Post-procedure:  site cleaned

## 2023-02-13 NOTE — ANESTHESIA PROCEDURE NOTES
Peripheral Block    Patient location during procedure: pre-op  Start time: 2/13/2023 2:32 PM  Reason for block: at surgeon's request and post-op pain management  Staffing  Performed: Anesthesiologist   Anesthesiologist: Yoli Bojorquez MD  Preanesthetic Checklist  Completed: patient identified, IV checked, site marked, risks and benefits discussed, surgical consent, monitors and equipment checked, pre-op evaluation and timeout performed  Peripheral Block  Patient position: supine  Prep: ChloraPrep  Patient monitoring: continuous pulse ox  Block type: adductor canal block  Laterality: left  Injection technique: single-shot  Procedures: ultrasound guided, Ultrasound guidance required for the procedure to increase accuracy and safety of medication placement and decrease risk of complications    Ultrasound permanent image savedbupivacaine (MARCAINE) 0 5 % - Perineural   7 mL - 2/13/2023 2:32:00 PM  Needle  Needle type: Stimuplex   Needle gauge: 22 G  Needle length: 10 cm  Needle localization: ultrasound guidance  Needle insertion depth: 5 cm  Assessment  Injection assessment: incremental injection, local visualized surrounding nerve on ultrasound, negative aspiration for heme and no paresthesia on injection  Paresthesia pain: none  Heart rate change: no  Slow fractionated injection: yes  Post-procedure:  site cleaned  patient tolerated the procedure well with no immediate complications

## 2023-02-13 NOTE — H&P (VIEW-ONLY)
Office Visit  10/27/2022  [address] [facility] Renzo Fernandez MD  Orthopedic Surgery  Arthritis of left knee +2 more  Dx  Right Knee - Post-op  Reason for Visit     Progress Notes  Lee Ellis MD (Resident) • • Orthopedic Surgery  Cosigned by: Abram Fernandez MD at 10/27/2022  5:39 PM       Attestation signed by Abram Fernandez MD at 10/27/2022  5:39 PM     Patient was seen and examined today  Case was reviewed with the physician resident today  I agree with the history, exam, assessment, plan as documented by the resident physician  Adult female 3 months removed right total knee replacement describes very little if any ongoing pain in the right knee, she describes return to weight-bearing pain in the right groin, and worsening weight-bearing pain in the left knee  Examination confirms a right hip with restriction of motion, this causes groin pain  Right knee has a healed anterior scar  Extension is almost full flexion is 110°  There is very little patellofemoral chatter  Calf compartments on the right are soft and supple  Toes on the right foot are warm, sensate, mobile  Left knee has intact soft tissue envelope, the knee is in varus  There is bony enlargement tenderness medially  There is crepitation flexion extension  I personally reviewed x-rays of the right knee my interpretation follows: Two views right knee show total knee replacement satisfactory position  I have also reviewed weight-bearing x-rays left knee that show profound medial and patellofemoral compartment arthritis  Assessment:  Adult female 3 months removed right total knee replacement with well clinical radiographic appearance as pertains the right knee  Right ear lexie use of the right knee is recommended, a dental antibiotic prophylaxis discussed, and she can return to work as of 11/14/2022    Focusing tension the right hip and groin can, she would benefit from an image guided injection, and finally we discussed care of the left knee  She has osteoarthritis left knee which remains pain and creates dysfunction despite appropriate nonsurgical treatments  Treatment options were discussed in detail  After review of the risks and benefits, consent was that her for left knee replacement  No guarantees given  Office follow-up as a postoperative patient is my recommendation            Expand All Collapse All  Assessment:    Diagnosis ICD-10-CM Associated Orders   1  Arthritis of left knee  M17 12 enoxaparin (LOVENOX) 40 mg/0 4 mL       ascorbic acid (VITAMIN C) 500 MG tablet       ferrous sulfate 324 (65 Fe) mg       folic acid (FOLVITE) 1 mg tablet       Multiple Vitamin (multivitamin) tablet   2  S/P total knee arthroplasty, right  [id number]     3  Arthritis of right hip  M16 11 FL injection right hip (non arthrogram)         Plan:    Patient was seen and examined today  Patient understood risks and benefits and would like to proceed with surgical intervention for her left knee in the form of TKA  She will be scheduled for the surgery and will see us next on date of surgery      To do next visit:  Return on date of surgery      The above stated was discussed in layman's terms and the patient expressed understanding  All questions were answered to the patient's satisfaction                Subjective:   Eugene Pelletier is a 63 y o  female who presents three months after right TKA  Patient is doing very well from her right total knee arthroplasty standpoint  She has continued to work with physical therapy  She states that she is able to range her knee from 0-118 degrees at physical therapy    She is very happy with her progress, and would like to schedule her left total knee arthroplasty           Review of systems negative unless otherwise specified in HPI     Medical History        Past Medical History:   Diagnosis Date   • Chest pain syndrome 3/29/2022   • Renal dysplasia       nonfunctioning left life-long            Surgical History         Past Surgical History:   Procedure Laterality Date   • COLONOSCOPY   03/2016   • FL INJECTION RIGHT HIP (NON ARTHROGRAM)   5/10/2022   • KIDNEY SURGERY Right       age 29   • RI TOTAL KNEE ARTHROPLASTY Right 8/1/2022     Procedure: ARTHROPLASTY KNEE TOTAL W ROBOT;  Surgeon: Alysha Perrin MD;  Location: [facility];  Service: Orthopedics   • REDUCTION MAMMAPLASTY       • WRIST SURGERY Right       fx            Family History   History reviewed  No pertinent family history         Social History            Occupational History   • 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   Tobacco Use   • Smoking status: Former Smoker       Packs/day: 0 25       Years: 15 00       Pack years: 3 75       Types: Cigarettes   • Smokeless tobacco: Never Used   Vaping Use   • Vaping Use: Never used   Substance and Sexual Activity   • Alcohol use:  Yes       Alcohol/week: 10 0 standard drinks       Types: 5 Cans of beer, 5 Standard drinks or equivalent per week       Comment: daily   • Drug use: No   • Sexual activity: Yes       Partners: Male       Birth control/protection: Post-menopausal            Current Outpatient Medications:   •  ascorbic acid (VITAMIN C) 500 MG tablet, Take 1 tablet (500 mg total) by mouth 2 (two) times a day, Disp: 60 tablet, Rfl: 0  •  enoxaparin (LOVENOX) 40 mg/0 4 mL, Inject 0 4 mL (40 mg total) under the skin daily for 28 days To start post op, Disp: 11 2 mL, Rfl: 0  •  ferrous sulfate 324 (65 Fe) mg, Take 1 tablet (324 mg total) by mouth daily before breakfast, Disp: 30 tablet, Rfl: 0  •  folic acid (FOLVITE) 1 mg tablet, Take 1 tablet (1 mg total) by mouth daily, Disp: 30 tablet, Rfl: 0  •  Multiple Vitamin (multivitamin) tablet, Take 1 tablet by mouth daily, Disp: 30 tablet, Rfl: 1  •  ALPRAZolam (XANAX) 0 25 mg tablet, Take 1 tablet (0 25 mg total) by mouth daily as needed for anxiety, Disp: 30 tablet, Rfl: 0  •  ascorbic acid (VITAMIN C) 500 MG tablet, Take 1 tablet (500 mg total) by mouth 2 (two) times a day, Disp: 60 tablet, Rfl: 0  •  cephalexin (KEFLEX) 500 mg capsule, Take 4 tablets one hour before dental visits  , Disp: 20 capsule, Rfl: 2  •  FIBER PO, Take by mouth daily, Disp: , Rfl:   •  Multiple Vitamins-Minerals (MULTI FOR HER 50+ PO), Take by mouth daily, Disp: , Rfl:   •  Psyllium (Metamucil) WAFR, Take by mouth, Disp: , Rfl:           Allergies   Allergen Reactions   • Acetazolamide Rash   • Sulfa Antibiotics Rash                   Vitals:     10/27/22 1618   BP: 123/78   Pulse: 91         Objective:  Right Knee Exam      Tenderness   The patient is experiencing no tenderness       Range of Motion   Extension: normal   Flexion: normal      Tests   Varus: negative Valgus: negative     Other   Erythema: absent  Scars: present  Sensation: normal  Pulse: present  Swelling: none  Effusion: no effusion present        Left Knee Exam      Tenderness   The patient is experiencing tenderness in the medial joint line      Range of Motion   Extension: normal   Flexion: normal      Tests   Varus: negative Valgus: negative  Drawer:  Anterior - negative     Posterior - negative     Other   Erythema: absent  Scars: absent  Sensation: normal  Pulse: present  Swelling: none  Effusion: no effusion present                    Diagnostics, reviewed and taken today if performed as documented:   The attending physician has personally reviewed the pertinent films in PACS and interpretation is as follows:    X-ray of right knee demonstrate neutral alignment and well-seated hardware without any evidence of aseptic loosening      Procedures, if performed today:  None performed       Portions of the record may have been created with voice recognition software   Occasional wrong word or "sound a like" substitutions may have occurred due to the inherent limitations of voice recognition software   Read the chart carefully and recognize, using context, where substitutions have occurred            Instructions       Return on date of surgery  COVID Immunization Verification [employer] 10/27/2022),   COVID Lab Result Verification (Printed 10/27/2022),   After Visit Summary (Automatic SnapShot taken 10/27/2022)  Additional Documentation    Vitals:   /78   Pulse 91   Ht 5' 4" (1 626 m)   BMI 31 58 kg/m²   BSA 1 89 m²      More Vitals   SmartForms:   [employer] PRE-CHARTING      Encounter Info:   Billing Info,   History,   Allergies,   Detailed Report        Communications    View Encounter Conversation Summary     Letter sent to Sujatha Sawant  From this encounter  Forms - Scan on 1/23/2023 2:46 PM: disability - complete   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/7/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     Orders Placed       APTT     Comprehensive metabolic panel     Anemia Panel w/Reflex     CBC and differential     Protime-INR     Type and screen     Hemoglobin A1C W/EAG Estimation     FL injection right hip (non arthrogram)     Ambulatory referral to Physical Therapy Pending Review     Case request operating room: NILSA Lynch 9 Once     All Encounter Results     Medication Changes       Enoxaparin Sodium 40 mg Subcutaneous Daily, To start post op     Ferrous Sulfate 324 mg Oral Daily before breakfast     Folic Acid 1 mg Oral Daily     Multiple Vitamin 1 tablet Oral Daily     Ascorbic Acid        500 mg Oral 2 times daily     500 mg Oral 2 times daily     Medication List     Visit Diagnoses       Arthritis of left knee     S/P total knee arthroplasty, right     Arthritis of right hip     Problem List     Encounters with Related Results     Hospital Encounter (2/13/2023)   Appointment (1/17/2023)   Hospital Encounter (11/3/2022)

## 2023-02-13 NOTE — ANESTHESIA PREPROCEDURE EVALUATION
Procedure:  ARTHROPLASTY KNEE TOTAL W ROBOT (Left: Knee)    Relevant Problems   CARDIO   (+) Mixed hyperlipidemia      /RENAL   (+) Congenital renal dysplasia ( left )      MUSCULOSKELETAL   (+) Chondromalacia patellae of right knee   (+) Primary osteoarthritis of left knee   (+) Primary osteoarthritis of right hip   (+) Primary osteoarthritis of right knee      NEURO/PSYCH   (+) Anxiety        Physical Exam    Airway    Mallampati score: II         Dental   No notable dental hx     Cardiovascular      Pulmonary      Other Findings        Anesthesia Plan  ASA Score- 2     Anesthesia Type- spinal with ASA Monitors  Additional Monitors:   Airway Plan:     Comment: I, Dr Fouzia Lock, the attending physician, have personally seen and evaluated the patient prior to anesthetic care  I have reviewed the pre-anesthetic record, and other medical records if appropriate to the anesthetic care  If a CRNA is involved in the case, I have reviewed the CRNA assessment, if present, and agree  The patient is in a suitable condition to proceed with my formulated anesthetic plan          Plan Factors-    Chart reviewed  Induction- intravenous  Postoperative Plan-     Informed Consent- Anesthetic plan and risks discussed with patient  I personally reviewed this patient with the CRNA  Discussed and agreed on the Anesthesia Plan with the CRNA  Jenny Reina

## 2023-02-13 NOTE — DISCHARGE INSTR - AVS FIRST PAGE
Discharge Instructions - Orthopedics  Pete Sas 63 y o  female MRN: [id number]  Unit/Bed#: PACU 14    Weight Bearing Status:                                           Weight Bearing as tolerated to the left lower extremity  DVT prophylaxis:  Complete course of Lovenox as directed    Pain:  Continue analgesics as directed    Showering Instructions:   Do not shower until followup     Dressing Instructions:   Keep dressing clean, dry and intact until follow up appointment  Driving Instructions:  No driving until cleared by Orthopaedic Surgery  PT/OT:  Continue PT/OT on outpatient basis as directed    Appt Instructions: If you do not have your appointment, please call the clinic at [phone]  Otherwise followup as scheduled    Contact the office sooner if you experience any increased numbness/tingling in the extremities        Miscellaneous:  None

## 2023-02-13 NOTE — OP NOTE
OPERATIVE REPORT  PATIENT NAME: Magalis Gerard    :  1960  MRN: [id number]  Pt Location: [facility] ROOM 18    SURGERY DATE: 2023    Surgeon(s) and Role:     * Magy Noriega MD - Primary     * Brittaney Wade - Assisting     * Flower Watson MD - Assisting    Preop Diagnosis:  Arthritis of left knee [M17 12]    Post-Op Diagnosis Codes:     * Arthritis of left knee [M17 12]    Procedure(s):  Left - ARTHROPLASTY KNEE TOTAL W ROBOT    Specimen(s):  * No specimens in log *    Estimated Blood Loss:   Minimal    Drains:  Closed/Suction Drain Anterior; Left Knee Accordion 10 Fr  (Active)   Number of days: 0       Urethral Catheter Latex;Straight-tip 16 Fr  (Active)   Number of days: 0       Anesthesia Type:   Spinal w/ Femoral Nerve Block    Operative Indications:  Arthritis of left knee [M17 12]      Operative Findings:  depuy attune   Femur-4N   Poly-7   Tibia-3   [id number]    Complications:   None    Procedure and Technique: Following the induction medical level of spinal anesthesia, [place] catheters and sterilely introduced into the patient's bladder  Antibiotics administered  The left thigh was then fitted with a thigh tourniquet  The left lower extremity underwent sterile prep and drape  The left lower extremity was exsanguinated gravity, and the tourniquet inflated to 300 mmHg  A midline knee incision was carried the knee in flexion  Full-thickness flaps were raised in order to access the extensor mechanism  A medial arthrotomy was created to open up the knee joint  2 half pins placed in proximal tibia, 2 half pins were placed on the distal femur  In doing so, modules were created  The arrays were then attached to the modules  Checkpoints made the proximal tibia and distal femur  The knee was then registered  The surgery was planned out on the computer, the plan was finalized  The robot was docked    First maneuver the of the distal femoral cut followed by the anterior posterior cuts  The chamfer cuts complete the process  Proximal tibia cut was made next  Care was taken preserve the intake of medial collateral, lateral collateral, and posterior structures during these maneuvers  The box cut was then made for the posterior stabilized unit, remnant medial and lateral meniscectomies then performed  Trials were inserted, the knee was taken through range of motion, found to be A full extension, good flexion, no mid flexion instability  The patella was then resurfaced while utilizing manufactures equipment, is found to be a size 32 mm button  The trial components removed and the knee was prepared for insertion of cemented components  The cemented tibia, cemented femur, trial poly-, cemented patella placed  Excess cement was removed, knee was brought into extension  The cement was to cure  The trial poly was taken out, the knee was packed off  The tourniquet was deflated, and hemostasis was secured  The insert polyethylene was then snapped into position  The knee was taken through final range of motion, found to be In full extension, good flexion, stable throughout, next patella tracking  Satisfied with the extent of surgery, the wounds were flushed with saline and closed  Betadine soak was initiated  A drain is placed deep brought of a separate stab incision  The arthrotomy was closed with number Vicryl suture  The subcu tissue closed with 2-0 Vicryl suture  The skin was Agosta staples  Sterile dressings were applied  She was then transferred to recovery room in stable condition plans include physical therapy weight-bear diet    She will require DVT prophylaxis with Lovenox   I was present for the entire procedure    Patient Disposition:  PACU         SIGNATURE: Андрей Jean MD  DATE: February 13, 2023  TIME: 4:06 PM

## 2023-02-13 NOTE — ANESTHESIA PROCEDURE NOTES
Peripheral Block    Patient location during procedure: pre-op  Start time: 2/13/2023 2:37 PM  Reason for block: at surgeon's request and post-op pain management  Staffing  Performed: Anesthesiologist   Anesthesiologist: Zoran Hassan MD  Preanesthetic Checklist  Completed: patient identified, IV checked, site marked, risks and benefits discussed, surgical consent, monitors and equipment checked, pre-op evaluation and timeout performed  Peripheral Block  Patient position: right lateral decubitus  Prep: ChloraPrep  Patient monitoring: continuous pulse ox  Block type: ipack block  Laterality: left  Injection technique: single-shot  Procedures: ultrasound guided, Ultrasound guidance required for the procedure to increase accuracy and safety of medication placement and decrease risk of complications    Ultrasound permanent image savedbupivacaine (PF) (MARCAINE) 0 25 % 10 mL - Perineural   20 mL - 2/13/2023 2:37:00 PM  Needle  Needle type: Stimuplex   Needle gauge: 22 G  Needle length: 10 cm  Needle localization: ultrasound guidance  Needle insertion depth: 5 cm  Assessment  Injection assessment: incremental injection, local visualized surrounding nerve on ultrasound, negative aspiration for heme and no paresthesia on injection  Paresthesia pain: none  Heart rate change: no  Slow fractionated injection: yes  Post-procedure:  site cleaned  patient tolerated the procedure well with no immediate complications

## 2023-02-13 NOTE — INTERVAL H&P NOTE
H&P reviewed  After examining the patient I find no changes in the patients condition since the H&P had been written  Vitals:    02/13/23 1249   BP: 142/73   Pulse: 70   Resp: 18   Temp: 98 8 °F (37 1 °C)   SpO2: 97%   Head: Present  CVS: RRR  Lungs: Clear bilateral  Abdomen: Present  Assessment: Adult female with osteoarthritis of the left knee who has persistence of pain and dysfunction despite appropriate nonsurgical treatments  Plan: Left total knee arthroplasty    Patient is aware of the risks and benefits

## 2023-02-13 NOTE — ANESTHESIA POSTPROCEDURE EVALUATION
Post-Op Assessment Note    CV Status:  Stable  Pain Score: 0    Pain management: adequate     Mental Status:  Alert and awake   Hydration Status:  Euvolemic   PONV Controlled:  Controlled   Airway Patency:  Patent      Post Op Vitals Reviewed: Yes      Staff: CRNA         No notable events documented      BP   107/55   Temp   97 1   Pulse  70   Resp 12   SpO2 95

## 2023-02-14 VITALS
DIASTOLIC BLOOD PRESSURE: 56 MMHG | WEIGHT: 185 LBS | OXYGEN SATURATION: 95 % | SYSTOLIC BLOOD PRESSURE: 105 MMHG | HEART RATE: 74 BPM | HEIGHT: 64 IN | BODY MASS INDEX: 31.58 KG/M2 | TEMPERATURE: 98.2 F | RESPIRATION RATE: 16 BRPM

## 2023-02-14 LAB
ANION GAP SERPL CALCULATED.3IONS-SCNC: 5 MMOL/L (ref 4–13)
BUN SERPL-MCNC: 8 MG/DL (ref 5–25)
CALCIUM SERPL-MCNC: 8.8 MG/DL (ref 8.3–10.1)
CHLORIDE SERPL-SCNC: 108 MMOL/L (ref 96–108)
CO2 SERPL-SCNC: 25 MMOL/L (ref 21–32)
CREAT SERPL-MCNC: 0.74 MG/DL (ref 0.6–1.3)
ERYTHROCYTE [DISTWIDTH] IN BLOOD BY AUTOMATED COUNT: 12.4 % (ref 11.6–15.1)
GFR SERPL CREATININE-BSD FRML MDRD: 88 ML/MIN/1.73SQ M
GLUCOSE SERPL-MCNC: 140 MG/DL (ref 65–140)
HCT VFR BLD AUTO: 38.5 % (ref 34.8–46.1)
HGB BLD-MCNC: 12.7 G/DL (ref 11.5–15.4)
MCH RBC QN AUTO: 30 PG (ref 26.8–34.3)
MCHC RBC AUTO-ENTMCNC: 33 G/DL (ref 31.4–37.4)
MCV RBC AUTO: 91 FL (ref 82–98)
PLATELET # BLD AUTO: 302 THOUSANDS/UL (ref 149–390)
PMV BLD AUTO: 9.7 FL (ref 8.9–12.7)
POTASSIUM SERPL-SCNC: 4.1 MMOL/L (ref 3.5–5.3)
RBC # BLD AUTO: 4.24 MILLION/UL (ref 3.81–5.12)
SODIUM SERPL-SCNC: 138 MMOL/L (ref 135–147)
WBC # BLD AUTO: 11.74 THOUSAND/UL (ref 4.31–10.16)

## 2023-02-14 RX ADMIN — OXYCODONE HYDROCHLORIDE 10 MG: 10 TABLET ORAL at 06:15

## 2023-02-14 RX ADMIN — METHOCARBAMOL 500 MG: 500 TABLET ORAL at 06:08

## 2023-02-14 RX ADMIN — ACETAMINOPHEN 975 MG: 325 TABLET, FILM COATED ORAL at 09:52

## 2023-02-14 RX ADMIN — GABAPENTIN 100 MG: 100 CAPSULE ORAL at 02:07

## 2023-02-14 RX ADMIN — ENOXAPARIN SODIUM 40 MG: 40 INJECTION SUBCUTANEOUS at 06:08

## 2023-02-14 RX ADMIN — GABAPENTIN 100 MG: 100 CAPSULE ORAL at 09:52

## 2023-02-14 RX ADMIN — METHOCARBAMOL 500 MG: 500 TABLET ORAL at 11:20

## 2023-02-14 RX ADMIN — HYDROMORPHONE HYDROCHLORIDE 0.5 MG: 1 INJECTION, SOLUTION INTRAMUSCULAR; INTRAVENOUS; SUBCUTANEOUS at 02:07

## 2023-02-14 RX ADMIN — ACETAMINOPHEN 975 MG: 325 TABLET, FILM COATED ORAL at 02:07

## 2023-02-14 RX ADMIN — OXYCODONE HYDROCHLORIDE 10 MG: 10 TABLET ORAL at 11:20

## 2023-02-14 RX ADMIN — CEFAZOLIN SODIUM 2000 MG: 2 SOLUTION INTRAVENOUS at 06:08

## 2023-02-14 NOTE — CONSULTS
Please see progress note from this provider from today for formal details of encounter      ERNESTINA Lang  Acute Pain Service

## 2023-02-14 NOTE — PHYSICAL THERAPY NOTE
PHYSICAL THERAPY EVALUATION  NAME:  Emy Walsh  DATE: 02/14/23    AGE:   63 y o  Mrn:   [id number]  ADMIT DX:  Arthritis of left knee [M17 12]    Past Medical History:   Diagnosis Date   • Anxiety     MENOPAUSE   • Arthritis     KNEES   • Chest pain syndrome 03/29/2022   • Chronic pain disorder    • Diverticulosis    • GERD (gastroesophageal reflux disease)    • Hypertension     1 episode last year went to ER; no problems since   • Renal dysplasia     nonfunctioning left life-long       Past Surgical History:   Procedure Laterality Date   • COLONOSCOPY  03/2016   • FL INJECTION RIGHT HIP (NON ARTHROGRAM)  5/10/2022   • FL INJECTION RIGHT HIP (NON ARTHROGRAM)  11/3/2022   • KIDNEY SURGERY Right     age 29   • WA ARTHRP KNE CONDYLE&PLATU MEDIAL&LAT COMPARTMENTS Right 8/1/2022    Procedure: ARTHROPLASTY KNEE TOTAL W ROBOT;  Surgeon: Jake Mejnivar MD;  Location: [facility];  Service: Orthopedics   • REDUCTION MAMMAPLASTY     • WRIST SURGERY Right     fx       Length Of Stay: 0    PHYSICAL THERAPY EVALUATION:        02/14/23 0850   Note Type   Note type Evaluation   Pain Assessment   Pain Assessment Tool 0-10   Pain Score 2   Pain Location/Orientation Orientation: Left; Location: Knee   Pain Onset/Description Onset: Ongoing;Frequency: Constant/Continuous; Descriptor: Aching   Effect of Pain on Daily Activities increased pain with activity   Patient's Stated Pain Goal No pain   Hospital Pain Intervention(s) Ambulation/increased activity;Repositioned   Restrictions/Precautions   Weight Bearing Precautions Per Order Yes   LLE Weight Bearing Per Order WBAT   Other Precautions Multiple lines; Fall Risk;Pain   Home Living   Type of [address] [place] [address] One level;Stairs to enter with rails  (1 KATE)   Home Equipment Walker;Cane   Additional Comments Patient reports living with supportive spouse who is able to assist as needed   Prior Function   Level of [place] Independent with functional mobility   Lives With Spouse   Receives Help From [facility] in the last 6 months 0   Comments Pt denies the use of an AD for ambulation PTA   General   Family/Caregiver Present No   Cognition   Overall Cognitive Status WFL   Arousal/Participation Alert   Orientation Level Oriented X4   Memory Within functional limits   Following Commands Follows all commands and directions without difficulty   RUE Assessment   RUE Assessment WFL   LUE Assessment   LUE Assessment WFL   RLE Assessment   RLE Assessment WFL   Strength RLE   RLE Overall Strength 5/5   LLE Assessment   LLE Assessment X  (knee AROM limited by pain)   Strength LLE   LLE Overall Strength 3+/5   Bed Mobility   Additional Comments NA, Pt seated OOB in chair at time of PT eval   Transfers   Sit to Stand 4  Minimal assistance   Additional items Assist x 1; Increased time required;Verbal cues   Stand to Sit 5  Supervision   Additional items Increased time required   Additional Comments cues needed for hand placement during transfers   Ambulation/Elevation   Gait pattern Short stride; Foward flexed   Gait Assistance 5  Supervision   Assistive Device Rolling walker   Distance 50ft x 2   Stair Management Assistance 5  Supervision   Stair Management Technique Two rails   Number of Stairs 3   Balance   Static Sitting Fair +   Static Standing Fair -   Ambulatory Fair -   Endurance Deficit   Endurance Deficit Yes   Endurance Deficit Description fatigue, pain   Activity Tolerance   Activity Tolerance Patient limited by fatigue;Patient limited by pain   Medical Staff Made Aware Isabella Norman, OT; Jadyn Mendoza OT student  OT present for co evaluation due to pts current medical presentation   Nurse Made Aware Pt appropriate to be seen and mobilize per nsg   Assessment   Prognosis Good   Problem List Decreased strength;Decreased range of motion;Decreased endurance;Decreased mobility;Pain   Assessment Pt is 63 y o  female seen [facility] on [facility]13/[facility]23   Two pt identifiers were used to confirm  Pt presented for scheduled Left - ARTHROPLASTY KNEE TOTAL W ROBOT which was performed on 2/13/23    Pt  with a primary dx of: arthritis of L knee s/p Left - ARTHROPLASTY KNEE TOTAL W ROBOT  PT now consulted for assessment of mobility and d/c needs  Pt with activity- beginning POD #0 orders  Pts current co morbidities affecting treatment include:  has a past medical history of Anxiety, Arthritis, Chest pain syndrome, Chronic pain disorder, Diverticulosis, GERD (gastroesophageal reflux disease), Hypertension, and Renal dysplasia    Pts current clinical presentation is Unstable/ Unpredictable (high complexity) due to Ongoing medical management for primary dx, Increased reliance on more restrictive AD compared to baseline, Decreased activity tolerance compared to baseline, Fall risk, Increased assistance needed from caregiver at current time, Continuous pulse oximetry monitoring , s/p surgical intervention    Upon evaluation, pt currently is requiring  Min Ax1 for transfers and Supervision for ambulation w/ RW  Pt presents at PT eval functioning below baseline and currently w/ overall mobility deficits 2* to: LLE weakness, decreased ROM, impaired balance, decreased endurance, gait deviations, pain, decreased activity tolerance compared to baseline, fall risk  Pt currently at a fall risk 2* to impairments listed above  Based on the aforementioned PT evaluation, pt will continue to benefit from skilled Acute PT interventions to address stated impairments; to maximize functional mobility; for ongoing pt/ family training; and DME needs  At conclusion of PT session pt returned back in chair with phone and call bell within reach  Pt denies any further questions at this time  PT is currently recommending Home with increased family support and Outpatient PT  PT will continue to follow during hospital stay     Barriers to Discharge None   Barriers to Discharge Comments Pt denies any mobility or safety concerns about returning home at time of d/c   Goals   Patient Goals " to go home"   STG Expiration Date 02/24/23   Short Term Goal #1 In 10 days pt will complete: 1) Bed mobility skills independently to increase safety and independence as well as decrease caregiver burden  2) Functional transfers independently to promote increased independence, safety, and QOL  3) Ambulate 150' using least restrictive AD with mod I without LOB and stable vitals so that pt can negotiate previous living environment safely and promote independence with functional mobility and return to PLOF  4) Stair training up/ down 3 step/s using rail/s with mod I so that pt can enter/negotiate previous living environment safely and decrease fall risk  5) Improve balance grades by 1/2 grade to increase safety with all mobility and decrease fall risk  6) Improve BLE strength by 1/2 grade to help increase overall functional mobility and decrease fall risk  Plan   Treatment/Interventions Functional transfer training;LE strengthening/ROM; Elevations; Therapeutic exercise; Endurance training;Patient/family training;Equipment eval/education; Bed mobility;Gait training;Spoke to nursing;OT   PT Frequency Twice a day   Recommendation   PT Discharge Recommendation Home with outpatient rehabilitation   Equipment Recommended [facility] Recommended Wheeled walker   Additional Comments Pt denies any mobility or safety concerns about returning home at time of d/c   AM-PAC Basic Mobility Inpatient   Turning in Flat Bed Without Bedrails 4   Lying on Back to Sitting on Edge of Flat Bed Without Bedrails 4   Moving Bed to Chair 3   Standing Up From Chair Using Arms 3   Walk in Room 3   Climb 3-5 Stairs With Railing 3   Basic Mobility Inpatient Raw Score 20   Basic Mobility Standardized Score 43 99   Highest Level Of Mobility   -HLM Goal 6: Walk 10 steps or more   JH-HLM Achieved 7: Walk 25 feet or more   Modified Kenedy Scale   Modified Kenedy Scale 3 Barthel Index   Feeding 10   Bathing 0   Grooming Score 5   Dressing Score 5   Bladder Score 10   Bowels Score 10   Toilet Use Score 10   Transfers (Bed/Chair) Score 10   Mobility (Level Surface) Score 10   Stairs Score 5   Barthel Index Score 75   Portions of the documentation may have been created using voice recognition software  Occasional wrong word or sound alike substitutions may have occurred due to the inherent limitations of the voice recognition software  Read the chart carefully and recognize, using context, where substitutions have occurred      Charlie Rizo, PT, DPT

## 2023-02-14 NOTE — DISCHARGE SUMMARY
ORTHOPEDICS DISCHARGE SUMMARY  Siri Trevizo 63 y o  female MRN: [id number]  Unit/Bed#: -91    Attending Physician: Trish Enriquez    Admitting diagnosis: Arthritis of left knee [M17 12]    Discharge diagnosis: Arthritis of left knee [M17 12]    Date of admission: 2/13/2023    Date of discharge: 02/14/23         Procedure: Left - ARTHROPLASTY KNEE TOTAL W ROBOT    HPI:  This is a 63y o  year old female that presented to the office with signs and symptoms of left knee osteoarthritis  They tried and failed conservative treatment measures and wished to proceed with surgical intervention  The risks, benefits, and complications of the procedure were discussed with the patient and informed consent was obtained  Hospital Course: The patient was admitted to the hospital on 2/13/2023 and underwent an uncomplicated left total knee arthroplasty  They were transferred to the floor after a brief stay in the post-anesthesia care unit  Their pain was well managed with IV and oral pain medications  They began therapy on post operative day #1  Lovenox 40 mg daily was also started for DVT prophylaxis 12 hours post operatively  Hemovac drain was removed on POD1  On discharge date pt was cleared by PT and the medicine team and determined to be safe for discharge  Daily discussion was had with the patient, nursing staff, orthopaedic team, and family members if present  All questions were answered to the patients satisifaction  0   Lab Value Date/Time    HGB 14 3 01/17/2023 0834    HGB 13 6 08/02/2022 0627    HGB 14 1 07/06/2022 0749    HGB 15 1 03/28/2022 2003    HGB 14 6 03/17/2021 0817    HGB 14 8 11/18/2016 0801       Greater than 2 gram drop which qualifies for diagnosis of acute blood loss anemia  Vital signs remained stable and pt was resuscitated with IVF as needed   Body mass index is 31 76 kg/m²  moderately obese  Recommend behavior modifications, nutrition and physical activity  Discharge Instructions:   The patient was discharged weight bearing as tolerated to the left lower extremity  Lovenox 40 mg daily will be continued for 28 days  Continue PT/OT  Take pain medications as instructed  Discharge Medications: For the complete list of discharge medications, please refer to the patient's medication reconciliation       Yuli Emanuel MD   PGY1 Orthopedic Surgery

## 2023-02-14 NOTE — PLAN OF CARE
Problem: PHYSICAL THERAPY ADULT  Goal: Performs mobility at highest level of function for planned discharge setting  See evaluation for individualized goals  Description: Treatment/Interventions: Functional transfer training, LE strengthening/ROM, Elevations, Therapeutic exercise, Endurance training, Patient/family training, Equipment eval/education, Bed mobility, Gait training, Spoke to nursing, OT  Equipment Recommended: Juarez Reyes       See flowsheet documentation for full assessment, interventions and recommendations  Note: Prognosis: Good  Problem List: Decreased strength, Decreased range of motion, Decreased endurance, Decreased mobility, Pain  Assessment: Pt is 63 y o  female seen for PT evaluation at [facility] on 2/13/2023  Two pt identifiers were used to confirm  Pt presented for scheduled Left - ARTHROPLASTY KNEE TOTAL W ROBOT which was performed on 2/13/23    Pt with a primary dx of: arthritis of L knee s/p Left - ARTHROPLASTY KNEE TOTAL W ROBOT  PT now consulted for assessment of mobility and d/c needs  Pt with activity- beginning POD #0 orders  Pts current co morbidities affecting treatment include:  has a past medical history of Anxiety, Arthritis, Chest pain syndrome, Chronic pain disorder, Diverticulosis, GERD (gastroesophageal reflux disease), Hypertension, and Renal dysplasia    Pts current clinical presentation is Unstable/ Unpredictable (high complexity) due to Ongoing medical management for primary dx, Increased reliance on more restrictive AD compared to baseline, Decreased activity tolerance compared to baseline, Fall risk, Increased assistance needed from caregiver at current time, Continuous pulse oximetry monitoring , s/p surgical intervention    Upon evaluation, pt currently is requiring  Min Ax1 for transfers and Supervision for ambulation w/ RW   Pt presents at PT eval functioning below baseline and currently w/ overall mobility deficits 2* to: LLE weakness, decreased ROM, impaired balance, decreased endurance, gait deviations, pain, decreased activity tolerance compared to baseline, fall risk  Pt currently at a fall risk 2* to impairments listed above  Based on the aforementioned PT evaluation, pt will continue to benefit from skilled Acute PT interventions to address stated impairments; to maximize functional mobility; for ongoing pt/ family training; and DME needs  At conclusion of PT session pt returned back in chair with phone and call bell within reach  Pt denies any further questions at this time  PT is currently recommending Home with increased family support and Outpatient PT  PT will continue to follow during hospital stay  Barriers to Discharge: None  Barriers to Discharge Comments: Pt denies any mobility or safety concerns about returning home at time of d/c  PT Discharge Recommendation: Home with outpatient rehabilitation    See flowsheet documentation for full assessment

## 2023-02-14 NOTE — PROGRESS NOTES
Internal Medicine Progress Note  Patient: Antonia Delacruz  Age/sex: 63 y o  female  Medical Record #: [id number]      ASSESSMENT/PLAN: (Interval History)  Antonia Delacruz is seen and examined and management for following issues:    Status Post left Total KNEE ARTHROPLASTY  • Pain controlled  • Continue encourage incentive spirometry; monitor fever curve  • DVT prophylaxis in place and reviewed  Results from last 7 days   Lab Units 02/14/23  0639   WBC Thousand/uL 11 74*   HEMOGLOBIN g/dL 12 7   HEMATOCRIT % 38 5   PLATELETS Thousands/uL 302         Single functioning kidney  • Monitor BMP  • Avoid hypotension and nephrotoxic medications post-op  • stable     Anxiety  • Continue Xanax as needed      Recent Rt TKA  • 8/22  • No post-op issues        OK for dc from medicine standpoint  PRE-OP HGB LEVEL: 14 3    The above assessment and plan was reviewed and updated as determined by my evaluation of the patient on 2/14/2023      Labs:   Results from last 7 days   Lab Units 02/14/23  0639   WBC Thousand/uL 11 74*   HEMOGLOBIN g/dL 12 7   HEMATOCRIT % 38 5   PLATELETS Thousands/uL 302     Results from last 7 days   Lab Units 02/14/23  0639   SODIUM mmol/L 138   POTASSIUM mmol/L 4 1   CHLORIDE mmol/L 108   CO2 mmol/L 25   BUN mg/dL 8   CREATININE mg/dL 0 74   CALCIUM mg/dL 8 8             Results from last 7 days   Lab Units 02/13/23  1625   POC GLUCOSE mg/dl 71       Review of Scheduled Meds:  Current Facility-Administered Medications   Medication Dose Route Frequency Provider Last Rate   • acetaminophen  975 mg Oral Q8H Taiwo Gu MD     • ALPRAZolam  0 25 mg Oral Daily PRN Taiwo Gu MD     • calcium carbonate  1,000 mg Oral Daily PRN Taiwo Gu MD     • cefazolin  2,000 mg Intravenous Once Taiwo Gu MD     • enoxaparin  40 mg Subcutaneous Q24H Taiwo Gu MD     • gabapentin  100 mg Oral Q8H Taiwo Gu MD     • HYDROmorphone  0 5 mg Intravenous Q2H PRN Taiwo Gu MD     • lactated ringers  1,000 mL Intravenous Once PRN Taiwo Gu MD      And   • lactated ringers  1,000 mL Intravenous Once PRN Taiwo Gu MD     • lactated ringers  1 5 mL/kg/hr Intravenous Continuous Taiwo Gu MD 1 5 mL/kg/hr (02/13/23 6773)   • lidocaine (PF)  0 5 mL Infiltration Once PRN Taiwo Gu MD     • methocarbamol  500 mg Oral Q6H [address] Taiwo Gu MD     • ondansetron  4 mg Intravenous Q6H PRN Ivett Stratton PA-C     • oxyCODONE  10 mg Oral Q4H PRN Taiwo Gu MD     • oxyCODONE  5 mg Oral Q4H PRN Taiwo Gu MD     • polyethylene glycol  17 g Oral Daily Taiwo uG MD     • senna  1 tablet Oral Daily Taiwo Gu MD     • sodium chloride  1,000 mL Intravenous Once PRN Taiwo Gu MD      And   • sodium chloride  1,000 mL Intravenous Once PRN Taiwo Gu MD         Subjective/ HPI: Patient seen and examined  Patients overnight issues or events were reviewed with nursing or staff during rounds or morning huddle session  New or overnight issues include the following:     Pt without any overnight events or reported nursing issues      ROS:   A 10 point ROS was performed; negative except as noted above         Imaging:     No orders to display       *Labs /Radiology studies Reviewed  *Medications  reviewed and reconciled as needed  *Please refer to order section for additional ordered labs studies  *Case discussed with primary attending during morning huddle case rounds    Physical Examination:  Vitals:   Vitals:    02/14/23 0209 02/14/23 0642 02/14/23 0820 02/14/23 0832   BP: 123/56 (!) 112/47 107/56 105/56   Pulse: 89 78 69 74   Resp: 18  16    Temp: 98 5 °F (36 9 °C) 98 4 °F (36 9 °C) 98 2 °F (36 8 °C)    TempSrc:       SpO2: 95% 93% 92% 95%   Weight:       Height:           General Appearance: no distress, conversive  HEENT: PERRLA, conjuctiva normal; oropharynx clear; mucous membranes moist;   Neck:  Supple, no lymphadenopathy or thyromegaly  Lungs: CTA, normal respiratory effort, no retractions, expiratory effort normal  CV: regular rate and rhythm , PMI normal   ABD: soft non tender, no masses , no hepatic or splenomegaly  EXT: DP pulses intact, no lymphadenopathy, no edema; left knee surgical dressing in place  Skin: normal turgor, normal texture, no rash  Psych: affect normal, mood normal  Neuro: AAOx3    : olivares removed ; due to void    The above physical exam was reviewed and updated as determined by my evaluation of the patient on 2/14/2023  Invasive Devices     Peripheral Intravenous Line  Duration           Peripheral IV 02/13/23 Dorsal (posterior); Left Hand <1 day          Drain  Duration           Closed/Suction Drain Anterior; Left Knee Accordion 10 Fr  <1 day                   VTE Pharmacologic Prophylaxis: Enoxaparin  Code Status: Level 1 - Full Code  Current Length of Stay: 0 day(s)      Total floor / unit time spent today 30 minutes  Coordination of patient's care was performed in conjunction with primary service  Time invested included review of patient's labs, vitals, and management of their comorbidities with continued monitoring, examination of patient as well as answering patient questions, documenting her findings and creating progress note in electronic medical record,  ordering appropriate diagnostic testing  Medical decision making for the day was made by supervising physician unless otherwise noted in their attestation statement  ** Please Note:  voice to text software may have been used in the creation of this document   Although proof errors in transcription or interpretation are a potential of such software**

## 2023-02-14 NOTE — OCCUPATIONAL THERAPY NOTE
Occupational Therapy Evaluation     Patient Name: Cele Pickard  AVNHI'A Date: 2/14/2023  Problem List  Principal Problem:    Primary osteoarthritis of left knee    Past Medical History  Past Medical History:   Diagnosis Date    Anxiety     MENOPAUSE    Arthritis     KNEES    Chest pain syndrome 03/29/2022    Chronic pain disorder     Diverticulosis     GERD (gastroesophageal reflux disease)     Hypertension     1 episode last year went to ER; no problems since    Renal dysplasia     nonfunctioning left life-long     Past Surgical History  Past Surgical History:   Procedure Laterality Date    COLONOSCOPY  03/2016    [facility] INJECTION RIGHT HIP (NON ARTHROGRAM)  5/10/2022    FL INJECTION RIGHT HIP (NON ARTHROGRAM)  11/3/2022    KIDNEY SURGERY Right     age 29    ME ARTHRP KNE CONDYLE&PLATU MEDIAL&LAT COMPARTMENTS Right 8/1/2022    Procedure: ARTHROPLASTY KNEE TOTAL W ROBOT;  Surgeon: Bimal Boss MD;  Location: [facility];  Service: Orthopedics    REDUCTION MAMMAPLASTY      WRIST SURGERY Right     fx         02/14/23 0828   OT Last Visit   OT Visit Date 02/14/23   Note Type   Note type Evaluation   Pain Assessment   Pain Assessment Tool 0-10   Pain Score 5   Pain Location/Orientation Orientation: Left; Location: Knee   Pain Onset/Description Onset: Ongoing; Descriptor: [address] Pain Intervention(s) Ambulation/increased activity;Repositioned   Restrictions/Precautions   LLE Weight Bearing Per Order WBAT   Other Precautions Multiple lines; Fall Risk;Pain  (+hemovac)   Home Living   Type of [address] [place] [address] One level;Stairs to enter with rails  (2 KATE)   Bathroom Shower/Tub Walk-in shower   Bathroom Toilet Standard   [address] Walker;Cane   Additional Comments Pt lives in [address] [place] [address] with 2 KATE  She has a commode but reports it is uncomforable to sit on, but uses it as a shower chair     Prior Function   Level of [place] Independent with ADLs; Independent with functional mobility; Independent with IADLS   Lives With Spouse   Receives Help From Family  (Pt reports her  is able to help her  Her son is nearby and can also help)   IADLs Independent with driving; Independent with meal prep; Independent with medication management   Falls in the last 6 months 0   Vocational On disability   Comments Pt reports living with a supportive  who can help her  She did not use any AD prior to admission  Lifestyle   Autonomy Independent with ADLs, IADLs, functional mobility, and driving   Reciprocal Relationships Lives with her    Service to Others On disability   Intrinsic Gratification Enjoys going to horse [employer] with her    ADL   Where Assessed Chair   Eating Assistance 6  Modified independent   Grooming Assistance 6  Modified Independent   UB Bathing Assistance 5  Supervision/Setup    [facility] 5  Supervision/Setup   LB Dressing Assistance 4  [facility] into underwear; Thread LLE into underwear  (Line management (hemovac))   Toileting Assistance  5  Supervision/Setup   Bed Mobility   Additional Comments Pt seated in bedside chair upon arrival with all needs within reach  Pt left in bedside chair with all needs within reach   Transfers   Sit to Stand 4  Minimal assistance   Additional items Assist x 1; Increased time required;Verbal cues   Stand to Sit 5  Supervision   Additional items Increased time required;Verbal cues   Toilet transfer 4  Minimal assistance   Additional items   (Low toilet)   Additional Comments Used RW   Functional Mobility   Functional Mobility 4  Minimal assistance   Additional Comments Pt performed functional mobility within household distance using a RW   Additional items Rolling walker   Balance   Static Sitting Fair +   Dynamic Sitting Fair   Static Standing Fair -   Dynamic Standing Fair - Ambulatory Fair -   Activity Tolerance   Activity Tolerance Patient limited by fatigue;Patient limited by pain  (Pt blood pressure taken while seated (105/56)  Dizziness resolved after short functional mobility )   Medical Staff Made Aware Seen with PT Kimberly Cristobal and SPT Dg Cespedes due to pt medical complexity   Nurse Made Aware RN ok with eval   RUE Assessment   RUE Assessment WFL   LUE Assessment   LUE Assessment WFL   Hand Function   Gross Motor Coordination Functional   Fine Motor Coordination Functional   Cognition   Overall Cognitive Status WFL   Arousal/Participation Alert; Responsive; Cooperative   Attention Within functional limits   Orientation Level Oriented X4   Memory Within functional limits   Following Commands Follows all commands and directions without difficulty   Comments Pt was very pleasant to work with  She was [facility] for cognition and followed all precautions with good safety awareness and demonstrated good carryover during ADLs  Assessment   Assessment Pt is a 63 y o  female [facility] w/ L knee pain and underwent Left Arthroplasty Knee Total w/ Robot on 2/13/2023  Pt  has a past medical history of Anxiety, Arthritis, Chest pain syndrome (03/29/2022), Chronic pain disorder, Diverticulosis, GERD (gastroesophageal reflux disease), Hypertension, and Renal dysplasia  Pt with active OT orders  Pt precautions include hemovac, fall risk, and pain  Pt currently lives with her  in a 1 SH with 2 KATE  Pt was independent w/  ADLS and IADLS, driving, & functional mobility  Patient participated in OT evaluation and answered all the questions while A&Ox4  Patient participated in functional transfers sit>stand with Bhaevsh and stand>sit with supervision and RW, functional mobility with Bhavesh and RW, and toilet transfers with Bhavesh and RW, and LB dressing with Bhavesh  Pt participated in knee replacement packet education with good carryover and understanding during ADLs throughout session       From OT standpoint, anticipate d/c home with increased family support  No additional OT needs at this time, d/c from OT caseload  Goals   Patient Goals To go home   Recommendation   OT Discharge Recommendation No rehabilitation needs   Equipment Recommended Shower/Tub chair with back ($);Raised toilet seat ($)   AM-PAC Daily Activity Inpatient   Lower Body Dressing 3   Bathing 3   Toileting 3   Upper Body Dressing 4   Grooming 4   Eating 4   Daily Activity Raw Score 21   Daily Activity Standardized Score (Calc for Raw Score >=11) 44 27   AM-PAC Applied Cognition Inpatient   Following a Speech/Presentation 4   Understanding Ordinary Conversation 4   Taking Medications 4   Remembering Where Things Are Placed or Put Away 4   Remembering List of 4-5 Errands 4   Taking Care of Complicated Tasks 4   Applied Cognition Raw Score 24   Applied Cognition Standardized Score 62 21   End of Consult   Education Provided Yes   Patient Position at End of Consult Bedside chair; All needs within reach   Nurse Communication Nurse aware of consult     AVRIL Cornelius

## 2023-02-14 NOTE — PROGRESS NOTES
Peripheral Nerve Block Follow-up Note - Acute Pain Service    Maxine Bower 63 y o  female MRN: [id number]  Unit/Bed#: -01 Encounter: [id number]      Assessment:   Principal Problem:    Primary osteoarthritis of left knee    Maxine Bower is a 63y o  year old female with past medical history of left knee osteoarthritis  She tried and failed conservative treatment measures and is now s/p left total knee arthroplasty with orthopedic surgery on 2/13/2023  She received left-sided single shot adductor canal and ipack blocks with Exparel for postoperative analgesia  Acute pain service was consulted for postoperative block follow-up  Plan:   Left-sided single shot adductor canal and ipack blocks with Exparel are functioning appropriately, there is no motor deficit and pain is well controlled currently  - Multimodal analgesia with:  · Recommend discontinuation of IV Dilaudid 0 5 mg every 2 hours as needed, for breakthrough pain  · Oxycodone 5 mg every 4 hours as needed, for moderate pain  · Oxycodone 10 mg every 4 hours as needed, for severe pain  · Tylenol 975 mg every 8 hours scheduled  · Gabapentin 100 mg 3 times daily  · Estimated Creatinine Clearance: 84 7 mL/min (by C-G formula based on SCr of 0 74 mg/dL)  · Robaxin 500 mg every 6 hours scheduled, hold for sedation    Treatment plan discussed with bedside nursing staff as well as primary care team   APS will sign off at this time  Thank you for the consult  All opioids and other analgesics to be written at discretion of primary team  Please contact Acute Pain Service - [facility] via [employer] from [phone] with additional questions or concerns  See [email or web address] or Yasmeen for additional contacts and after hours information  Pain History  Current pain location(s): Left knee  Pain Scale:   0  Quality: Aching  24 hour history: No acute events overnight, patient hemodynamically stable at this time    Patient has been up and ambulating with physical therapy this morning, currently sitting up in chair with minimal knee discomfort  Patient reports moderate pain reduction with use of as needed oxycodone  Tolerating current medication regimen no complaints of shortness of breath, nausea/vomiting, constipation/diarrhea  Opioid requirement previous 24 hours:   25 mg oxycodone  1 mg IV Dilaudid    Meds/Allergies   all current active meds have been reviewed, current meds:   Current Facility-Administered Medications   Medication Dose Route Frequency   • acetaminophen (TYLENOL) tablet 975 mg  975 mg Oral Q8H   • ALPRAZolam (XANAX) tablet 0 25 mg  0 25 mg Oral Daily PRN   • calcium carbonate (TUMS) chewable tablet 1,000 mg  1,000 mg Oral Daily PRN   • ceFAZolin (ANCEF) IVPB (premix in dextrose) 2,000 mg 50 mL  2,000 mg Intravenous Once   • enoxaparin (LOVENOX) subcutaneous injection 40 mg  40 mg Subcutaneous Q24H   • gabapentin (NEURONTIN) capsule 100 mg  100 mg Oral Q8H   • HYDROmorphone (DILAUDID) injection 0 5 mg  0 5 mg Intravenous Q2H PRN   • lactated ringers bolus 1,000 mL  1,000 mL Intravenous Once PRN    And   • lactated ringers bolus 1,000 mL  1,000 mL Intravenous Once PRN   • lactated ringers infusion  1 5 mL/kg/hr Intravenous Continuous   • lidocaine (PF) (XYLOCAINE-MPF) 1 % injection 0 5 mL  0 5 mL Infiltration Once PRN   • methocarbamol (ROBAXIN) tablet 500 mg  500 mg Oral Q6H [facility]   • ondansetron (ZOFRAN) injection 4 mg  4 mg Intravenous Q6H PRN   • oxyCODONE (ROXICODONE) immediate release tablet 10 mg  10 mg Oral Q4H PRN   • oxyCODONE (ROXICODONE) IR tablet 5 mg  5 mg Oral Q4H PRN   • polyethylene glycol (MIRALAX) packet 17 g  17 g Oral Daily   • senna (SENOKOT) tablet 8 6 mg  1 tablet Oral Daily   • sodium chloride 0 9 % bolus 1,000 mL  1,000 mL Intravenous Once PRN    And   • sodium chloride 0 9 % bolus 1,000 mL  1,000 mL Intravenous Once PRN    and PTA meds:   Prior to Admission Medications   Prescriptions Last Dose Informant Patient Reported? Taking? ALPRAZolam (XANAX) 0 25 mg tablet 1/30/2023  No Yes   Sig: Take 1 tablet (0 25 mg total) by mouth daily as needed for anxiety   Biotin w/ Vitamins C & E 1250-7 5-7 5 MCG-MG-UNT CHEW 2/6/2023  Yes Yes   Sig: Chew   FIBER PO 2/12/2023  Yes Yes   Sig: Take by mouth daily   Multiple Vitamins-Minerals (MULTI FOR HER 50+ PO) 2/12/2023  Yes Yes   Sig: Take by mouth daily   ascorbic acid (VITAMIN C) 500 MG tablet 2/12/2023  No Yes   Sig: Take 1 tablet (500 mg total) by mouth 2 (two) times a day   cephalexin (KEFLEX) 500 mg capsule More than a month  No No   Sig: Take 4 tablets one hour before dental visits  ferrous sulfate 324 (65 Fe) mg 2/12/2023  No Yes   Sig: Take 1 tablet (324 mg total) by mouth daily before breakfast   folic acid (FOLVITE) 1 mg tablet 2/12/2023  No Yes   Sig: Take 1 tablet (1 mg total) by mouth daily      Facility-Administered Medications: None       Allergies   Allergen Reactions   • Sulfa Antibiotics Rash       Objective     Temp:  [97 5 °F (36 4 °C)-98 8 °F (37 1 °C)] 98 2 °F (36 8 °C)  HR:  [52-89] 74  Resp:  [12-22] 16  BP: (103-144)/(46-85) 105/56    Physical Exam  Vitals reviewed  Constitutional:       General: She is not in acute distress  Appearance: Normal appearance  She is not ill-appearing  Cardiovascular:      Rate and Rhythm: Normal rate  Pulmonary:      Effort: Pulmonary effort is normal  No tachypnea, bradypnea or respiratory distress  Abdominal:      General: There is no distension  Palpations: Abdomen is soft  There is no mass  Tenderness: There is no abdominal tenderness  Musculoskeletal:         General: Swelling (L knee) and tenderness (L knee) present  Cervical back: Normal range of motion  Right lower leg: No edema  Left lower leg: No edema  Comments: Ace wrap to LLE   Skin:     General: Skin is warm and dry  Coloration: Skin is not pale  Findings: No rash     Neurological:      Mental Status: She is alert and oriented to person, place, and time  Mental status is at baseline  Sensory: Sensation is intact  No sensory deficit  Motor: Motor function is intact  No weakness or tremor  Psychiatric:         Attention and Perception: Attention normal          Mood and Affect: Mood normal          Speech: Speech normal          Behavior: Behavior normal  Behavior is cooperative  Lab Results:   Results from last 7 days   Lab Units 02/14/23  0639   WBC Thousand/uL 11 74*   HEMOGLOBIN g/dL 12 7   HEMATOCRIT % 38 5   PLATELETS Thousands/uL 302      Results from last 7 days   Lab Units 02/14/23  0639   POTASSIUM mmol/L 4 1   CHLORIDE mmol/L 108   CO2 mmol/L 25   BUN mg/dL 8   CREATININE mg/dL 0 74   CALCIUM mg/dL 8 8       Imaging Studies: I have personally reviewed pertinent reports  Please note that the APS provides consultative services regarding pain management only  With the exception of ketamine, peripheral nerve catheters, and epidural infusions (and except when indicated), final decisions regarding starting or changing doses of analgesic medications are at the discretion of the consulting service  Off hours consultation and/or medication management is generally not available      Kevin Fillers, CRNP  Acute Pain Service

## 2023-02-14 NOTE — PROGRESS NOTES
Progress Note - Orthopedics   Alan Quintero 63 y o  female MRN: [id number]  Unit/Bed#: -01      Subjective:    63 y  o female POD 1 L TKA  Doing well, pain controlled       Labs:  0   Lab Value Date/Time    HCT 43 6 01/17/2023 0834    HCT 40 5 08/02/2022 0627    HCT 42 9 07/06/2022 0749    HCT 45 0 11/18/2016 0801    HGB 14 3 01/17/2023 0834    HGB 13 6 08/02/2022 0627    HGB 14 1 07/06/2022 0749    HGB 14 8 11/18/2016 0801    INR 0 89 01/17/2023 0834    WBC 6 99 01/17/2023 0834    WBC 16 35 (H) 08/02/2022 0627    WBC 6 49 07/06/2022 0749    WBC 7 8 11/18/2016 0801    WBC 6-10 (A) 11/18/2016 0801       Meds:    Current Facility-Administered Medications:   •  acetaminophen (TYLENOL) tablet 975 mg, 975 mg, Oral, Q8H, Rigoberto Cassidy MD, 975 mg at 02/14/23 0207  •  ALPRAZolam Kennyth Ava) tablet 0 25 mg, 0 25 mg, Oral, Daily PRN, Rigoberto Cassidy MD  •  calcium carbonate (TUMS) chewable tablet 1,000 mg, 1,000 mg, Oral, Daily PRN, Rigoberto Cassidy MD  •  ceFAZolin (ANCEF) IVPB (premix in dextrose) 2,000 mg 50 mL, 2,000 mg, Intravenous, Once, Rigoberto Cassidy MD  •  ceFAZolin (ANCEF) IVPB (premix in dextrose) 2,000 mg 50 mL, 2,000 mg, Intravenous, Q8H, Rigoberto Cassidy MD, Last Rate: 100 mL/hr at 02/14/23 0608, 2,000 mg at 02/14/23 0608  •  enoxaparin (LOVENOX) subcutaneous injection 40 mg, 40 mg, Subcutaneous, Q24H, Rigoberto Cassidy MD, 40 mg at 02/14/23 6284  •  gabapentin (NEURONTIN) capsule 100 mg, 100 mg, Oral, Q8H, Rigoberto Cassidy MD, 100 mg at 02/14/23 0207  •  HYDROmorphone (DILAUDID) injection 0 5 mg, 0 5 mg, Intravenous, Q2H PRN, Rigoberto Cassidy MD, 0 5 mg at 02/14/23 0207  •  lactated ringers bolus 1,000 mL, 1,000 mL, Intravenous, Once PRN **AND** lactated ringers bolus 1,000 mL, 1,000 mL, Intravenous, Once PRN, Rigoberto Cassidy MD  •  lactated ringers infusion, 1 5 mL/kg/hr, Intravenous, Continuous, Rigoberto Cassidy MD, Last Rate: 125 9 mL/hr at 02/13/23 2333, 1 5 mL/kg/hr at 02/13/23 2333  •  lidocaine (PF) (XYLOCAINE-MPF) 1 % injection 0 5 mL, 0 5 mL, Infiltration, Once PRN, Jasson Ludwig MD  •  methocarbamol (ROBAXIN) tablet 500 mg, 500 mg, Oral, Q6H [address], Jasson Ludwig MD, 500 mg at 02/14/23 0608  •  ondansetron (ZOFRAN) injection 4 mg, 4 mg, Intravenous, Q6H PRN, Ivett Stratton PA-C  •  oxyCODONE (ROXICODONE) immediate release tablet 10 mg, 10 mg, Oral, Q4H PRN, Jasson Ludwig MD, 10 mg at 02/14/23 2379  •  oxyCODONE (ROXICODONE) IR tablet 5 mg, 5 mg, Oral, Q4H PRN, Jasson Ludwig MD, 5 mg at 02/13/23 1922  •  polyethylene glycol (MIRALAX) packet 17 g, 17 g, Oral, Daily, Jasson Ludwig MD  •  senna (SENOKOT) tablet 8 6 mg, 1 tablet, Oral, Daily, Jasson Ludwig MD  •  sodium chloride 0 9 % bolus 1,000 mL, 1,000 mL, Intravenous, Once PRN **AND** sodium chloride 0 9 % bolus 1,000 mL, 1,000 mL, Intravenous, Once PRN, Jasson Ludwig MD    Blood Culture:   No results found for: BLOODCX    Wound Culture:   No results found for: WOUNDCULT    Ins and Outs:  I/O last 24 hours: In: 2958 9 [P O :1200; I V :1658 9; IV Piggyback:100]  Out: 3240 [CSHEQ:1540; Drains:170; Blood:50]          Physical:  Vitals:    02/14/23 0209   BP: 123/56   Pulse: 89   Resp: 18   Temp: 98 5 °F (36 9 °C)   SpO2: 95%     Musculoskeletal: left Lower Extremity  · Dressing in place without significant drainage  · TTP knee  · Sensation intact to light touch whit/saph/sp/dp/tib  · Motor intact EHL/FHL, ankle DF/PF  · 2+ DP pulse    Assessment:    62 y  o female POD 1 L TKA  Doing well        Plan:  · WBAT LLE  · Will monitor for ABLA  · PT/OT  · Pain control  · DVT PPX: lovenox x 28 days post op  · Dispo: Discharge pending PT/OT    Jasson Ludwig MD

## 2023-02-15 ENCOUNTER — TELEPHONE (OUTPATIENT)
Dept: OBGYN CLINIC | Facility: HOSPITAL | Age: 63
End: 2023-02-15

## 2023-02-15 NOTE — TELEPHONE ENCOUNTER
Pt returned my call, full postoperative follow up call assessment completed  She reports doing "Alright not too bad "   She is ambulating with the RW, "stephanily" and has outpatient PT scheduled for tomorrow  She reports swelling is the same, she is icing, and her dressing is dry with no drainage  She states she did re-wrap ACE today  We reviewed her AVS medication list  She is taking  Lovenox daily, and  Oxycodone 5mg every 4 hours  (trying to space further)  We discussed starting   Tylenol 1000mg TID  She also took Metamucil daily, trying to encourage a BM and eating prunes  She denies chest pain, SOB, dizziness, or calf pain  She had a low grade fever this morning, and we discussed postoperative low grade temperatures, she denies s/s of infection  pt encouraged to call me with questions, concerns or issues

## 2023-02-16 ENCOUNTER — OFFICE VISIT (OUTPATIENT)
Dept: PHYSICAL THERAPY | Facility: CLINIC | Age: 63
End: 2023-02-16

## 2023-02-16 DIAGNOSIS — G89.29 CHRONIC PAIN OF LEFT KNEE: Primary | ICD-10-CM

## 2023-02-16 DIAGNOSIS — M25.562 CHRONIC PAIN OF LEFT KNEE: Primary | ICD-10-CM

## 2023-02-16 NOTE — TELEPHONE ENCOUNTER
Spoke to patient, as she called reporting fevers as high as 101  We discussed OTC tylenol and she reports she took it at 2pm    She had PT today and per patient the therapist was pleased with appearance of wound and "incision site looked good "     We discussed S/S of infection to monitor for such as drainage, redness, warmth/heat that will spread from incision  She denies all of those at this time      She will continue to monitor and update our team

## 2023-02-16 NOTE — PROGRESS NOTES
PT Evaluation     Today's date: 2023  Patient name: Kali Harper  : 1960  MRN: [id number]  Referring provider: Devi Miranda  Dx:   Encounter Diagnosis     ICD-10-CM    1  Chronic pain of left knee  M25 562     G89 29                      Assessment  Assessment details: Pt presents with post-operative impairments of R knee ROM, strength, edema, WB tolerance, gait, and pain  She will benefit from skilled PT services to address her impairments in order to decrease pain and restore function  Impairments: abnormal gait, abnormal muscle firing, abnormal or restricted ROM, abnormal movement, activity intolerance, impaired physical strength, lacks appropriate home exercise program, pain with function, weight-bearing intolerance and poor body mechanics  Understanding of Dx/Px/POC: good   Prognosis: good    Goals  STG - 4 wks  1) pt will demonstrate PROM 0-90 deg  2) pt will d/c FWW    MTG - 8 wks  1) pt will demonstrate PROM 0-110 deg  2) pt will d/c SPC  3) pt will report no difficulty with light ADLs    LTG - 12 wks  1) pt will demonstrate PROM 0-120 deg  2) pt will demonstrate normalized gait  3) pt will report reciprocal gait on stairs  4) pt will initiate daily walking routine for fitness    Plan  Planned modality interventions: cryotherapy  Planned therapy interventions: joint mobilization, manual therapy, balance/weight bearing training, body mechanics training, neuromuscular re-education, patient education, stretching, strengthening, therapeutic activities, therapeutic exercise, home exercise program, gait training, flexibility and functional ROM exercises  Frequency: 2x week  Duration in weeks: 12  Treatment plan discussed with: patient        Subjective Evaluation    History of Present Illness  Mechanism of injury: Pt is a 63 y o  female with PMHx significant for R hip OA, R TKA Aug '22, anxiety  She presents to PT s/p L TKA 23    Pain  Current pain ratin  At best pain rating: 4  At worst pain rating: 10  Location: L knee  Quality: sharp, tight, throbbing, needle-like, pulling and squeezing  Relieving factors: rest and medications  Aggravating factors: standing, walking and stair climbing    Social Support  Steps to enter house: yes (2 KATE)  Stairs in house: yes (FF to basement)   Lives in: one-story house  Lives with: spouse    Employment status: working  Treatments  Discharged from (in last 30 days): inpatient hospitalization  Patient Goals  Patient goals for therapy: decreased edema, decreased pain, improved balance, increased strength, independence with ADLs/IADLs, return to sport/leisure activities, return to work and increased motion          Objective     Observations   Left Knee   Positive for edema (3+) and incision (no s/s infection)  Negative for deformity and drainage  Passive Range of Motion   Left Knee   Flexion: 79 degrees with pain  Extension: 11 degrees with pain    Strength/Myotome Testing     Left Hip   Planes of Motion   Flexion: 4-  Extension: 4-  Abduction: 3+  Adduction: 4-    Left Knee   Flexion: 3-  Extension: 2+  Quadriceps contraction: poor    General Comments:      Knee Comments  Gait: FWW: L step-to gait pattern, posterior trunk lean during L swing, poor knee flexion at pre-swing             Precautions:  PMHx: R hip OA, R TKA, anxiety  Dx: s/p L TKA 2/13/23    Manuals 2/6 2/16           L knee PROM  10 min                                                  Neuro Re-Ed                                                                                                        Ther Ex             Bike: rocking  S8  3 min S8  5 min          Heel slides 5"x5 5"x5 5"x15          Supine GA stretch 20"x1 10"x3 20"x3          Quad sets 5"x15 5"x5 5"x15          SAQ AAROM 1x5 1x10 3x10                                                 Ther Activity                                       Gait Training             Step-ups   FWW  4"/          Gait training on floor  FWW  1x100 ft FWW  1x150 ft                       Modalities

## 2023-02-20 ENCOUNTER — OFFICE VISIT (OUTPATIENT)
Dept: PHYSICAL THERAPY | Facility: CLINIC | Age: 63
End: 2023-02-20

## 2023-02-20 DIAGNOSIS — G89.29 CHRONIC PAIN OF LEFT KNEE: Primary | ICD-10-CM

## 2023-02-20 DIAGNOSIS — M25.562 CHRONIC PAIN OF LEFT KNEE: Primary | ICD-10-CM

## 2023-02-20 DIAGNOSIS — Z96.652 TOTAL KNEE REPLACEMENT STATUS, LEFT: ICD-10-CM

## 2023-02-20 NOTE — PROGRESS NOTES
Daily Note     Today's date: 2023  Patient name: Angus Marcano  : 1960  MRN: [id number]  Referring provider: Todd Hernandez  Dx:   Encounter Diagnosis     ICD-10-CM    1  Chronic pain of left knee  M25 562     G89 29       2  Total knee replacement status, left  Z96 652                      Subjective: Pt reports fever and no compliance with HEP x 48 hrs following last visit  She reports contacting surgeon's office who instructed her to monitor for erythema and drainage which she denies  Objective: See treatment diary below  Reviewed with pt criteria for contacting surgeon re: infection   PROM: 10-76 deg    Assessment: Pt demonstrated mildly improved PROM, improved gait and WB tolerance  Plan: Cont  POC  Precautions:  PMHx: R hip OA, R TKA, anxiety  Dx: s/p L TKA 23    Manuals           L knee PROM  10 min                                                  Neuro Re-Ed                                                                                                        Ther Ex             Bike: rocking  S8  3 min S8  5 min          Heel slides 5"x5 5"x5 5"x10 5"x15         Supine GA stretch 20"x1 10"x3 20"x3 20"x3         Quad sets 5"x15 5"x5 5"x10 5"x15         SAQ AAROM 1x5 1x10 2x10 3x10         STS   Foam  1x5                                    Ther Activity                                       Gait Training             Step-ups    FWW  4"/         Gait training on floor  FWW  1x100 ft FWW  1x250 ft                       Modalities

## 2023-02-21 ENCOUNTER — OFFICE VISIT (OUTPATIENT)
Dept: OBGYN CLINIC | Facility: HOSPITAL | Age: 63
End: 2023-02-21

## 2023-02-21 ENCOUNTER — HOSPITAL ENCOUNTER (OUTPATIENT)
Dept: RADIOLOGY | Facility: HOSPITAL | Age: 63
Discharge: HOME | End: 2023-02-21
Attending: ORTHOPAEDIC SURGERY

## 2023-02-21 VITALS
SYSTOLIC BLOOD PRESSURE: 135 MMHG | HEART RATE: 84 BPM | BODY MASS INDEX: 31.76 KG/M2 | HEIGHT: 64 IN | DIASTOLIC BLOOD PRESSURE: 77 MMHG

## 2023-02-21 DIAGNOSIS — Z96.652 AFTERCARE FOLLOWING LEFT KNEE JOINT REPLACEMENT SURGERY: ICD-10-CM

## 2023-02-21 DIAGNOSIS — Z96.652 AFTERCARE FOLLOWING LEFT KNEE JOINT REPLACEMENT SURGERY: Primary | ICD-10-CM

## 2023-02-21 DIAGNOSIS — Z47.1 AFTERCARE FOLLOWING LEFT KNEE JOINT REPLACEMENT SURGERY: Primary | ICD-10-CM

## 2023-02-21 DIAGNOSIS — Z47.1 AFTERCARE FOLLOWING LEFT KNEE JOINT REPLACEMENT SURGERY: ICD-10-CM

## 2023-02-21 NOTE — PROGRESS NOTES
Assessment:  1  Aftercare following left knee joint replacement surgery            Plan:  One week s/p left TKA with robot, 2/13/2023  The patient is doing well and should continue daily Lovenox, pain medications as needed and current physical therapy regimen  The patient can shower letting soapy water over incision, yet should not to submerge the incision, and then pat dry  The patient should follow up in one week  To do next visit:  Return in about 1 week (around 2/28/2023)  The above stated was discussed in layman's terms and the patient expressed understanding  All questions were answered to the patient's satisfaction  Scribe Attestation    I,:  Channing Samuel am acting as a scribe while in the presence of the attending physician :       I,:  Ken Childers MD personally performed the services described in this documentation    as scribed in my presence :             Subjective:   Sera Schrader is a 63 y o  female who presents one week s/p left TKA with robot, 2/13/2023  The patient is doing well  Today she complains of generalized left knee pain  She does participate in physical therapy  She does use Lovenox daily  She does use oxycodone for pain control  She did have multiple bouts of fever yet no shortness of breath  She is 6 months s/p right TKA which is doing well        Review of systems negative unless otherwise specified in HPI    Past Medical History:   Diagnosis Date   • Anxiety     MENOPAUSE   • Arthritis     KNEES   • Chest pain syndrome 03/29/2022   • Chronic pain disorder    • Diverticulosis    • GERD (gastroesophageal reflux disease)    • Hypertension     1 episode last year went to ER; no problems since   • Renal dysplasia     nonfunctioning left life-long       Past Surgical History:   Procedure Laterality Date   • COLONOSCOPY  03/2016   • FL INJECTION RIGHT HIP (NON ARTHROGRAM)  5/10/2022   • FL INJECTION RIGHT HIP (NON ARTHROGRAM)  11/3/2022   • KIDNEY SURGERY Right     age 29   • [place] ARTHRP KNE CONDYLE&PLATU MEDIAL&LAT COMPARTMENTS Right 8/1/2022    Procedure: ARTHROPLASTY KNEE TOTAL W ROBOT;  Surgeon: Lanette Frederick MD;  Location: [facility];  Service: Orthopedics   • [place] ARTHRP KNE CONDYLE&PLATU MEDIAL&LAT COMPARTMENTS Left 2/13/2023    Procedure: ARTHROPLASTY KNEE TOTAL W ROBOT;  Surgeon: Lanette Frederick MD;  Location: [facility];  Service: Orthopedics   • REDUCTION MAMMAPLASTY     • WRIST SURGERY Right     fx       Family History   Problem Relation Age of Onset   • Diabetes Father    • Breast cancer Neg Hx    • Ovarian cancer Neg Hx    • Colon cancer Neg Hx        Social History     Occupational History   • 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   Tobacco Use   • Smoking status: Former     Packs/day: 0 25     Years: 15 00     Pack years: 3 75     Types: Cigarettes   • Smokeless tobacco: Never   Vaping Use   • Vaping Use: Never used   Substance and Sexual Activity   • Alcohol use: Yes     Alcohol/week: 10 0 standard drinks     Types: 5 Cans of beer, 5 Standard drinks or equivalent per week     Comment: daily   • Drug use: No   • Sexual activity: Yes     Partners: Male     Birth control/protection: Post-menopausal         Current Outpatient Medications:   •  ALPRAZolam (XANAX) 0 25 mg tablet, Take 1 tablet (0 25 mg total) by mouth daily as needed for anxiety, Disp: 30 tablet, Rfl: 0  •  Biotin w/ Vitamins C & E 1250-7 5-7 5 MCG-MG-UNT CHEW, Chew, Disp: , Rfl:   •  cephalexin (KEFLEX) 500 mg capsule, Take 4 tablets one hour before dental visits  , Disp: 20 capsule, Rfl: 2  •  enoxaparin (LOVENOX) 40 mg/0 4 mL, Inject 0 4 mL (40 mg total) under the skin daily for 28 days To start post op, Disp: 11 2 mL, Rfl: 0  •  FIBER PO, Take by mouth daily, Disp: , Rfl:   •  Multiple Vitamins-Minerals (MULTI FOR HER 50+ PO), Take by mouth daily, Disp: , Rfl:   •  oxyCODONE (Roxicodone) 5 immediate release tablet, Take 1 tablet (5 mg total) by mouth every 4 (four) hours as needed for moderate pain for up to 10 days Max Daily Amount: 30 mg, Disp: 30 tablet, Rfl: 0    Allergies   Allergen Reactions   • Sulfa Antibiotics Rash            Vitals:    02/21/23 0924   BP: 135/77   Pulse: 84       Objective:  Physical exam  · General: Awake, Alert, Oriented  · Eyes: Pupils equal, round and reactive to light  · Heart: regular rate and rhythm  · Lungs: No audible wheezing  · Abdomen: soft                    Ortho Exam  Left knee:  Incision clean dry and intact  Staples well approximated   No erythema and no ecchymosis  Appropriate swelling of lower limb  Appropriate warmth of knee  Extensor mechanism intact  Extension 10  Flexion 70  Calf compartments soft and supple  Sensation intact  Toes are warm sensate and mobile      Diagnostics, reviewed and taken today if performed as documented: The attending physician has personally reviewed the pertinent films in PACS and interpretation is as follows:  Right knee x-ray:  Well aligned prosthesis with no acute changes  Procedures, if performed today:    Procedures    None performed      Portions of the record may have been created with voice recognition software  Occasional wrong word or "sound a like" substitutions may have occurred due to the inherent limitations of voice recognition software  Read the chart carefully and recognize, using context, where substitutions have occurred

## 2023-02-23 ENCOUNTER — OFFICE VISIT (OUTPATIENT)
Dept: PHYSICAL THERAPY | Facility: CLINIC | Age: 63
End: 2023-02-23

## 2023-02-23 DIAGNOSIS — G89.29 CHRONIC PAIN OF LEFT KNEE: Primary | ICD-10-CM

## 2023-02-23 DIAGNOSIS — M25.562 CHRONIC PAIN OF LEFT KNEE: Primary | ICD-10-CM

## 2023-02-23 DIAGNOSIS — Z96.652 TOTAL KNEE REPLACEMENT STATUS, LEFT: ICD-10-CM

## 2023-02-23 NOTE — PROGRESS NOTES
Daily Note     Today's date: 2023  Patient name: Lauree Hammans  : 1960  MRN: [id number]  Referring provider: Franco Page  Dx:   Encounter Diagnosis     ICD-10-CM    1  Chronic pain of left knee  M25 562     G89 29       2  Total knee replacement status, left  Z96 652           Start Time: 0900  Stop Time: 0950  Total time in clinic (min): 50 minutes    Subjective: Patient continues to note stiffness and moderate to high pain  Still utilizing narcotics as prescribed  Objective: See treatment diary below  PROM: 10-81 deg    Assessment: Patient demonstrated slight improvement with L knee flexion PROM  She required very little assistance during SAQ TKE AROM  Progress as able  Plan: Cont  POC  Precautions:  PMHx: R hip OA, R TKA, anxiety  Dx: s/p L TKA 23    Manuals          L knee PROM  10 min  10 min                                                Neuro Re-Ed                                                                                                        Ther Ex             Bike: rocking  S8  3 min S8  5 min S8  5 min         Heel slides 5"x5 5"x5 5"x10 5"x15         Supine GA stretch 20"x1 10"x3 20"x3 20"x3         Quad sets 5"x15 5"x5 5"x10 5"x15         SAQ AAROM 1x5 1x10 2x10 3x10         STS   Foam  1x5 Foam  1x5                                   Ther Activity                                       Gait Training             Step-ups     FWW  4"/        Gait training on floor  FWW  1x100 ft FWW  1x250 ft                       Modalities

## 2023-02-27 ENCOUNTER — OFFICE VISIT (OUTPATIENT)
Dept: PHYSICAL THERAPY | Facility: CLINIC | Age: 63
End: 2023-02-27

## 2023-02-27 DIAGNOSIS — Z96.652 TOTAL KNEE REPLACEMENT STATUS, LEFT: ICD-10-CM

## 2023-02-27 DIAGNOSIS — G89.29 CHRONIC PAIN OF LEFT KNEE: Primary | ICD-10-CM

## 2023-02-27 DIAGNOSIS — M25.562 CHRONIC PAIN OF LEFT KNEE: Primary | ICD-10-CM

## 2023-02-27 NOTE — PROGRESS NOTES
Daily Note     Today's date: 2023  Patient name: Genny Vickers  : 1960  MRN: [id number]  Referring provider: Remy Son  Dx:   Encounter Diagnosis     ICD-10-CM    1  Chronic pain of left knee  M25 562     G89 29       2  Total knee replacement status, left  Z96 652           Start Time: 0900  Stop Time: 0945  Total time in clinic (min): 45 minutes    Subjective: Patient notes feeling stiff prior to therapy  Will be getting staples out tomorrow at her ortho F/U  Objective: See treatment diary below  PROM: 10-88 deg    Assessment: Patient demonstrated slight improvement with L knee flexion PROM  No assist required during SAQ  Added static leg extension hang with no adverse effect  Progress as able  Plan: Cont  POC  Precautions:  PMHx: R hip OA, R TKA, anxiety  Dx: s/p L TKA 23    Manuals         L knee PROM  10 min  10 min 10 min                                               Neuro Re-Ed                                                                                                        Ther Ex             Bike: rocking  S8  3 min S8  5 min S8  5 min S8  5 min        Heel slides 5"x5 5"x5 5"x10 5"x15 5"x15        Supine GA stretch 20"x1 10"x3 20"x3 20"x3 20"x3        Quad sets 5"x15 5"x5 5"x10 5"x15 5"x15        SAQ AAROM 1x5 1x10 2x10 3x10 3x10  AROM        STS   Foam  1x5 Foam  1x5 Foam  1x10        Static bag hang     0#  3 min                     Ther Activity                                       Gait Training             Step-ups     4"  1x10 fwd        Gait training on floor  FWW  1x100 ft FWW  1x250 ft                       Modalities

## 2023-02-28 ENCOUNTER — OFFICE VISIT (OUTPATIENT)
Dept: OBGYN CLINIC | Facility: HOSPITAL | Age: 63
End: 2023-02-28

## 2023-02-28 VITALS — BODY MASS INDEX: 31.76 KG/M2 | HEIGHT: 64 IN

## 2023-02-28 DIAGNOSIS — Z96.652 S/P TOTAL KNEE REPLACEMENT, LEFT: Primary | ICD-10-CM

## 2023-02-28 RX ORDER — OXYCODONE HYDROCHLORIDE 5 MG/1
TABLET ORAL
Qty: 30 TABLET | Refills: 0 | Status: SHIPPED | OUTPATIENT
Start: 2023-02-28

## 2023-02-28 NOTE — PROGRESS NOTES
Subjective; 24-year-old female patient who is 2 weeks postop from left total knee replacement  She presents for the purpose of staple removal at today's appointment  Past Medical History:   Diagnosis Date   • Anxiety     MENOPAUSE   • Arthritis     KNEES   • Chest pain syndrome 03/29/2022   • Chronic pain disorder    • Diverticulosis    • GERD (gastroesophageal reflux disease)    • Hypertension     1 episode last year went to ER; no problems since   • Renal dysplasia     nonfunctioning left life-long       Past Surgical History:   Procedure Laterality Date   • COLONOSCOPY  03/2016   • FL INJECTION RIGHT HIP (NON ARTHROGRAM)  5/10/2022   • FL INJECTION RIGHT HIP (NON ARTHROGRAM)  11/3/2022   • KIDNEY SURGERY Right     age 29   • [place] ARTHRP KNE CONDYLE&PLATU MEDIAL&LAT COMPARTMENTS Right 8/1/2022    Procedure: ARTHROPLASTY KNEE TOTAL W ROBOT;  Surgeon: Martin Gill MD;  Location: [facility];  Service: Orthopedics   • GA ARTHRP KNE CONDYLE&PLATU MEDIAL&LAT COMPARTMENTS Left 2/13/2023    Procedure: ARTHROPLASTY KNEE TOTAL W ROBOT;  Surgeon: Martin Gill MD;  Location: [facility];  Service: Orthopedics   • REDUCTION MAMMAPLASTY     • WRIST SURGERY Right     fx       Family History   Problem Relation Age of Onset   • Diabetes Father    • Breast cancer Neg Hx    • Ovarian cancer Neg Hx    • Colon cancer Neg Hx        Social History     Tobacco Use   • Smoking status: Former     Packs/day: 0 25     Years: 15 00     Pack years: 3 75     Types: Cigarettes   • Smokeless tobacco: Never   Vaping Use   • Vaping Use: Never used   Substance Use Topics   • Alcohol use: Yes     Alcohol/week: 10 0 standard drinks     Types: 5 Cans of beer, 5 Standard drinks or equivalent per week     Comment: daily   • Drug use: No     Exam; incision is clean dry and intact  House for safe removal of the staples at today's appointment  She has had slight decrease of her swelling    She has the absence of left calf pain      Impression;    2 weeks postop left total knee replacement    Plan;    Staples removed  Steri-Strips applied  Patient may shower and was instructed on how to  May not submerge the limb or immerse it    Continue physical therapy  DVT prophylaxis   pain medication was ordered on her behalf    Entire experience was supervised by and plan formulated by the attending surgeon it was my privilege to assist him in the delivery of her care

## 2023-03-02 ENCOUNTER — OFFICE VISIT (OUTPATIENT)
Dept: PHYSICAL THERAPY | Facility: CLINIC | Age: 63
End: 2023-03-02

## 2023-03-02 DIAGNOSIS — G89.29 CHRONIC PAIN OF LEFT KNEE: Primary | ICD-10-CM

## 2023-03-02 DIAGNOSIS — M25.562 CHRONIC PAIN OF LEFT KNEE: Primary | ICD-10-CM

## 2023-03-02 DIAGNOSIS — Z96.652 TOTAL KNEE REPLACEMENT STATUS, LEFT: ICD-10-CM

## 2023-03-02 NOTE — PROGRESS NOTES
Daily Note     Today's date: 3/2/2023  Patient name: Emy Walsh  : 1960  MRN: [id number]  Referring provider: Tulio Ross  Dx:   Encounter Diagnosis     ICD-10-CM    1  Chronic pain of left knee  M25 562     G89 29       2  Total knee replacement status, left  Z96 652                      Subjective: Patient reports minimal left knee pain currently  Objective: See treatment diary below  PROM: 7-90 deg    Assessment: Patient demonstrated increased knee extension ROM, improved tolerance to quad strengthening  Plan: Cont  POC  Precautions:  PMHx: R hip OA, R TKA, anxiety  Dx: s/p L TKA 23    Manuals 2/6 2/16 2/20 2/23 2/27 3/2       L knee PROM  10 min  10 min 10 min 10 min                                              Neuro Re-Ed                                                                                                        Ther Ex             Bike: rocking  S8  3 min S8  5 min S8  5 min S8  5 min S8 5 min       Heel slides 5"x5 5"x5 5"x10 5"x15 5"x15 5"x15       Supine GA stretch 20"x1 10"x3 20"x3 20"x3 20"x3 20"x3       Quad sets 5"x15 5"x5 5"x10 5"x15 5"x15 5"x15       SAQ AAROM 1x5 1x10 2x10 3x10 3x10  AROM 3x10 AROM       STS   Foam  1x5 Foam  1x5 Foam  1x10 Foam  1x10       Static bag hang     0#  3 min 0#  3 min                    Ther Activity                                       Gait Training             Step-ups     4"  1x10 fwd 4"   1x10 fwd       Gait training on floor  FWW  1x100 ft FWW  1x250 ft                       Modalities

## 2023-03-06 ENCOUNTER — OFFICE VISIT (OUTPATIENT)
Dept: PHYSICAL THERAPY | Facility: CLINIC | Age: 63
End: 2023-03-06

## 2023-03-06 DIAGNOSIS — M25.562 CHRONIC PAIN OF LEFT KNEE: Primary | ICD-10-CM

## 2023-03-06 DIAGNOSIS — G89.29 CHRONIC PAIN OF LEFT KNEE: Primary | ICD-10-CM

## 2023-03-06 DIAGNOSIS — Z96.652 TOTAL KNEE REPLACEMENT STATUS, LEFT: ICD-10-CM

## 2023-03-06 NOTE — PROGRESS NOTES
Daily Note     Today's date: 3/6/2023  Patient name: Brian Roberts  : 1960  MRN: [id number]  Referring provider: Christy Payne  Dx:   Encounter Diagnosis     ICD-10-CM    1  Chronic pain of left knee  M25 562     G89 29       2  Total knee replacement status, left  Z96 652           Start Time: 1200  Stop Time: 1250  Total time in clinic (min): 50 minutes    Subjective: Patient notes continued pain and stiffness but has been compliant with her HEP  Objective: See treatment diary below  PROM: 5-90 deg    Assessment: Patient PROM demonstrated minor changes since last visit  Patient able to complete SLR with minimal quad lag but significant fatigue  Patient improving with ability to complete a 4 inch step up with no compensation  Progress as able  Plan: Cont  POC  Precautions:  PMHx: R hip OA, R TKA, anxiety  Dx: s/p L TKA 23    Manuals 2/6 2/16 2/20 2/23 2/27 3/2 3/6      L knee PROM  10 min  10 min 10 min 10 min 10 min                                             Neuro Re-Ed                                                                                                        Ther Ex             Bike: rocking  S8  3 min S8  5 min S8  5 min S8  5 min S8 5 min S9-8 5 min      Heel slides 5"x5 5"x5 5"x10 5"x15 5"x15 5"x15 5"x15      Supine GA stretch 20"x1 10"x3 20"x3 20"x3 20"x3 20"x3 20"x3      Quad sets 5"x15 5"x5 5"x10 5"x15 5"x15 5"x15 5"x15      SAQ AAROM 1x5 1x10 2x10 3x10 3x10  AROM 3x10 AROM 3x10 AROM      STS   Foam  1x5 Foam  1x5 Foam  1x10 Foam  1x10 Foam  1x15      Static bag hang     0#  3 min 0#  3 min 0#  3 min      SLR       2x5                                Ther Activity                                       Gait Training             Step-ups     4"  1x10 fwd 4"   1x10 fwd 4"  1x15  Fwd      Gait training on floor  FWW  1x100 ft FWW  1x250 ft                       Modalities

## 2023-03-09 ENCOUNTER — OFFICE VISIT (OUTPATIENT)
Dept: PHYSICAL THERAPY | Facility: CLINIC | Age: 63
End: 2023-03-09

## 2023-03-09 DIAGNOSIS — Z96.652 TOTAL KNEE REPLACEMENT STATUS, LEFT: ICD-10-CM

## 2023-03-09 DIAGNOSIS — G89.29 CHRONIC PAIN OF LEFT KNEE: Primary | ICD-10-CM

## 2023-03-09 DIAGNOSIS — M25.562 CHRONIC PAIN OF LEFT KNEE: Primary | ICD-10-CM

## 2023-03-09 NOTE — PROGRESS NOTES
Daily Note     Today's date: 3/9/2023  Patient name: Eugene Pelletier  : 1960  MRN: [id number]  Referring provider: Julio Dominguez  Dx:   Encounter Diagnosis     ICD-10-CM    1  Chronic pain of left knee  M25 562     G89 29       2  Total knee replacement status, left  Z96 652                      Subjective: Patient reports feeling very stiff in her left knee  Objective: See treatment diary below  PROM: 4-93 deg    Assessment: Patient demonstrated increased knee PROM and increased quad stability with SLRs  Plan: Cont  POC  Precautions:  PMHx: R hip OA, R TKA, anxiety  Dx: s/p L TKA 23    Manuals 2/6 2/16 2/20 2/23 2/27 3/2 3/6 3/9     L knee PROM  10 min  10 min 10 min 10 min 10 min 10 min                                            Neuro Re-Ed                                                                                                        Ther Ex             Bike: rocking  S8  3 min S8  5 min S8  5 min S8  5 min S8 5 min S9-8 5 min S8 5 min     Heel slides 5"x5 5"x5 5"x10 5"x15 5"x15 5"x15 5"x15 5"x15     Supine GA stretch 20"x1 10"x3 20"x3 20"x3 20"x3 20"x3 20"x3 20"x3     Quad sets 5"x15 5"x5 5"x10 5"x15 5"x15 5"x15 5"x15 5"x15     SAQ AAROM 1x5 1x10 2x10 3x10 3x10  AROM 3x10 AROM 3x10 AROM 3x10 AROM     STS   Foam  1x5 Foam  1x5 Foam  1x10 Foam  1x10 Foam  1x15 Foam  1x15     Static bag hang     0#  3 min 0#  3 min 0#  3 min 0#  3 min 1#  3 min    SLR       2x5 2x5                               Ther Activity                                       Gait Training             Step-ups     4"  1x10 fwd 4"   1x10 fwd 4"  1x15  Fwd nv     Gait training on floor  FWW  1x100 ft FWW  1x250 ft                       Modalities

## 2023-03-13 ENCOUNTER — OFFICE VISIT (OUTPATIENT)
Dept: PHYSICAL THERAPY | Facility: CLINIC | Age: 63
End: 2023-03-13

## 2023-03-13 DIAGNOSIS — G89.29 CHRONIC PAIN OF LEFT KNEE: Primary | ICD-10-CM

## 2023-03-13 DIAGNOSIS — M25.562 CHRONIC PAIN OF LEFT KNEE: Primary | ICD-10-CM

## 2023-03-13 DIAGNOSIS — Z96.652 TOTAL KNEE REPLACEMENT STATUS, LEFT: ICD-10-CM

## 2023-03-13 NOTE — PROGRESS NOTES
Daily Note     Today's date: 3/13/2023  Patient name: Alan Quintero  : 1960  MRN: [id number]  Referring provider: Clarisa Perdue  Dx:   Encounter Diagnosis     ICD-10-CM    1  Chronic pain of left knee  M25 562     G89 29       2  Total knee replacement status, left  Z96 652                      Subjective: Pt reports L knee stiffness and pain and feeling frustrated about her progress post-operatively  Objective: See treatment diary below  PROM: 4-94 deg    Assessment: Pt demonstrated no significant change in PROM, good gait stability with SPC  Plan: Cont  POC  Precautions:  PMHx: R hip OA, R TKA, anxiety  Dx: s/p L TKA 23    Manuals 2/6 2/16 2/20 2/23 2/27 3/2 3/6 3/9 3/13    L knee PROM  10 min  10 min 10 min 10 min 10 min 10 min 15 min                                           Neuro Re-Ed                                                                                                        Ther Ex             Bike: rocking  S8  3 min S8  5 min S8  5 min S8  5 min S8 5 min S9-8 5 min S8 5 min S7  7 min  S6  3 min    Heel slides 5"x5 5"x5 5"x10 5"x15 5"x15 5"x15 5"x15 5"x15 5"x15    Supine GA stretch 20"x1 10"x3 20"x3 20"x3 20"x3 20"x3 20"x3 20"x3     Standing GA stretch         20"x3    Quad sets 5"x15 5"x5 5"x10 5"x15 5"x15 5"x15 5"x15 5"x15 5"x20    SAQ AAROM 1x5 1x10 2x10 3x10 3x10  AROM 3x10 AROM 3x10 AROM 3x10 AROM     STS   Foam  1x5 Foam  1x5 Foam  1x10 Foam  1x10 Foam  1x15 Foam  1x15 No foam  1x10    Static bag hang     0#  3 min 0#  3 min 0#  3 min 0#  3 min HEP    SLR       2x5 2x5                               Ther Activity                                       Gait Training             Step-ups/  downs     4"  1x10 fwd 4"   1x10 fwd 4"  1x15  Fwd nv 4"/  1x10 6"/  1x10   Gait training on floor  FWW  1x100 ft FWW  1x250 ft      SPC  1x300 ft                 Modalities

## 2023-03-16 ENCOUNTER — OFFICE VISIT (OUTPATIENT)
Dept: PHYSICAL THERAPY | Facility: CLINIC | Age: 63
End: 2023-03-16

## 2023-03-16 DIAGNOSIS — M25.562 CHRONIC PAIN OF LEFT KNEE: Primary | ICD-10-CM

## 2023-03-16 DIAGNOSIS — Z96.652 TOTAL KNEE REPLACEMENT STATUS, LEFT: ICD-10-CM

## 2023-03-16 DIAGNOSIS — G89.29 CHRONIC PAIN OF LEFT KNEE: Primary | ICD-10-CM

## 2023-03-16 NOTE — PROGRESS NOTES
Daily Note     Today's date: 3/16/2023  Patient name: Jennifer Quinn  : 1960  MRN: [id number]  Referring provider: Jose L Salcedo  Dx:   Encounter Diagnosis     ICD-10-CM    1  Chronic pain of left knee  M25 562     G89 29       2  Total knee replacement status, left  Z96 652                      Subjective: Pt reports continued left knee stiffness, difficulty walking without a limp when using SPC  Objective: See treatment diary below  PROM: 4-100 deg    Assessment: Pt demonstrated increased knee flexion, continued need for FWW with community ambulation at this time  Plan: Assess SPC ambulation outdoors  Precautions:  PMHx: R hip OA, R TKA, anxiety  Dx: s/p L TKA 23    Manuals 2/6 2/16 2/20 2/23 2/27 3/2 3/6 3/9 3/13 3/16   L knee PROM  10 min  10 min 10 min 10 min 10 min 10 min 15 min 14 min                                          Neuro Re-Ed                                                                                                        Ther Ex             Bike: rocking  S8  3 min S8  5 min S8  5 min S8  5 min S8 5 min S9-8 5 min S8 5 min S7  7 min  S6  3 min S7 5 min   Heel slides 5"x5 5"x5 5"x10 5"x15 5"x15 5"x15 5"x15 5"x15 5"x15 5"x15   Supine GA stretch 20"x1 10"x3 20"x3 20"x3 20"x3 20"x3 20"x3 20"x3     Standing GA stretch         20"x3 20"x3   Quad sets 5"x15 5"x5 5"x10 5"x15 5"x15 5"x15 5"x15 5"x15 5"x20 5"x20   SAQ AAROM 1x5 1x10 2x10 3x10 3x10  AROM 3x10 AROM 3x10 AROM 3x10 AROM     STS   Foam  1x5 Foam  1x5 Foam  1x10 Foam  1x10 Foam  1x15 Foam  1x15 No foam  1x10 No foam 1x10   Static bag hang     0#  3 min 0#  3 min 0#  3 min 0#  3 min HEP    SLR       2x5 2x5  2x5                             Ther Activity                                       Gait Training             Step-ups/  downs     4"  1x10 fwd 4"   1x10 fwd 4"  1x15  Fwd nv 4"/  1x10 6"/  1x10   Gait training on floor  FWW  1x100 ft FWW  1x250 ft      SPC  1x300 ft                 Modalities

## 2023-03-20 ENCOUNTER — OFFICE VISIT (OUTPATIENT)
Dept: PHYSICAL THERAPY | Facility: CLINIC | Age: 63
End: 2023-03-20

## 2023-03-20 DIAGNOSIS — Z96.652 TOTAL KNEE REPLACEMENT STATUS, LEFT: ICD-10-CM

## 2023-03-20 DIAGNOSIS — M25.562 CHRONIC PAIN OF LEFT KNEE: Primary | ICD-10-CM

## 2023-03-20 DIAGNOSIS — G89.29 CHRONIC PAIN OF LEFT KNEE: Primary | ICD-10-CM

## 2023-03-20 NOTE — PROGRESS NOTES
Daily Note     Today's date: 3/20/2023  Patient name: Sera Schrader  : 1960  MRN: [id number]  Referring provider: Alexi Guerra  Dx:   Encounter Diagnosis     ICD-10-CM    1  Chronic pain of left knee  M25 562     G89 29       2  Total knee replacement status, left  Z96 652                      Subjective: Pt reports not taking any pain medication prior to PT  Objective: See treatment diary below  PROM: 4-102 deg    Assessment: Pt demonstrated significant difficulty trying to improve ROM  Plan: Cont  POC  Precautions:  PMHx: R hip OA, R TKA, anxiety  Dx: s/p L TKA 23    Manuals 3/20         3/16   L knee PROM 15 min         14 min                                          Neuro Re-Ed                                                                                                        Ther Ex             Bike: rocking S8  3 min  S7 3 min  S6  3 min         S7 5 min   Heel slides 10"x20         5"x15                Standing GA stretch 20"x4         20"x3   Quad sets 5"x20         5"x20   SAQ AAROM             STS No foam  1x10 No foam  2x10        No foam 1x10   Static bag hang             SLR 1x10 2x10        2x5                             Ther Activity                                       Gait Training             Step-ups/  downs 6"/  1x10         6"/  1x10   Gait training on floor SPC  400 ft                         Modalities

## 2023-03-23 ENCOUNTER — OFFICE VISIT (OUTPATIENT)
Dept: PHYSICAL THERAPY | Facility: CLINIC | Age: 63
End: 2023-03-23

## 2023-03-23 DIAGNOSIS — M25.562 CHRONIC PAIN OF LEFT KNEE: Primary | ICD-10-CM

## 2023-03-23 DIAGNOSIS — Z96.652 TOTAL KNEE REPLACEMENT STATUS, LEFT: ICD-10-CM

## 2023-03-23 DIAGNOSIS — G89.29 CHRONIC PAIN OF LEFT KNEE: Primary | ICD-10-CM

## 2023-03-23 NOTE — PROGRESS NOTES
Daily Note     Today's date: 3/23/2023  Patient name: Suyapa Hunter  : 1960  MRN: [id number]  Referring provider: Vince Bran  Dx:   Encounter Diagnosis     ICD-10-CM    1  Chronic pain of left knee  M25 562     G89 29       2  Total knee replacement status, left  Z96 652                      Subjective: Pt reports fair HEP compliance because of being sick over the past couple of days  Objective: See treatment diary below  PROM: 4-107 deg    Assessment: Pt demonstrated slowly increasing knee flexion PROM but limited knee extension  Plan: Cont  POC  Precautions:  PMHx: R hip OA, R TKA, anxiety  Dx: s/p L TKA 23    Manuals 3/20 3/23        3/16   L knee PROM 15 min 15 min        14 min                                          Neuro Re-Ed                                                                                                        Ther Ex             Bike: rocking S8  3 min  S7 3 min  S6  3 min S8  3 min  S7 3 min  S6  3 min        S7 5 min   Heel slides 10"x20 10"x20        5"x15                Standing GA stretch 20"x4 20"x4        20"x3   Quad sets 5"x20 5"x20        5"x20   SAQ AAROM             STS No foam  1x10 No foam  2x10        No foam 1x10   Static bag hang             SLR 1x10 2x10        2x5                             Ther Activity                                       Gait Training             Step-ups/  downs 6"/  1x10 6"/ 1x10        6"/  1x10   Gait training on floor SPC  400 ft  ft                        Modalities

## 2023-03-27 ENCOUNTER — EVALUATION (OUTPATIENT)
Dept: PHYSICAL THERAPY | Facility: CLINIC | Age: 63
End: 2023-03-27

## 2023-03-27 DIAGNOSIS — Z96.652 AFTERCARE FOLLOWING LEFT KNEE JOINT REPLACEMENT SURGERY: Primary | ICD-10-CM

## 2023-03-27 DIAGNOSIS — Z96.652 TOTAL KNEE REPLACEMENT STATUS, LEFT: ICD-10-CM

## 2023-03-27 DIAGNOSIS — G89.29 CHRONIC PAIN OF LEFT KNEE: Primary | ICD-10-CM

## 2023-03-27 DIAGNOSIS — M25.562 CHRONIC PAIN OF LEFT KNEE: Primary | ICD-10-CM

## 2023-03-27 DIAGNOSIS — Z47.1 AFTERCARE FOLLOWING LEFT KNEE JOINT REPLACEMENT SURGERY: Primary | ICD-10-CM

## 2023-03-27 NOTE — PROGRESS NOTES
PT Re-Evaluation     Today's date: 3/27/2023  Patient name: Bo Soriano  : 1960  MRN: [id number]  Referring provider: Flaca Pope  Dx:   Encounter Diagnosis     ICD-10-CM    1  Chronic pain of left knee  M25 562     G89 29       2  Total knee replacement status, left  Z96 652                      Assessment  Assessment details: Pt demonstrates improved ROM, strength, WB tolerance, edema, and pain  She demonstrates difficulty progressing extension ROM and will benefit from a Dynasplint to address her extension ROM impairments  She will continue to benefit from skilled PT services to address her impairments in order to further improve pain and function    Impairments: abnormal gait, abnormal muscle firing, abnormal or restricted ROM, abnormal movement, activity intolerance, impaired physical strength, lacks appropriate home exercise program, pain with function, weight-bearing intolerance and poor body mechanics  Understanding of Dx/Px/POC: good   Prognosis: good    Goals  STG - 4 wks  1) pt will demonstrate PROM 0-90 deg (partially met)  2) pt will d/c FWW (met)    MTG - 8 wks  1) pt will demonstrate PROM 0-110 deg  2) pt will d/c SPC  3) pt will report no difficulty with light ADLs    LTG - 12 wks  1) pt will demonstrate PROM 0-120 deg  2) pt will demonstrate normalized gait  3) pt will report reciprocal gait on stairs  4) pt will initiate daily walking routine for fitness    Plan  Planned modality interventions: cryotherapy  Planned therapy interventions: joint mobilization, manual therapy, balance/weight bearing training, body mechanics training, neuromuscular re-education, patient education, stretching, strengthening, therapeutic activities, therapeutic exercise, home exercise program, gait training, flexibility and functional ROM exercises  Frequency: 2x week  Duration in weeks: 8  Treatment plan discussed with: patient        Subjective Evaluation    History of Present Illness  Mechanism of "injury: Pt is a 63 y o  female with PMHx significant for R hip OA, R TKA Aug '22, anxiety  She reports SPC use for community ambulation and a 20-minute ambulation tolerance  Pain  Current pain ratin  At best pain ratin  At worst pain ratin  Location: L knee  Quality: tight, dull ache and pressure  Relieving factors: rest and medications  Aggravating factors: standing, walking and stair climbing    Social Support  Steps to enter house: yes (2 KATE)  Stairs in house: yes (FF to basement)   Lives in: one-story house  Lives with: spouse    Employment status: working  Treatments  No previous or current treatments  Previous treatment: physical therapy  Current treatment: physical therapy  Patient Goals  Patient goals for therapy: decreased edema, decreased pain, improved balance, increased strength, independence with ADLs/IADLs, return to sport/leisure activities, return to work and increased motion          Objective     Observations   Left Knee   Positive for edema (2+) and incision  Negative for deformity and drainage  Passive Range of Motion   Left Knee   Flexion: 108 degrees with pain  Extension: 6 degrees with pain    Strength/Myotome Testing     Left Hip   Planes of Motion   Flexion: 4-  Extension: 4-  Abduction: 3+  Adduction: 4-    Left Knee   Flexion: 3+  Extension: 3+  Quadriceps contraction: fair    General Comments:      Knee Comments  Gait: no AD: mildly decreased L stance time and terminal knee flexion at pre-swing    STS: mild UE assist    SLS EO: L: 5 sec             Precautions:  PMHx: R hip OA, R TKA, anxiety  Dx: s/p L TKA 23    Manuals 3/27            L knee PROM 15 min                                                   Neuro Re-Ed                                                                                                        Ther Ex             Bike S7  3 min  S6  3 min  S5  3 min            Heel slides 5\"x20            Standing GA stretch 20\"x4            Quad sets 5\"x20    " "        Standing knee flexion drill 1x10            Standing march drill 1x10            STS 2x10            SLS 60\"x1            SLR 2x10 3x10                                     Ther Activity                                       Gait Training             Step-ups 8\"/  1x10            Step-ups/  downs 6\"/  1x10            Gait training on floor No AD  1x300 ft                         Modalities                                          "

## 2023-03-28 ENCOUNTER — OFFICE VISIT (OUTPATIENT)
Dept: OBGYN CLINIC | Facility: HOSPITAL | Age: 63
End: 2023-03-28

## 2023-03-28 ENCOUNTER — HOSPITAL ENCOUNTER (OUTPATIENT)
Dept: RADIOLOGY | Facility: HOSPITAL | Age: 63
Discharge: HOME | End: 2023-03-28
Attending: ORTHOPAEDIC SURGERY

## 2023-03-28 VITALS
HEIGHT: 64 IN | HEART RATE: 70 BPM | BODY MASS INDEX: 31.24 KG/M2 | DIASTOLIC BLOOD PRESSURE: 77 MMHG | WEIGHT: 183 LBS | SYSTOLIC BLOOD PRESSURE: 139 MMHG

## 2023-03-28 DIAGNOSIS — Z96.651 AFTERCARE FOLLOWING RIGHT KNEE JOINT REPLACEMENT SURGERY: Primary | ICD-10-CM

## 2023-03-28 DIAGNOSIS — Z47.1 AFTERCARE FOLLOWING RIGHT KNEE JOINT REPLACEMENT SURGERY: Primary | ICD-10-CM

## 2023-03-28 DIAGNOSIS — Z47.1 AFTERCARE FOLLOWING RIGHT KNEE JOINT REPLACEMENT SURGERY: ICD-10-CM

## 2023-03-28 DIAGNOSIS — Z96.651 AFTERCARE FOLLOWING RIGHT KNEE JOINT REPLACEMENT SURGERY: ICD-10-CM

## 2023-03-28 DIAGNOSIS — Z96.652 S/P TOTAL KNEE REPLACEMENT, LEFT: ICD-10-CM

## 2023-03-28 NOTE — PROGRESS NOTES
Subjective;    82-year-old female patient accompanied by her   6 months status post successful right total knee replacement  6 weeks postop from left total knee replacement  She is doing physical therapy  She finds that different physical therapist's have different range of motion numbers as they pertain to her experience  Xrays of the right knee were appropriate at today's appointment  Past Medical History:   Diagnosis Date   • Anxiety     MENOPAUSE   • Arthritis     KNEES   • Chest pain syndrome 03/29/2022   • Chronic pain disorder    • Diverticulosis    • GERD (gastroesophageal reflux disease)    • Hypertension     1 episode last year went to ER; no problems since   • Renal dysplasia     nonfunctioning left life-long       Past Surgical History:   Procedure Laterality Date   • COLONOSCOPY  03/2016   • FL INJECTION RIGHT HIP (NON ARTHROGRAM)  5/10/2022   • FL INJECTION RIGHT HIP (NON ARTHROGRAM)  11/3/2022   • KIDNEY SURGERY Right     age 29   • CA ARTHRP KNE CONDYLE&PLATU MEDIAL&LAT COMPARTMENTS Right 8/1/2022    Procedure: ARTHROPLASTY KNEE TOTAL W ROBOT;  Surgeon: Fernando Garay MD;  Location: [facility];  Service: Orthopedics   • CA ARTHRP KNE CONDYLE&PLATU MEDIAL&LAT COMPARTMENTS Left 2/13/2023    Procedure: ARTHROPLASTY KNEE TOTAL W ROBOT;  Surgeon: Fernando Garay MD;  Location: [facility];  Service: Orthopedics   • REDUCTION MAMMAPLASTY     • WRIST SURGERY Right     fx       Family History   Problem Relation Age of Onset   • Diabetes Father    • Breast cancer Neg Hx    • Ovarian cancer Neg Hx    • Colon cancer Neg Hx        Social History     Tobacco Use   • Smoking status: Former     Packs/day: 0 25     Years: 15 00     Pack years: 3 75     Types: Cigarettes   • Smokeless tobacco: Never   Vaping Use   • Vaping Use: Never used   Substance Use Topics   • Alcohol use:  Yes     Alcohol/week: 10 0 standard drinks     Types: 5 Cans of beer, 5 Standard drinks or equivalent per week Comment: daily   • Drug use: No     Exam;    Adult female, both knees have anterior incisions that are mature and well-healed  Range  Of motion left knee, 5-7 degree Extension lag, to Flexion of 95+ degrees  She has no medial lateral instability to varus or valgus stress  She exhibits no anterior posterior instability    Right knee exhibits fluid range of motion, without pain    X-rays; right knee series total knee components in excellent position    Impression;    6 weeks postop left knee  6 months postop right knee    Plan;    [facility] physical therapy emphasis on terminal extension of knee, and Improved Flexion Left Knee  Next follow-up visit will be in 6 additional weeks  Xrays left knee at that visit

## 2023-03-30 ENCOUNTER — OFFICE VISIT (OUTPATIENT)
Dept: PHYSICAL THERAPY | Facility: CLINIC | Age: 63
End: 2023-03-30

## 2023-03-30 DIAGNOSIS — G89.29 CHRONIC PAIN OF LEFT KNEE: Primary | ICD-10-CM

## 2023-03-30 DIAGNOSIS — M25.562 CHRONIC PAIN OF LEFT KNEE: Primary | ICD-10-CM

## 2023-03-30 DIAGNOSIS — Z96.652 TOTAL KNEE REPLACEMENT STATUS, LEFT: ICD-10-CM

## 2023-03-30 NOTE — PROGRESS NOTES
"Daily Note     Today's date: 3/30/2023  Patient name: Jody Wilhelm  : 1960  MRN: [id number]  Referring provider: Brandon Casper  Dx:   Encounter Diagnosis     ICD-10-CM    1  Chronic pain of left knee  M25 562     G89 29       2  Total knee replacement status, left  Z96 652                      Subjective: Pt reports left knee stiffness pre tx  Objective: See treatment diary below; L knee PROM: 5-109 degrees      Assessment: Pt demonstrated min lateral trunk lean during gait training, fair SLS balance indicating need of SPC at this time  Plan: Continue poc as per PT  Precautions:  PMHx: R hip OA, R TKA, anxiety  Dx: s/p L TKA 23    Manuals 3/27 3/30           L knee PROM 15 min 15 min                                                  Neuro Re-Ed                                                                                                        Ther Ex             Bike S7  3 min  S6  3 min  S5  3 min S7  3 min  S6  3 min  S5  3 min           Heel slides 5\"x20 5\"x20           Standing GA stretch 20\"x4 20\"x4           Quad sets 5\"x20 5\"x20           Standing knee flexion drill 1x10 1x10           Standing march drill 1x10 1x10           STS 2x10 nv           SLS 60\"x1 60\"x1           SLR 2x10 3x10                                     Ther Activity                                       Gait Training             Step-ups 8\"/  1x10 8\"/  1x10           Step-ups/  downs 6\"/  1x10 6\"/  1x10           Gait training on floor No AD  1x300 ft No AD  1x300 ft                        Modalities                                            "

## 2023-04-03 ENCOUNTER — OFFICE VISIT (OUTPATIENT)
Dept: PHYSICAL THERAPY | Facility: CLINIC | Age: 63
End: 2023-04-03

## 2023-04-03 DIAGNOSIS — Z96.652 TOTAL KNEE REPLACEMENT STATUS, LEFT: ICD-10-CM

## 2023-04-03 DIAGNOSIS — G89.29 CHRONIC PAIN OF LEFT KNEE: Primary | ICD-10-CM

## 2023-04-03 DIAGNOSIS — M25.562 CHRONIC PAIN OF LEFT KNEE: Primary | ICD-10-CM

## 2023-04-03 NOTE — PROGRESS NOTES
"Daily Note     Today's date: 4/3/2023  Patient name: Sparkle Uribe  : 1960  MRN: [id number]  Referring provider: Candie Alexis  Dx:   Encounter Diagnosis     ICD-10-CM    1  Chronic pain of left knee  M25 562     G89 29       2  Total knee replacement status, left  Z96 652           Start Time: 0930  Stop Time: 1035  Total time in clinic (min): 65 minutes    Subjective: Patient states, \"My L knee stiff feels stiff\"  Objective: See treatment diary below; L knee PROM: 5-111 degrees    Assessment: Patient demonstrates consistent L PROM knee ext and slightly improved flexion  L knee flexion is limited by pain  Plan: Continue poc as per PT  Precautions:  PMHx: R hip OA, R TKA, anxiety  Dx: s/p L TKA 23    Manuals 3/27 3/30 4/3          L knee PROM 15 min 15 min 15 min                                                 Neuro Re-Ed                                                                                                        Ther Ex             Bike S7  3 min  S6  3 min  S5  3 min S7  3 min  S6  3 min  S5  3 min S7  3 min  S6  3 min  S5  3 min          Heel slides 5\"x20 5\"x20 5\"x20          Standing GA stretch 20\"x4 20\"x4 20\"x4          Quad sets 5\"x20 5\"x20 5\"x20          Standing knee flexion drill 1x10 1x10 1x10          Standing march drill 1x10 1x10 1x10          STS 2x10 nv           SLS 60\"x1 60\"x1 60\"x1          SLR 2x10 3x10 3x10                                    Ther Activity                                       Gait Training             Step-ups 8\"/  1x10 8\"/  1x10 8\"/  1x10          Step-ups/  downs 6\"/  1x10 6\"/  1x10 6\"/  1x10          Gait training on floor No AD  1x300 ft No AD  1x300 ft No AD  1x300 ft                       Modalities                                          "

## 2023-04-05 ENCOUNTER — TELEPHONE (OUTPATIENT)
Dept: OBGYN CLINIC | Facility: HOSPITAL | Age: 63
End: 2023-04-05

## 2023-04-05 NOTE — TELEPHONE ENCOUNTER
Caller: Self    Doctor: Landy Avila    Reason for call: Patient wanted to let provider know that she had emergency dental work done and was prescribed antibiotics for 10 days (amoxicillin)    Call back#: [phone]

## 2023-04-06 ENCOUNTER — OFFICE VISIT (OUTPATIENT)
Dept: PHYSICAL THERAPY | Facility: CLINIC | Age: 63
End: 2023-04-06

## 2023-04-06 DIAGNOSIS — M25.562 CHRONIC PAIN OF LEFT KNEE: Primary | ICD-10-CM

## 2023-04-06 DIAGNOSIS — G89.29 CHRONIC PAIN OF LEFT KNEE: Primary | ICD-10-CM

## 2023-04-06 DIAGNOSIS — Z96.652 TOTAL KNEE REPLACEMENT STATUS, LEFT: ICD-10-CM

## 2023-04-06 NOTE — PROGRESS NOTES
"Daily Note     Today's date: 2023  Patient name: Patricia Mata  : 1960  MRN: [id number]  Referring provider: Kevyn Cohen  Dx:   Encounter Diagnosis     ICD-10-CM    1  Chronic pain of left knee  M25 562     G89 29       2  Total knee replacement status, left  Z96 652           Start Time: 09  Stop Time: 1025  Total time in clinic (min): 50 minutes    Subjective: Patient reports stiffness prior to therapy  Objective: See treatment diary below    Assessment: Patients PROM is limited by pain  She demonstrates good functional ability during assigned TE  Plan: Continue poc as per PT  Precautions:  PMHx: R hip OA, R TKA, anxiety  Dx: s/p L TKA 23    Manuals 3/27 3/30 4/3 4/6         L knee PROM 15 min 15 min 15 min 15 min                                                Neuro Re-Ed                                                                                                        Ther Ex             Bike S7  3 min  S6  3 min  S5  3 min S7  3 min  S6  3 min  S5  3 min S7  3 min  S6  3 min  S5  3 min S7  3 min  S6  3 min  S5  3 min         Heel slides 5\"x20 5\"x20 5\"x20 5\"x20         Standing GA stretch 20\"x4 20\"x4 20\"x4 20\"x4         Quad sets 5\"x20 5\"x20 5\"x20 5\"x20         Standing knee flexion drill 1x10 1x10 1x10 1x10         Standing march drill 1x10 1x10 1x10 1x10         STS 2x10 nv  2x10         SLS 60\"x1 60\"x1 60\"x1 60\"x1         SLR 2x10 3x10 3x10 3x10                                   Ther Activity                                       Gait Training             Step-ups 8\"/  1x10 8\"/  1x10 8\"/  1x10 8\"/  1x10         Step-ups/  downs 6\"/  1x10 6\"/  1x10 6\"/  1x10 6\"/  1x10         Gait training on floor No AD  1x300 ft No AD  1x300 ft No AD  1x300 ft No AD  1x300 ft                      Modalities                                          "

## 2023-04-20 DIAGNOSIS — Z96.652 AFTERCARE FOLLOWING LEFT KNEE JOINT REPLACEMENT SURGERY: Primary | ICD-10-CM

## 2023-04-20 DIAGNOSIS — Z47.1 AFTERCARE FOLLOWING LEFT KNEE JOINT REPLACEMENT SURGERY: Primary | ICD-10-CM

## 2023-04-24 ENCOUNTER — APPOINTMENT (OUTPATIENT)
Dept: PHYSICAL THERAPY | Facility: CLINIC | Age: 63
End: 2023-04-24

## 2023-04-25 ENCOUNTER — APPOINTMENT (OUTPATIENT)
Dept: PHYSICAL THERAPY | Facility: CLINIC | Age: 63
End: 2023-04-25

## 2023-04-27 ENCOUNTER — OFFICE VISIT (OUTPATIENT)
Dept: PHYSICAL THERAPY | Facility: CLINIC | Age: 63
End: 2023-04-27

## 2023-04-27 DIAGNOSIS — G89.29 CHRONIC PAIN OF LEFT KNEE: ICD-10-CM

## 2023-04-27 DIAGNOSIS — Z96.652 TOTAL KNEE REPLACEMENT STATUS, LEFT: Primary | ICD-10-CM

## 2023-04-27 DIAGNOSIS — M25.562 CHRONIC PAIN OF LEFT KNEE: ICD-10-CM

## 2023-04-27 NOTE — PROGRESS NOTES
"Daily Note     Today's date: 2023  Patient name: Sparkle Uribe  : 1960  MRN: [id number]  Referring provider: Rita Kaiser  Dx:   Encounter Diagnosis     ICD-10-CM    1  Total knee replacement status, left  Z96 652       2  Chronic pain of left knee  M25 562     G89 29           Start Time: 0930  Stop Time: 1015  Total time in clinic (min): 45 minutes    Subjective: Patient reports, \"My knee is stiff this morning  I had a very eventful weekend and didn't complete my exercises\"  Patient didn't complete her HEP for 5 days  Objective: See treatment diary below  R knee PROM: 3-110 degrees    Assessment: Patient's PROM did decrease a slight amount after increased activity over the weekend  Continue to focus ROM and functional strength  Plan: Cont  POC  Precautions:  PMHx: R hip OA, R TKA, anxiety  Dx: s/p L TKA 23    Manuals 3/27 3/30 4/3 4/6 4/10 4/13 4/17 4/20 4/27    L knee PROM 15 min 15 min 15 min 15 min 15 min 15 min 15 min 15 min 15 min                                           Neuro Re-Ed                                                                                                        Ther Ex             Bike S7  3 min  S6  3 min  S5  3 min S7  3 min  S6  3 min  S5  3 min S7  3 min  S6  3 min  S5  3 min S7  3 min  S6  3 min  S5  3 min S7  4 min  S6  3 min  S5  3 min S7  4 min  S6  3 min  S5  3 min S5  5 min  S4  5 min S5  5 min  S4  5 min S5  5 min  S4  5 min    Heel slides 5\"x20 5\"x20 5\"x20 5\"x20 HEP        Standing GA stretch 20\"x4 20\"x4 20\"x4 20\"x4 20\"x4 20\"x4 20\"x4 20\"x4 20\"x4    Standing HA stretch     20\"x4 20\"x4 20\"x4 20\"x4 20\"x4    Quad sets 5\"x20 5\"x20 5\"x20 5\"x20         Standing knee flexion drill 1x10 1x10 1x10 1x10 1x10 1x10 1x10 1x10 1x10    Standing march drill 1x10 1x10 1x10 1x10 1x10 1x10 1x10 1x10 1x10    STS 2x10 nv  2x10 2x10 2x10 3x10 3x10 3x10    SLS 60\"x1 60\"x1 60\"x1 60\"x1 60\"x1 60\"x1 60\"x1 60\"x1 60\"x1    SLR 2x10 3x10 3x10 3x10 3x10 3x12 3x12 " "3x15 3x15                              Ther Activity                                       Gait Training             Step-ups 8\"/  1x10 8\"/  1x10 8\"/  1x10 8\"/  1x10 8\"/  1x15 8\"/  1x15 8\"/  2x10      Step-ups/  downs 6\"/  1x10 6\"/  1x10 6\"/  1x10 6\"/  1x10 6\"/  1x15 6\"/  1x15 6\"/  2x10 8\"/  2x10 8\"/  2x10    Gait training on floor No AD  1x300 ft No AD  1x300 ft No AD  1x300 ft No AD  1x300 ft No AD  1x300 ft No AD  300 ft  No AD   1x100 ft No AD   [id number]                 Modalities                                          "

## 2023-05-01 ENCOUNTER — OFFICE VISIT (OUTPATIENT)
Dept: PHYSICAL THERAPY | Facility: CLINIC | Age: 63
End: 2023-05-01

## 2023-05-01 DIAGNOSIS — G89.29 CHRONIC PAIN OF LEFT KNEE: ICD-10-CM

## 2023-05-01 DIAGNOSIS — M25.562 CHRONIC PAIN OF LEFT KNEE: ICD-10-CM

## 2023-05-01 DIAGNOSIS — Z96.652 TOTAL KNEE REPLACEMENT STATUS, LEFT: Primary | ICD-10-CM

## 2023-05-01 NOTE — PROGRESS NOTES
"Daily Note     Today's date: 2023  Patient name: Romulo Lyon  : 1960  MRN: [id number]  Referring provider: Kimberley Robertson  Dx:   Encounter Diagnosis     ICD-10-CM    1  Total knee replacement status, left  Z96 652       2  Chronic pain of left knee  M25 562     G89 29           Start Time: 0935  Stop Time: 1025  Total time in clinic (min): 50 minutes    Subjective: Patient reports stiffness prior to therapy  Objective: See treatment diary below  R knee PROM: 2-115 degrees    Assessment: Patient able to regain her previous PROM progress after her busy weekend and no exercise for 5 days  Less pain noted at end range flexion and extension  Plan: Cont  POC  Precautions:  PMHx: R hip OA, R TKA, anxiety  Dx: s/p L TKA 23    Manuals 3/27 3/30 4/3 4/6 4/10 4/13 4/17 4/20 4/27 5/1   L knee PROM 15 min 15 min 15 min 15 min 15 min 15 min 15 min 15 min 15 min 15 min                                          Neuro Re-Ed                                                                                                        Ther Ex             Bike S7  3 min  S6  3 min  S5  3 min S7  3 min  S6  3 min  S5  3 min S7  3 min  S6  3 min  S5  3 min S7  3 min  S6  3 min  S5  3 min S7  4 min  S6  3 min  S5  3 min S7  4 min  S6  3 min  S5  3 min S5  5 min  S4  5 min S5  5 min  S4  5 min S5  5 min  S4  5 min S5  5 min  S4  5 min   Heel slides 5\"x20 5\"x20 5\"x20 5\"x20 HEP        Standing GA stretch 20\"x4 20\"x4 20\"x4 20\"x4 20\"x4 20\"x4 20\"x4 20\"x4 20\"x4 20\"x4   Standing HA stretch     20\"x4 20\"x4 20\"x4 20\"x4 20\"x4 20\"x4   Quad sets 5\"x20 5\"x20 5\"x20 5\"x20         Standing knee flexion drill 1x10 1x10 1x10 1x10 1x10 1x10 1x10 1x10 1x10 1x10   Standing march drill 1x10 1x10 1x10 1x10 1x10 1x10 1x10 1x10 1x10 1x10   STS 2x10 nv  2x10 2x10 2x10 3x10 3x10 3x10 3x10   SLS 60\"x1 60\"x1 60\"x1 60\"x1 60\"x1 60\"x1 60\"x1 60\"x1 60\"x1 60\"x1   SLR 2x10 3x10 3x10 3x10 3x10 3x12 3x12 3x15 3x15 3x15                           " "  Ther Activity                                       Gait Training             Step-ups 8\"/  1x10 8\"/  1x10 8\"/  1x10 8\"/  1x10 8\"/  1x15 8\"/  1x15 8\"/  2x10      Step-ups/  downs 6\"/  1x10 6\"/  1x10 6\"/  1x10 6\"/  1x10 6\"/  1x15 6\"/  1x15 6\"/  2x10 8\"/  2x10 8\"/  2x10 8\"/  2x10   Gait training on floor No AD  1x300 ft No AD  1x300 ft No AD  1x300 ft No AD  1x300 ft No AD  1x300 ft No AD  300 ft  No AD   1x100 ft No AD   [id number] No AD   [id number]                Modalities                                          "

## 2023-05-03 ENCOUNTER — EVALUATION (OUTPATIENT)
Dept: PHYSICAL THERAPY | Facility: CLINIC | Age: 63
End: 2023-05-03

## 2023-05-03 DIAGNOSIS — M25.562 CHRONIC PAIN OF LEFT KNEE: Primary | ICD-10-CM

## 2023-05-03 DIAGNOSIS — Z96.652 TOTAL KNEE REPLACEMENT STATUS, LEFT: ICD-10-CM

## 2023-05-03 DIAGNOSIS — G89.29 CHRONIC PAIN OF LEFT KNEE: Primary | ICD-10-CM

## 2023-05-03 NOTE — PROGRESS NOTES
PT Re-Evaluation     Today's date: 5/3/2023  Patient name: Camryn   : 1960  MRN: [id number]  Referring provider: Dameon Suarez  Dx:   Encounter Diagnosis     ICD-10-CM    1  Chronic pain of left knee  M25 562     G89 29       2  Total knee replacement status, left  Z96 652                      Assessment  Assessment details: Pt demonstrates improved ROM, strength, WB tolerance, gait, SLS, and stair tolerance  She will continue to benefit from skilled PT services to address her remaining impairments in order to further improve pain and function    Impairments: abnormal gait, abnormal muscle firing, abnormal or restricted ROM, abnormal movement, activity intolerance, impaired physical strength, lacks appropriate home exercise program, pain with function, weight-bearing intolerance and poor body mechanics  Understanding of Dx/Px/POC: good   Prognosis: good    Goals  STG - 4 wks  1) pt will demonstrate PROM 0-90 deg (partially met)  2) pt will d/c FWW (met)    MTG - 8 wks  1) pt will demonstrate PROM 0-110 deg (partially met)  2) pt will d/c SPC (met)  3) pt will report no difficulty with light ADLs (met)    LTG - 12 wks  1) pt will demonstrate PROM 0-120 deg (not met)  2) pt will demonstrate normalized gait (not met)  3) pt will report reciprocal gait on stairs (not met)  4) pt will initiate daily walking routine for fitness (partially met)    Plan  Planned modality interventions: cryotherapy  Planned therapy interventions: joint mobilization, manual therapy, balance/weight bearing training, body mechanics training, neuromuscular re-education, patient education, stretching, strengthening, therapeutic activities, therapeutic exercise, home exercise program, gait training, flexibility and functional ROM exercises  Frequency: 2x week  Duration in weeks: 6  Treatment plan discussed with: patient        Subjective Evaluation    History of Present Illness  Mechanism of injury: Pt is a 63 y o  female "with PMHx significant for R hip OA, R TKA Aug '22, anxiety  She reports difficulty with 8\" stairs secondary to stiffness and not yet feeling confident without a railing as well as limited gait speeds without AD  Pain  Current pain ratin  At best pain ratin  At worst pain ratin  Location: L knee  Quality: tight and dull ache  Relieving factors: rest and medications  Aggravating factors: stair climbing    Social Support  Steps to enter house: yes (2 KATE)  Stairs in house: yes (FF to basement)   Lives in: one-story house  Lives with: spouse    Employment status: working  Treatments  No previous or current treatments  Previous treatment: physical therapy  Current treatment: physical therapy  Patient Goals  Patient goals for therapy: decreased edema, decreased pain, improved balance, increased strength, independence with ADLs/IADLs, return to sport/leisure activities, return to work and increased motion          Objective     Observations   Left Knee   Positive for edema (2+) and incision  Negative for deformity and drainage  Passive Range of Motion   Left Knee   Flexion: 114 degrees with pain  Extension: 2 degrees with pain    Strength/Myotome Testing     Left Hip   Planes of Motion   Flexion: 4-  Extension: 4-  Abduction: 3+  Adduction: 4-    Left Knee   Flexion: 4  Extension: 4  Quadriceps contraction: good    General Comments:      Knee Comments  Gait: no AD: mildly decreased L terminal knee flexion at pre-swing    STS: mild UE assist    SLS EO: L: 15 sec    Stairs: 8\": poor ecc lowering without railing             Precautions:  PMHx: R hip OA, R TKA, anxiety  Dx: s/p L TKA 23    Manuals 5/3            L knee PROM 15 min                                                   Neuro Re-Ed                                                                                                        Ther Ex             Bike S5  5 min  S4  5 min            Heel slides 5\"x20            Standing GA stretch 20\"x4     " "                                              STS 2x10 D/c           SLS 60\"x1            SLR 1#  3x10            SL hip ABD                                                    Ther Activity                                       Gait Training             Lateral step-ups  6\"/  2x10           Step-ups/  downs 8\"/  2x10 8\"/  3x10           Gait training on floor                          Modalities                                          "

## 2023-05-04 ENCOUNTER — APPOINTMENT (OUTPATIENT)
Dept: PHYSICAL THERAPY | Facility: CLINIC | Age: 63
End: 2023-05-04
Payer: COMMERCIAL

## 2023-05-08 ENCOUNTER — OFFICE VISIT (OUTPATIENT)
Dept: PHYSICAL THERAPY | Facility: CLINIC | Age: 63
End: 2023-05-08

## 2023-05-08 DIAGNOSIS — M25.562 CHRONIC PAIN OF LEFT KNEE: Primary | ICD-10-CM

## 2023-05-08 DIAGNOSIS — Z96.652 TOTAL KNEE REPLACEMENT STATUS, LEFT: ICD-10-CM

## 2023-05-08 DIAGNOSIS — G89.29 CHRONIC PAIN OF LEFT KNEE: Primary | ICD-10-CM

## 2023-05-09 ENCOUNTER — HOSPITAL ENCOUNTER (OUTPATIENT)
Dept: RADIOLOGY | Facility: HOSPITAL | Age: 63
Discharge: HOME | End: 2023-05-09
Attending: ORTHOPAEDIC SURGERY

## 2023-05-09 ENCOUNTER — OFFICE VISIT (OUTPATIENT)
Dept: OBGYN CLINIC | Facility: HOSPITAL | Age: 63
End: 2023-05-09

## 2023-05-09 VITALS
DIASTOLIC BLOOD PRESSURE: 71 MMHG | BODY MASS INDEX: 31.41 KG/M2 | HEIGHT: 64 IN | SYSTOLIC BLOOD PRESSURE: 144 MMHG | HEART RATE: 86 BPM

## 2023-05-09 DIAGNOSIS — M16.11 PRIMARY OSTEOARTHRITIS OF RIGHT HIP: ICD-10-CM

## 2023-05-09 DIAGNOSIS — Z47.1 AFTERCARE FOLLOWING LEFT KNEE JOINT REPLACEMENT SURGERY: Primary | ICD-10-CM

## 2023-05-09 DIAGNOSIS — Z96.652 AFTERCARE FOLLOWING LEFT KNEE JOINT REPLACEMENT SURGERY: ICD-10-CM

## 2023-05-09 DIAGNOSIS — Z96.652 AFTERCARE FOLLOWING LEFT KNEE JOINT REPLACEMENT SURGERY: Primary | ICD-10-CM

## 2023-05-09 DIAGNOSIS — Z47.1 AFTERCARE FOLLOWING LEFT KNEE JOINT REPLACEMENT SURGERY: ICD-10-CM

## 2023-05-09 RX ORDER — AMOXICILLIN 500 MG/1
CAPSULE ORAL
COMMUNITY
Start: 2023-04-05

## 2023-05-09 NOTE — LETTER
May 9, 2023     Patient: Juwan Mosley  YOB: 1960  Date of Visit: 5/9/2023      To Whom it May Concern:    Rizwana Noe is under my professional care  Ezio Carr was seen in my office on 5/9/2023  Ezio Carr may return to work on 5/23/2023  If you have any questions or concerns, please don't hesitate to call           Sincerely,          Reggie Hernandez MD        CC: No Recipients

## 2023-05-09 NOTE — PROGRESS NOTES
63 y o female presents 3 months following left total knee replacement and describes significant relief of pain and improvement in function in her left knee  She is does admit to atraumatic onset and return of weightbearing pain in the right hip and right groin    She has pain level the right hip, the pain is made worse bearing weight, the pain increases with increased activities    Review of Systems  Review of systems negative unless otherwise specified in HPI    Past Medical History  Past Medical History:   Diagnosis Date   • Anxiety     MENOPAUSE   • Arthritis     KNEES   • Chest pain syndrome 03/29/2022   • Chronic pain disorder    • Diverticulosis    • GERD (gastroesophageal reflux disease)    • Hypertension     1 episode last year went to ER; no problems since   • Renal dysplasia     nonfunctioning left life-long       Past Surgical History  Past Surgical History:   Procedure Laterality Date   • COLONOSCOPY  03/2016   • FL INJECTION RIGHT HIP (NON ARTHROGRAM)  5/10/2022   • FL INJECTION RIGHT HIP (NON ARTHROGRAM)  11/3/2022   • KIDNEY SURGERY Right     age 29   • CT ARTHRP KNE CONDYLE&PLATU MEDIAL&LAT COMPARTMENTS Right 8/1/2022    Procedure: ARTHROPLASTY KNEE TOTAL W ROBOT;  Surgeon: Dimas Nelson MD;  Location: [facility];  Service: Orthopedics   • CT ARTHRP KNE CONDYLE&PLATU MEDIAL&LAT COMPARTMENTS Left 2/13/2023    Procedure: ARTHROPLASTY KNEE TOTAL W ROBOT;  Surgeon: Dimas Nelson MD;  Location: [facility];  Service: Orthopedics   • REDUCTION MAMMAPLASTY     • WRIST SURGERY Right     fx       Current Medications  Current Outpatient Medications on File Prior to Visit   Medication Sig Dispense Refill   • amoxicillin (AMOXIL) 500 mg capsule      • ALPRAZolam (XANAX) 0 25 mg tablet Take 1 tablet (0 25 mg total) by mouth daily as needed for anxiety 30 tablet 0   • amoxicillin (AMOXIL) 500 mg capsule TAKE ONE CAPSULE BY MOUTH THREE TIMES A DAY UNTIL FINISHED     • Biotin w/ Vitamins C & E 1250-7 5-7 5 MCG-MG-UNT CHEW Chew     • cephalexin (KEFLEX) 500 mg capsule Take 4 tablets one hour before dental visits  20 capsule 2   • FIBER PO Take by mouth daily     • Multiple Vitamins-Minerals (MULTI FOR HER 50+ PO) Take by mouth daily     • [DISCONTINUED] enoxaparin (LOVENOX) 40 mg/0 4 mL Inject 0 4 mL (40 mg total) under the skin daily for 28 days To start post op 11 2 mL 0   • [DISCONTINUED] oxyCODONE (ROXICODONE) 5 immediate release tablet 1 pill po Q4 Hrs prn 30 tablet 0     No current facility-administered medications on file prior to visit  Recent Labs [facility])  0   Lab Value Date/Time    HCT 38 5 02/14/2023 0639    HCT 45 0 11/18/2016 0801    HGB 12 7 02/14/2023 0639    HGB 14 8 11/18/2016 0801    WBC 11 74 (H) 02/14/2023 0639    WBC 7 8 11/18/2016 0801    WBC 6-10 (A) 11/18/2016 0801    INR 0 89 01/17/2023 0834    HGBA1C 5 4 01/17/2023 0834         Physical exam  · General: Awake, Alert, Oriented  · Eyes: Pupils equal, round and reactive to light  · Heart: regular rate and rhythm  · Lungs: No audible wheezing  · Abdomen: soft  Emanation confirm a right hip with an intact soft tissue envelope  There is restriction of forced internal Tatian when the hip is flexed 9 degrees and this recreates groin pain  He has a healed anterior scar  Knee is neutral alignment  Extension is good and flexion is about 120 degrees  There is no tenderness to left calf    Imaging  I have personally reviewed x-rays of the left knee from today my interpretation as follows:  2 views of the left knee show a total knee replacement satisfactory position    Procedure  None indicated performed today    Assessment/Plan:   63 y  o female 3 months following left total knee replacement with well clinical graphic appearance    She has return of symptomatic early arthritis in the right hip, an injection of corticosteroid is ordered on behalf of the patient for pain relief prior to her return to work with to be in 2 weeks  Ad lexie  use of the left knee is loud, dental about a prophylaxis discussed, and office follow-up in 3 months time with x-rays 2 views right and left knee    That will be for 6-month follow-up of the left knee, 1 year follow-up to the right knee replacement

## 2023-05-11 ENCOUNTER — OFFICE VISIT (OUTPATIENT)
Dept: PHYSICAL THERAPY | Facility: CLINIC | Age: 63
End: 2023-05-11

## 2023-05-11 DIAGNOSIS — M25.562 CHRONIC PAIN OF LEFT KNEE: Primary | ICD-10-CM

## 2023-05-11 DIAGNOSIS — Z96.652 TOTAL KNEE REPLACEMENT STATUS, LEFT: ICD-10-CM

## 2023-05-11 DIAGNOSIS — G89.29 CHRONIC PAIN OF LEFT KNEE: Primary | ICD-10-CM

## 2023-05-11 NOTE — PROGRESS NOTES
"Daily Note     Today's date: 2023  Patient name: Layne Jamison  : 1960  MRN: [id number]  Referring provider: Chrystal Parker  Dx:   Encounter Diagnosis     ICD-10-CM    1  Chronic pain of left knee  M25 562     G89 29       2  Total knee replacement status, left  Z96 652                      Subjective: Pt reports generalized L knee stiffness and whole-body soreness after doing yardwork yesterday  Objective: See treatment diary below    Assessment: Pt demonstrated improved 8\" stair tolerance, moderate difficulty initiating manual PROM secondary to increased soreness and stiffness but less so with repetition  Plan: Cont  POC  Precautions:  PMHx: R hip OA, R TKA, anxiety  Dx: s/p L TKA 23    Manuals 5/3 5/8 5/11          L knee PROM 15 min 15 min 15 min                                                 Neuro Re-Ed                                                                                                        Ther Ex             Bike S5  5 min  S4  5 min S5  5 min  S4  5 min S5  5 min  S4  5 min          Heel slides 5\"x20 5\"x20 5\"x20          Standing GA stretch 20\"x4 20\"x4 20\"x4                                                 STS 2x10 D/c           SLS 60\"x1 60\"x1 60\"x1          SLR 1#  3x10 1#  3x10 1#  3x15          SL hip ABD   2x10                                                 Ther Activity                                       Gait Training             Lateral step-ups  6\"/  2x10 6\"/  2x10          Step-ups/  downs 8\"/  2x10 8\"/  3x10 8\"/  3x10          Gait training on floor                          Modalities                                            "

## 2023-05-15 ENCOUNTER — OFFICE VISIT (OUTPATIENT)
Dept: PHYSICAL THERAPY | Facility: CLINIC | Age: 63
End: 2023-05-15

## 2023-05-15 DIAGNOSIS — G89.29 CHRONIC PAIN OF LEFT KNEE: Primary | ICD-10-CM

## 2023-05-15 DIAGNOSIS — Z96.652 TOTAL KNEE REPLACEMENT STATUS, LEFT: ICD-10-CM

## 2023-05-15 DIAGNOSIS — M25.562 CHRONIC PAIN OF LEFT KNEE: Primary | ICD-10-CM

## 2023-05-15 NOTE — PROGRESS NOTES
"Daily Note     Today's date: 5/15/2023  Patient name: Swapna Taylor  : 1960  MRN: [id number]  Referring provider: Kristin Emmanuel  Dx:   Encounter Diagnosis     ICD-10-CM    1  Chronic pain of left knee  M25 562     G89 29       2  Total knee replacement status, left  Z96 652                      Subjective: Pt reports severe R hip pain limiting functional mobility, notes moderate L knee stiffness that she attributes to compensating for R hip  She requests shortened tx secondary to appt conflict  Objective: See treatment diary below    Assessment: Pt demonstrated mildly improved ecc lowering on 8\" step  Plan: Cont  POC  Resume full POC nv         Precautions:  PMHx: R hip OA, R TKA, anxiety  Dx: s/p L TKA 23    Manuals 5/3 5/8 5/11 5/15         L knee PROM 15 min 15 min 15 min 15 min                                                Neuro Re-Ed                                                                                                        Ther Ex             Bike S5  5 min  S4  5 min S5  5 min  S4  5 min S5  5 min  S4  5 min L1  5 min         Heel slides 5\"x20 5\"x20 5\"x20          Standing GA stretch 20\"x4 20\"x4 20\"x4 20\"x4                                                STS 2x10 D/c           SLS 60\"x1 60\"x1 60\"x1 nv         SLR 1#  3x10 1#  3x10 1#  3x15 nv         SL hip ABD   2x10 nv                                                Ther Activity                                       Gait Training             Lateral step-ups  6\"/  2x10 6\"/  2x10 nv         Step-ups/  downs 8\"/  2x10 8\"/  3x10 8\"/  3x10 8\"/  3x10         Gait training on floor                          Modalities                                            "

## 2023-05-16 ENCOUNTER — TELEPHONE (OUTPATIENT)
Dept: OBGYN CLINIC | Facility: HOSPITAL | Age: 63
End: 2023-05-16

## 2023-05-16 DIAGNOSIS — M25.551 PAIN IN RIGHT HIP: Primary | ICD-10-CM

## 2023-05-16 NOTE — NURSING NOTE
Call placed to patient to discuss upcoming appointment at [facility] radiology department and complete consultation with patient  Patient is having a right hip CSI  utilizing fluoroscopy guidance  Reviewed  patient's allergies , no current anticoagulant medication present, patient has had procedure in the past, no questions when asked if any questions or concerns  Reminded patient of location and time expected for procedure, Patient expressed understanding by verbalizing and repeating instructions

## 2023-05-16 NOTE — TELEPHONE ENCOUNTER
Caller: Patient    Doctor: Curtis Cardona    Reason for call: Patient has been going to PT and they have requested that she start doing hip exercises as well  She was wondering if a PT script could be ordered to include her right hip as well  Please advise       Call back#: [phone]

## 2023-05-18 ENCOUNTER — OFFICE VISIT (OUTPATIENT)
Dept: PHYSICAL THERAPY | Facility: CLINIC | Age: 63
End: 2023-05-18

## 2023-05-18 DIAGNOSIS — G89.29 CHRONIC PAIN OF LEFT KNEE: ICD-10-CM

## 2023-05-18 DIAGNOSIS — Z96.652 TOTAL KNEE REPLACEMENT STATUS, LEFT: Primary | ICD-10-CM

## 2023-05-18 DIAGNOSIS — M25.562 CHRONIC PAIN OF LEFT KNEE: ICD-10-CM

## 2023-05-18 NOTE — PROGRESS NOTES
"Daily Note     Today's date: 2023  Patient name: Nisha Trejo  : 1960  MRN: [id number]  Referring provider: Ben Palacio  Dx:   Encounter Diagnosis     ICD-10-CM    1  Total knee replacement status, left  Z96 652       2  Chronic pain of left knee  M25 562     G89 29                      Subjective: Pt reports low levels of L knee pain  Objective: See treatment diary below    Assessment: Pt demonstrated mild difficulty with ecc step-downs  Plan: Cont  POC  Precautions:  PMHx: R hip OA, R TKA, anxiety  Dx: s/p L TKA 23    Manuals 5/3 5/8 5/11 5/15 5/18        L knee PROM 15 min 15 min 15 min 15 min 10 min                                               Neuro Re-Ed                                                                                                        Ther Ex             Bike S5  5 min  S4  5 min S5  5 min  S4  5 min S5  5 min  S4  5 min L1  5 min L1  5 min        Heel slides 5\"x20 5\"x20 5\"x20  5\"x20        Standing GA stretch 20\"x4 20\"x4 20\"x4 20\"x4 20\"x4                                               STS 2x10 D/c           SLS 60\"x1 60\"x1 60\"x1 nv 60\"x1        SLR 1#  3x10 1#  3x10 1#  3x15 nv 1#  3x15        SL hip ABD   2x10 nv D/c                                               Ther Activity                                       Gait Training             Lateral step-ups  6\"/  2x10 6\"/  2x10 nv 6\"/  2x10        Step-ups/  downs 8\"/  2x10 8\"/  3x10 8\"/  3x10 8\"/  3x10 8\"/  3x10        Gait training on floor                          Modalities                                            "

## 2023-05-22 ENCOUNTER — OFFICE VISIT (OUTPATIENT)
Dept: PHYSICAL THERAPY | Facility: CLINIC | Age: 63
End: 2023-05-22

## 2023-05-22 ENCOUNTER — HOSPITAL ENCOUNTER (OUTPATIENT)
Dept: RADIOLOGY | Facility: HOSPITAL | Age: 63
Discharge: HOME | End: 2023-05-22
Attending: ORTHOPAEDIC SURGERY

## 2023-05-22 DIAGNOSIS — G89.29 CHRONIC PAIN OF LEFT KNEE: ICD-10-CM

## 2023-05-22 DIAGNOSIS — M25.562 CHRONIC PAIN OF LEFT KNEE: ICD-10-CM

## 2023-05-22 DIAGNOSIS — Z96.652 TOTAL KNEE REPLACEMENT STATUS, LEFT: Primary | ICD-10-CM

## 2023-05-22 DIAGNOSIS — M16.11 PRIMARY OSTEOARTHRITIS OF RIGHT HIP: ICD-10-CM

## 2023-05-22 RX ORDER — BUPIVACAINE HYDROCHLORIDE 2.5 MG/ML
10 INJECTION, SOLUTION EPIDURAL; INFILTRATION; INTRACAUDAL
Status: COMPLETED | OUTPATIENT
Start: 2023-05-22 | End: 2023-05-22

## 2023-05-22 RX ORDER — LIDOCAINE HYDROCHLORIDE 10 MG/ML
5 INJECTION, SOLUTION EPIDURAL; INFILTRATION; INTRACAUDAL; PERINEURAL
Status: COMPLETED | OUTPATIENT
Start: 2023-05-22 | End: 2023-05-22

## 2023-05-22 RX ORDER — METHYLPREDNISOLONE ACETATE 80 MG/ML
80 INJECTION, SUSPENSION INTRA-ARTICULAR; INTRALESIONAL; INTRAMUSCULAR; SOFT TISSUE
Status: COMPLETED | OUTPATIENT
Start: 2023-05-22 | End: 2023-05-22

## 2023-05-22 RX ADMIN — BUPIVACAINE HYDROCHLORIDE 0.5 ML: 2.5 INJECTION, SOLUTION EPIDURAL; INFILTRATION; INTRACAUDAL; PERINEURAL at 13:21

## 2023-05-22 RX ADMIN — LIDOCAINE HYDROCHLORIDE 4 ML: 10 INJECTION, SOLUTION EPIDURAL; INFILTRATION; INTRACAUDAL; PERINEURAL at 13:21

## 2023-05-22 RX ADMIN — IOHEXOL 1 ML: 300 INJECTION, SOLUTION INTRAVENOUS at 13:21

## 2023-05-22 RX ADMIN — METHYLPREDNISOLONE ACETATE 80 MG: 80 INJECTION, SUSPENSION INTRA-ARTICULAR; INTRALESIONAL; INTRAMUSCULAR; SOFT TISSUE at 13:22

## 2023-05-22 NOTE — PROGRESS NOTES
"Daily Note     Today's date: 2023  Patient name: Ghazala Ornelas  : 1960  MRN: [id number]  Referring provider: Rosalio White  Dx:   Encounter Diagnosis     ICD-10-CM    1  Total knee replacement status, left  Z96 652       2  Chronic pain of left knee  M25 562     G89 29           Start Time: 0950  Stop Time: 1025  Total time in clinic (min): 35 minutes    Subjective: Patient reports very little L knee pain prior to therapy  Notes her R hip/sciatic pain has been really bothering her  Will receive an injection today  Objective: See treatment diary below    Assessment: Patient demonstrates consistent PROM measurements  Showing improvements with eccentric control during step downs  Plan: Cont  POC  Precautions:  PMHx: R hip OA, R TKA, anxiety  Dx: s/p L TKA 23    Manuals 5/3 5/8 5/11 5/15 5/18 5/22       L knee PROM 15 min 15 min 15 min 15 min 10 min 10 min                                              Neuro Re-Ed                                                                                                        Ther Ex             Bike S5  5 min  S4  5 min S5  5 min  S4  5 min S5  5 min  S4  5 min L1  5 min L1  5 min L1  5 min       Heel slides 5\"x20 5\"x20 5\"x20  5\"x20 5\"x20       Standing GA stretch 20\"x4 20\"x4 20\"x4 20\"x4 20\"x4 20\"x4                                              STS 2x10 D/c           SLS 60\"x1 60\"x1 60\"x1 nv 60\"x1 60\"x1       SLR 1#  3x10 1#  3x10 1#  3x15 nv 1#  3x15 1#  3x15       SL hip ABD   2x10 nv D/c                                               Ther Activity                                       Gait Training             Lateral step-ups  6\"/  2x10 6\"/  2x10 nv 6\"/  2x10 6\"/  2x10       Step-ups/  downs 8\"/  2x10 8\"/  3x10 8\"/  3x10 8\"/  3x10 8\"/  3x10 8\"/  3x10       Gait training on floor                          Modalities                                          "

## 2023-05-25 ENCOUNTER — OFFICE VISIT (OUTPATIENT)
Dept: PHYSICAL THERAPY | Facility: CLINIC | Age: 63
End: 2023-05-25

## 2023-05-25 DIAGNOSIS — Z96.652 TOTAL KNEE REPLACEMENT STATUS, LEFT: Primary | ICD-10-CM

## 2023-05-25 DIAGNOSIS — G89.29 CHRONIC PAIN OF LEFT KNEE: ICD-10-CM

## 2023-05-25 DIAGNOSIS — M25.562 CHRONIC PAIN OF LEFT KNEE: ICD-10-CM

## 2023-05-30 ENCOUNTER — TELEPHONE (OUTPATIENT)
Dept: OBGYN CLINIC | Facility: HOSPITAL | Age: 63
End: 2023-05-30

## 2023-05-30 ENCOUNTER — EVALUATION (OUTPATIENT)
Dept: PHYSICAL THERAPY | Facility: CLINIC | Age: 63
End: 2023-05-30

## 2023-05-30 DIAGNOSIS — G89.29 CHRONIC PAIN OF LEFT KNEE: ICD-10-CM

## 2023-05-30 DIAGNOSIS — M25.551 RIGHT HIP PAIN: ICD-10-CM

## 2023-05-30 DIAGNOSIS — M25.562 CHRONIC PAIN OF LEFT KNEE: ICD-10-CM

## 2023-05-30 DIAGNOSIS — Z96.652 TOTAL KNEE REPLACEMENT STATUS, LEFT: Primary | ICD-10-CM

## 2023-05-30 NOTE — PROGRESS NOTES
PT Re-Evaluation     Today's date: 2023  Patient name: Nithya Higuera  : 1960  MRN: [id number]  Referring provider: Fritz Finney  Dx:   Encounter Diagnosis     ICD-10-CM    1  Total knee replacement status, left  Z96 652       2  Chronic pain of left knee  M25 562     G89 29       3  Right hip pain  M25 551                      Assessment  Assessment details: Pt presents with s/s consistent with R hip OA and a lower lumbar derangement with an extension DP  She will benefit from skilled PT services to address her impairments in order to decrease pain and restore function  She also demonstrates normalized gait and stairs and is appropriate for d/c of her L knee POC to a maintenance HEP at this time    Impairments: abnormal gait, abnormal muscle firing, abnormal or restricted ROM, abnormal movement, activity intolerance, impaired physical strength, lacks appropriate home exercise program, pain with function, weight-bearing intolerance and poor body mechanics  Understanding of Dx/Px/POC: good   Prognosis: good    Goals  STG - 4 wks  1) pt will demonstrate PROM 0-90 deg (partially met)  2) pt will d/c FWW (met)    MTG - 8 wks  1) pt will demonstrate PROM 0-110 deg (partially met)  2) pt will d/c SPC (met)  3) pt will report no difficulty with light ADLs (met)    LTG - 12 wks  1) pt will demonstrate PROM 0-120 deg (partially met)  2) pt will demonstrate normalized gait (met)  3) pt will report reciprocal gait on stairs (met)  4) pt will initiate daily walking routine for fitness (partially met)    Plan  Planned modality interventions: cryotherapy  Planned therapy interventions: joint mobilization, manual therapy, balance/weight bearing training, body mechanics training, neuromuscular re-education, patient education, stretching, strengthening, therapeutic activities, therapeutic exercise, home exercise program, gait training, flexibility, functional ROM exercises and abdominal trunk stabilization  Frequency: 2x week  Duration in weeks: 6  Treatment plan discussed with: patient        Subjective Evaluation    History of Present Illness  Mechanism of injury: Pt is a 63 y o  female with PMHx significant for R hip OA, R TKA Aug '22, anxiety  She presents with new Rx for R hip pain  She reports 95% improvement since R hip CSI 8 days ago but continues to have R glute and posterior thigh pain with prolonged standing > sitting  Pt reports a resolution of L knee pain and functional limitations  Pain  Current pain ratin  At best pain ratin  At worst pain ratin  Location: R glute, posterior thigh  Quality: dull ache, radiating and burning  Relieving factors: change in position  Aggravating factors: sitting and standing  Progression: improved    Social Support  Steps to enter house: yes (2 KATE)  Stairs in house: yes (FF to basement)   Lives in: one-story house  Lives with: spouse    Employment status: working  Treatments  Previous treatment: physical therapy and injection treatment  Current treatment: physical therapy  Patient Goals  Patient goals for therapy: decreased edema, decreased pain, improved balance, increased strength, independence with ADLs/IADLs, return to sport/leisure activities, return to work and increased motion          Objective     Postural Observations  Seated posture: fair  Standing posture: fair  Correction of posture: makes symptoms better        Observations   Left Knee   Positive for edema (2+) and incision  Negative for deformity and drainage       Active Range of Motion     Lumbar   Flexion:  WFL  Extension:  Restriction level: moderate  Left lateral flexion:  WFL  Right lateral flexion:  WFL    Passive Range of Motion     Right Hip   Flexion: 120 degrees   Extension: 2 degrees   Abduction: 35 degrees   Adduction: 28 degrees   External rotation (90/90): 40 degrees   Internal rotation (90/90): 20 degrees   Left Knee   Flexion: 114 degrees with pain  Extension: 2 "degrees with pain  Mechanical Assessment    Cervical      Thoracic      Lumbar    Standing flexion: repeated movements   Pain location:peripheralized  Pain intensity: worse  Pain level: produced  Standing extension: repeated movements  Pain location: no change  Pain intensity: better  Pain level: decreased  Lying extension: repeated movements  Pain location: centralized  Pain intensity: better  Pain level: abolished    Strength/Myotome Testing     Left Hip   Planes of Motion   Flexion: 4-  Extension: 4-  Abduction: 3+  Adduction: 4-    Right Hip   Planes of Motion   Flexion: 3+  Extension: 4  Abduction: 4-  Adduction: 4  External rotation: 4-  Internal rotation: 4    Left Knee   Flexion: 4  Extension: 4  Quadriceps contraction: good    General Comments:      Knee Comments  Gait: no AD: mildly decreased R stance time    STS: normalize    SLS EO: L: 15 sec    Stairs: 8\": fair ecc lowering without railing             Precautions:  PMHx: R hip OA, R TKA, anxiety  Dx: s/p L TKA 2/13/23, R hip OA, lower lumbar derangement with an extension DP    Manuals 5/30            R hip FF, IR MWM 1x10 ea            R hip extension WB MWM 1x10            Prone press-ups with PT OP                          Neuro Re-Ed             Standing lumbar extension 1x10            Prone press-ups 1x10            bridges  3x10                                                               Ther Ex             Bike S5  5 min  S4  5 min                         Standing GA stretch 20\"x4            Supine hip ABD 1x15            Prone hip IR 1x15            Standing hip flexor stretch 10\"x3            clamshells             SLS 60\"x1                                                                             Ther Activity                                       Gait Training                          Step-ups/  downs 8\"/  3x10                                      Modalities                                          "

## 2023-05-30 NOTE — TELEPHONE ENCOUNTER
Caller: patient    Doctor: Carrie Meyers    Reason for call: calling to inform she is dropping off return to work paperwork, to update with the date she returned to work 5/26    Call back#: [phone]

## 2023-06-05 ENCOUNTER — OFFICE VISIT (OUTPATIENT)
Dept: PHYSICAL THERAPY | Facility: CLINIC | Age: 63
End: 2023-06-05
Payer: COMMERCIAL

## 2023-06-05 DIAGNOSIS — M25.551 RIGHT HIP PAIN: Primary | ICD-10-CM

## 2023-06-05 PROCEDURE — 97110 THERAPEUTIC EXERCISES: CPT | Performed by: PHYSICAL THERAPIST

## 2023-06-05 PROCEDURE — 97112 NEUROMUSCULAR REEDUCATION: CPT | Performed by: PHYSICAL THERAPIST

## 2023-06-05 PROCEDURE — 97140 MANUAL THERAPY 1/> REGIONS: CPT | Performed by: PHYSICAL THERAPIST

## 2023-06-05 NOTE — PROGRESS NOTES
"Daily Note     Today's date: 2023  Patient name: Pro Yarbrough  : 1960  MRN: [id number]  Referring provider: Mavis Taylor  Dx:   Encounter Diagnosis     ICD-10-CM    1  Right hip pain  M25 551                      Subjective: Pt reports driving to and from [facility] [employer] [place] without an increase in RLE pain  She reports getting out of the car q 1 hr and performing EIS 1x10  Objective: See treatment diary below    Assessment: Pt demonstrated improved R hip > L/S ROM  Plan: Cont  POC  Precautions:  PMHx: R hip OA, R TKA, anxiety  Dx: R hip OA, lower lumbar derangement with an extension DP    Manuals            R hip FF, IR MWM 1x10 ea 1x20 ea           R hip extension WB MWM 1x10 1x10           Prone press-ups with PT OP             Gr IV L4-S1 PA mobs in prone  1x30 ea                        Neuro Re-Ed             Standing lumbar extension 1x10 3x10           Prone press-ups 1x10 1x10           bridges  3x10                                                               Ther Ex             Bike S5  5 min  S4  5 min                         Standing GA stretch 20\"x4            Supine hip ABD 1x15 3x10           Prone hip IR 1x15 3x10           Standing hip flexor stretch 10\"x3 HEP           clamshells  R/  3x10           SLS 60\"x1                                                                             Ther Activity                                       Gait Training                          Step-ups/  downs 8\"/  3x10                                      Modalities                                            "

## 2023-06-08 ENCOUNTER — OFFICE VISIT (OUTPATIENT)
Dept: PHYSICAL THERAPY | Facility: CLINIC | Age: 63
End: 2023-06-08
Payer: COMMERCIAL

## 2023-06-08 DIAGNOSIS — M25.551 RIGHT HIP PAIN: Primary | ICD-10-CM

## 2023-06-08 PROCEDURE — 97140 MANUAL THERAPY 1/> REGIONS: CPT | Performed by: PHYSICAL THERAPIST

## 2023-06-08 PROCEDURE — 97112 NEUROMUSCULAR REEDUCATION: CPT | Performed by: PHYSICAL THERAPIST

## 2023-06-08 PROCEDURE — 97110 THERAPEUTIC EXERCISES: CPT | Performed by: PHYSICAL THERAPIST

## 2023-06-08 NOTE — PROGRESS NOTES
"Daily Note     Today's date: 2023  Patient name: Chelsey Bassett  : 1960  MRN: [id number]  Referring provider: Anshul Hunter  Dx:   Encounter Diagnosis     ICD-10-CM    1  Right hip pain  M25 551                      Subjective: Pt reports a resolution of R hip pain, mild LBP into the R glute with sitting  Objective: See treatment diary below    Assessment: Pt demonstrated improved L/S ROM with EIS with hips against table  Plan: Cont  POC  Precautions:  PMHx: R hip OA, R TKA, anxiety  Dx: R hip OA, lower lumbar derangement with an extension DP    Manuals           R hip FF, IR MWM 1x10 ea 1x20 ea 1x20 ea          R hip extension WB MWM 1x10 1x10 1x10          Prone press-ups with PT OP             Gr IV L4-S1 PA mobs in prone  1x30 ea 1x30 ea                       Neuro Re-Ed             Standing lumbar extension 1x10 3x10 3x10 1x10         Standing lumbar extension hips against able   1x10 3x10         Prone press-ups 1x10 1x10 HEP          bridges  3x10 3x12 3x15                                                             Ther Ex             Bike S5  5 min  S4  5 min                         Standing GA stretch 20\"x4            Supine hip ABD 1x15 3x10 3x10          Prone hip IR 1x15 3x10 3x10          Standing hip flexor stretch 10\"x3 HEP           clamshells  R/  3x10 R/  3x15          SLS 60\"x1                                                                             Ther Activity                                       Gait Training                          Step-ups/  downs 8\"/  3x10                                      Modalities                                            "

## 2023-06-12 ENCOUNTER — OFFICE VISIT (OUTPATIENT)
Dept: PHYSICAL THERAPY | Facility: CLINIC | Age: 63
End: 2023-06-12
Payer: COMMERCIAL

## 2023-06-12 DIAGNOSIS — M25.551 RIGHT HIP PAIN: Primary | ICD-10-CM

## 2023-06-12 PROCEDURE — 97112 NEUROMUSCULAR REEDUCATION: CPT | Performed by: PHYSICAL THERAPIST

## 2023-06-12 PROCEDURE — 97140 MANUAL THERAPY 1/> REGIONS: CPT | Performed by: PHYSICAL THERAPIST

## 2023-06-12 PROCEDURE — 97110 THERAPEUTIC EXERCISES: CPT | Performed by: PHYSICAL THERAPIST

## 2023-06-12 NOTE — PROGRESS NOTES
"Daily Note     Today's date: 2023  Patient name: Liam Bella  : 1960  MRN: [id number]  Referring provider: Timi Mann  Dx:   Encounter Diagnosis     ICD-10-CM    1  Right hip pain  M25 551                      Subjective: Pt reports mod R glute pain on Saturday when she was going up and down stadium steps and sitting in bleachers f/b a long bus ride  Objective: See treatment diary below    Assessment: Pt demonstrated improved L/S and R hip ROM  Plan: Cont  POC  Precautions:  PMHx: R hip OA, R TKA, anxiety  Dx: R hip OA, lower lumbar derangement with an extension DP    Manuals          R hip FF, IR MWM 1x10 ea 1x20 ea 1x20 ea 1x20 ea         R hip extension WB MWM 1x10 1x10 1x10 1x10         Prone press-ups with PT OP             Gr IV L4-S1 PA mobs in prone  1x30 ea 1x30 ea S1  2x30                      Neuro Re-Ed             Standing lumbar extension 1x10 3x10 3x10          Standing lumbar extension hips against able   1x10 5x10         Prone press-ups 1x10 1x10 HEP          bridges  3x10 3x12 3x15                                                             Ther Ex             Bike S5  5 min  S4  5 min                         Standing GA stretch 20\"x4            Supine hip ABD 1x15 3x10 3x10 3x10         Prone hip IR 1x15 3x10 3x10 3x10         Standing hip flexor stretch 10\"x3 HEP           clamshells  R/  3x10 R/  3x15 R/  3x15 G/  3x15        SLS 60\"x1                                                                             Ther Activity                                       Gait Training                          Step-ups/  downs 8\"/  3x10                                      Modalities                                            "

## 2023-06-15 ENCOUNTER — OFFICE VISIT (OUTPATIENT)
Dept: PHYSICAL THERAPY | Facility: CLINIC | Age: 63
End: 2023-06-15
Payer: COMMERCIAL

## 2023-06-15 DIAGNOSIS — M25.551 RIGHT HIP PAIN: Primary | ICD-10-CM

## 2023-06-15 PROCEDURE — 97140 MANUAL THERAPY 1/> REGIONS: CPT | Performed by: PHYSICAL THERAPIST

## 2023-06-15 PROCEDURE — 97112 NEUROMUSCULAR REEDUCATION: CPT | Performed by: PHYSICAL THERAPIST

## 2023-06-15 PROCEDURE — 97110 THERAPEUTIC EXERCISES: CPT | Performed by: PHYSICAL THERAPIST

## 2023-06-15 NOTE — PROGRESS NOTES
"Daily Note     Today's date: 6/15/2023  Patient name: Judie Jimenez  : 1960  MRN: [id number]  Referring provider: Deedee Paez  Dx:   Encounter Diagnosis     ICD-10-CM    1  Right hip pain  M25 551                      Subjective: Pt reports a resolution of R hip pain, minimal intermittent R LBP into the glute that she can quickly abolish with by getting up and performing EIS 1x10  Objective: See treatment diary below    Assessment: Pt demonstrated improved R hip PROM and L/S PA mobility  Plan: Cont  POC  Precautions:  PMHx: R hip OA, R TKA, anxiety  Dx: R hip OA, lower lumbar derangement with an extension DP    Manuals 5/30 6/5 6/8 6/12 6/15        R hip FF, IR MWM 1x10 ea 1x20 ea 1x20 ea 1x20 ea 1x20 ea        R hip extension WB MWM 1x10 1x10 1x10 1x10 1x10        Prone press-ups with PT OP             Gr IV L4-S1 PA mobs in prone  1x30 ea 1x30 ea S1  2x30 S1  1x50                     Neuro Re-Ed             Standing lumbar extension 1x10 3x10 3x10          Standing lumbar extension hips against able   1x10 5x10 5x10        Prone press-ups 1x10 1x10 HEP          bridges  3x10 3x12 3x15 3x15                                                            Ther Ex             Bike S5  5 min  S4  5 min                         Standing GA stretch 20\"x4            Supine hip ABD 1x15 3x10 3x10 3x10 3x10        Prone hip IR 1x15 3x10 3x10 3x10 3x10        Standing hip flexor stretch 10\"x3 HEP           Clamshells: latex-free  R/  3x10 R/  3x15 R/  3x15 G/  3x15        SLS 60\"x1                                                                             Ther Activity                                       Gait Training                          Step-ups/  downs 8\"/  3x10                                      Modalities                                            "

## 2023-06-19 ENCOUNTER — OFFICE VISIT (OUTPATIENT)
Dept: PHYSICAL THERAPY | Facility: CLINIC | Age: 63
End: 2023-06-19
Payer: COMMERCIAL

## 2023-06-19 DIAGNOSIS — M25.551 RIGHT HIP PAIN: Primary | ICD-10-CM

## 2023-06-19 DIAGNOSIS — M25.562 CHRONIC PAIN OF LEFT KNEE: ICD-10-CM

## 2023-06-19 DIAGNOSIS — G89.29 CHRONIC PAIN OF LEFT KNEE: ICD-10-CM

## 2023-06-19 DIAGNOSIS — Z96.652 TOTAL KNEE REPLACEMENT STATUS, LEFT: ICD-10-CM

## 2023-06-19 PROCEDURE — 97112 NEUROMUSCULAR REEDUCATION: CPT

## 2023-06-19 PROCEDURE — 97110 THERAPEUTIC EXERCISES: CPT

## 2023-06-19 NOTE — PROGRESS NOTES
"Daily Note     Today's date: 2023  Patient name: Kaitlin Rose  : 1960  MRN: [id number]  Referring provider: Itz Albright  Dx:   Encounter Diagnosis     ICD-10-CM    1  Right hip pain  M25 551       2  Total knee replacement status, left  Z96 652       3  Chronic pain of left knee  M25 562     G89 29           Start Time: 0930  Stop Time: 1000  Total time in clinic (min): 30 minutes    Subjective: Patient reports her hip and back are much better since starting therapy  Objective: See treatment diary below    Assessment: Patient demonstrates good form t/o therapy requiring a little cueing for clamshells  Responding really well to the current PT POC  Plan: Cont  POC  Precautions:  PMHx: R hip OA, R TKA, anxiety  Dx: R hip OA, lower lumbar derangement with an extension DP    Manuals 5/30 6/5 6/8 6/12 6/15 6/19       R hip FF, IR MWM 1x10 ea 1x20 ea 1x20 ea 1x20 ea 1x20 ea        R hip extension WB MWM 1x10 1x10 1x10 1x10 1x10        Prone press-ups with PT OP             Gr IV L4-S1 PA mobs in prone  1x30 ea 1x30 ea S1  2x30 S1  1x50        PROM R hip      5 min                    Neuro Re-Ed             Standing lumbar extension 1x10 3x10 3x10          Standing lumbar extension hips against able   1x10 5x10 5x10 5x10       Prone press-ups 1x10 1x10 HEP          bridges  3x10 3x12 3x15 3x15 3x15                                                           Ther Ex             Bike S5  5 min  S4  5 min                         Standing GA stretch 20\"x4            Supine hip ABD 1x15 3x10 3x10 3x10 3x10 3x10       Prone hip IR 1x15 3x10 3x10 3x10 3x10 3x10       Standing hip flexor stretch 10\"x3 HEP           Clamshells: latex-free  R/  3x10 R/  3x15 R/  3x15 G/  3x15 G/  3x15       SLS 60\"x1                                                                             Ther Activity                                       Gait Training                          Step-ups/  downs 8\"/  3x10         " Modalities

## 2023-06-21 ENCOUNTER — APPOINTMENT (OUTPATIENT)
Dept: PHYSICAL THERAPY | Facility: CLINIC | Age: 63
End: 2023-06-21
Payer: COMMERCIAL

## 2023-06-22 ENCOUNTER — APPOINTMENT (OUTPATIENT)
Dept: PHYSICAL THERAPY | Facility: CLINIC | Age: 63
End: 2023-06-22
Payer: COMMERCIAL

## 2023-06-26 ENCOUNTER — APPOINTMENT (OUTPATIENT)
Dept: PHYSICAL THERAPY | Facility: CLINIC | Age: 63
End: 2023-06-26
Payer: COMMERCIAL

## 2023-06-29 ENCOUNTER — APPOINTMENT (OUTPATIENT)
Dept: PHYSICAL THERAPY | Facility: CLINIC | Age: 63
End: 2023-06-29
Payer: COMMERCIAL

## 2023-07-06 ENCOUNTER — OFFICE VISIT (OUTPATIENT)
Dept: PHYSICAL THERAPY | Facility: CLINIC | Age: 63
End: 2023-07-06
Payer: COMMERCIAL

## 2023-07-06 DIAGNOSIS — G89.29 CHRONIC PAIN OF LEFT KNEE: ICD-10-CM

## 2023-07-06 DIAGNOSIS — M25.551 RIGHT HIP PAIN: Primary | ICD-10-CM

## 2023-07-06 DIAGNOSIS — Z96.652 TOTAL KNEE REPLACEMENT STATUS, LEFT: ICD-10-CM

## 2023-07-06 DIAGNOSIS — M25.562 CHRONIC PAIN OF LEFT KNEE: ICD-10-CM

## 2023-07-06 PROCEDURE — 97110 THERAPEUTIC EXERCISES: CPT | Performed by: PHYSICAL THERAPIST

## 2023-07-06 PROCEDURE — 97112 NEUROMUSCULAR REEDUCATION: CPT | Performed by: PHYSICAL THERAPIST

## 2023-07-06 PROCEDURE — 97140 MANUAL THERAPY 1/> REGIONS: CPT | Performed by: PHYSICAL THERAPIST

## 2023-07-06 NOTE — PROGRESS NOTES
Daily Note     Today's date: 2023  Patient name: Godfrey Boyd  : 1960  MRN: [id number]  Referring provider: Ghulam Ortega  Dx:   Encounter Diagnosis     ICD-10-CM    1. Right hip pain  M25.551       2. Total knee replacement status, left  Z96.652       3. Chronic pain of left knee  M25.562     G89.29                      Subjective: Pt reports no pain x 1 wk. Objective: See treatment diary below    Assessment: Pt demonstrated mildly limited L/S extension ROM with a resolution of her derangement. She is appropriate for d/c from skilled PT services to a maintenance HEP at this time. Plan: D/C to HEP. Precautions:  PMHx: R hip OA, R TKA, anxiety  Dx: R hip OA, lower lumbar derangement with an extension DP    Manuals 5/30 6/5 6/8 6/12 6/15 6/19 7/6      R hip FF, IR MWM 1x10 ea 1x20 ea 1x20 ea 1x20 ea 1x20 ea  1x20 ea      R hip extension WB MWM 1x10 1x10 1x10 1x10 1x10  1x10      Prone press-ups with PT OP             Gr IV L4-S1 PA mobs in prone  1x30 ea 1x30 ea S1  2x30 S1  1x50        PROM R hip      5 min                    Neuro Re-Ed             Standing lumbar extension 1x10 3x10 3x10    1x10      Standing lumbar extension hips against able   1x10 5x10 5x10 5x10 1x10      Prone press-ups 1x10 1x10 HEP          bridges  3x10 3x12 3x15 3x15 3x15 3x20                                                          Ther Ex             Bike S5  5 min  S4  5 min                         Standing GA stretch 20"x4            Supine hip ABD 1x15 3x10 3x10 3x10 3x10 3x10 3x10      Prone hip IR 1x15 3x10 3x10 3x10 3x10 3x10 3x10      Standing hip flexor stretch 10"x3 HEP           Clamshells: latex-free  R/  3x10 R/  3x15 R/  3x15 G/  3x15 G/  3x15 G/  3x15      SLS 60"x1                                                                             Ther Activity                                       Gait Training                          Step-ups/  downs 8"/  3x10 Modalities

## 2023-07-07 ENCOUNTER — APPOINTMENT (OUTPATIENT)
Dept: PHYSICAL THERAPY | Facility: CLINIC | Age: 63
End: 2023-07-07
Payer: COMMERCIAL

## 2023-07-11 ENCOUNTER — RA CDI HCC (OUTPATIENT)
Dept: OTHER | Facility: HOSPITAL | Age: 63
End: 2023-07-11

## 2023-07-11 NOTE — PROGRESS NOTES
[facility] coding opportunities       Chart reviewed, no opportunity found: CHART REVIEWED, NO OPPORTUNITY FOUND        Patients Insurance        Commercial Insurance: Christian[employer] Palacios

## 2023-07-18 PROBLEM — M25.561 CHRONIC PAIN OF RIGHT KNEE: Status: RESOLVED | Noted: 2022-05-05 | Resolved: 2023-07-18

## 2023-07-18 PROBLEM — M17.11 PRIMARY OSTEOARTHRITIS OF RIGHT KNEE: Status: RESOLVED | Noted: 2019-09-24 | Resolved: 2023-07-18

## 2023-07-18 PROBLEM — G89.29 CHRONIC PAIN OF RIGHT KNEE: Status: RESOLVED | Noted: 2022-05-05 | Resolved: 2023-07-18

## 2023-07-18 PROBLEM — M17.12 PRIMARY OSTEOARTHRITIS OF LEFT KNEE: Status: RESOLVED | Noted: 2022-05-05 | Resolved: 2023-07-18

## 2023-07-18 NOTE — PATIENT INSTRUCTIONS
She had severe atraumatic pain left metatarsophalangeal joint great toe, no prior history of gout, currently 90% better. We did discuss possible gout, has no trauma I held off on imaging. I would like her to do uric acid, call us if recurs. May have degree of overuse, did undergo left total knee replacement in February, had right knee replaced last summer, doing well in that regard, has completed therapy. We did review previous blood work, around procedure in February glucose was 140, appears was nonfasting, A1c here today is fine at 5.3. Does have family history of diabetes, continue to work on [employer] She is up to date with Lipid screening. Cholesterol last year 212 with HDL 55, , less than 5% 10-year cardiovascular risk, I would like her to redo lipid panel   She is up to date with Diabetes screening. TSH was fine last year 1.89. Immunization History   Administered Date(s) Administered    COVID-19 PFIZER VACCINE 0.3 ML IM 03/17/2021, 04/08/2021    Tdap 04/16/2015    Zoster 01/01/2016       She does not do yearly Flu shot. Tdap/tetanus shot is up to date  (done every 10 yrs for superficial cuts, every 5 yrs for deep wounds)   Can do 'shingles' shot, Shingrix at pharmacy. Covid vaccine rcvd x 2    Non-smoker     Regarding Colon Cancer screening, we discussed Colonoscopy vs ColoGuard is indicated starting at age 42-54. Screening is up to date. Colonoscopy was negative in May 2021, redo 10 years. She does see her Gynecologist routinely. Discussed screening Mammogram, this is up-to-date. Continue to try to watch healthy diet, exercise routinely. She does use Alprazolam very sparingly, received 30 pills in August 2021, call for new prescription when needed. We did discuss controlled substance. We will see her again in 12 months, sooner as needed.

## 2023-07-19 ENCOUNTER — OFFICE VISIT (OUTPATIENT)
Dept: FAMILY MEDICINE CLINIC | Facility: CLINIC | Age: 63
End: 2023-07-19
Payer: COMMERCIAL

## 2023-07-19 VITALS — WEIGHT: 185 LBS | OXYGEN SATURATION: 98 % | TEMPERATURE: 97.2 F | BODY MASS INDEX: 31.76 KG/M2 | HEART RATE: 78 BPM

## 2023-07-19 DIAGNOSIS — Q61.4 CONGENITAL RENAL DYSPLASIA: ICD-10-CM

## 2023-07-19 DIAGNOSIS — R73.9 BORDERLINE HYPERGLYCEMIA: ICD-10-CM

## 2023-07-19 DIAGNOSIS — E78.2 MIXED HYPERLIPIDEMIA: ICD-10-CM

## 2023-07-19 DIAGNOSIS — M79.672 ACUTE PAIN OF LEFT FOOT: ICD-10-CM

## 2023-07-19 DIAGNOSIS — Z00.00 ENCOUNTER FOR ANNUAL PHYSICAL EXAM: Primary | ICD-10-CM

## 2023-07-19 PROBLEM — Z96.652 AFTERCARE FOLLOWING LEFT KNEE JOINT REPLACEMENT SURGERY: Status: RESOLVED | Noted: 2023-03-27 | Resolved: 2023-07-19

## 2023-07-19 PROBLEM — Z96.653 H/O TOTAL KNEE REPLACEMENT, BILATERAL: Status: ACTIVE | Noted: 2022-08-02

## 2023-07-19 PROBLEM — Z47.1 AFTERCARE FOLLOWING LEFT KNEE JOINT REPLACEMENT SURGERY: Status: RESOLVED | Noted: 2023-03-27 | Resolved: 2023-07-19

## 2023-07-19 PROCEDURE — 99396 PREV VISIT EST AGE 40-64: CPT | Performed by: FAMILY MEDICINE

## 2023-07-19 NOTE — PROGRESS NOTES
BMI Counseling: Body mass index is 31.76 kg/m². The BMI is above normal. Nutrition recommendations include encouraging healthy choices of fruits and vegetables, limiting drinks that contain sugar and moderation in carbohydrate intake. Rationale for BMI follow-up plan is due to patient being overweight or obese. FAMILY PRACTICE OFFICE VISIT  Natalie Mehta D.O. [address] [facility]  [address] [place] [address].  [place], [place], [facility]      NAME: Willard Frederick  AGE: 63 y.o. SEX: female  : 1960   MRN: [id number]    DATE: 2023  TIME: 2:08 PM    Assessment and Plan     Problem List Items Addressed This Visit     Congenital renal dysplasia ( left )    Relevant Orders    Comprehensive metabolic panel    Urinalysis with microscopic    Mixed hyperlipidemia    Relevant Orders    Lipid panel   Other Visit Diagnoses     Encounter for annual physical exam    -  Primary    Acute pain of left MTP Jt #1        Relevant Orders    Uric acid    Borderline hyperglycemia        Relevant Orders    Comprehensive metabolic panel    POCT hemoglobin A1c          Patient Instructions     She had severe atraumatic pain left metatarsophalangeal joint great toe, no prior history of gout, currently 90% better. We did discuss possible gout, has no trauma I held off on imaging. I would like her to do uric acid, call us if recurs. May have degree of overuse, did undergo left total knee replacement in February, had right knee replaced last summer, doing well in that regard, has completed therapy. We did review previous blood work, around procedure in February glucose was 140, appears was nonfasting, A1c here today is fine at 5.3. Does have family history of diabetes, continue to work on [employer] She is up to date with Lipid screening.   Cholesterol last year 212 with HDL 55, , less than 5% 10-year cardiovascular risk, I would like her to redo lipid panel   She is up to date with Diabetes screening. TSH was fine last year 1.89. Immunization History   Administered Date(s) Administered   • COVID-19 PFIZER VACCINE 0.3 ML IM 03/17/2021, 04/08/2021   • Tdap 04/16/2015   • Zoster 01/01/2016       She does not do yearly Flu shot. Tdap/tetanus shot is up to date  (done every 10 yrs for superficial cuts, every 5 yrs for deep wounds)   Can do 'shingles' shot, Shingrix at pharmacy. Covid vaccine rcvd x 2    Non-smoker     Regarding Colon Cancer screening, we discussed Colonoscopy vs ColoGuard is indicated starting at age 42-54. Screening is up to date. Colonoscopy was negative in May 2021, redo 10 years. She does see her Gynecologist routinely. Discussed screening Mammogram, this is up-to-date. Continue to try to watch healthy diet, exercise routinely. She does use Alprazolam very sparingly, received 30 pills in August 2021, call for new prescription when needed. We did discuss controlled substance. We will see her again in 12 months, sooner as needed. Chief Complaint     Chief Complaint   Patient presents with   • Follow-up     Poss gout ? History of Present Illness   Scott Grady is a 63y.o.-year-old female who had episode recently of atraumatic pain left great toe, no history of gout. Pain was very intense, was sensitive to fine touch. Had trip to [facility], used few Motrin, pain over 90% improved. Otherwise doing okay, did well with second knee replacement    Rarely uses Xanax      Review of Systems   Review of Systems   Constitutional: Negative for appetite change, fatigue, fever and unexpected weight change. HENT: Negative for sore throat and trouble swallowing. Respiratory: Negative for cough, chest tightness and shortness of breath. Cardiovascular: Negative for chest pain, palpitations and leg swelling. Gastrointestinal: Negative for abdominal pain, blood in stool, nausea and vomiting.         No acid reflux     No change in bowel Genitourinary: Negative for dysuria and hematuria. Neurological: Negative for dizziness, syncope, light-headedness and headaches. Psychiatric/Behavioral: Negative for behavioral problems and confusion. Active Problem List     Patient Active Problem List   Diagnosis   • Anxiety   • Congenital renal dysplasia ( left )   • Mixed hyperlipidemia   • Primary osteoarthritis of right hip   • H/O total knee replacement, bilateral       Past Medical History:  Reviewed    Past Surgical History:  Reviewed    Family History:  Reviewed    Social History:  Reviewed    Objective     Vitals:    07/19/23 1055   Pulse: 78   Temp: (!) 97.2 °F (36.2 °C)   SpO2: 98%   Weight: 83.9 kg (185 lb)     Body mass index is 31.76 kg/m². BP Readings from Last 3 Encounters:   05/09/23 144/71   03/28/23 139/77   02/21/23 135/77       Wt Readings from Last 3 Encounters:   07/19/23 83.9 kg (185 lb)   03/28/23 83 kg (183 lb)   02/13/23 83.9 kg (185 lb)       Physical Exam  Constitutional:       General: She is not in acute distress. Appearance: Normal appearance. She is well-developed. She is not ill-appearing. Eyes:      General: No scleral icterus. Neck:      Vascular: No carotid bruit. Cardiovascular:      Rate and Rhythm: Normal rate and regular rhythm. Heart sounds: Normal heart sounds. No murmur heard. Pulmonary:      Effort: Pulmonary effort is normal. No respiratory distress. Breath sounds: Normal breath sounds. No wheezing, rhonchi or rales. Musculoskeletal:      Right lower leg: No edema. Left lower leg: No edema. Comments: Very mild tenderness with full range of motion left great toe metatarsal phalangeal joint, good color, good warmth, good pulses. No ankle edema   Lymphadenopathy:      Cervical: No cervical adenopathy. Skin:     Coloration: Skin is not jaundiced. Neurological:      Mental Status: She is alert. Mental status is at baseline.    Psychiatric:         Mood and Affect: Mood normal.         Behavior: Behavior normal.          ALLERGIES:  Allergies   Allergen Reactions   • Sulfa Antibiotics Rash       Current Medications     Current Outpatient Medications   Medication Sig Dispense Refill   • ALPRAZolam (XANAX) 0.25 mg tablet Take 1 tablet (0.25 mg total) by mouth daily as needed for anxiety 30 tablet 0   • Biotin w/ Vitamins C & E 1250-7.5-7.5 MCG-MG-UNT CHEW Chew     • cephalexin (KEFLEX) 500 mg capsule Take 4 tablets one hour before dental visits. 20 capsule 2   • FIBER PO Take by mouth daily     • Multiple Vitamins-Minerals (MULTI FOR HER 50+ PO) Take by mouth daily       No current facility-administered medications for this visit.             Orders Placed This Encounter   Procedures   • Comprehensive metabolic panel   • Lipid panel   • Uric acid   • Urinalysis with microscopic   • POCT hemoglobin A1c         Bennett Leon DO

## 2023-07-20 DIAGNOSIS — Z47.1 AFTERCARE FOLLOWING BILATERAL KNEE JOINT REPLACEMENT SURGERY: Primary | ICD-10-CM

## 2023-07-20 DIAGNOSIS — Z96.653 AFTERCARE FOLLOWING BILATERAL KNEE JOINT REPLACEMENT SURGERY: Primary | ICD-10-CM

## 2023-07-22 LAB
ALBUMIN SERPL-MCNC: 4.6 G/DL (ref 3.6–5.1)
ALBUMIN/GLOB SERPL: 1.6 (CALC) (ref 1–2.5)
ALP SERPL-CCNC: 71 U/L (ref 37–153)
ALT SERPL-CCNC: 27 U/L (ref 6–29)
APPEARANCE UR: CLEAR
AST SERPL-CCNC: 22 U/L (ref 10–35)
BACTERIA UR QL AUTO: NORMAL /HPF
BILIRUB SERPL-MCNC: 0.7 MG/DL (ref 0.2–1.2)
BILIRUB UR QL STRIP: NEGATIVE
BUN SERPL-MCNC: 19 MG/DL (ref 7–25)
BUN/CREAT SERPL: ABNORMAL (CALC) (ref 6–22)
CALCIUM SERPL-MCNC: 10 MG/DL (ref 8.6–10.4)
CHLORIDE SERPL-SCNC: 104 MMOL/L (ref 98–110)
CHOLEST SERPL-MCNC: 250 MG/DL
CHOLEST/HDLC SERPL: 4.3 (CALC)
CO2 SERPL-SCNC: 25 MMOL/L (ref 20–32)
COLOR UR: YELLOW
CREAT SERPL-MCNC: 0.71 MG/DL (ref 0.5–1.05)
GFR/BSA.PRED SERPLBLD CYS-BASED-ARV: 97 ML/MIN/1.73M2
GLOBULIN SER CALC-MCNC: 2.9 G/DL (CALC) (ref 1.9–3.7)
GLUCOSE SERPL-MCNC: 118 MG/DL (ref 65–99)
GLUCOSE UR QL STRIP: NEGATIVE
HDLC SERPL-MCNC: 58 MG/DL
HGB UR QL STRIP: NEGATIVE
HYALINE CASTS #/AREA URNS LPF: NORMAL /LPF
KETONES UR QL STRIP: NEGATIVE
LDLC SERPL CALC-MCNC: 162 MG/DL (CALC)
LEUKOCYTE ESTERASE UR QL STRIP: NEGATIVE
NITRITE UR QL STRIP: NEGATIVE
NONHDLC SERPL-MCNC: 192 MG/DL (CALC)
PH UR STRIP: 6.5 [PH] (ref 5–8)
POTASSIUM SERPL-SCNC: 4.3 MMOL/L (ref 3.5–5.3)
PROT SERPL-MCNC: 7.5 G/DL (ref 6.1–8.1)
PROT UR QL STRIP: NEGATIVE
RBC #/AREA URNS HPF: NORMAL /HPF
SODIUM SERPL-SCNC: 139 MMOL/L (ref 135–146)
SP GR UR STRIP: 1.01 (ref 1–1.03)
SQUAMOUS #/AREA URNS HPF: NORMAL /HPF
TRIGL SERPL-MCNC: 153 MG/DL
URATE SERPL-MCNC: 9.2 MG/DL (ref 2.5–7)
WBC #/AREA URNS HPF: NORMAL /HPF

## 2023-07-23 DIAGNOSIS — M1A.0790 IDIOPATHIC CHRONIC GOUT OF FOOT WITHOUT TOPHUS, UNSPECIFIED LATERALITY: Primary | ICD-10-CM

## 2023-07-23 PROBLEM — M10.9 GOUT OF FOOT: Status: ACTIVE | Noted: 2023-07-23

## 2023-07-23 PROBLEM — R73.03 PREDIABETES: Status: ACTIVE | Noted: 2023-07-23

## 2023-07-23 RX ORDER — ALLOPURINOL 100 MG/1
TABLET ORAL
Qty: 90 TABLET | Refills: 0 | Status: SHIPPED | OUTPATIENT
Start: 2023-07-23 | End: 2023-07-24 | Stop reason: SDUPTHER

## 2023-07-24 ENCOUNTER — TELEPHONE (OUTPATIENT)
Dept: FAMILY MEDICINE CLINIC | Facility: CLINIC | Age: 63
End: 2023-07-24

## 2023-07-24 DIAGNOSIS — M1A.0790 IDIOPATHIC CHRONIC GOUT OF FOOT WITHOUT TOPHUS, UNSPECIFIED LATERALITY: ICD-10-CM

## 2023-07-24 RX ORDER — ALLOPURINOL 100 MG/1
TABLET ORAL
Qty: 90 TABLET | Refills: 0 | Status: SHIPPED | OUTPATIENT
Start: 2023-07-24 | End: 2024-08-05

## 2023-07-24 NOTE — TELEPHONE ENCOUNTER
Pt is visiting [facility] and will like the medication for gout to be sent [facility] [address] [address] IF POSSIBLE.

## 2023-08-01 ENCOUNTER — HOSPITAL ENCOUNTER (OUTPATIENT)
Dept: RADIOLOGY | Facility: HOSPITAL | Age: 63
Discharge: HOME | End: 2023-08-01
Attending: ORTHOPAEDIC SURGERY
Payer: COMMERCIAL

## 2023-08-01 ENCOUNTER — OFFICE VISIT (OUTPATIENT)
Dept: OBGYN CLINIC | Facility: HOSPITAL | Age: 63
End: 2023-08-01
Payer: COMMERCIAL

## 2023-08-01 VITALS
SYSTOLIC BLOOD PRESSURE: 93 MMHG | HEART RATE: 73 BPM | HEIGHT: 64 IN | BODY MASS INDEX: 31.76 KG/M2 | DIASTOLIC BLOOD PRESSURE: 60 MMHG

## 2023-08-01 DIAGNOSIS — Z47.1 AFTERCARE FOLLOWING BILATERAL KNEE JOINT REPLACEMENT SURGERY: ICD-10-CM

## 2023-08-01 DIAGNOSIS — Z96.653 STATUS POST BILATERAL KNEE REPLACEMENTS: Primary | ICD-10-CM

## 2023-08-01 DIAGNOSIS — Z96.653 AFTERCARE FOLLOWING BILATERAL KNEE JOINT REPLACEMENT SURGERY: ICD-10-CM

## 2023-08-01 PROCEDURE — 73560 X-RAY EXAM OF KNEE 1 OR 2: CPT

## 2023-08-01 PROCEDURE — 99213 OFFICE O/P EST LOW 20 MIN: CPT | Performed by: ORTHOPAEDIC SURGERY

## 2023-08-01 NOTE — PROGRESS NOTES
Assessment:  1. Status post bilateral knee replacements        2. Aftercare following bilateral knee joint replacement surgery            Plan:  · Avoid jumping and running activities  · Continue prophylactic antibiotics for all dental work for next 2 years  · Follow-up in 6 months for revaluation of the left knee and repeat x-rays    To do next visit:  Return in about 6 months (around 2/1/2024) for Recheck of the left knee. The above stated was discussed in layman's terms and the patient expressed understanding. All questions were answered to the patient's satisfaction. Scribe Attestation    I,:  Demetri Potts am acting as a scribe while in the presence of the attending physician.:       I,:  Sharif Cooley MD personally performed the services described in this documentation    as scribed in my presence.:           Subjective:   Jurgen Hernández is a 63 y.o. female who presents for follow-up evaluation 12 months status post right total knee arthroplasty and 6 months status-post left total knee arthroplasty. She is extremely pleased with the outcome of the procedures. She has resumed her normal level of activity. She experiences some slight discomfort of the left knee when using stairs. She notes she uses stairs slowly, but is forcing herself to use reciprocal gait with stairs.       Review of systems negative unless otherwise specified in HPI    Past Medical History:   Diagnosis Date   • Aftercare following left knee joint replacement surgery 3/27/2023   • Anxiety     MENOPAUSE   • Arthritis     KNEES   • Chest pain syndrome 03/29/2022   • Chondromalacia patellae of right knee 11/8/2016   • Chronic pain disorder    • Chronic pain of right knee 5/5/2022   • Diverticulosis    • GERD (gastroesophageal reflux disease)    • Hypertension     1 episode last year went to ER; no problems since   • Primary osteoarthritis of left knee 5/5/2022   • Primary osteoarthritis of right knee 9/24/2019   • Renal dysplasia nonfunctioning left life-long       Past Surgical History:   Procedure Laterality Date   • COLONOSCOPY  03/2016   • FL INJECTION RIGHT HIP (NON ARTHROGRAM)  5/10/2022   • FL INJECTION RIGHT HIP (NON ARTHROGRAM)  11/3/2022   • FL INJECTION RIGHT HIP (NON ARTHROGRAM)  5/22/2023   • KIDNEY SURGERY Right     age 29   • AL ARTHRP KNE CONDYLE&PLATU MEDIAL&LAT COMPARTMENTS Right 8/1/2022    Procedure: ARTHROPLASTY KNEE TOTAL W ROBOT;  Surgeon: Clementina Kimble MD;  Location[facility];  Service: Orthopedics   • AL ARTHRP KNE CONDYLE&PLATU MEDIAL&LAT COMPARTMENTS Left 2/13/2023    Procedure: ARTHROPLASTY KNEE TOTAL W ROBOT;  Surgeon: Clementina Kimble MD;  Location: [facility];  Service: Orthopedics   • REDUCTION MAMMAPLASTY     • WRIST SURGERY Right     fx       Family History   Problem Relation Age of Onset   • Diabetes Father    • Breast cancer Neg Hx    • Ovarian cancer Neg Hx    • Colon cancer Neg Hx        Social History     Occupational History   • 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   Tobacco Use   • Smoking status: Former     Packs/day: 0.25     Years: 15.00     Total pack years: 3.75     Types: Cigarettes   • Smokeless tobacco: Never   Vaping Use   • Vaping Use: Never used   Substance and Sexual Activity   • Alcohol use: Yes     Alcohol/week: 10.0 standard drinks of alcohol     Types: 5 Cans of beer, 5 Standard drinks or equivalent per week     Comment: daily   • Drug use: No   • Sexual activity: Yes     Partners: Male     Birth control/protection: Post-menopausal         Current Outpatient Medications:   •  allopurinol (ZYLOPRIM) 100 mg tablet, Take 1 tablet (100 mg total) by mouth daily for 7 days, THEN 2 tablets (200 mg total) daily for 7 days, THEN 3 tablets (300 mg total) daily.  ( call for 300 mg pill when runs out )., Disp: 90 tablet, Rfl: 0  •  ALPRAZolam (XANAX) 0.25 mg tablet, Take 1 tablet (0.25 mg total) by mouth daily as needed for anxiety, Disp: 30 tablet, Rfl: 0  •  Biotin w/ Vitamins C & E 1250-7.5-7.5 MCG-MG-UNT CHEW, Chew, Disp: , Rfl:   •  cephalexin (KEFLEX) 500 mg capsule, Take 4 tablets one hour before dental visits. , Disp: 20 capsule, Rfl: 2  •  FIBER PO, Take by mouth daily, Disp: , Rfl:   •  Multiple Vitamins-Minerals (MULTI FOR HER 50+ PO), Take by mouth daily, Disp: , Rfl:     Allergies   Allergen Reactions   • Sulfa Antibiotics Rash       Vitals:    08/01/23 1300   BP: 93/60   Pulse: 73       Objective:  Physical exam  · General: Awake, Alert, Oriented  · Eyes: Pupils equal, round and reactive to light  · Heart: regular rate and rhythm  · Lungs: No audible wheezing  · Abdomen: soft                    Right Knee Exam     Tenderness   The patient is experiencing no tenderness. Range of Motion   The patient has normal right knee ROM. Tests   Varus: negative Valgus: negative    Comments:  Surgical scar well healed      Left Knee Exam     Tenderness   The patient is experiencing no tenderness. Range of Motion   The patient has normal left knee ROM. Tests   Varus: negative Valgus: negative    Comments:  Surgical scar well healed        Diagnostics, reviewed and taken today if performed as documented: The attending physician has personally reviewed the pertinent films in PACS and interpretation is as follows:  X-rays of the knees today demonstrate well-positioned, well-aligned total knee prostheses. Procedures, if performed today:    None performed      Portions of the record may have been created with voice recognition software. Occasional wrong word or "sound a like" substitutions may have occurred due to the inherent limitations of voice recognition software. Read the chart carefully and recognize, using context, where substitutions have occurred.

## 2023-08-28 DIAGNOSIS — M1A.0790 IDIOPATHIC CHRONIC GOUT OF FOOT WITHOUT TOPHUS, UNSPECIFIED LATERALITY: ICD-10-CM

## 2023-08-28 RX ORDER — ALLOPURINOL 300 MG/1
300 TABLET ORAL DAILY
Qty: 90 TABLET | Refills: 3 | Status: SHIPPED | OUTPATIENT
Start: 2023-08-28

## 2023-08-28 NOTE — TELEPHONE ENCOUNTER
Pt called LVM requesting 300 mg pills instead of taking 100 mg pills 3x daily. Pt also stated that on her bottle it also states to call in for the 300 MG once she runs out of the 100 mg. When I called pt back to confirm pharmacy she stated to me is Dr Tessa Aleman keeping me on the allopurinol 300 mg indefinite or just till the gout is under control. Thank you!

## 2023-08-28 NOTE — TELEPHONE ENCOUNTER
Please call her back, she should use the allopurinol long-term to suppress uric acid/prevent gout, I did send in 300 mg pills to use 1 daily. If she notes pain that she thinks is gout she should have a reevaluation, otherwise we will plan to see her and redo blood work in July of next year.

## 2023-08-29 NOTE — TELEPHONE ENCOUNTER
Pt has been notified with your results. She stated since she has been on the med she has not had a flare up.

## 2023-10-05 ENCOUNTER — TELEPHONE (OUTPATIENT)
Age: 63
End: 2023-10-05

## 2023-10-05 DIAGNOSIS — M16.11 PRIMARY OSTEOARTHRITIS OF RIGHT HIP: Primary | ICD-10-CM

## 2023-10-05 NOTE — TELEPHONE ENCOUNTER
Caller: Patient    Doctor: Kristi Keenan    Reason for call: Pt has been getting left intraarticular hip injections that have been providing great relief. Pt's last injection was 5/22/23 and lasted about 4 months. Pt would like an order to be placed to have this repeated. Please call pt when it is ordered so she can schedule.     Call back#: [phone]

## 2023-10-05 NOTE — TELEPHONE ENCOUNTER
I will order now. Picato Pregnancy And Lactation Text: This medication is Pregnancy Category C. It is unknown if this medication is excreted in breast milk.

## 2023-10-18 ENCOUNTER — HOSPITAL ENCOUNTER (OUTPATIENT)
Dept: RADIOLOGY | Facility: HOSPITAL | Age: 63
Discharge: HOME | End: 2023-10-18
Admitting: RADIOLOGY
Payer: COMMERCIAL

## 2023-10-18 DIAGNOSIS — F41.9 ANXIETY: ICD-10-CM

## 2023-10-18 DIAGNOSIS — M16.11 PRIMARY OSTEOARTHRITIS OF RIGHT HIP: ICD-10-CM

## 2023-10-18 PROCEDURE — 20610 DRAIN/INJ JOINT/BURSA W/O US: CPT

## 2023-10-18 PROCEDURE — 77002 NEEDLE LOCALIZATION BY XRAY: CPT

## 2023-10-18 RX ORDER — ALPRAZOLAM 0.25 MG/1
0.25 TABLET ORAL DAILY PRN
Qty: 30 TABLET | Refills: 0 | Status: SHIPPED | OUTPATIENT
Start: 2023-10-18

## 2023-10-18 RX ORDER — BUPIVACAINE HYDROCHLORIDE 2.5 MG/ML
30 INJECTION, SOLUTION EPIDURAL; INFILTRATION; INTRACAUDAL
Status: COMPLETED | OUTPATIENT
Start: 2023-10-18 | End: 2023-10-18

## 2023-10-18 RX ORDER — LIDOCAINE HYDROCHLORIDE 10 MG/ML
5 INJECTION, SOLUTION EPIDURAL; INFILTRATION; INTRACAUDAL; PERINEURAL
Status: COMPLETED | OUTPATIENT
Start: 2023-10-18 | End: 2023-10-18

## 2023-10-18 RX ORDER — METHYLPREDNISOLONE ACETATE 80 MG/ML
80 INJECTION, SUSPENSION INTRA-ARTICULAR; INTRALESIONAL; INTRAMUSCULAR; SOFT TISSUE
Status: COMPLETED | OUTPATIENT
Start: 2023-10-18 | End: 2023-10-18

## 2023-10-18 RX ADMIN — IOHEXOL 1 ML: 300 INJECTION, SOLUTION INTRAVENOUS at 14:55

## 2023-10-18 RX ADMIN — METHYLPREDNISOLONE ACETATE 80 MG: 80 INJECTION, SUSPENSION INTRA-ARTICULAR; INTRALESIONAL; INTRAMUSCULAR; SOFT TISSUE at 14:55

## 2023-10-18 RX ADMIN — BUPIVACAINE HYDROCHLORIDE 2 ML: 2.5 INJECTION, SOLUTION EPIDURAL; INFILTRATION; INTRACAUDAL; PERINEURAL at 14:55

## 2023-10-18 RX ADMIN — LIDOCAINE HYDROCHLORIDE 5 ML: 10 INJECTION, SOLUTION EPIDURAL; INFILTRATION; INTRACAUDAL; PERINEURAL at 14:55

## 2023-10-27 ENCOUNTER — OFFICE VISIT (OUTPATIENT)
Dept: OBGYN CLINIC | Facility: MEDICAL CENTER | Age: 63
End: 2023-10-27
Payer: COMMERCIAL

## 2023-10-27 VITALS
HEIGHT: 64 IN | DIASTOLIC BLOOD PRESSURE: 81 MMHG | HEART RATE: 69 BPM | SYSTOLIC BLOOD PRESSURE: 168 MMHG | WEIGHT: 185 LBS | BODY MASS INDEX: 31.58 KG/M2

## 2023-10-27 DIAGNOSIS — M16.11 PRIMARY OSTEOARTHRITIS OF RIGHT HIP: Primary | ICD-10-CM

## 2023-10-27 PROCEDURE — 99213 OFFICE O/P EST LOW 20 MIN: CPT | Performed by: ORTHOPAEDIC SURGERY

## 2023-10-27 NOTE — PATIENT INSTRUCTIONS
Diagnosis ICD-10-CM Associated Orders   1. Primary osteoarthritis of right hip  M16.11 FL injection right hip (non arthrogram)          Return for as previously scheduled in February 2024, or sooner if symptoms worsen or fail to improve.

## 2023-10-27 NOTE — PROGRESS NOTES
Assessment:   Diagnosis ICD-10-CM Associated Orders   1. Primary osteoarthritis of right hip  M16.11 FL injection right hip (non arthrogram)          Plan:  Recently taken diagnostics reviewed and physical exam performed. Diagnosis, treatment options and associated risks were discussed with the patient including no treatment, nonsurgical treatment and potential for surgical intervention. The patient was given the opportunity to ask questions regarding each. Counseled on weight loss and general wellness measures  Right hip modest OA however finds relief with intra-articular injections of cortisone performed by radiology. She may have the injection repeated every 3 months as needed. Weight bearing and activities as tolerated. To do next visit:  Return for as previously scheduled in February 2024, or sooner if symptoms worsen or fail to improve. The above stated was discussed in layman's terms and the patient expressed understanding. All questions were answered to the patient's satisfaction. Scribe Attestation      I,:  Darian Angela am acting as a scribe while in the presence of the attending physician.:       I,:  Yifan Brown MD personally performed the services described in this documentation    as scribed in my presence.:               Subjective:   Windsor Felty is a 66 y.o. female who presents today for initial evaluation of her right hip due to pain. She has groin pain that increases with rotatory motions of her right lower extremity. She finds significant improvement of her symptoms with intra-articular injections of cortisone her most recent being last week. Her previous injection provided nearly 4 months of relief. She does find that her symptoms do limit her day-to-day activities as well as impedes on her quality of life when her pain does set in. She describes the pain at times to be severe and unrelenting.   She does have lower back pain that radiates through her buttock and posteriorly at her distal thigh.   Denies any numbness or tingling    She has a history of bilateral total knee arthroplasties, right 8/1/2022 and left 2/13/2023    Review of systems negative unless otherwise specified in HPI  Review of Systems    Past Medical History:   Diagnosis Date    Aftercare following left knee joint replacement surgery 3/27/2023    Anxiety     MENOPAUSE    Arthritis     KNEES    Chest pain syndrome 03/29/2022    Chondromalacia patellae of right knee 11/8/2016    Chronic pain disorder     Chronic pain of right knee 5/5/2022    Diverticulosis     GERD (gastroesophageal reflux disease)     Hypertension     1 episode last year went to ER; no problems since    Primary osteoarthritis of left knee 5/5/2022    Primary osteoarthritis of right knee 9/24/2019    Renal dysplasia     nonfunctioning left life-long       Past Surgical History:   Procedure Laterality Date    COLONOSCOPY  03/2016    [facility] INJECTION RIGHT HIP (NON ARTHROGRAM)  5/10/2022    FL INJECTION RIGHT HIP (NON ARTHROGRAM)  11/3/2022    FL INJECTION RIGHT HIP (NON ARTHROGRAM)  5/22/2023    FL INJECTION RIGHT HIP (NON ARTHROGRAM)  10/18/2023    KIDNEY SURGERY Right     age 29    [facility] ARTHRP KNE CONDYLE&PLATU MEDIAL&LAT COMPARTMENTS Right 8/1/2022    Procedure: ARTHROPLASTY KNEE TOTAL W ROBOT;  Surgeon: Costa Vo MD;  [facility];  Service: Orthopedics    OK ARTHRP KNE CONDYLE&PLATU MEDIAL&LAT COMPARTMENTS Left 2/13/2023    Procedure: ARTHROPLASTY KNEE TOTAL W ROBOT;  Surgeon: Costa Vo MD;  [facility];  Service: Orthopedics    REDUCTION MAMMAPLASTY      WRIST SURGERY Right     fx       Family History   Problem Relation Age of Onset    Diabetes Father     Breast cancer Neg Hx     Ovarian cancer Neg Hx     Colon cancer Neg Hx        Social History     Occupational History    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   Tobacco Use    Smoking status: Former     Packs/day: 0.25     Years: 15.00     Total pack years: 3.75     Types: Cigarettes    Smokeless tobacco: Never   Vaping Use    Vaping Use: Never used   Substance and Sexual Activity    Alcohol use: Yes     Alcohol/week: 10.0 standard drinks of alcohol     Types: 5 Cans of beer, 5 Standard drinks or equivalent per week     Comment: daily    Drug use: No    Sexual activity: Yes     Partners: Male     Birth control/protection: Post-menopausal         Current Outpatient Medications:     allopurinol (ZYLOPRIM) 300 mg tablet, Take 1 tablet (300 mg total) by mouth daily, Disp: 90 tablet, Rfl: 3    ALPRAZolam (XANAX) 0.25 mg tablet, Take 1 tablet (0.25 mg total) by mouth daily as needed for anxiety, Disp: 30 tablet, Rfl: 0    Biotin w/ Vitamins C & E 1250-7.5-7.5 MCG-MG-UNT CHEW, Chew, Disp: , Rfl:     cephalexin (KEFLEX) 500 mg capsule, Take 4 tablets one hour before dental visits. , Disp: 20 capsule, Rfl: 2    FIBER PO, Take by mouth daily, Disp: , Rfl:     Multiple Vitamins-Minerals (MULTI FOR HER 50+ PO), Take by mouth daily, Disp: , Rfl:     Allergies   Allergen Reactions    Sulfa Antibiotics Rash            Vitals:    10/27/23 0844   BP: 168/81   Pulse: 69       Objective:                    Right Hip Exam     Tenderness   The patient is experiencing no tenderness. Range of Motion   Flexion:  80   External rotation:  20   Internal rotation:  10 (with groin pain, restriction, and guarding)     Muscle Strength   The patient has normal right hip strength. Other   Sensation: normal      Left Hip Exam     Tenderness   The patient is experiencing no tenderness. Range of Motion   Flexion:  90   External rotation:  40   Internal rotation: 20     Muscle Strength   The patient has normal left hip strength. Other   Sensation: normal    Comments:    Equal leg lengths            Diagnostics, reviewed and taken today if performed as documented:    None performed but reviewed:      The attending physician has personally reviewed the pertinent films in PACS and interpretation is as follows:    Right hip and pelvis x-rays from 10/13/2023 were reviewed today and show: Moderate right hip intra-articular degenerative changes with subchondral sclerosis and osteophyte formation present. Very mild left hip intra-articular degenerative changes. Limited visualization of her lower lumbar spine which does shows multilevel facet arthropathy with disc base narrowing at L5-S1      Procedures, if performed today:    Procedures    None performed      Portions of the record may have been created with voice recognition software. Occasional wrong word or "sound a like" substitutions may have occurred due to the inherent limitations of voice recognition software. Read the chart carefully and recognize, using context, where substitutions have occurred.

## 2023-12-06 DIAGNOSIS — M1A.0790 IDIOPATHIC CHRONIC GOUT OF FOOT WITHOUT TOPHUS, UNSPECIFIED LATERALITY: ICD-10-CM

## 2023-12-06 RX ORDER — ALLOPURINOL 300 MG/1
300 TABLET ORAL DAILY
Qty: 90 TABLET | Refills: 0 | Status: SHIPPED | OUTPATIENT
Start: 2023-12-06

## 2024-01-01 NOTE — PROGRESS NOTES
"Daily Note     Today's date: 2023  Patient name: Gregorio Lantigua  : 1960  MRN: [id number]  Referring provider: Alexis Aguillon  Dx:   Encounter Diagnosis     ICD-10-CM    1  Chronic pain of left knee  M25 562     G89 29       2  Total knee replacement status, left  Z96 652           Start Time: 0930  Stop Time: 1015  Total time in clinic (min): 45 minutes    Subjective: Patient reports stiffness and medial knee soreness/pain prior to therapy  Objective: See treatment diary below    Assessment: Patient is maintaining her PROM with both flexion and extension  Decreased medial knee pain following therapy  Plan: Continue per plan of care  Precautions:  PMHx: R hip OA, R TKA, anxiety  Dx: s/p L TKA 23    Manuals 5/3 5/8           L knee PROM 15 min 15 min                                                  Neuro Re-Ed                                                                                                        Ther Ex             Bike S5  5 min  S4  5 min S5  5 min  S4  5 min           Heel slides 5\"x20 5\"x20           Standing GA stretch 20\"x4 20\"x4                                                  STS 2x10 D/c           SLS 60\"x1 60\"x1           SLR 1#  3x10 1#  3x10           SL hip ABD                                                    Ther Activity                                       Gait Training             Lateral step-ups  6\"/  2x10           Step-ups/  downs 8\"/  2x10 8\"/  3x10           Gait training on floor                          Modalities                                          " never true

## 2024-01-18 DIAGNOSIS — Z96.653 AFTERCARE FOLLOWING BILATERAL KNEE JOINT REPLACEMENT SURGERY: Primary | ICD-10-CM

## 2024-01-18 DIAGNOSIS — Z47.1 AFTERCARE FOLLOWING LEFT KNEE JOINT REPLACEMENT SURGERY: ICD-10-CM

## 2024-01-18 DIAGNOSIS — Z47.1 AFTERCARE FOLLOWING BILATERAL KNEE JOINT REPLACEMENT SURGERY: Primary | ICD-10-CM

## 2024-01-18 DIAGNOSIS — Z96.652 AFTERCARE FOLLOWING LEFT KNEE JOINT REPLACEMENT SURGERY: ICD-10-CM

## 2024-01-26 ENCOUNTER — ANNUAL EXAM (OUTPATIENT)
Dept: OBGYN CLINIC | Facility: CLINIC | Age: 64
End: 2024-01-26
Payer: COMMERCIAL

## 2024-01-26 VITALS
SYSTOLIC BLOOD PRESSURE: 124 MMHG | HEIGHT: 64 IN | WEIGHT: 177 LBS | BODY MASS INDEX: 30.22 KG/M2 | DIASTOLIC BLOOD PRESSURE: 82 MMHG

## 2024-01-26 DIAGNOSIS — Z01.419 ENCOUNTER FOR GYNECOLOGICAL EXAMINATION (GENERAL) (ROUTINE) WITHOUT ABNORMAL FINDINGS: Primary | ICD-10-CM

## 2024-01-26 DIAGNOSIS — Z12.31 ENCOUNTER FOR SCREENING MAMMOGRAM FOR MALIGNANT NEOPLASM OF BREAST: ICD-10-CM

## 2024-01-26 PROCEDURE — S0612 ANNUAL GYNECOLOGICAL EXAMINA: HCPCS | Performed by: STUDENT IN AN ORGANIZED HEALTH CARE EDUCATION/TRAINING PROGRAM

## 2024-01-26 NOTE — PROGRESS NOTES
Lakisha Trevizo   1960    CC:  Yearly exam    A:  Yearly exam.     Problem List Items Addressed This Visit    None  Visit Diagnoses       Encounter for gynecological examination (general) (routine) without abnormal findings    -  Primary    Encounter for screening mammogram for malignant neoplasm of breast        Relevant Orders    Mammo screening bilateral w 3d & cad            P:   Pap up to date, testing complete. We reviewed ASCCP guidelines for Pap testing.   Mammo slip given   Colonoscopy due     RTO one year for yearly exam or sooner as needed.      S:  63 y.o. female here for yearly exam. She is postmenopausal and has had no vaginal bleeding.  She denies vaginal discharge, itching, odor or dryness.     Sexual activity: She is sexually active without pain, bleeding. Some dryness, but alleviated with lubricant.     STD testing: She does not want STD testing today.     Menopausal    Last Pap: 2023 NILM/HPV -  Last Mammo: 2024 BIRADS 1  Last Colonoscopy: 2021, 10yr recall     Non-smoker, social drinker  Exercises irregularly    Doing well. Thinking that she will retire in the next year, though her  will stay part time.     Family hx of breast cancer: denies  Family hx of ovarian cancer: denies  Family hx of colon cancer: denies       Current Outpatient Medications:     allopurinol (ZYLOPRIM) 300 mg tablet, Take 1 tablet (300 mg total) by mouth daily, Disp: 90 tablet, Rfl: 0    ALPRAZolam (XANAX) 0.25 mg tablet, Take 1 tablet (0.25 mg total) by mouth daily as needed for anxiety, Disp: 30 tablet, Rfl: 0    Biotin w/ Vitamins C & E 1250-7.5-7.5 MCG-MG-UNT CHEW, Chew, Disp: , Rfl:     cephalexin (KEFLEX) 500 mg capsule, Take 4 tablets one hour before dental visits., Disp: 20 capsule, Rfl: 2    FIBER PO, Take by mouth daily, Disp: , Rfl:     Multiple Vitamins-Minerals (MULTI FOR HER 50+ PO), Take by mouth daily, Disp: , Rfl:   Social History     Socioeconomic History    Marital status:  /Civil Union     Spouse name: Not on file    Number of children: Not on file    Years of education: Not on file    Highest education level: Not on file   Occupational History    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   Tobacco Use    Smoking status: Former     Current packs/day: 0.25     Average packs/day: 0.3 packs/day for 15.0 years (3.8 ttl pk-yrs)     Types: Cigarettes    Smokeless tobacco: Never   Vaping Use    Vaping status: Never Used   Substance and Sexual Activity    Alcohol use: Yes     Alcohol/week: 10.0 standard drinks of alcohol     Types: 5 Cans of beer, 5 Standard drinks or equivalent per week     Comment: daily    Drug use: No    Sexual activity: Yes     Partners: Male     Birth control/protection: Post-menopausal   Other Topics Concern    Not on file   Social History Narrative    Not on file     Social Determinants of Health     Financial Resource Strain: Not on file   Food Insecurity: Not on file   Transportation Needs: Not on file   Physical Activity: Not on file   Stress: Not on file   Social Connections: Not on file   Intimate Partner Violence: Not on file   Housing Stability: Not on file     Family History   Problem Relation Age of Onset    Diabetes Father     Breast cancer Neg Hx     Ovarian cancer Neg Hx     Colon cancer Neg Hx      Past Medical History:   Diagnosis Date    Aftercare following left knee joint replacement surgery 3/27/2023    Anxiety     MENOPAUSE    Arthritis     KNEES    Chest pain syndrome 03/29/2022    Chondromalacia patellae of right knee 11/8/2016    Chronic pain disorder     Chronic pain of right knee 5/5/2022    Diverticulosis     GERD (gastroesophageal reflux disease)     Hypertension     1 episode last year went to ER; no problems since    Primary osteoarthritis of left knee 5/5/2022    Primary osteoarthritis of right knee 9/24/2019    Renal dysplasia     nonfunctioning left life-long        Review of Systems   Respiratory: Negative.    Cardiovascular: Negative.   "  Gastrointestinal: Negative for constipation and diarrhea.   Genitourinary: Negative for difficulty urinating, pelvic pain, vaginal bleeding, vaginal discharge, itching or odor.    O:  Blood pressure 124/82, height 5' 4\" (1.626 m), weight 80.3 kg (177 lb).    Patient appears well and is not in distress  Neck is supple without masses  Breasts are symmetrical without mass, tenderness, nipple discharge, skin changes or adenopathy.   Abdomen is soft and nontender without masses.   Vulva without lesions or rashes.  Urethral meatus and urethra are normal  Bladder is normal to palpation  Vagina is normal without discharge or bleeding.   Cervix is normal without discharge or lesion.   Uterus and adnexa are normal, mobile, nontender without palpable mass.  Rectovaginal exam without nodularity/masses/visible blood on glove   "

## 2024-01-30 ENCOUNTER — HOSPITAL ENCOUNTER (OUTPATIENT)
Dept: RADIOLOGY | Facility: HOSPITAL | Age: 64
Discharge: HOME | End: 2024-01-30
Attending: ORTHOPAEDIC SURGERY | Admitting: STUDENT IN AN ORGANIZED HEALTH CARE EDUCATION/TRAINING PROGRAM
Payer: COMMERCIAL

## 2024-01-30 DIAGNOSIS — M16.11 PRIMARY OSTEOARTHRITIS OF RIGHT HIP: ICD-10-CM

## 2024-01-30 PROCEDURE — 20610 DRAIN/INJ JOINT/BURSA W/O US: CPT

## 2024-01-30 PROCEDURE — 77002 NEEDLE LOCALIZATION BY XRAY: CPT

## 2024-01-30 RX ORDER — BUPIVACAINE HYDROCHLORIDE 2.5 MG/ML
30 INJECTION, SOLUTION EPIDURAL; INFILTRATION; INTRACAUDAL
Status: COMPLETED | OUTPATIENT
Start: 2024-01-30 | End: 2024-01-30

## 2024-01-30 RX ORDER — METHYLPREDNISOLONE ACETATE 80 MG/ML
80 INJECTION, SUSPENSION INTRA-ARTICULAR; INTRALESIONAL; INTRAMUSCULAR; SOFT TISSUE
Status: COMPLETED | OUTPATIENT
Start: 2024-01-30 | End: 2024-01-30

## 2024-01-30 RX ORDER — LIDOCAINE HYDROCHLORIDE 10 MG/ML
5 INJECTION, SOLUTION EPIDURAL; INFILTRATION; INTRACAUDAL; PERINEURAL
Status: COMPLETED | OUTPATIENT
Start: 2024-01-30 | End: 2024-01-30

## 2024-01-30 RX ADMIN — METHYLPREDNISOLONE ACETATE 80 MG: 80 INJECTION, SUSPENSION INTRA-ARTICULAR; INTRALESIONAL; INTRAMUSCULAR; SOFT TISSUE at 15:40

## 2024-01-30 RX ADMIN — IOHEXOL 1 ML: 300 INJECTION, SOLUTION INTRAVENOUS at 15:40

## 2024-01-30 RX ADMIN — LIDOCAINE HYDROCHLORIDE 3 ML: 10 INJECTION, SOLUTION EPIDURAL; INFILTRATION; INTRACAUDAL; PERINEURAL at 15:40

## 2024-01-30 RX ADMIN — BUPIVACAINE HYDROCHLORIDE 3 ML: 2.5 INJECTION, SOLUTION EPIDURAL; INFILTRATION; INTRACAUDAL; PERINEURAL at 15:40

## 2024-02-01 ENCOUNTER — OFFICE VISIT (OUTPATIENT)
Dept: OBGYN CLINIC | Facility: HOSPITAL | Age: 64
End: 2024-02-01
Payer: COMMERCIAL

## 2024-02-01 ENCOUNTER — HOSPITAL ENCOUNTER (OUTPATIENT)
Dept: RADIOLOGY | Facility: HOSPITAL | Age: 64
Discharge: HOME | End: 2024-02-01
Attending: ORTHOPAEDIC SURGERY
Payer: COMMERCIAL

## 2024-02-01 VITALS
HEART RATE: 63 BPM | BODY MASS INDEX: 30.38 KG/M2 | DIASTOLIC BLOOD PRESSURE: 80 MMHG | HEIGHT: 64 IN | SYSTOLIC BLOOD PRESSURE: 159 MMHG

## 2024-02-01 DIAGNOSIS — G89.29 CHRONIC BILATERAL LOW BACK PAIN WITHOUT SCIATICA: Primary | ICD-10-CM

## 2024-02-01 DIAGNOSIS — M16.11 PRIMARY OSTEOARTHRITIS OF RIGHT HIP: ICD-10-CM

## 2024-02-01 DIAGNOSIS — M54.50 CHRONIC BILATERAL LOW BACK PAIN WITHOUT SCIATICA: Primary | ICD-10-CM

## 2024-02-01 DIAGNOSIS — Z96.652 AFTERCARE FOLLOWING LEFT KNEE JOINT REPLACEMENT SURGERY: ICD-10-CM

## 2024-02-01 DIAGNOSIS — Z47.1 AFTERCARE FOLLOWING LEFT KNEE JOINT REPLACEMENT SURGERY: ICD-10-CM

## 2024-02-01 DIAGNOSIS — Z96.652 S/P TOTAL KNEE REPLACEMENT, LEFT: ICD-10-CM

## 2024-02-01 PROCEDURE — 99214 OFFICE O/P EST MOD 30 MIN: CPT | Performed by: ORTHOPAEDIC SURGERY

## 2024-02-01 PROCEDURE — 73560 X-RAY EXAM OF KNEE 1 OR 2: CPT

## 2024-02-01 NOTE — PROGRESS NOTES
63 y.o.female 1 year s/p left TKA who presents for follow up.  The patient states that she is doing well. She has had no pain in her right knee.  At her last visit in October she had been evaluated for right hip osteoarthritis and was recommended NSAIDs and possible right hip intra articular injection.  She had an injection of the right hip 2 days ago and feels that her hip pain has improved. She additionally states that she has axial back pain for which she has been seeing a chiropracter with minimal benefit.  She has no radiating pain, numbness weakness or tingling in her legs.    Review of Systems  Review of systems negative unless otherwise specified in HPI    Past Medical History  Past Medical History:   Diagnosis Date    Aftercare following left knee joint replacement surgery 3/27/2023    Anxiety     MENOPAUSE    Arthritis     KNEES    Chest pain syndrome 03/29/2022    Chondromalacia patellae of right knee 11/8/2016    Chronic pain disorder     Chronic pain of right knee 5/5/2022    Diverticulosis     GERD (gastroesophageal reflux disease)     Hypertension     1 episode last year went to ER; no problems since    Primary osteoarthritis of left knee 5/5/2022    Primary osteoarthritis of right knee 9/24/2019    Renal dysplasia     nonfunctioning left life-long       Past Surgical History  Past Surgical History:   Procedure Laterality Date    COLONOSCOPY  03/2016    FL INJECTION RIGHT HIP (NON ARTHROGRAM)  5/10/2022    FL INJECTION RIGHT HIP (NON ARTHROGRAM)  11/3/2022    FL INJECTION RIGHT HIP (NON ARTHROGRAM)  5/22/2023    FL INJECTION RIGHT HIP (NON ARTHROGRAM)  10/18/2023    FL INJECTION RIGHT HIP (NON ARTHROGRAM)  1/30/2024    KIDNEY SURGERY Right     age 32    [place] ARTHRP KNE CONDYLE&PLATU MEDIAL&LAT COMPARTMENTS Right 8/1/2022    Procedure: ARTHROPLASTY KNEE TOTAL W ROBOT;  Surgeon: Juan Pitt MD;  [facility];  Service: Orthopedics    VT ARTHRP KNE CONDYLE&PLATU MEDIAL&LAT COMPARTMENTS  Left 2/13/2023    Procedure: ARTHROPLASTY KNEE TOTAL W ROBOT;  Surgeon: Juan Pitt MD;  [facility];  Service: Orthopedics    REDUCTION MAMMAPLASTY      WRIST SURGERY Right     fx       Current Medications  Current Outpatient Medications on File Prior to Visit   Medication Sig Dispense Refill    allopurinol (ZYLOPRIM) 300 mg tablet Take 1 tablet (300 mg total) by mouth daily 90 tablet 0    ALPRAZolam (XANAX) 0.25 mg tablet Take 1 tablet (0.25 mg total) by mouth daily as needed for anxiety 30 tablet 0    Biotin w/ Vitamins C & E 1250-7.5-7.5 MCG-MG-UNT CHEW Chew      cephalexin (KEFLEX) 500 mg capsule Take 4 tablets one hour before dental visits. 20 capsule 2    FIBER PO Take by mouth daily      Multiple Vitamins-Minerals (MULTI FOR HER 50+ PO) Take by mouth daily       No current facility-administered medications on file prior to visit.       Recent Labs (HCT,HGB,PT,INR,ESR,CRP,GLU,HgA1C)  0   Lab Value Date/Time    HCT 38.5 02/14/2023 0639    HCT 45.0 11/18/2016 0801    HGB 12.7 02/14/2023 0639    HGB 14.8 11/18/2016 0801    WBC 11.74 (H) 02/14/2023 0639    WBC 7.8 11/18/2016 0801    WBC 6-10 (A) 11/18/2016 0801    INR 0.89 01/17/2023 0834    HGBA1C 5.4 01/17/2023 0834         Physical exam  General: Awake, Alert, Oriented  Eyes: Pupils equal, round and reactive to light  Heart: regular rate and rhythm  Lungs: No audible wheezing  Abdomen: soft  LLE  - skin incision well healed  - non tender knee  - knee ROM 0-120 deg flexion/extension  - stable varus/valgus stress.  - motor intact  - sensation intact to light touch sural, saphenous, deep and superficial peroneal nerve distributions  - 2+ DP pulse    RLE  - skin intact  - painless hip ROM  - hip ROM 10 deg internal, 45 deg external,  100 deg flexion  - motor and sensation intact grossly  - extremity warm and adequately perfused.    Imaging  X-ray left knee obtained, reviewed and my independent interpretation is as follows: intact prosthesis in  good alignment.    Procedure  None    Assessment/Plan:   63 y.o.female 1 year s/p left TKA, right hip OA and axial back pain.  Patient reminded of dental ppx requirement 2 years s/p left TKA.  As patient is receiving benefit with steroid injections, will continue along with home exercise program.  Patient provided referral to comprehensive spine program for her axial back pain    WBAT B/L LE  Continue NSAIDs prn  Comprehensive spine program referral  Return to clinic prn

## 2024-02-02 ENCOUNTER — TELEPHONE (OUTPATIENT)
Dept: PHYSICAL THERAPY | Facility: OTHER | Age: 64
End: 2024-02-02

## 2024-02-19 ENCOUNTER — TELEPHONE (OUTPATIENT)
Age: 64
End: 2024-02-19

## 2024-02-19 DIAGNOSIS — M25.551 RIGHT HIP PAIN: Primary | ICD-10-CM

## 2024-02-19 NOTE — TELEPHONE ENCOUNTER
Caller: Scheduling center from [facility]     Doctor: Sweetie     Reason for call: They receive a call from the patient to schedule an FL injection. Please place an order

## 2024-03-01 DIAGNOSIS — Z47.1 AFTERCARE FOLLOWING RIGHT KNEE JOINT REPLACEMENT SURGERY: ICD-10-CM

## 2024-03-01 DIAGNOSIS — Z96.651 AFTERCARE FOLLOWING RIGHT KNEE JOINT REPLACEMENT SURGERY: ICD-10-CM

## 2024-03-01 RX ORDER — CEPHALEXIN 500 MG/1
CAPSULE ORAL
Qty: 20 CAPSULE | Refills: 0 | Status: SHIPPED | OUTPATIENT
Start: 2024-03-01 | End: 2024-03-15

## 2024-03-12 DIAGNOSIS — M1A.0790 IDIOPATHIC CHRONIC GOUT OF FOOT WITHOUT TOPHUS, UNSPECIFIED LATERALITY: ICD-10-CM

## 2024-03-12 RX ORDER — ALLOPURINOL 300 MG/1
300 TABLET ORAL DAILY
Qty: 90 TABLET | Refills: 1 | Status: SHIPPED | OUTPATIENT
Start: 2024-03-12

## 2024-04-02 ENCOUNTER — TELEPHONE (OUTPATIENT)
Dept: PHYSICAL THERAPY | Facility: OTHER | Age: 64
End: 2024-04-02

## 2024-04-02 NOTE — TELEPHONE ENCOUNTER
Patient called into [facility] today.    Spoke with patient, explained [facility] and what we offer.    Patient states she was having back pain and most likely is coming from her hip. She is established with [facility] and she has an appt on 04/23 to discuss further wether or not she would need surgery. She will ask if PT with advanced therapist will be beneficial for her and if yes then she will call us back to proceed with the triage.    Hours of business provided.    [facility] referral closed.

## 2024-04-02 NOTE — TELEPHONE ENCOUNTER
Patient called into [facility] at 1:30 PM and LM. I returned her call at 1:40 PM.     Voice message left for patient to call back. Phone number and hours of business provided.     Pt's original referral to [facility] has been closed. Was referred by Ortho on 2/1/24. I did let the patient know in the message that we can still triage her, and her eval would be in PT with the advanced spine therapist.     Will assist the patient if she calls back in

## 2024-04-22 RX ORDER — AMOXICILLIN 500 MG/1
CAPSULE ORAL
COMMUNITY
Start: 2024-03-01

## 2024-04-23 ENCOUNTER — OFFICE VISIT (OUTPATIENT)
Dept: OBGYN CLINIC | Facility: HOSPITAL | Age: 64
End: 2024-04-23
Payer: COMMERCIAL

## 2024-04-23 VITALS
HEART RATE: 72 BPM | BODY MASS INDEX: 30.73 KG/M2 | HEIGHT: 64 IN | DIASTOLIC BLOOD PRESSURE: 77 MMHG | WEIGHT: 180 LBS | SYSTOLIC BLOOD PRESSURE: 122 MMHG

## 2024-04-23 DIAGNOSIS — M16.11 PRIMARY OSTEOARTHRITIS OF RIGHT HIP: Primary | ICD-10-CM

## 2024-04-23 PROCEDURE — 99214 OFFICE O/P EST MOD 30 MIN: CPT | Performed by: ORTHOPAEDIC SURGERY

## 2024-04-23 RX ORDER — ENOXAPARIN SODIUM 100 MG/ML
40 INJECTION SUBCUTANEOUS DAILY
Qty: 11.2 ML | Refills: 0 | Status: SHIPPED | OUTPATIENT
Start: 2024-04-23 | End: 2024-05-21

## 2024-04-23 RX ORDER — ASCORBIC ACID 500 MG
500 TABLET ORAL DAILY
Qty: 30 TABLET | Refills: 0 | Status: SHIPPED | OUTPATIENT
Start: 2024-04-23

## 2024-04-23 RX ORDER — CHLORHEXIDINE GLUCONATE ORAL RINSE 1.2 MG/ML
15 SOLUTION DENTAL ONCE
OUTPATIENT
Start: 2024-04-23 | End: 2024-04-23

## 2024-04-23 RX ORDER — ACETAMINOPHEN 325 MG/1
975 TABLET ORAL ONCE
OUTPATIENT
Start: 2024-04-23 | End: 2024-04-23

## 2024-04-23 RX ORDER — GABAPENTIN 300 MG/1
300 CAPSULE ORAL ONCE
OUTPATIENT
Start: 2024-04-23 | End: 2024-04-23

## 2024-04-23 RX ORDER — FERROUS SULFATE 324(65)MG
324 TABLET, DELAYED RELEASE (ENTERIC COATED) ORAL
Qty: 60 TABLET | Refills: 0 | Status: SHIPPED | OUTPATIENT
Start: 2024-04-23

## 2024-04-23 RX ORDER — CEFAZOLIN SODIUM 2 G/50ML
2000 SOLUTION INTRAVENOUS ONCE
OUTPATIENT
Start: 2024-04-23 | End: 2024-04-23

## 2024-04-23 RX ORDER — SODIUM CHLORIDE, SODIUM LACTATE, POTASSIUM CHLORIDE, CALCIUM CHLORIDE 600; 310; 30; 20 MG/100ML; MG/100ML; MG/100ML; MG/100ML
125 INJECTION, SOLUTION INTRAVENOUS CONTINUOUS
OUTPATIENT
Start: 2024-04-23

## 2024-04-23 RX ORDER — FOLIC ACID 1 MG/1
1 TABLET ORAL DAILY
Qty: 30 TABLET | Refills: 0 | Status: SHIPPED | OUTPATIENT
Start: 2024-04-23

## 2024-04-23 RX ORDER — TRANEXAMIC ACID 10 MG/ML
1000 INJECTION, SOLUTION INTRAVENOUS ONCE
OUTPATIENT
Start: 2024-04-23 | End: 2024-04-23

## 2024-04-23 NOTE — PROGRESS NOTES
63 y.o.female presents for reevaluation.  She has had rapid worsening weightbearing pain in the right hip and groin area.  She has pain level the right hip that is worse when she bears weight and increased activity results increase intensity of her pain.  She currently utilizes terms such as severe and unrelenting to describe her current levels of pain.  She has had treatments including not limited to medications by mouth and injections of corticosteroid all without relief of her current weightbearing pain in the right hip and right groin    Review of Systems  Review of systems negative unless otherwise specified in HPI    Past Medical History  Past Medical History:   Diagnosis Date    Aftercare following left knee joint replacement surgery 3/27/2023    Anxiety     MENOPAUSE    Arthritis     KNEES    Chest pain syndrome 03/29/2022    Chondromalacia patellae of right knee 11/8/2016    Chronic pain disorder     Chronic pain of right knee 5/5/2022    Diverticulosis     GERD (gastroesophageal reflux disease)     Hypertension     1 episode last year went to ER; no problems since    Primary osteoarthritis of left knee 5/5/2022    Primary osteoarthritis of right knee 9/24/2019    Renal dysplasia     nonfunctioning left life-long       Past Surgical History  Past Surgical History:   Procedure Laterality Date    COLONOSCOPY  03/2016    FL INJECTION RIGHT HIP (NON ARTHROGRAM)  5/10/2022    FL INJECTION RIGHT HIP (NON ARTHROGRAM)  11/3/2022    FL INJECTION RIGHT HIP (NON ARTHROGRAM)  5/22/2023    FL INJECTION RIGHT HIP (NON ARTHROGRAM)  10/18/2023    FL INJECTION RIGHT HIP (NON ARTHROGRAM)  1/30/2024    KIDNEY SURGERY Right     age 32    [place] ARTHRP KNE CONDYLE&PLATU MEDIAL&LAT COMPARTMENTS Right 8/1/2022    Procedure: ARTHROPLASTY KNEE TOTAL W ROBOT;  Surgeon: Juan Pitt MD;  [facility];  Service: Orthopedics    [place] ARTHRP KNE CONDYLE&PLATU MEDIAL&LAT COMPARTMENTS Left 2/13/2023    Procedure: ARTHROPLASTY  KNEE TOTAL W ROBOT;  Surgeon: Juan Pitt MD;  [facility];  Service: Orthopedics    REDUCTION MAMMAPLASTY      WRIST SURGERY Right     fx       Current Medications  Current Outpatient Medications on File Prior to Visit   Medication Sig Dispense Refill    amoxicillin (AMOXIL) 500 mg capsule 4 CAPS (2GRAMS) 1 HOUR PRIOR TO PROCEDURE      allopurinol (ZYLOPRIM) 300 mg tablet Take 1 tablet (300 mg total) by mouth daily 90 tablet 1    ALPRAZolam (XANAX) 0.25 mg tablet Take 1 tablet (0.25 mg total) by mouth daily as needed for anxiety 30 tablet 0    Biotin w/ Vitamins C & E 1250-7.5-7.5 MCG-MG-UNT CHEW Chew      FIBER PO Take by mouth daily      Multiple Vitamins-Minerals (MULTI FOR HER 50+ PO) Take by mouth daily       No current facility-administered medications on file prior to visit.       Recent Labs (HCT,HGB,PT,INR,ESR,CRP,GLU,HgA1C)  0   Lab Value Date/Time    HCT 38.5 02/14/2023 0639    HCT 45.0 11/18/2016 0801    HGB 12.7 02/14/2023 0639    HGB 14.8 11/18/2016 0801    WBC 11.74 (H) 02/14/2023 0639    WBC 7.8 11/18/2016 0801    WBC 6-10 (A) 11/18/2016 0801    INR 0.89 01/17/2023 0834    HGBA1C 5.4 01/17/2023 0834         Physical exam  General: Awake, Alert, Oriented  Eyes: Pupils equal, round and reactive to light  Heart: regular rate and rhythm  Lungs: No audible wheezing  Abdomen: soft  Gait pattern is with significant antalgia.  Right hip has intact soft tissue sleeve there is.  There is absent internal rotation as this hip is flexed and this recreates pain in the right groin.  The right thigh is devoid of atrophy right knee has a healed anterior scar.  There is no lymph inequality.  The foot does strongly dorsiflex on the right side    Imaging  Personally reviewed x-rays of the right hip that were done previous and show a ovoid femoral head, and less than 50% joint space preserved when compared to the contralateral normal left hip    Procedure  None indicated performed  today    Assessment/Plan:   63 y.o.female who has osteoarthritis of the right hip and it remains symptomatic despite appropriate nonsurgical treatment.  Currently her pain is increasing and her functional level is decreasing.  All diagnoses were discussed with the patient.  Treatment option including risks, benefits, return discussed in detail.  I think right hip replacement surgery is warranted.  After review the risks and benefits, consent was to have the above-mentioned surgery.  No guarantees were given.  Office follow-up as a postoperative patient is my recommendation

## 2024-04-30 ENCOUNTER — HOSPITAL ENCOUNTER (OUTPATIENT)
Dept: RADIOLOGY | Facility: HOSPITAL | Age: 64
Discharge: HOME | End: 2024-04-30
Attending: ORTHOPAEDIC SURGERY

## 2024-05-07 ENCOUNTER — TELEPHONE (OUTPATIENT)
Dept: OBGYN CLINIC | Facility: HOSPITAL | Age: 64
End: 2024-05-07

## 2024-05-14 NOTE — PROGRESS NOTES
Internal Medicine Pre-Operative Evaluation:     Reason for Visit: Pre-operative Evaluation for Risk Stratification and Optimization    Patient ID: Lakisha Trevizo is a 63 y.o. female.     Surgery: Arthroplasty of right hip  Referring Provider: Dr Pitt      Recommendations to Proceed withSurgery    Patient is considered to be Low risk for Medium risk procedure.     After evaluation and discussion with patient with emphasis that all surgery has some degree of inherent risk it is determined this procedure is of acceptable risk  medically.    Patient may proceed with planned procedure      Assessment      Primary osteoarthritis right hip  Failed conservative measures  Electing to undergo total joint arthroplasty    Pre-operative Medical Evaluation for planned surgery  Patient meets preoperative quality goals as noted below  Recommendations as listed in PLAN section below  Contact surgical nurse  navigator with any questions regarding preoperative plan or schedule.    Single functioning kidney  Monitor bmp  Avoid hypotension  Avoid NSAIDS    Recent R &L TKA  8/2022  No issues post operatively    Anxiety  Cont xanax as needed        Patient Active Problem List   Diagnosis    Anxiety    Congenital renal dysplasia ( left )    Mixed hyperlipidemia    Primary osteoarthritis of right hip    H/O total knee replacement, bilateral    Gout of foot    Prediabetes        Plan:     1. Further preoperative workup as follows:   - none no further testing required may proceed with surgery    2. Medication Management/Recommendations:   - continue all current medicines through morning of surgery with sip of water except the following:  - hold aspirin 7 days prior to surgery  - avoid use of NSAID such as motrin, advil, aleve for 7 days prior to surgery  - hold all OTC herbal or nutritional supplements 7 days before surgery    3. Patient requires further consultation with:   No Consults Required    4. Discharge Planning / Barriers to  "Discharge  none identified - patients has post discharge therapy plan in place, transportation arranged for discharge day, adequate family support at home to assist with discharge to home.        Subjective:           History of Present Illness:     Lakisha Trevizo is a 63 y.o. female who presents to the office today for a preoperative consultation at the request of surgeon. The patient understands this is an elective procedure and not emergent. They are electing to undergo planned procedure with an understanding that all surgery has inherent risk. They have worked with their surgeon and failed conservative treatment measures. Today they present for preoperative risk assessment and recommendations for optimization in preparation for surgery.    Pt seen in surgical optimization center for upcoming proposed surgery. They have failed previous conservative measures and have elected surgical intervention.     Pt meets presurgical lab and BMI optimization goals.    Upon interview questioning patient is able to perform greater than 4 METs workload in daily life without any reported cardio-pulmonary symptoms.    Pt underwent recent TKA  and did well post operatively          ROS: No TIA's or unusual headaches, no dysphagia.  No prolonged cough. No dyspnea or chest pain on exertion.  No abdominal pain, change in bowel habits, black or bloody stools.  No urinary tract or BPH symptoms.  Positive reported pain in arthritic joint. Positive difficulty with gait. No skin rashes or issues.            Objective:      Pulse 73   Ht 5' 4\" (1.626 m)   Wt 81.8 kg (180 lb 6.4 oz)   SpO2 96%   BMI 30.97 kg/m²       General Appearance: no distress, conversive  HEENT: PERRLA, conjuctiva normal; oropharynx clear; mucous membranes moist;   Neck:  Supple, no lymphadenopathy or thyromegaly  Lungs: breath sounds normal, normal respiratory effort, no retractions, expiratory effort normal  CV: normal heart sounds S1/S2, PMI normal   ABD: soft " non tender, no masses , no hepatic or splenomegaly  EXT: DP pulses intact, no lymphadenopathy, no edema  Skin: normal turgor, normal texture, no rash  Psych: affect normal, mood normal  Neuro: AAOx3        The following portions of the patient's history were reviewed and updated as appropriate: allergies, current medications, past family history, past medical history, past social history, past surgical history and problem list.     Past History:       Past Medical History:   Diagnosis Date    Aftercare following left knee joint replacement surgery 3/27/2023    Anxiety     MENOPAUSE    Arthritis     KNEES    Chest pain syndrome 03/29/2022    Chondromalacia patellae of right knee 11/8/2016    Chronic pain disorder     Chronic pain of right knee 5/5/2022    Diverticulosis     GERD (gastroesophageal reflux disease)     Hypertension     1 episode last year went to ER; no problems since    Primary osteoarthritis of left knee 5/5/2022    Primary osteoarthritis of right knee 9/24/2019    Renal dysplasia     nonfunctioning left life-long    Past Surgical History:   Procedure Laterality Date    COLONOSCOPY  03/2016    FL INJECTION RIGHT HIP (NON ARTHROGRAM)  5/10/2022    FL INJECTION RIGHT HIP (NON ARTHROGRAM)  11/3/2022    FL INJECTION RIGHT HIP (NON ARTHROGRAM)  5/22/2023    FL INJECTION RIGHT HIP (NON ARTHROGRAM)  10/18/2023    FL INJECTION RIGHT HIP (NON ARTHROGRAM)  1/30/2024    KIDNEY SURGERY Right     age 32    TX ARTHRP KNE CONDYLE&PLATU MEDIAL&LAT COMPARTMENTS Right 8/1/2022    Procedure: ARTHROPLASTY KNEE TOTAL W ROBOT;  Surgeon: Juan Pitt MD;  Location: [facility];  Service: Orthopedics    TX ARTHRP KNE CONDYLE&PLATU MEDIAL&LAT COMPARTMENTS Left 2/13/2023    Procedure: ARTHROPLASTY KNEE TOTAL W ROBOT;  Surgeon: Juan Pitt MD;  Location: [facility];  Service: Orthopedics    REDUCTION MAMMAPLASTY      WRIST SURGERY Right     fx          Social History     Tobacco Use    Smoking status: Former  "    Current packs/day: 0.25     Average packs/day: 0.3 packs/day for 15.0 years (3.8 ttl pk-yrs)     Types: Cigarettes    Smokeless tobacco: Never   Vaping Use    Vaping status: Never Used   Substance Use Topics    Alcohol use: Yes     Alcohol/week: 10.0 standard drinks of alcohol     Types: 5 Cans of beer, 5 Standard drinks or equivalent per week     Comment: daily    Drug use: No     Family History   Problem Relation Age of Onset    Diabetes Father     Breast cancer Neg Hx     Ovarian cancer Neg Hx     Colon cancer Neg Hx           Allergies:     Allergies   Allergen Reactions    Sulfa Antibiotics Rash        Current Medications:     Current Outpatient Medications   Medication Instructions    allopurinol (ZYLOPRIM) 300 mg, Oral, Daily    ALPRAZolam (XANAX) 0.25 mg, Oral, Daily PRN    amoxicillin (AMOXIL) 500 mg capsule 4 CAPS (2GRAMS) 1 HOUR PRIOR TO PROCEDURE    ascorbic acid (VITAMIN C) 500 mg, Oral, Daily, Start 30 days prior to surgery    Biotin w/ Vitamins C & E 1250-7.5-7.5 MCG-MG-UNT CHEW Oral    enoxaparin (LOVENOX) 40 mg, Subcutaneous, Daily, Start injections after surgery    ferrous sulfate 324 mg, Oral, 2 times daily before meals, Start 30 days prior to surgery    FIBER PO Oral, Daily    folic acid (FOLVITE) 1 mg, Oral, Daily, Start 30 days prior to surgery    Multiple Vitamins-Minerals (MULTI FOR HER 50+ PO) Oral, Daily             PRE-OP WORKSHEET DATA    Assessment of Pre-Operative Risks     MLJ Quality Hard Stops:  BMI (<40) : Estimated body mass index is 30.97 kg/m² as calculated from the following:    Height as of this encounter: 5' 4\" (1.626 m).    Weight as of this encounter: 81.8 kg (180 lb 6.4 oz).    Hgb ( >11):   Lab Results   Component Value Date    HGB 14.6 05/17/2024     HbA1c (<7.0) :   Lab Results   Component Value Date    HGBA1C 5.6 05/17/2024     GFR (>60) (Less then 45 = Nephrology consult):  Estimated Creatinine Clearance: 100.1 mL/min (by C-G formula based on SCr of 0.61 " mg/dL).      Active Decompensated Chronic Conditions which would delay surgery  No acutely decompensated medical issues such as recent CVA, MI, new onset arrhythmia, severe aortic stenosis, CHF, uncontrolled COPD       Exercise Capacity: (if less the 4 mets consider functional assessment via cardiac stress testing or consultation)    Able to walk 2 blocks without symptoms?: Yes  Able to walk 1 flights without symptoms?: Yes    Assessment of intra and post operative respiratory, hemodynamic and thrombotic risks     Prior Anesthesia Reactions: No     Personal history of venous thromboembolic disease? No    History of steroid use > 5 mg for >2 weeks within last year? No    Cardiac Risk Estimation: per the Revised Cardiac Risk Index (Circ. 100:1043, 1999),     The patient's risk factors for cardiac complications include :  none    Lakisha Trevizo has a in hospital cardiac risk of RCI RISK CLASS I (0 risk factors, risk of major cardiac compl. appr. 0.5%) based on RCRI calculator          Pre-Op Data Reviewed:       Laboratory Results: I have personally reviewed the pertinent laboratory results/reports     EKG:I have personally reviewed pertinent reports.  . I personally reviewed and interpreted available tracings in the electronic medical record    Sinus bradycardia  Otherwise normal ECG  When compared with ECG of 28-MAR-2022 17:21, (unconfirmed)  QT has shortened  Confirmed by Liza Dominguez ([id number]) on 3/29/2022     OLD RECORDS: reviewed old records in the chart review section if EHR on day of visit.    Previous cardiopulmonary studies within the past year:  Echocardiogram: yes, 2022 ef 65%  Cardiac Catheterization: no  Stress Test: yes, 2022 negative      Time of visit including pre-visit chart review, visit and post-visit coordination of plan and care , review of pre-surgical lab work, preparation and time spent documenting note in electronic medical record, time spent face-to-face in physical examination answering  patient questions by care team 60 minutes             [facility]

## 2024-05-14 NOTE — PATIENT INSTRUCTIONS
BEFORE SURGERY    Contact surgical nurse  navigator with any questions regarding preoperative plan or schedule.  Stop all over the counter supplements, herbal, naturopathic  medications for 1 week prior to surgery UNLESS prescribed by your surgeon  Hold NSAIDS (i.e. advil, alleve, motrin, ibuprofen, celebrex) minimum 5 days prior to surgery  Follow presurgical medication instructions provided by preadmission nursing team reviewed during your presurgery phone call  Strategies for optimizing your surgery through breathing exercises, nutrition and physical activity can be found at [email or web address]  Call [phone] with any presurgical concerns or medications questions    AFTER SURGERY    Recommend using Tylenol ( acetaminophen ) 1000 mg every eight hours during the first week post discharge along with icing the area for 20 mins every 3-4 hours while awake can be helpful in reducing your need for post operative opioid use. This opioid sparing plan can be used along side your surgeons pain plan.  Use stool softener over the counter (colace) daily after surgery during the first 1-2 weeks to avoid post operative constipation issues  If no bowel movement within 3 days after surgery then use over the counter Miralax in addition to your stool softener   If cleared by your surgical team for activity then early and often walking is encouraged and can be important in prevention of post surgical blood clots. Additionally spend as much time out of bed as possible and allowed by your surgical team  Use your incentive spirometer twice per hour in the first seven days after surgery to help prevent post surgery lung complications and infections  Call [phone] with any post discharge concerns or medical issues

## 2024-05-15 NOTE — TELEPHONE ENCOUNTER
Caller: Sujatha     Doctor: Sweetie    Reason for call: Patient returning a call from 5/7    Call back#: [phone]

## 2024-05-16 ENCOUNTER — RA CDI HCC (OUTPATIENT)
Dept: OTHER | Facility: HOSPITAL | Age: 64
End: 2024-05-16

## 2024-05-16 NOTE — PROGRESS NOTES
HCC coding opportunities             Patients Insurance        Commercial Insurance: [employer]

## 2024-05-17 ENCOUNTER — LAB REQUISITION (OUTPATIENT)
Dept: LAB | Facility: HOSPITAL | Age: 64
End: 2024-05-17
Payer: COMMERCIAL

## 2024-05-17 ENCOUNTER — APPOINTMENT (OUTPATIENT)
Dept: LAB | Facility: CLINIC | Age: 64
End: 2024-05-17
Payer: COMMERCIAL

## 2024-05-17 DIAGNOSIS — M16.11 PRIMARY OSTEOARTHRITIS OF RIGHT HIP: ICD-10-CM

## 2024-05-17 DIAGNOSIS — M16.11 UNILATERAL PRIMARY OSTEOARTHRITIS, RIGHT HIP: ICD-10-CM

## 2024-05-17 LAB
ABO GROUP BLD: NORMAL
ALBUMIN SERPL BCP-MCNC: 4.3 G/DL (ref 3.5–5)
ALP SERPL-CCNC: 70 U/L (ref 34–104)
ALT SERPL W P-5'-P-CCNC: 19 U/L (ref 7–52)
ANION GAP SERPL CALCULATED.3IONS-SCNC: 7 MMOL/L (ref 4–13)
APTT PPP: 25 SECONDS (ref 23–37)
AST SERPL W P-5'-P-CCNC: 16 U/L (ref 13–39)
BASOPHILS # BLD AUTO: 0.06 THOUSANDS/ÂΜL (ref 0–0.1)
BASOPHILS NFR BLD AUTO: 1 % (ref 0–1)
BILIRUB SERPL-MCNC: 0.77 MG/DL (ref 0.2–1)
BLD GP AB SCN SERPL QL: NEGATIVE
BUN SERPL-MCNC: 17 MG/DL (ref 5–25)
CALCIUM SERPL-MCNC: 9.4 MG/DL (ref 8.4–10.2)
CHLORIDE SERPL-SCNC: 105 MMOL/L (ref 96–108)
CO2 SERPL-SCNC: 27 MMOL/L (ref 21–32)
CREAT SERPL-MCNC: 0.61 MG/DL (ref 0.6–1.3)
EOSINOPHIL # BLD AUTO: 0.32 THOUSAND/ÂΜL (ref 0–0.61)
EOSINOPHIL NFR BLD AUTO: 5 % (ref 0–6)
ERYTHROCYTE [DISTWIDTH] IN BLOOD BY AUTOMATED COUNT: 12.7 % (ref 11.6–15.1)
EST. AVERAGE GLUCOSE BLD GHB EST-MCNC: 114 MG/DL
GFR SERPL CREATININE-BSD FRML MDRD: 98 ML/MIN/1.73SQ M
GLUCOSE P FAST SERPL-MCNC: 115 MG/DL (ref 65–99)
HBA1C MFR BLD: 5.6 %
HCT VFR BLD AUTO: 43.9 % (ref 34.8–46.1)
HGB BLD-MCNC: 14.6 G/DL (ref 11.5–15.4)
IMM GRANULOCYTES # BLD AUTO: 0.04 THOUSAND/UL (ref 0–0.2)
IMM GRANULOCYTES NFR BLD AUTO: 1 % (ref 0–2)
INR PPP: 0.92 (ref 0.84–1.19)
LYMPHOCYTES # BLD AUTO: 1.33 THOUSANDS/ÂΜL (ref 0.6–4.47)
LYMPHOCYTES NFR BLD AUTO: 20 % (ref 14–44)
MCH RBC QN AUTO: 30.4 PG (ref 26.8–34.3)
MCHC RBC AUTO-ENTMCNC: 33.3 G/DL (ref 31.4–37.4)
MCV RBC AUTO: 92 FL (ref 82–98)
MONOCYTES # BLD AUTO: 0.64 THOUSAND/ÂΜL (ref 0.17–1.22)
MONOCYTES NFR BLD AUTO: 10 % (ref 4–12)
NEUTROPHILS # BLD AUTO: 4.37 THOUSANDS/ÂΜL (ref 1.85–7.62)
NEUTS SEG NFR BLD AUTO: 63 % (ref 43–75)
NRBC BLD AUTO-RTO: 0 /100 WBCS
PLATELET # BLD AUTO: 258 THOUSANDS/UL (ref 149–390)
PMV BLD AUTO: 9.6 FL (ref 8.9–12.7)
POTASSIUM SERPL-SCNC: 4.3 MMOL/L (ref 3.5–5.3)
PROT SERPL-MCNC: 7.2 G/DL (ref 6.4–8.4)
PROTHROMBIN TIME: 12.9 SECONDS (ref 11.6–14.5)
RBC # BLD AUTO: 4.8 MILLION/UL (ref 3.81–5.12)
RH BLD: POSITIVE
SODIUM SERPL-SCNC: 139 MMOL/L (ref 135–147)
SPECIMEN EXPIRATION DATE: NORMAL
WBC # BLD AUTO: 6.76 THOUSAND/UL (ref 4.31–10.16)

## 2024-05-17 PROCEDURE — 86850 RBC ANTIBODY SCREEN: CPT | Performed by: ORTHOPAEDIC SURGERY

## 2024-05-17 PROCEDURE — 36415 COLL VENOUS BLD VENIPUNCTURE: CPT

## 2024-05-17 PROCEDURE — 86900 BLOOD TYPING SEROLOGIC ABO: CPT | Performed by: ORTHOPAEDIC SURGERY

## 2024-05-17 PROCEDURE — 86901 BLOOD TYPING SEROLOGIC RH(D): CPT | Performed by: ORTHOPAEDIC SURGERY

## 2024-05-17 PROCEDURE — 85610 PROTHROMBIN TIME: CPT

## 2024-05-17 PROCEDURE — 85025 COMPLETE CBC W/AUTO DIFF WBC: CPT

## 2024-05-17 PROCEDURE — 80053 COMPREHEN METABOLIC PANEL: CPT

## 2024-05-17 PROCEDURE — 83036 HEMOGLOBIN GLYCOSYLATED A1C: CPT

## 2024-05-17 PROCEDURE — 85730 THROMBOPLASTIN TIME PARTIAL: CPT

## 2024-05-17 NOTE — TELEPHONE ENCOUNTER
Preoperative Elective Admission Assessment-S/w pt     Living Situation:   Pt lives with  in single level ranch home. Does have #16 steps to basement level, #1 step to enter home.      First Floor Setup:  Yes     DME: Pt does have her RW, cane and BSC.     Patient's Current Level of Function: independent with ADLs and ambulation.      Post-op Caregiver:      Post-op Transport:      Outpatient Physical Therapy Site:  Site: [facility]  pre and post-op appts scheduled? Y     Medication Management:  Self   Preferred Pharm[facility] for Post-op Medications[facility] [place] [address] [address] [place]  Blood Management Vitamin Regimen: Pt is taking as prescribed.  Denies questions or issues.   Post-op anticoagulant: Pt has lovenox at home and it is ready for post-op use only.     DC Plan: Pt plans for DC to home and plans to attend OP PT.     Barriers to DC identified preoperatively: None     BMI: 30.90     Patient Education:  Pt educated on post-op pain, early mobilization (POD0), Average inpt LOS, OP PT goal.  Patient educated that our goal is to appropriately discharge patient based off their post-op function while striving to maintain maximal independence. The goal is to discharge patient to home and for them to attend outpatient physical therapy.     Assigned to care team? Yes

## 2024-05-23 ENCOUNTER — OFFICE VISIT (OUTPATIENT)
Age: 64
End: 2024-05-23
Payer: COMMERCIAL

## 2024-05-23 ENCOUNTER — ANESTHESIA EVENT (OUTPATIENT)
Age: 64
End: 2024-05-23
Payer: COMMERCIAL

## 2024-05-23 VITALS — HEIGHT: 64 IN | OXYGEN SATURATION: 96 % | HEART RATE: 73 BPM | BODY MASS INDEX: 30.8 KG/M2 | WEIGHT: 180.4 LBS

## 2024-05-23 DIAGNOSIS — E78.2 MIXED HYPERLIPIDEMIA: ICD-10-CM

## 2024-05-23 DIAGNOSIS — F41.9 ANXIETY: ICD-10-CM

## 2024-05-23 DIAGNOSIS — M16.11 PRIMARY OSTEOARTHRITIS OF RIGHT HIP: Primary | ICD-10-CM

## 2024-05-23 DIAGNOSIS — Z96.653 H/O TOTAL KNEE REPLACEMENT, BILATERAL: ICD-10-CM

## 2024-05-23 PROCEDURE — 99215 OFFICE O/P EST HI 40 MIN: CPT | Performed by: INTERNAL MEDICINE

## 2024-05-23 NOTE — PRE-PROCEDURE INSTRUCTIONS
Pre-Surgery Instructions:   Medication Instructions    allopurinol (ZYLOPRIM) 300 mg tablet Take day of surgery.    ALPRAZolam (XANAX) 0.25 mg tablet Uses PRN- OK to take day of surgery    amoxicillin (AMOXIL) 500 mg capsule Prior dental    ascorbic acid (VITAMIN C) 500 MG tablet Hold day of surgery.    Biotin w/ Vitamins C & E 1250-7.5-7.5 MCG-MG-UNT CHEW Stop taking 7 days prior to surgery.    ferrous sulfate 324 (65 Fe) mg Hold day of surgery.    FIBER PO Stop taking 7 days prior to surgery.    folic acid (FOLVITE) 1 mg tablet Hold day of surgery.    Multiple Vitamins-Minerals (MULTI FOR HER 50+ PO) Stop taking 7 days prior to surgery.   TMLJ Patient Education reviewed by surgeon's office/nurse navigator  Instructed to call nurse navigator with any questions  Instructed to bring walker DOS  Patient has Ortho Bag with IS  Patient has Ortho Packet and reviewed information  Pre op instructions per [facility] protocol,medications per surgeon/anesthesia guidelines reviewed and showering instructions per [facility] TMLJ protocol reviewed by surgeon's office-Patient has hibiclens soap and cloths  Patient taking Folic Acid,Vitamin C,Iron and will continue until day prior surgery.  Pt. Verbalized understanding of current visitor restrictions   Instructed to call surgeon with any illness,change in health or covid exposure now till DOS   All medications instructed to take DOS are with sips water only   Instructed to avoid all ASA and OTC Vit/Supp 1 week prior to surgery and to avoid NSAIDs 3 days prior to surgery per anesthesia guidelines.Tylenol ok to take prn.   No Alcohol 24 hours prior to surgery  Patient aware Lovenox or other Blood Thinner prescribed is for POST OP ONLY  Pt. Verbalized an understanding of all instructions reviewed and offers no concerns at this time.

## 2024-05-29 ENCOUNTER — EVALUATION (OUTPATIENT)
Dept: PHYSICAL THERAPY | Facility: CLINIC | Age: 64
End: 2024-05-29
Payer: COMMERCIAL

## 2024-05-29 DIAGNOSIS — M25.551 RIGHT HIP PAIN: Primary | ICD-10-CM

## 2024-05-29 PROCEDURE — 97112 NEUROMUSCULAR REEDUCATION: CPT | Performed by: PHYSICAL THERAPIST

## 2024-05-29 PROCEDURE — 97110 THERAPEUTIC EXERCISES: CPT | Performed by: PHYSICAL THERAPIST

## 2024-05-29 PROCEDURE — 97161 PT EVAL LOW COMPLEX 20 MIN: CPT | Performed by: PHYSICAL THERAPIST

## 2024-05-29 NOTE — PROGRESS NOTES
PT Evaluation     Today's date: 2024  Patient name: Lakisha Trevizo  : 1960  MRN: [id number]  Referring provider: Juan Pitt,*  Dx:   Encounter Diagnosis     ICD-10-CM    1. Right hip pain  M25.551                      Assessment  Impairments: abnormal gait, abnormal or restricted ROM, activity intolerance, impaired physical strength, lacks appropriate home exercise program, pain with function, weight-bearing intolerance, poor posture  and poor body mechanics  Symptom irritability: high    Assessment details: Pt presents with s/s consistent with R hip OA. She is scheduled for R ROXANA posterior approach 24. She will benefit from skilled PT services post-operatively to address her impairments in order to decrease pain and restore function.  Understanding of Dx/Px/POC: good     Prognosis: good    Goals  STG - 2-4 wks  1) pt will d/c FWW  2) pt will be demonstrate 3+/5 R hip strength    MTG - 4-6 wks  1) pt will d/c SPC  2) pt will demonstrate 4-/5 R hip strength    LTG - 6-8 wks  1) pt will normalize gait  2) pt will use reciprocal pattern on stairs  3) pt will demonstrate 4/5 R hip strength    Plan  Patient would benefit from: skilled physical therapy    Planned therapy interventions: manual therapy, abdominal trunk stabilization, body mechanics training, balance/weight bearing training, neuromuscular re-education, postural training, strengthening, stretching, therapeutic activities, therapeutic exercise, home exercise program, gait training, flexibility and functional ROM exercises    Frequency: 2x week  Duration in weeks: 8  Treatment plan discussed with: patient        Subjective Evaluation    History of Present Illness  Mechanism of injury: Pt is a 63 y.o. female with PMHx significant for anxiety, B TKA, CLBP, latex allergy. She presents to PT for R ROXANA pre-op assessment.    Patient Goals  Patient goals for therapy: decreased edema, decreased pain, increased strength, independence with  "ADLs/IADLs, return to sport/leisure activities, return to work, increased motion and improved balance    Pain  Current pain ratin  At best pain ratin  At worst pain ratin  Location: anterior R hip, groin  Quality: burning, sharp, pressure and discomfort  Relieving factors: change in position and rest  Aggravating factors: sitting, standing and walking  Progression: worsening    Social Support  Steps to enter house: yes  2  Stairs in house: yes (FF basement)   Lives in: one-story house  Lives with: spouse    Employment status: working ([employer])    Diagnostic Tests  X-ray: abnormal (moderate R hip OA)  Treatments  Previous treatment: chiropractic, injection treatment, physical therapy and medication  Current treatment: medication        Objective     Passive Range of Motion     Right Hip   Flexion: 92 degrees with pain  Extension: 0 degrees with pain  Abduction: 20 degrees with pain  Adduction: 10 degrees with pain  External rotation (90/90): 40 degrees with pain  Internal rotation (90/90): 8 degrees with pain    Strength/Myotome Testing     Right Hip   Planes of Motion   Flexion: 3-  Extension: 4-  Abduction: 3+  Adduction: 4  External rotation: 3+  Internal rotation: 4    General Comments:      Hip Comments   Gait no AD: moderate forward trunk lean, moderately antalgic gait on R             Precautions: PMHx: anxiety, B TKA, CLBP, latex allergy  Dx: R hip OA, R ROXANA posterior approach scheduled 24    Manuals             R hip flexion PROM to 90 deg             R hip ABD PROM                                       Neuro Re-Ed             Abdominal bracing 5\"x10            bridges 1x5                                                                             Ther Ex             SKC to 90 deg 5\"x5            Supine hip ABD 1x10                                                                                          Ther Activity             STS  foam           Step-ups  4\"/         " "  Step-downs  4\"/                        Gait Training             Gait training on floor  FWW                                                  Modalities                                            "

## 2024-05-30 ENCOUNTER — ANESTHESIA EVENT (OUTPATIENT)
Age: 64
End: 2024-05-30

## 2024-05-30 ENCOUNTER — ANESTHESIA (OUTPATIENT)
Age: 64
End: 2024-05-30

## 2024-06-06 DIAGNOSIS — Z47.1 AFTERCARE FOLLOWING RIGHT HIP JOINT REPLACEMENT SURGERY: Primary | ICD-10-CM

## 2024-06-06 DIAGNOSIS — Z96.641 AFTERCARE FOLLOWING RIGHT HIP JOINT REPLACEMENT SURGERY: Primary | ICD-10-CM

## 2024-06-12 NOTE — DISCHARGE INSTR - AVS FIRST PAGE
Discharge Instructions - Orthopedics  Lakisha Trevizo 63 y.o. female MRN: [id number]  Unit/Bed#:     Weight Bearing Status:                                           Weight Bearing as tolerated to right lower extremity.    Be sure to maintain posterior hip precautions: no bending at waist, no crossing legs, on internally rotating surgical leg    DVT prophylaxis:  Complete course of Lovenox at home over 28 days.     Pain:  Continue pain medications as directed.  Continue other medications provided as prescribed.     Showering Instructions:   Do not shower until first follow up appointment.     Dressing Instructions:   Keep dressing clean, dry and intact until follow up appointment.    Driving Instructions:  No driving until cleared by Orthopaedic Surgery.    PT/OT:  Continue PT/OT on outpatient basis as directed    Appt Instructions:   Follow up as scheduled on 6/20/2024 in [place]   If you need to change or cancel your appointment for any reason, please call the clinic at [phone]    Contact the office sooner if you experience any increased numbness/tingling in the extremities.    Miscellaneous:  Advise use of abduction pillow (pink pillow) for 6 weeks after surgery when sleeping.    Please contact the office if you need refills of your medications prior to your next visit.   Advise against any dental cleanings or procedures for 3 months after surgery.   - If there is a dental emergency, please contact the office for further instructions.

## 2024-06-13 ENCOUNTER — HOSPITAL ENCOUNTER (OUTPATIENT)
Age: 64
Setting detail: OUTPATIENT SURGERY
Discharge: HOME | End: 2024-06-14
Attending: ORTHOPAEDIC SURGERY | Admitting: ORTHOPAEDIC SURGERY
Payer: COMMERCIAL

## 2024-06-13 ENCOUNTER — ANESTHESIA (OUTPATIENT)
Age: 64
End: 2024-06-13
Payer: COMMERCIAL

## 2024-06-13 DIAGNOSIS — M16.11 PRIMARY OSTEOARTHRITIS OF RIGHT HIP: Primary | ICD-10-CM

## 2024-06-13 PROCEDURE — 97535 SELF CARE MNGMENT TRAINING: CPT

## 2024-06-13 PROCEDURE — 97167 OT EVAL HIGH COMPLEX 60 MIN: CPT

## 2024-06-13 PROCEDURE — C1776 JOINT DEVICE (IMPLANTABLE): HCPCS | Performed by: ORTHOPAEDIC SURGERY

## 2024-06-13 PROCEDURE — 27130 TOTAL HIP ARTHROPLASTY: CPT | Performed by: ORTHOPAEDIC SURGERY

## 2024-06-13 PROCEDURE — NC001 PR NO CHARGE: Performed by: ORTHOPAEDIC SURGERY

## 2024-06-13 PROCEDURE — 99024 POSTOP FOLLOW-UP VISIT: CPT | Performed by: PHYSICIAN ASSISTANT

## 2024-06-13 PROCEDURE — C1713 ANCHOR/SCREW BN/BN,TIS/BN: HCPCS | Performed by: ORTHOPAEDIC SURGERY

## 2024-06-13 DEVICE — CORAIL HIP SYSTEM CEMENTLESS FEMORAL STEM 12/14 AMT 135 DEGREES KS SIZE 10 HA COATED STANDARD NO COLLAR
Type: IMPLANTABLE DEVICE | Site: HIP | Status: FUNCTIONAL
Brand: CORAIL

## 2024-06-13 DEVICE — PINNACLE HIP SOLUTIONS ALTRX POLYETHYLENE ACETABULAR LINER +4 10 DEGREES 32MM ID 48MM OD
Type: IMPLANTABLE DEVICE | Site: HIP | Status: FUNCTIONAL
Brand: PINNACLE ALTRX

## 2024-06-13 DEVICE — PINNACLE POROCOAT ACETABULAR SHELL SECTOR II 48MM OD
Type: IMPLANTABLE DEVICE | Site: HIP | Status: FUNCTIONAL
Brand: PINNACLE POROCOAT

## 2024-06-13 DEVICE — PINNACLE CANCELLOUS BONE SCREW 6.5MM X 25MM
Type: IMPLANTABLE DEVICE | Site: HIP | Status: FUNCTIONAL
Brand: PINNACLE

## 2024-06-13 DEVICE — ARTICUL/EZE FEMORAL HEAD DIAMETER 32MM +1 12/14 TAPER
Type: IMPLANTABLE DEVICE | Site: HIP | Status: FUNCTIONAL
Brand: ARTICUL/EZE

## 2024-06-13 RX ORDER — OXYCODONE HYDROCHLORIDE 10 MG/1
10 TABLET ORAL EVERY 4 HOURS PRN
Status: DISCONTINUED | OUTPATIENT
Start: 2024-06-13 | End: 2024-06-14 | Stop reason: HOSPADM

## 2024-06-13 RX ORDER — PROPOFOL 10 MG/ML
INJECTION, EMULSION INTRAVENOUS AS NEEDED
Status: DISCONTINUED | OUTPATIENT
Start: 2024-06-13 | End: 2024-06-13

## 2024-06-13 RX ORDER — ALPRAZOLAM 0.25 MG/1
0.25 TABLET ORAL DAILY PRN
Status: DISCONTINUED | OUTPATIENT
Start: 2024-06-13 | End: 2024-06-14 | Stop reason: HOSPADM

## 2024-06-13 RX ORDER — HYDROMORPHONE HCL/PF 1 MG/ML
0.5 SYRINGE (ML) INJECTION
Status: DISCONTINUED | OUTPATIENT
Start: 2024-06-13 | End: 2024-06-13 | Stop reason: HOSPADM

## 2024-06-13 RX ORDER — CEFAZOLIN SODIUM 2 G/50ML
2000 SOLUTION INTRAVENOUS ONCE
Status: COMPLETED | OUTPATIENT
Start: 2024-06-13 | End: 2024-06-13

## 2024-06-13 RX ORDER — MIDAZOLAM HYDROCHLORIDE 2 MG/2ML
INJECTION, SOLUTION INTRAMUSCULAR; INTRAVENOUS AS NEEDED
Status: DISCONTINUED | OUTPATIENT
Start: 2024-06-13 | End: 2024-06-13

## 2024-06-13 RX ORDER — DOCUSATE SODIUM 100 MG/1
100 CAPSULE, LIQUID FILLED ORAL 2 TIMES DAILY
Status: DISCONTINUED | OUTPATIENT
Start: 2024-06-13 | End: 2024-06-14 | Stop reason: HOSPADM

## 2024-06-13 RX ORDER — OXYCODONE HYDROCHLORIDE 5 MG/1
5 TABLET ORAL EVERY 4 HOURS PRN
Status: DISCONTINUED | OUTPATIENT
Start: 2024-06-13 | End: 2024-06-14 | Stop reason: HOSPADM

## 2024-06-13 RX ORDER — CHLORHEXIDINE GLUCONATE ORAL RINSE 1.2 MG/ML
15 SOLUTION DENTAL ONCE
Status: COMPLETED | OUTPATIENT
Start: 2024-06-13 | End: 2024-06-13

## 2024-06-13 RX ORDER — SENNOSIDES 8.6 MG
650 CAPSULE ORAL EVERY 8 HOURS PRN
Qty: 30 TABLET | Refills: 0 | Status: SHIPPED | OUTPATIENT
Start: 2024-06-13 | End: 2024-06-13

## 2024-06-13 RX ORDER — HYDROMORPHONE HCL/PF 1 MG/ML
0.5 SYRINGE (ML) INJECTION EVERY 2 HOUR PRN
Status: DISCONTINUED | OUTPATIENT
Start: 2024-06-13 | End: 2024-06-14 | Stop reason: HOSPADM

## 2024-06-13 RX ORDER — SODIUM CHLORIDE, SODIUM LACTATE, POTASSIUM CHLORIDE, CALCIUM CHLORIDE 600; 310; 30; 20 MG/100ML; MG/100ML; MG/100ML; MG/100ML
125 INJECTION, SOLUTION INTRAVENOUS CONTINUOUS
Status: DISCONTINUED | OUTPATIENT
Start: 2024-06-13 | End: 2024-06-14 | Stop reason: HOSPADM

## 2024-06-13 RX ORDER — OXYCODONE HYDROCHLORIDE 5 MG/1
5 TABLET ORAL EVERY 6 HOURS PRN
Qty: 20 TABLET | Refills: 0 | Status: SHIPPED | OUTPATIENT
Start: 2024-06-13 | End: 2024-06-13

## 2024-06-13 RX ORDER — METHOCARBAMOL 500 MG/1
500 TABLET, FILM COATED ORAL 3 TIMES DAILY PRN
Qty: 60 TABLET | Refills: 0 | Status: SHIPPED | OUTPATIENT
Start: 2024-06-13 | End: 2024-06-13

## 2024-06-13 RX ORDER — MAGNESIUM HYDROXIDE 1200 MG/15ML
LIQUID ORAL AS NEEDED
Status: DISCONTINUED | OUTPATIENT
Start: 2024-06-13 | End: 2024-06-13 | Stop reason: HOSPADM

## 2024-06-13 RX ORDER — PHENYLEPHRINE HCL IN 0.9% NACL 1 MG/10 ML
SYRINGE (ML) INTRAVENOUS AS NEEDED
Status: DISCONTINUED | OUTPATIENT
Start: 2024-06-13 | End: 2024-06-13

## 2024-06-13 RX ORDER — ENOXAPARIN SODIUM 100 MG/ML
40 INJECTION SUBCUTANEOUS DAILY
Status: DISCONTINUED | OUTPATIENT
Start: 2024-06-13 | End: 2024-06-14 | Stop reason: HOSPADM

## 2024-06-13 RX ORDER — SENNOSIDES 8.6 MG
650 CAPSULE ORAL EVERY 8 HOURS PRN
Qty: 30 TABLET | Refills: 0 | Status: SHIPPED | OUTPATIENT
Start: 2024-06-13

## 2024-06-13 RX ORDER — ALLOPURINOL 100 MG/1
300 TABLET ORAL DAILY
Status: DISCONTINUED | OUTPATIENT
Start: 2024-06-13 | End: 2024-06-14 | Stop reason: HOSPADM

## 2024-06-13 RX ORDER — PROPOFOL 10 MG/ML
INJECTION, EMULSION INTRAVENOUS CONTINUOUS PRN
Status: DISCONTINUED | OUTPATIENT
Start: 2024-06-13 | End: 2024-06-13

## 2024-06-13 RX ORDER — METHOCARBAMOL 500 MG/1
500 TABLET, FILM COATED ORAL EVERY 6 HOURS SCHEDULED
Status: DISCONTINUED | OUTPATIENT
Start: 2024-06-13 | End: 2024-06-14 | Stop reason: HOSPADM

## 2024-06-13 RX ORDER — ACETAMINOPHEN 325 MG/1
650 TABLET ORAL EVERY 6 HOURS PRN
Status: DISCONTINUED | OUTPATIENT
Start: 2024-06-13 | End: 2024-06-13

## 2024-06-13 RX ORDER — CALCIUM CARBONATE 500 MG/1
1000 TABLET, CHEWABLE ORAL DAILY PRN
Status: DISCONTINUED | OUTPATIENT
Start: 2024-06-13 | End: 2024-06-14 | Stop reason: HOSPADM

## 2024-06-13 RX ORDER — ONDANSETRON 2 MG/ML
4 INJECTION INTRAMUSCULAR; INTRAVENOUS ONCE AS NEEDED
Status: DISCONTINUED | OUTPATIENT
Start: 2024-06-13 | End: 2024-06-13 | Stop reason: HOSPADM

## 2024-06-13 RX ORDER — DEXAMETHASONE SODIUM PHOSPHATE 10 MG/ML
INJECTION, SOLUTION INTRAMUSCULAR; INTRAVENOUS AS NEEDED
Status: DISCONTINUED | OUTPATIENT
Start: 2024-06-13 | End: 2024-06-13

## 2024-06-13 RX ORDER — TRANEXAMIC ACID 10 MG/ML
1000 INJECTION, SOLUTION INTRAVENOUS ONCE
Status: COMPLETED | OUTPATIENT
Start: 2024-06-13 | End: 2024-06-13

## 2024-06-13 RX ORDER — ONDANSETRON 2 MG/ML
4 INJECTION INTRAMUSCULAR; INTRAVENOUS EVERY 6 HOURS PRN
Status: DISCONTINUED | OUTPATIENT
Start: 2024-06-13 | End: 2024-06-14 | Stop reason: HOSPADM

## 2024-06-13 RX ORDER — FENTANYL CITRATE/PF 50 MCG/ML
25 SYRINGE (ML) INJECTION
Status: DISCONTINUED | OUTPATIENT
Start: 2024-06-13 | End: 2024-06-13 | Stop reason: HOSPADM

## 2024-06-13 RX ORDER — GABAPENTIN 300 MG/1
300 CAPSULE ORAL ONCE
Status: COMPLETED | OUTPATIENT
Start: 2024-06-13 | End: 2024-06-13

## 2024-06-13 RX ORDER — CEFAZOLIN SODIUM 1 G/50ML
1000 SOLUTION INTRAVENOUS EVERY 8 HOURS
Status: COMPLETED | OUTPATIENT
Start: 2024-06-13 | End: 2024-06-14

## 2024-06-13 RX ORDER — ACETAMINOPHEN 325 MG/1
650 TABLET ORAL EVERY 6 HOURS SCHEDULED
Status: DISCONTINUED | OUTPATIENT
Start: 2024-06-13 | End: 2024-06-14 | Stop reason: HOSPADM

## 2024-06-13 RX ORDER — ACETAMINOPHEN 325 MG/1
975 TABLET ORAL ONCE
Status: COMPLETED | OUTPATIENT
Start: 2024-06-13 | End: 2024-06-13

## 2024-06-13 RX ORDER — GABAPENTIN 100 MG/1
100 CAPSULE ORAL EVERY 8 HOURS
Status: DISCONTINUED | OUTPATIENT
Start: 2024-06-13 | End: 2024-06-14 | Stop reason: HOSPADM

## 2024-06-13 RX ORDER — FENTANYL CITRATE 50 UG/ML
INJECTION, SOLUTION INTRAMUSCULAR; INTRAVENOUS AS NEEDED
Status: DISCONTINUED | OUTPATIENT
Start: 2024-06-13 | End: 2024-06-13

## 2024-06-13 RX ORDER — OXYCODONE HYDROCHLORIDE 5 MG/1
5 TABLET ORAL EVERY 6 HOURS PRN
Qty: 20 TABLET | Refills: 0 | Status: SHIPPED | OUTPATIENT
Start: 2024-06-13 | End: 2024-06-23

## 2024-06-13 RX ORDER — METHOCARBAMOL 500 MG/1
500 TABLET, FILM COATED ORAL 3 TIMES DAILY PRN
Qty: 60 TABLET | Refills: 0 | Status: SHIPPED | OUTPATIENT
Start: 2024-06-13

## 2024-06-13 RX ORDER — ONDANSETRON 2 MG/ML
INJECTION INTRAMUSCULAR; INTRAVENOUS AS NEEDED
Status: DISCONTINUED | OUTPATIENT
Start: 2024-06-13 | End: 2024-06-13

## 2024-06-13 RX ORDER — SODIUM CHLORIDE, SODIUM LACTATE, POTASSIUM CHLORIDE, CALCIUM CHLORIDE 600; 310; 30; 20 MG/100ML; MG/100ML; MG/100ML; MG/100ML
125 INJECTION, SOLUTION INTRAVENOUS CONTINUOUS
Status: DISCONTINUED | OUTPATIENT
Start: 2024-06-13 | End: 2024-06-13

## 2024-06-13 RX ADMIN — Medication 100 MCG: at 09:44

## 2024-06-13 RX ADMIN — MEPIVACAINE HYDROCHLORIDE 3 ML: 15 INJECTION, SOLUTION EPIDURAL; INFILTRATION at 09:20

## 2024-06-13 RX ADMIN — FENTANYL CITRATE 25 MCG: 50 INJECTION INTRAMUSCULAR; INTRAVENOUS at 09:44

## 2024-06-13 RX ADMIN — CEFAZOLIN SODIUM 2000 MG: 2 SOLUTION INTRAVENOUS at 09:22

## 2024-06-13 RX ADMIN — SODIUM CHLORIDE, SODIUM LACTATE, POTASSIUM CHLORIDE, AND CALCIUM CHLORIDE 125 ML/HR: .6; .31; .03; .02 INJECTION, SOLUTION INTRAVENOUS at 07:08

## 2024-06-13 RX ADMIN — METHOCARBAMOL 500 MG: 500 TABLET ORAL at 13:11

## 2024-06-13 RX ADMIN — CEFAZOLIN SODIUM 1000 MG: 1 SOLUTION INTRAVENOUS at 17:58

## 2024-06-13 RX ADMIN — PROPOFOL 50 MG: 10 INJECTION, EMULSION INTRAVENOUS at 09:22

## 2024-06-13 RX ADMIN — ONDANSETRON 4 MG: 2 INJECTION INTRAMUSCULAR; INTRAVENOUS at 09:34

## 2024-06-13 RX ADMIN — DOCUSATE SODIUM 100 MG: 100 CAPSULE, LIQUID FILLED ORAL at 13:11

## 2024-06-13 RX ADMIN — FENTANYL CITRATE 25 MCG: 50 INJECTION INTRAMUSCULAR; INTRAVENOUS at 10:27

## 2024-06-13 RX ADMIN — GABAPENTIN 100 MG: 100 CAPSULE ORAL at 23:14

## 2024-06-13 RX ADMIN — GABAPENTIN 100 MG: 100 CAPSULE ORAL at 14:38

## 2024-06-13 RX ADMIN — FENTANYL CITRATE 25 MCG: 50 INJECTION INTRAMUSCULAR; INTRAVENOUS at 10:00

## 2024-06-13 RX ADMIN — ACETAMINOPHEN 325MG 975 MG: 325 TABLET ORAL at 07:07

## 2024-06-13 RX ADMIN — DOCUSATE SODIUM 100 MG: 100 CAPSULE, LIQUID FILLED ORAL at 18:02

## 2024-06-13 RX ADMIN — OXYCODONE 5 MG: 5 TABLET ORAL at 18:02

## 2024-06-13 RX ADMIN — Medication 100 MCG: at 10:10

## 2024-06-13 RX ADMIN — METHOCARBAMOL 500 MG: 500 TABLET ORAL at 17:58

## 2024-06-13 RX ADMIN — SODIUM CHLORIDE, SODIUM LACTATE, POTASSIUM CHLORIDE, AND CALCIUM CHLORIDE 125 ML/HR: .6; .31; .03; .02 INJECTION, SOLUTION INTRAVENOUS at 13:05

## 2024-06-13 RX ADMIN — ENOXAPARIN SODIUM 40 MG: 40 INJECTION SUBCUTANEOUS at 21:10

## 2024-06-13 RX ADMIN — TRANEXAMIC ACID 1000 MG: 10 INJECTION, SOLUTION INTRAVENOUS at 09:27

## 2024-06-13 RX ADMIN — DEXAMETHASONE SODIUM PHOSPHATE 10 MG: 10 INJECTION INTRAMUSCULAR; INTRAVENOUS at 09:33

## 2024-06-13 RX ADMIN — OXYCODONE 5 MG: 5 TABLET ORAL at 13:35

## 2024-06-13 RX ADMIN — PROPOFOL 80 MCG/KG/MIN: 10 INJECTION, EMULSION INTRAVENOUS at 09:22

## 2024-06-13 RX ADMIN — ACETAMINOPHEN 325MG 650 MG: 325 TABLET ORAL at 19:40

## 2024-06-13 RX ADMIN — MIDAZOLAM 2 MG: 1 INJECTION INTRAMUSCULAR; INTRAVENOUS at 09:11

## 2024-06-13 RX ADMIN — FENTANYL CITRATE 25 MCG: 50 INJECTION INTRAMUSCULAR; INTRAVENOUS at 09:22

## 2024-06-13 RX ADMIN — CHLORHEXIDINE GLUCONATE 15 ML: 1.2 RINSE ORAL at 07:06

## 2024-06-13 RX ADMIN — ALLOPURINOL 300 MG: 100 TABLET ORAL at 13:12

## 2024-06-13 RX ADMIN — GABAPENTIN 300 MG: 300 CAPSULE ORAL at 07:07

## 2024-06-13 RX ADMIN — ACETAMINOPHEN 325MG 650 MG: 325 TABLET ORAL at 13:35

## 2024-06-13 NOTE — OCCUPATIONAL THERAPY NOTE
Occupational Therapy Evaluation & Treatment       Patient Name: Lakisha Trevizo  Today's Date: 6/13/2024  Problem List  Principal Problem:    Primary osteoarthritis of right hip    Past Medical History  Past Medical History:   Diagnosis Date    Aftercare following left knee joint replacement surgery 3/27/2023    Anxiety     MENOPAUSE    Arthritis     KNEES    Chest pain syndrome 03/29/2022    Chondromalacia patellae of right knee 11/8/2016    Chronic pain disorder     Chronic pain of right knee 5/5/2022    Diverticulosis     GERD (gastroesophageal reflux disease)     Hypertension     1 episode last year went to ER; no problems since    Primary osteoarthritis of left knee 5/5/2022    Primary osteoarthritis of right knee 9/24/2019    Renal dysplasia     nonfunctioning left life-long     Past Surgical History  Past Surgical History:   Procedure Laterality Date    COLONOSCOPY  03/2016    FL INJECTION RIGHT HIP (NON ARTHROGRAM)  5/10/2022    FL INJECTION RIGHT HIP (NON ARTHROGRAM)  11/3/2022    FL INJECTION RIGHT HIP (NON ARTHROGRAM)  5/22/2023    FL INJECTION RIGHT HIP (NON ARTHROGRAM)  10/18/2023    FL INJECTION RIGHT HIP (NON ARTHROGRAM)  1/30/2024    KIDNEY SURGERY Right     age 32    PA ARTHRP KNE CONDYLE&PLATU MEDIAL&LAT COMPARTMENTS Right 8/1/2022    Procedure: ARTHROPLASTY KNEE TOTAL W ROBOT;  Surgeon: Juan Pitt MD;  Location: [facility];  Service: Orthopedics    PA ARTHRP KNE CONDYLE&PLATU MEDIAL&LAT COMPARTMENTS Left 2/13/2023    Procedure: ARTHROPLASTY KNEE TOTAL W ROBOT;  Surgeon: Juan Pitt MD;  Location: [facility];  Service: Orthopedics    REDUCTION MAMMAPLASTY      WRIST SURGERY Right     fx           06/13/24 1601   OT Last Visit   OT Visit Date 06/13/24   Note Type   Note type Evaluation & Treatment   Pain Assessment   Pain Assessment Tool 0-10   Pain Score 1   Pain Location/Orientation Orientation: Right;Location: Hip   Hospital Pain Intervention(s)  Repositioned;Ambulation/increased activity   Restrictions/Precautions   Weight Bearing Precautions Per Order Yes (h/o of R & L TKA)   RLE Weight Bearing Per Order WBAT   Braces or Orthoses Other (Comment)  (abduction wedge)   Other Precautions WBS;Bed Alarm;Chair Alarm;Pain;Fall Risk   Home Living   Type of Home House   Home Layout One level   Bathroom Shower/Tub Walk-in shower   Bathroom Toilet Standard   Bathroom Equipment Commode  (use of BSC of shower chair)   Home Equipment Walker;Cane   Additional Comments Pt with spouse, Jean, in 1SH with 1+1 KATE.   Prior Function   Level of [place] Independent with ADLs;Independent with functional mobility;Independent with IADLS   Lives With Spouse   Receives Help From Family   IADLs Independent with driving;Independent with medication management;Independent with meal prep   Falls in the last 6 months 0   Vocational Part time employment  ([employer] 3-4 shifts a week)   Comments PTA, Pt reports being I with ADLs/IADLs/no AD/ +/works PT. Pt with supportive spouse able to assist upon DC.   Lifestyle   Autonomy I with ADLs/IADLs/no AD/ +/works PT   Reciprocal Relationships Supportive spouse   Service to Others PTE- [employer]   Intrinsic Gratification Enjoys horse racing   ADL   Where Assessed Edge of bed   Equipment Provided Reacher;Long-handled shoe horn;Long-handled sponge;Sock aid;Dressing stick   Eating Assistance 7  Independent   Grooming Assistance 5  Supervision/Setup   UB Bathing Assistance 5  Supervision/Setup   LB Bathing Assistance 3  Moderate Assistance   UB Dressing Assistance 5  Supervision/Setup   LB Dressing Assistance 3  Moderate Assistance   Toileting Assistance  4  Minimal Assistance   Functional Assistance 5  Supervision/Setup   Additional Comments Pt provided with THP handout and educated on safety with ADLs. Therapist demonstrated use of hip kit.   Bed Mobility   Supine to Sit Unable to assess   Sit to Supine Unable to  assess   Additional Comments Pt greeted OOB in recliner chair.   Transfers   Sit to Stand 5  Supervision   Additional items Increased time required;Verbal cues   Stand to Sit 5  Supervision   Additional items Increased time required;Verbal cues   Additional Comments with RW- Pt educated on safety with transfers   Functional Mobility   Functional Mobility 5  Supervision   Additional Comments S with functional mobility with RW- household distances   Additional items Rolling walker   Balance   Static Sitting Fair +   Dynamic Sitting Fair   Static Standing Fair -   Dynamic Standing Fair -   Ambulatory Fair -   Activity Tolerance   Activity Tolerance Patient tolerated treatment well   Nurse Made Aware RN cleared/updated.   RUE Assessment   RUE Assessment WFL   LUE Assessment   LUE Assessment WFL   Hand Function   Gross Motor Coordination Functional   Fine Motor Coordination Functional   Sensation   Light Touch No apparent deficits   Psychosocial   Psychosocial (WDL) WDL   Cognition   Overall Cognitive Status WFL   Arousal/Participation Alert;Cooperative   Attention Within functional limits   Orientation Level Oriented X4   Memory Within functional limits   Following Commands Follows all commands and directions without difficulty   Comments Pt very pleasant and cooperative during OT session. Pt with good carryover of techniques and motivated.   Assessment   Limitation Decreased ADL status;Decreased UE ROM;Decreased UE strength;Decreased Safe judgement during ADL;Decreased endurance;Decreased self-care trans;Decreased high-level ADLs   Prognosis Good   Assessment Pt is a 63 y.o. female who presented to [facility] on 6/13/2024 with OA of R hip. Pt s/p R ROXANA posterior on 6/13. Pt WBAT on RLE and with posterior hip precautions. Pt  has a past medical history of Aftercare following left knee joint replacement surgery (3/27/2023), Anxiety, Arthritis, Chest pain syndrome (03/29/2022), Chondromalacia patellae of right knee (11/8/2016),  Chronic pain disorder, Chronic pain of right knee (5/5/2022), Diverticulosis, GERD (gastroesophageal reflux disease), Hypertension, Primary osteoarthritis of left knee (5/5/2022), Primary osteoarthritis of right knee (9/24/2019), and Renal dysplasia. Pt greeted bedside for OT evaluation on 06/13/24. Pt with spouse, Jean, in 1SH with 1+1 KATE. PTA, Pt reports being I with ADLs/IADLs/no AD/ +/works PT. Pt with supportive spouse able to assist upon DC. Pt demonstrating the following occupational performance levels: S with UB ADLs, mod A with LB ADLs, S with functional transfers, and S with functional mobility with RW. Limitations that impact functional performance include decreased ADL status, decreased UE ROM, decreased UE strength, decreased safe judgement during ADLs, decreased endurance, decreased self care transfers, decreased high level ADLs, and pain. Occupational performance areas to address ADL retraining, functional transfer training, UE strengthening/ROM, endurance training, cognitive reorientation, Pt/caregiver education, equipment evaluation/education, compensatory technique education, energy conservation, and activity engagement . Pt would benefit from continued skilled OT services while in hospital to maximize independence with ADLs. Will continue to follow Pt's progress. Pt would benefit from returning home with increased social support upon DC to maximize safety and independence with ADLs and functional tasks of choice.   Goals   Patient Goals To walk better   LTG Time Frame 10-14   Long Term Goal #1 See goals listed below.   Plan   Treatment Interventions ADL retraining;Functional transfer training;UE strengthening/ROM;Endurance training;Patient/family training;Equipment evaluation/education;Compensatory technique education;Energy conservation;Activityengagement   Goal Expiration Date 06/16/24   OT Frequency 3-5x/wk   Discharge Recommendation   Rehab Resource Intensity Level, OT No post-acute  rehabilitation needs  (pending progress)   Equipment Recommended Hip Kit ($)   Additional Comments  The patient's raw score on the AM-PAC Daily Activity Inpatient Short Form is 18. A raw score of less than 19 suggests the patient may benefit from discharge to post-acute rehabilitation services. Please refer to the recommendation of the Occupational Therapist for safe discharge planning.   -PAC Daily Activity Inpatient   Lower Body Dressing 2   Bathing 3   Toileting 3   Upper Body Dressing 3   Grooming 3   Eating 4   Daily Activity Raw Score 18   Daily Activity Standardized Score (Calc for Raw Score >=11) 38.66   -PAC Applied Cognition Inpatient   Following a Speech/Presentation 4   Understanding Ordinary Conversation 4   Taking Medications 4   Remembering Where Things Are Placed or Put Away 4   Remembering List of 4-5 Errands 4   Taking Care of Complicated Tasks 4   Applied Cognition Raw Score 24   Applied Cognition Standardized Score 62.21   Additional Treatment Session   Start Time 1626   End Time 1645   Treatment Assessment Pt greeted for follow up treatment focusing on increasing independence with ADLs while maintaining THP. Pt able to doff socks with use of dressing stick at S level and don sock on LLE with use of sock aid at S level with cues. Pt administered hip kit at end of session.   Additional Treatment Day 1   End of Consult   Education Provided Yes   Patient Position at End of Consult Bedside chair;Bed/Chair alarm activated;All needs within reach   Nurse Communication Nurse aware of consult       GOALS:  Pt will complete UB ADLs with I in order to maximize participation with ADLs.   Pt will complete LB ADLs with I in order to maximize safety with ADLs.   Pt will complete toileting routine (transfer, hygiene, and clothing management) with I in order to return to prior level of function.  Pt will complete bed mobility with I in order to maximize participation with ADLs.   Pt will complete functional  transfers at I level in order to increase participation with ADLs.  Pt will increase dynamic standing balance to F+ in order to increase safety with ADLs.  Pt will increase standing tolerance x10 min in order to increase participation with ADLs.   Pt will complete functional mobility with AD PRN for item retrieval task at [place] level in order to increase participation with ADLs.  Pt will complete IADL tasks/simulation of IADLs tasks with I in order to return to PLOF.  Pt will demonstrate G energy conservation techniques with ADLs/IADLs in order to reduce the risk of falls.  Pt will be attentive 100% of the time for ongoing functional/formal cognitive assessment to assist with safe dc planning prn.  Pt will maintain 3/3 % of the time during OT session.      Elizabeth Mendoza MS, OTR/L

## 2024-06-13 NOTE — PLAN OF CARE
Problem: OCCUPATIONAL THERAPY ADULT  Goal: Performs self-care activities at highest level of function for planned discharge setting.  See evaluation for individualized goals.  Description: Treatment Interventions: ADL retraining, Functional transfer training, UE strengthening/ROM, Endurance training, Patient/family training, Equipment evaluation/education, Compensatory technique education, Energy conservation, Activityengagement  Equipment Recommended: Hip Kit ($)       See flowsheet documentation for full assessment, interventions and recommendations.   6/13/2024 1809 by Elizabeth Mendoza OT  Note: Limitation: Decreased ADL status, Decreased UE ROM, Decreased UE strength, Decreased Safe judgement during ADL, Decreased endurance, Decreased self-care trans, Decreased high-level ADLs  Prognosis: Good  Assessment: Pt is a 63 y.o. female who presented to [facility] on 6/13/2024 with OA of R hip. Pt s/p R ROXANA posterior on 6/13. Pt WBAT on RLE and with posterior hip precautions. Pt  has a past medical history of Aftercare following left knee joint replacement surgery (3/27/2023), Anxiety, Arthritis, Chest pain syndrome (03/29/2022), Chondromalacia patellae of right knee (11/8/2016), Chronic pain disorder, Chronic pain of right knee (5/5/2022), Diverticulosis, GERD (gastroesophageal reflux disease), Hypertension, Primary osteoarthritis of left knee (5/5/2022), Primary osteoarthritis of right knee (9/24/2019), and Renal dysplasia. Pt greeted bedside for OT evaluation on 06/13/24. Pt with spouse, Jean, in 1SH with 1+1 KATE. PTA, Pt reports being I with ADLs/IADLs/no AD/ +/works PT. Pt with supportive spouse able to assist upon DC. Pt demonstrating the following occupational performance levels: S with UB ADLs, mod A with LB ADLs, S with functional transfers, and S with functional mobility with RW. Limitations that impact functional performance include decreased ADL status, decreased UE ROM, decreased UE strength, decreased  safe judgement during ADLs, decreased endurance, decreased self care transfers, decreased high level ADLs, and pain. Occupational performance areas to address ADL retraining, functional transfer training, UE strengthening/ROM, endurance training, cognitive reorientation, Pt/caregiver education, equipment evaluation/education, compensatory technique education, energy conservation, and activity engagement . Pt would benefit from continued skilled OT services while in hospital to maximize independence with ADLs. Will continue to follow Pt's progress. Pt would benefit from returning home with increased social support upon DC to maximize safety and independence with ADLs and functional tasks of choice.     Rehab Resource Intensity Level, OT: No post-acute rehabilitation needs (pending progress)       6/13/2024 1809 by Elizabeth Mendoza, OT  Note: Limitation: Decreased ADL status, Decreased UE ROM, Decreased UE strength, Decreased Safe judgement during ADL, Decreased endurance, Decreased self-care trans, Decreased high-level ADLs  Prognosis: Good  Assessment: Pt is a 63 y.o. female who presented to [facility] on 6/13/2024 with OA of R hip. Pt s/p R ROXANA posterior on 6/13. Pt WBAT on RLE and with posterior hip precautions. Pt  has a past medical history of Aftercare following left knee joint replacement surgery (3/27/2023), Anxiety, Arthritis, Chest pain syndrome (03/29/2022), Chondromalacia patellae of right knee (11/8/2016), Chronic pain disorder, Chronic pain of right knee (5/5/2022), Diverticulosis, GERD (gastroesophageal reflux disease), Hypertension, Primary osteoarthritis of left knee (5/5/2022), Primary osteoarthritis of right knee (9/24/2019), and Renal dysplasia. Pt greeted bedside for OT evaluation on 06/13/24. Pt with spouse, Jean, in 1SH with 1+1 KATE. PTA, Pt reports being I with ADLs/IADLs/no AD/ +/works PT. Pt with supportive spouse able to assist upon DC. Pt demonstrating the following occupational  performance levels: S with UB ADLs, mod A with LB ADLs, S with functional transfers, and S with functional mobility with RW. Limitations that impact functional performance include decreased ADL status, decreased UE ROM, decreased UE strength, decreased safe judgement during ADLs, decreased endurance, decreased self care transfers, decreased high level ADLs, and pain. Occupational performance areas to address ADL retraining, functional transfer training, UE strengthening/ROM, endurance training, cognitive reorientation, Pt/caregiver education, equipment evaluation/education, compensatory technique education, energy conservation, and activity engagement . Pt would benefit from continued skilled OT services while in hospital to maximize independence with ADLs. Will continue to follow Pt's progress. Pt would benefit from returning home with increased social support upon DC to maximize safety and independence with ADLs and functional tasks of choice.     Rehab Resource Intensity Level, OT: No post-acute rehabilitation needs (pending progress)       6/13/2024 1809 by Elizabeth Mendoza, OT  Note: Limitation: Decreased ADL status, Decreased UE ROM, Decreased UE strength, Decreased Safe judgement during ADL, Decreased endurance, Decreased self-care trans, Decreased high-level ADLs  Prognosis: Good  Assessment: Pt is a 63 y.o. female who presented to [facility] on 6/13/2024 with OA of R hip. Pt s/p R ROXANA posterior on 6/13. Pt WBAT on RLE and with posterior hip precautions. Pt  has a past medical history of Aftercare following left knee joint replacement surgery (3/27/2023), Anxiety, Arthritis, Chest pain syndrome (03/29/2022), Chondromalacia patellae of right knee (11/8/2016), Chronic pain disorder, Chronic pain of right knee (5/5/2022), Diverticulosis, GERD (gastroesophageal reflux disease), Hypertension, Primary osteoarthritis of left knee (5/5/2022), Primary osteoarthritis of right knee (9/24/2019), and Renal dysplasia. Pt  greeted bedside for OT evaluation on 06/13/24. Pt with spouse, Jean, in 1SH with 1+1 KATE. PTA, Pt reports being I with ADLs/IADLs/no AD/ +/works PT. Pt with supportive spouse able to assist upon DC. Pt demonstrating the following occupational performance levels: S with UB ADLs, mod A with LB ADLs, S with functional transfers, and S with functional mobility with RW. Limitations that impact functional performance include decreased ADL status, decreased UE ROM, decreased UE strength, decreased safe judgement during ADLs, decreased endurance, decreased self care transfers, decreased high level ADLs, and pain. Occupational performance areas to address ADL retraining, functional transfer training, UE strengthening/ROM, endurance training, cognitive reorientation, Pt/caregiver education, equipment evaluation/education, compensatory technique education, energy conservation, and activity engagement . Pt would benefit from continued skilled OT services while in hospital to maximize independence with ADLs. Will continue to follow Pt's progress. Pt would benefit from returning home with increased social support upon DC to maximize safety and independence with ADLs and functional tasks of choice.     Rehab Resource Intensity Level, OT: No post-acute rehabilitation needs (pending progress)

## 2024-06-13 NOTE — ANESTHESIA PROCEDURE NOTES
Spinal Block    Patient location during procedure: OR  Start time: 6/13/2024 9:20 AM  Reason for block: primary anesthetic  Staffing  Performed by: Hugh Steven CRNA  Authorized by: Hugh Wu MD    Preanesthetic Checklist  Completed: patient identified, IV checked, site marked, risks and benefits discussed, surgical consent, monitors and equipment checked, pre-op evaluation and timeout performed  Spinal Block  Patient position: sitting  Prep: ChloraPrep  Patient monitoring: cardiac monitor, continuous pulse ox and frequent blood pressure checks  Approach: midline  Location: L3-4  Needle  Needle type: Pencan   Needle gauge: 24 G  Needle length: 3.5 in  Assessment  Injection Assessment:  positive aspiration for clear CSF, no paresthesia on injection and negative aspiration for heme.  Post-procedure:  site cleaned

## 2024-06-13 NOTE — PLAN OF CARE
Problem: PAIN - ADULT  Goal: Verbalizes/displays adequate comfort level or baseline comfort level  Description: Interventions:  - Encourage patient to monitor pain and request assistance  - Assess pain using appropriate pain scale  - Administer analgesics based on type and severity of pain and evaluate response  - Implement non-pharmacological measures as appropriate and evaluate response  - Consider cultural and social influences on pain and pain management  - Notify physician/advanced practitioner if interventions unsuccessful or patient reports new pain  6/13/2024 1952 by Santiago Zambrano RN  Outcome: Progressing  6/13/2024 1951 by Santiago Zambrano RN  Outcome: Progressing     Problem: SAFETY ADULT  Goal: Patient will remain free of falls  Description: INTERVENTIONS:  - Educate patient/family on patient safety including physical limitations  - Instruct patient to call for assistance with activity   - Consult OT/PT to assist with strengthening/mobility   - Keep Call bell within reach  - Keep bed low and locked with side rails adjusted as appropriate  - Keep care items and personal belongings within reach  - Initiate and maintain comfort rounds  - Make Fall Risk Sign visible to staff  - Initiate/Maintain bed/chair alarm  - Obtain necessary fall risk management equipment:   - Apply yellow socks and bracelet for high fall risk patients  - Consider moving patient to room near nurses station  6/13/2024 1952 by Santiago Zambrano RN  Outcome: Progressing  6/13/2024 1951 by Santiago Zambrano RN  Outcome: Progressing     Problem: SAFETY ADULT  Goal: Maintain or return to baseline ADL function  Description: INTERVENTIONS:  -  Assess patient's ability to carry out ADLs; assess patient's baseline for ADL function and identify physical deficits which impact ability to perform ADLs (bathing, care of mouth/teeth, toileting, grooming, dressing, etc.)  - Assess/evaluate cause of self-care deficits   - Assess range of motion  - Assess patient's  mobility; develop plan if impaired  - Assess patient's need for assistive devices and provide as appropriate  - Encourage maximum independence but intervene and supervise when necessary  - Involve family in performance of ADLs  - Assess for home care needs following discharge   - Consider OT consult to assist with ADL evaluation and planning for discharge  - Provide patient education as appropriate  6/13/2024 1952 by Santiago Zambrano RN  Outcome: Progressing  6/13/2024 1951 by Santiago Zambrano RN  Outcome: Progressing     Problem: DISCHARGE PLANNING  Goal: Discharge to home or other facility with appropriate resources  Description: INTERVENTIONS:  - Identify barriers to discharge w/patient and caregiver  - Arrange for needed discharge resources and transportation as appropriate  - Identify discharge learning needs (meds, wound care, etc.)  - Arrange for interpretive services to assist at discharge as needed  - Refer to Case Management Department for coordinating discharge planning if the patient needs post-hospital services based on physician/advanced practitioner order or complex needs related to functional status, cognitive ability, or social support system  6/13/2024 1952 by Santiago Zambrano RN  Outcome: Progressing  6/13/2024 1951 by Santiago Zambrano RN  Outcome: Progressing     Problem: Knowledge Deficit  Goal: Patient/family/caregiver demonstrates understanding of disease process, treatment plan, medications, and discharge instructions  Description: Complete learning assessment and assess knowledge base.  Interventions:  - Provide teaching at level of understanding  - Provide teaching via preferred learning methods  6/13/2024 1952 by Santiago Zambrano RN  Outcome: Progressing  6/13/2024 1951 by Santiago Zambrano RN  Outcome: Progressing

## 2024-06-13 NOTE — OP NOTE
OPERATIVE REPORT  PATIENT NAME: Lakisha Trevizo  : 1960  MRN: [id number]  Pt Location:  [facility] OR ROOM 06    Surgery Date: 2024    Surgeons and Role:     * Juan Pitt MD - Primary     * La Nena Elizabeth PA-C - Assisting     Preop Diagnosis:  Primary osteoarthritis of right hip [M16.11]    Post-Op Diagnosis Codes:     * Primary osteoarthritis of right hip [M16.11]    Procedure(s):  Right - ARTHROPLASTY HIP TOTAL    Specimens:  * No specimens in log *    Estimated Blood Loss:   100 mL    Drains:  [REMOVED] Urethral Catheter Non-latex 16 Fr. (Removed)   Number of days: 0       Anesthesia Type:   Spinal     Operative Indications:  Primary osteoarthritis of right hip [M16.11]    Operative Findings:  Depuy   Cup-48mm metal   Liner-10 degree lipped poly   Head/neck-32 +1mm metal   Femur-11 STD    Complications:   None      Hip Approach: Posterior    Procedure and Technique:  Following the induction of adequate level of spinal anesthesia, Kearney catheter was then sterilely introduced into this patient's bladder.  Antibiotics administered.  The right hip and thigh were then prepped and draped sterilely.  A posterolateral approach was created in order to gain access to the hip.  Full-thickness flaps were raised in order to access the tensor fascia.  This was split, expose the deep layer of the hip.  With the hip in internal rotation, the piriformis tendon was identified, transected, and retracted in a posterior fashion.  The remainder the short external rotators were sectioned as well.  A T-type capsulotomy was used to open up the hip.  The femoral head was then delivered posteriorly.  Utilizing a femoral neck osteotomy guide, the proper femoral neck cut was then made.  A posterior capsulectomy, anterior capsulotomy we then created in order to circumferentially expose the acetabulum.  Reaming started at 46 and extended to 48 mm which point time a Hemisphere bleeding cancellous bone was encountered.  40 trial was  inserted and noted to fit well, therefore the 48 mm insert was then hammered into place.  A single screw was then placed within the posterior supra quadrant for additional fixation.  The 10 degree lip liner was then snapped into position.  The proximal femur was then prepared for insertion of press-fit component.  The box osteotome was used at the proximal femur.  Patient's canal sequentially broached.  With a #11 standard and a 32+1 femoral head and neck, it was located, taken through range of motion graft adequate stable.  The trial components removed if the hip was carefully dislocated.  The insert components were assembled and introduced the patient's right hip in standard fashion.  The hip was once more located, taken through range of motion, count found to be quite stable.  Satisfied with the extent of surgery, the wounds were flushed with saline and closed.  A Betadine soak initiated.  The piriformis tendon was reapproximated to the greater trochanter number Vicryl suture.  The tensor fascia was then closed with number Vicryl suture.  The subcu tissues were closed with a mixture of #1 Vicryl for deep tissues, and 2-0 Vicryl for the more superficial tissues.  Skin staples used to approximate the skin.  Sterile dressings were applied.  She was then transferred to recovery room in stable condition with plans to include physical therapy weightbearing times, she will require DVT prophylaxis with Lovenox.  Please note, there is no qualified orthopedic resident was available to assist, the assistance of Maximino Elizabeth PA-C was instrumental in the safe execution of this patient surgery.  Started the patient position, patient prepped draped, IntraOp assistance, wound closure, dressing application, patient transfers, all of these were performed under my direct supervision   I was present for the entire procedure.    Patient Disposition:  PACU               SIGNATURE: Juan Pitt MD  DATE: June 13, 2024  TIME:  10:13 AM

## 2024-06-13 NOTE — PROGRESS NOTES
"Progress Note - Orthopedics   Lakisha Trevizo 63 y.o. female MRN: [id number]  Unit/Bed#: [facility] N -01 Encounter: [id number]    Assessment:  64yo F with history of single functioning kidney whom is POD #0 right total hip arthroplasty posterior approach      Plan:    Incentive spirometry  Weight Bearing as tolerated RLE  Up and out of bed with walker  Posterior hip precautions   PRN analgesics and ice  PT/OT  Avoid NSAIDs  Plan for discharge in the morning   SQ lovenox at home x28 days  Continuity of care follow-up with PCP  Postop appointment with ortho 6/20        VTE Pharmacologic Prophylaxis: Enoxaparin (Lovenox) SQ  VTE Mechanical Prophylaxis: sequential compression device        Subjective: Patient has no complaints. She is impressed with the staff. She has urinated at least once since surgery. She denies any chest pain, SOB, difficulty breathing, nausea, vomiting.   She denies any severe pain, or numbness or tingling in her RLE.         Vitals: Blood pressure 124/59, pulse 68, temperature 98.8 °F (37.1 °C), temperature source Oral, resp. rate 18, height 5' 4\" (1.626 m), weight 79.8 kg (176 lb), SpO2 93%.,Body mass index is 30.21 kg/m².      Intake/Output Summary (Last 24 hours) at 6/13/2024 1945  Last data filed at 6/13/2024 1401  Gross per 24 hour   Intake 1330 ml   Output 1400 ml   Net -70 ml       Invasive Devices       Peripheral Intravenous Line  Duration             Peripheral IV 06/13/24 Left;Ventral (anterior) Forearm <1 day                    Physical Exam:     General: Alert and oriented, no acute distress  CV: RRR, no murmur  Resp: no stridor, no respiratory distress     Right lower extremity:  Mepilex dressing C/D/I  Toes warm and well perfused  Motor and sensation grossly intact distally        Lab, Imaging and other studies: I have personally reviewed pertinent lab results.  CBC: No results found for: \"WBC\", \"HGB\", \"HCT\", \"MCV\", \"PLT\", \"ADJUSTEDWBC\", \"RBC\", \"MCH\", \"MCHC\", \"RDW\", \"MPV\", " "\"NRBC\"  CMP: No results found for: \"SODIUM\", \"CL\", \"CO2\", \"ANIONGAP\", \"BUN\", \"CREATININE\", \"GLUCOSE\", \"CALCIUM\", \"AST\", \"ALT\", \"ALKPHOS\", \"PROT\", \"BILITOT\", \"EGFR\"  PT/INR: No results found for: \"PT\", \"INR\"      "

## 2024-06-13 NOTE — H&P
H&P Exam - Orthopedics   Lakisha Trevizo 63 y.o. female MRN: [id number]      Assessment/Plan   Assessment:  right Hip Osteoarthritis in this adult female who continues to have both pain and dysfunction despite appropriate nonsurgical treatments    Plan:  right posterior Total Hip Arthroplasty.  Patient is familiar with the risks, benefits, and alternatives      TOTAL HIP REPLACEMENT INDICATIONS AND RISKS  We had a lengthy discussion with the patient regarding the potential options for treatment.  Based on current presentation, radiographic exam, and lack of sufficient response to nonsurgical management including activity modification, NSAIDs and therapeutic exercise, I would offer treatment in the form of total hip arthroplasty at this time.      The potential risks and benefits were discussed in detail.    While no guarantees can be made, total hip replacement has a very high success rate in terms of relieving a patient's hip pain and returning them to a more active, independent lifestyle for 10-15 years or more. All surgery carries some risk; for hip replacement, the complication rate is low but may include: death (very rare), infection, bleeding requiring transfusion, blood clots in legs traveling to lungs, nerve and/or blood vessel damage, bone fracture, leg length difference, prosthetic hip dislocation, persistent hip pain and/or stiffness, and repeat surgery(ies). The risk of a major complication is generally 1-2 per 1000 cases. Total hip replacement should only be done if conservative treatment has failed. The predicted revision rate is approximately 1% per year; in other words, 90% of hip replacements last 10 years or more, 80% last 20 years or more, and so on, assuming no injury. Additionally, we discussed anesthesia related complications which will be discussed in greater detail with the anesthesia team before surgery. The patient voiced their understanding of the surgical plan and potential complications  and wishes to proceed with surgery.    History of Present Illness   HPI:  Lakisha Trevizo is a 63 y.o. female who presents with pain in the right hip. Patient is no longer getting adequate relief from non-operative modalities. Patient is continuing to have debilitating pain from their hip, interfering with daily activities and sleep. Patient denies any concerns with infections, new neuropathies, or any acute injuries.     Historical Information  Review Of Systems:   Skin: Normal  Neuro: See HPI  Musculoskeletal: See HPI  14 point review of systems negative except as stated above     Past Medical History:   Past Medical History:   Diagnosis Date    Aftercare following left knee joint replacement surgery 3/27/2023    Anxiety     MENOPAUSE    Arthritis     KNEES    Chest pain syndrome 03/29/2022    Chondromalacia patellae of right knee 11/8/2016    Chronic pain disorder     Chronic pain of right knee 5/5/2022    Diverticulosis     GERD (gastroesophageal reflux disease)     Hypertension     1 episode last year went to ER; no problems since    Primary osteoarthritis of left knee 5/5/2022    Primary osteoarthritis of right knee 9/24/2019    Renal dysplasia     nonfunctioning left life-long       Past Surgical History:   Past Surgical History:   Procedure Laterality Date    COLONOSCOPY  03/2016    FL INJECTION RIGHT HIP (NON ARTHROGRAM)  5/10/2022    FL INJECTION RIGHT HIP (NON ARTHROGRAM)  11/3/2022    FL INJECTION RIGHT HIP (NON ARTHROGRAM)  5/22/2023    FL INJECTION RIGHT HIP (NON ARTHROGRAM)  10/18/2023    FL INJECTION RIGHT HIP (NON ARTHROGRAM)  1/30/2024    KIDNEY SURGERY Right     age 32    FL ARTHRP KNE CONDYLE&PLATU MEDIAL&LAT COMPARTMENTS Right 8/1/2022    Procedure: ARTHROPLASTY KNEE TOTAL W ROBOT;  Surgeon: Juan Pitt MD;  Location: [facility];  Service: Orthopedics    FL ARTHRP KNE CONDYLE&PLATU MEDIAL&LAT COMPARTMENTS Left 2/13/2023    Procedure: ARTHROPLASTY KNEE TOTAL W ROBOT;  Surgeon: Juan  Jus Pitt MD;  Location: [facility];  Service: Orthopedics    REDUCTION MAMMAPLASTY      WRIST SURGERY Right     fx       Family History:  Family history reviewed and non-contributory  Family History   Problem Relation Age of Onset    No Known Problems Mother     Diabetes Father     Breast cancer Neg Hx     Ovarian cancer Neg Hx     Colon cancer Neg Hx        Social History:  Social History     Socioeconomic History    Marital status: /Civil Union     Spouse name: None    Number of children: None    Years of education: None    Highest education level: None   Occupational History    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   Tobacco Use    Smoking status: Former     Current packs/day: 0.25     Average packs/day: 0.3 packs/day for 15.0 years (3.8 ttl pk-yrs)     Types: Cigarettes    Smokeless tobacco: Never   Vaping Use    Vaping status: Never Used   Substance and Sexual Activity    Alcohol use: Yes     Alcohol/week: 10.0 standard drinks of alcohol     Types: 5 Cans of beer, 5 Standard drinks or equivalent per week     Comment: daily    Drug use: Never    Sexual activity: Yes     Partners: Male     Birth control/protection: Post-menopausal   Other Topics Concern    None   Social History Narrative    None     Social Determinants of Health     Financial Resource Strain: Not on file   Food Insecurity: Not on file   Transportation Needs: Not on file   Physical Activity: Not on file   Stress: Not on file   Social Connections: Not on file   Intimate Partner Violence: Not on file   Housing Stability: Not on file       Allergies:   Allergies   Allergen Reactions    Sulfa Antibiotics Rash           Labs:  0   Lab Value Date/Time    HCT 43.9 05/17/2024 0842    HCT 38.5 02/14/2023 0639    HCT 43.6 01/17/2023 0834    HCT 45.0 11/18/2016 0801    HGB 14.6 05/17/2024 0842    HGB 12.7 02/14/2023 0639    HGB 14.3 01/17/2023 0834    HGB 14.8 11/18/2016 0801    INR 0.92 05/17/2024 0842    WBC 6.76 05/17/2024 0842    WBC 11.74 (H)  "02/14/2023 0639    WBC 6.99 01/17/2023 0834    WBC 7.8 11/18/2016 0801    WBC 6-10 (A) 11/18/2016 0801       Meds:    Current Facility-Administered Medications:     ceFAZolin (ANCEF) IVPB (premix in dextrose) 2,000 mg 50 mL, 2,000 mg, Intravenous, Once, Juan Pitt MD    lactated ringers infusion, 125 mL/hr, Intravenous, Continuous, Juan Pitt MD, Last Rate: 125 mL/hr at 06/13/24 0708, 125 mL/hr at 06/13/24 0708    povidone-iodine (BETADINE) 10 % 20 Application in sodium chloride 0.9 % 500 mL irrigation bottle, , Irrigation, Once, Juan Pitt MD    tranexamic acid (CYKLOKAPRON) 1000-0.7 MG/100ML-% injection 1,000 mg, 1,000 mg, Intravenous, Once, Juan Pitt MD    Blood Culture:   No results found for: \"BLOODCX\"    Wound Culture:   No results found for: \"WOUNDCULT\"    Ins and Outs:  No intake/output data recorded.          Physical Exam  /63   Pulse 67   Temp 98.8 °F (37.1 °C) (Temporal)   Resp 18   Ht 5' 4\" (1.626 m)   Wt 79.8 kg (176 lb)   SpO2 97%   BMI 30.21 kg/m²   /63   Pulse 67   Temp 98.8 °F (37.1 °C) (Temporal)   Resp 18   Ht 5' 4\" (1.626 m)   Wt 79.8 kg (176 lb)   SpO2 97%   BMI 30.21 kg/m²   Gen: No acute distress, resting comfortably in bed  HEENT: Eyes clear, moist mucus membranes, hearing intact  Respiratory: No audible wheezing or stridor  Cardiovascular: Well Perfused peripherally, 2+ distal pulse  Abdomen: nondistended, no peritoneal signs  Ortho Exam: limited hip ROM due to pain and mechanical blocking  Neuro Exam: intact    Lab Results: Reviewed  Imaging: Reviewed   "

## 2024-06-13 NOTE — ANESTHESIA PREPROCEDURE EVALUATION
Procedure:  ARTHROPLASTY HIP TOTAL (Right: Hip)    Relevant Problems   CARDIO   (+) Mixed hyperlipidemia      /RENAL   (+) Congenital renal dysplasia ( left )      MUSCULOSKELETAL   (+) Gout of foot   (+) Primary osteoarthritis of right hip      NEURO/PSYCH   (+) Anxiety        Physical Exam    Airway    Mallampati score: II  TM Distance: >3 FB  Neck ROM: full     Dental       Cardiovascular  Cardiovascular exam normal    Pulmonary  Pulmonary exam normal     Other Findings  post-pubertal.      Anesthesia Plan  ASA Score- 2     Anesthesia Type- spinal with ASA Monitors.         Additional Monitors:     Airway Plan:            Plan Factors-    Chart reviewed. EKG reviewed.  Existing labs reviewed. Patient summary reviewed.                  Induction- intravenous.    Postoperative Plan-         Informed Consent- Anesthetic plan and risks discussed with patient.  I personally reviewed this patient with the CRNA. Discussed and agreed on the Anesthesia Plan with the CRNA..

## 2024-06-13 NOTE — ANESTHESIA POSTPROCEDURE EVALUATION
Post-Op Assessment Note    CV Status:  Stable  Pain Score: 0    Pain management: adequate       Mental Status:  Alert and awake   Hydration Status:  Euvolemic   PONV Controlled:  Controlled   Airway Patency:  Patent     Post Op Vitals Reviewed: Yes    No anethesia notable event occurred.    Staff: CRNA               BP   118/64   Temp   98.2   Pulse  70   Resp   18   SpO2   99

## 2024-06-14 VITALS
WEIGHT: 176 LBS | HEART RATE: 71 BPM | TEMPERATURE: 98 F | DIASTOLIC BLOOD PRESSURE: 54 MMHG | HEIGHT: 64 IN | OXYGEN SATURATION: 94 % | BODY MASS INDEX: 30.05 KG/M2 | SYSTOLIC BLOOD PRESSURE: 123 MMHG | RESPIRATION RATE: 18 BRPM

## 2024-06-14 LAB
ANION GAP SERPL CALCULATED.3IONS-SCNC: 7 MMOL/L (ref 4–13)
BUN SERPL-MCNC: 10 MG/DL (ref 5–25)
CALCIUM SERPL-MCNC: 9.1 MG/DL (ref 8.4–10.2)
CHLORIDE SERPL-SCNC: 105 MMOL/L (ref 96–108)
CO2 SERPL-SCNC: 26 MMOL/L (ref 21–32)
CREAT SERPL-MCNC: 0.55 MG/DL (ref 0.6–1.3)
ERYTHROCYTE [DISTWIDTH] IN BLOOD BY AUTOMATED COUNT: 12.2 % (ref 11.6–15.1)
GFR SERPL CREATININE-BSD FRML MDRD: 101 ML/MIN/1.73SQ M
GLUCOSE SERPL-MCNC: 140 MG/DL (ref 65–140)
HCT VFR BLD AUTO: 33.9 % (ref 34.8–46.1)
HGB BLD-MCNC: 11.4 G/DL (ref 11.5–15.4)
MCH RBC QN AUTO: 30.6 PG (ref 26.8–34.3)
MCHC RBC AUTO-ENTMCNC: 33.6 G/DL (ref 31.4–37.4)
MCV RBC AUTO: 91 FL (ref 82–98)
PLATELET # BLD AUTO: 235 THOUSANDS/UL (ref 149–390)
PMV BLD AUTO: 9.7 FL (ref 8.9–12.7)
POTASSIUM SERPL-SCNC: 4.2 MMOL/L (ref 3.5–5.3)
RBC # BLD AUTO: 3.73 MILLION/UL (ref 3.81–5.12)
SODIUM SERPL-SCNC: 138 MMOL/L (ref 135–147)
WBC # BLD AUTO: 13.77 THOUSAND/UL (ref 4.31–10.16)

## 2024-06-14 PROCEDURE — 97163 PT EVAL HIGH COMPLEX 45 MIN: CPT | Performed by: PHYSICAL THERAPIST

## 2024-06-14 PROCEDURE — 85027 COMPLETE CBC AUTOMATED: CPT

## 2024-06-14 PROCEDURE — 97535 SELF CARE MNGMENT TRAINING: CPT

## 2024-06-14 PROCEDURE — 80048 BASIC METABOLIC PNL TOTAL CA: CPT

## 2024-06-14 PROCEDURE — 99024 POSTOP FOLLOW-UP VISIT: CPT | Performed by: PHYSICIAN ASSISTANT

## 2024-06-14 RX ADMIN — ACETAMINOPHEN 325MG 650 MG: 325 TABLET ORAL at 02:15

## 2024-06-14 RX ADMIN — ACETAMINOPHEN 325MG 650 MG: 325 TABLET ORAL at 08:06

## 2024-06-14 RX ADMIN — ENOXAPARIN SODIUM 40 MG: 40 INJECTION SUBCUTANEOUS at 09:06

## 2024-06-14 RX ADMIN — OXYCODONE 5 MG: 5 TABLET ORAL at 12:10

## 2024-06-14 RX ADMIN — OXYCODONE 5 MG: 5 TABLET ORAL at 02:14

## 2024-06-14 RX ADMIN — METHOCARBAMOL 500 MG: 500 TABLET ORAL at 12:10

## 2024-06-14 RX ADMIN — GABAPENTIN 100 MG: 100 CAPSULE ORAL at 06:51

## 2024-06-14 RX ADMIN — ALLOPURINOL 300 MG: 100 TABLET ORAL at 09:05

## 2024-06-14 RX ADMIN — GABAPENTIN 100 MG: 100 CAPSULE ORAL at 14:10

## 2024-06-14 RX ADMIN — OXYCODONE 5 MG: 5 TABLET ORAL at 08:06

## 2024-06-14 RX ADMIN — DOCUSATE SODIUM 100 MG: 100 CAPSULE, LIQUID FILLED ORAL at 08:07

## 2024-06-14 RX ADMIN — ACETAMINOPHEN 325MG 650 MG: 325 TABLET ORAL at 14:10

## 2024-06-14 RX ADMIN — CEFAZOLIN SODIUM 1000 MG: 1 SOLUTION INTRAVENOUS at 02:15

## 2024-06-14 RX ADMIN — METHOCARBAMOL 500 MG: 500 TABLET ORAL at 05:33

## 2024-06-14 NOTE — PHYSICAL THERAPY NOTE
PT EVALUATION    Pt. Name: Lakisha Trevizo  Pt. Age: 63 y.o.  MRN: [id number]  LENGTH OF STAY: 0    Patient Active Problem List   Diagnosis    Anxiety    Congenital renal dysplasia ( left )    Mixed hyperlipidemia    Primary osteoarthritis of right hip    H/O total knee replacement, bilateral    Gout of foot    Prediabetes       Admitting Diagnoses:   Primary osteoarthritis of right hip [M16.11]    Past Medical History:   Diagnosis Date    Aftercare following left knee joint replacement surgery 03/27/2023    Anxiety     MENOPAUSE    Arthritis     KNEES    Chest pain syndrome 03/29/2022    Chondromalacia patellae of right knee 11/08/2016    Chronic pain disorder     Chronic pain of right knee 05/05/2022    Diverticulosis     GERD (gastroesophageal reflux disease)     Hypertension     1 episode last year went to ER; no problems since    Primary osteoarthritis of left knee 05/05/2022    Primary osteoarthritis of right knee 09/24/2019    Renal dysplasia     nonfunctioning left life-long    Single functional kidney     left kidney non-functioning       Past Surgical History:   Procedure Laterality Date    COLONOSCOPY  03/2016    FL INJECTION RIGHT HIP (NON ARTHROGRAM)  5/10/2022    FL INJECTION RIGHT HIP (NON ARTHROGRAM)  11/3/2022    FL INJECTION RIGHT HIP (NON ARTHROGRAM)  5/22/2023    FL INJECTION RIGHT HIP (NON ARTHROGRAM)  10/18/2023    FL INJECTION RIGHT HIP (NON ARTHROGRAM)  1/30/2024    KIDNEY SURGERY Right     age 32    LA ARTHRP KNE CONDYLE&PLATU MEDIAL&LAT COMPARTMENTS Right 8/1/2022    Procedure: ARTHROPLASTY KNEE TOTAL W ROBOT;  Surgeon: Juan Pitt MD;  Location: [facility];  Service: Orthopedics    LA ARTHRP KNE CONDYLE&PLATU MEDIAL&LAT COMPARTMENTS Left 2/13/2023    Procedure: ARTHROPLASTY KNEE TOTAL W ROBOT;  Surgeon: Juan Pitt MD;  Location: [facility];  Service: Orthopedics    REDUCTION MAMMAPLASTY      WRIST SURGERY Right     fx       Imaging Studies:  No orders to  display        06/14/24 0746   PT Last Visit   PT Visit Date 06/14/24   Note Type   Note type Evaluation   Pain Assessment   Pain Assessment Tool 0-10   Pain Score 1   Pain Location/Orientation Orientation: Right;Location: Hip   Hospital Pain Intervention(s) Repositioned;Ambulation/increased activity;Elevated;Emotional support;Rest   Restrictions/Precautions   Weight Bearing Precautions Per Order Yes   RLE Weight Bearing Per Order WBAT   Braces or Orthoses Other (Comment)  (abduction wedge)   Other Precautions WBS;THR;Chair Alarm;Bed Alarm;Fall Risk;Pain   Home Living   Type of Home House   Home Layout Two level  (basement)   Bathroom Shower/Tub Walk-in shower   Bathroom Toilet Standard   Bathroom Equipment Commode  (BSC for shower chair)   Additional Comments Pt with spouse, Jean, in 1SH with 1+1 KATE.   Prior Function   Level of [place] Independent with functional mobility;Independent with IADLS;Independent with ADLs   Lives With Spouse   Receives Help From Family   IADLs Independent with driving;Independent with meal prep;Independent with medication management   Falls in the last 6 months 0   Vocational Part time employment  ([employer])   Comments PTA, Pt reports being I with ADLs/IADLs/no AD/ +/works PT. Pt with supportive spouse able to assist upon DC.   General   Family/Caregiver Present No   Cognition   Overall Cognitive Status WFL   Arousal/Participation Alert   Attention Within functional limits   Orientation Level Oriented X4   Following Commands Follows one step commands without difficulty   Comments Pt very pleasant and cooperative during OT session. Pt with good carryover of techniques and motivated.   RUE Assessment   RUE Assessment   (see OT note)   LUE Assessment   LUE Assessment   (see OT note)   RLE Assessment   RLE Assessment X  (did not assess hip due to post op, able to flex knee and ankle through normal ROM)   LLE Assessment   LLE Assessment WFL   Light Touch   RLE Light Touch  Grossly intact   LLE Light Touch Grossly intact   Bed Mobility   Supine to Sit Unable to assess   Sit to Supine Unable to assess   Additional Comments Pt greeted in recliner chair at start of session   Transfers   Sit to Stand 5  Supervision   Additional items Increased time required;Verbal cues  (RW)   Stand to Sit 5  Supervision   Additional items Increased time required;Verbal cues  (RW)   Additional Comments cues for RW safety   Ambulation/Elevation   Gait pattern Improper Weight shift;Antalgic;Decreased R stance;Excessively slow;Decreased heel strike;Decreased toe off;Decreased hip extension   Gait Assistance 5  Supervision   Additional items Verbal cues   Assistive Device Rolling walker   Distance 90'x2   Stair Management Assistance 5  Supervision   Additional items Verbal cues;Increased time required   Stair Management Technique Foreward;No rails;Step to pattern;With walker   Number of Stairs 4   Ambulation/Elevation Additional Comments cues for LE sequencing during ambulation and stairs   Balance   Static Sitting Good   Dynamic Sitting Fair +   Static Standing Fair   Dynamic Standing Fair -   Ambulatory Fair -   Activity Tolerance   Activity Tolerance Patient tolerated treatment well   Medical Staff Made Aware RN Christa   Nurse Made Aware yes   Assessment   Prognosis Good   Problem List Decreased strength;Decreased range of motion;Impaired balance;Decreased endurance;Decreased mobility;Orthopedic restrictions;Pain   Assessment Pt is a 63 y.o. female who [facility] s/p R ROXANA, posterior approach done by Dr. Pitt. Precautions include no hip flexion past 90 deg, and no IR. Pt has a past medical history of HTN, Renal dysplasia, and Single functional kidney.. Pt greeted in recliner chair for PT evaluation on 06/14/24. Pt referred to PT for functional mobility evaluation & D/C planning w/ orders of activity beginning POD #0 and activity as tolerated. PTA, pt reports being I w/o AD and I w/  IADLs. Personal factors affecting pt at time of IE include: lives in 2 story house and stairs to enter home. Please find objective findings from PT assessment regarding body systems outlined above with impairments and limitations including weakness, decreased ROM, impaired balance, decreased endurance, gait deviations, pain, decreased activity tolerance, decreased functional mobility tolerance, fall risk, and orthopedic restrictions.  Please see flow sheet above for objective findings and level of assistance required for safe completion of functional tasks. Pt was able to perform sit<>stand with S and use of RW. Pt able to ambulate 90'x2 with RW and S. Pt was compliant with posterior hip precautions and able to recite them back to PT when asked. Pt able to perform 4 steps with RW and S. Pt needed cues for RW safety and LE sequencing.  Pt demonstrated dec endurance and tolerance to activity. Denies reports of dizziness or SOB t/o session. Patient was left in recliner chair  with call bell and all needs within reach. Pt was educated on fall precautions and reinforced w/ good understanding. Based on pt presentation and impaired function, pt would benefit from level III, (minimal resource intensity) at D/C. From PT/mobility standpoint, pt would benefit from OPPT to address deficits as defined above and maximize level of independence and return pt to PLOF. HEP given and reviewed with patient, no questions at this time. CM to follow. Nsg staff to continue to mobilized pt (OOB in chair for all meals & ambulate in room/unit) as tolerated to prevent further decline in function. Nsg notified. Co-eval performed with OT to complete this evaluation for the pts best interest given pts medical complexity and functional level.   Barriers to Discharge None   Goals   Patient Goals to go home   STG Expiration Date 06/21/24   Short Term Goal #1 1).  Pt will perform bed mobility with Marciano demonstrating appropriate technique 100% of the  time in order to improve function.2)  Perform all transfers with Marciano demonstrating safe and appropriate technique 100% of the time in order to improve ability to negotiate safely in home environment.3) Amb with least restrictive AD > 200'x1 with Marciano in order to demonstrate ability to negotiate in home environment.4)  Improve overall strength and balance 1/2 grade in order to optimize ability to perform functional tasks and reduce fall risk.5) Increase activity tolerance to 45 minutes in order to improve endurance to functional tasks.6)  Negotiate stairs using most appropriate technique and Marciano in order to be able to negotiate safely in home environment. 7) PT for ongoing patient and family/caregiver education, DME needs and d/c planning in order to promote highest level of function in least restrictive environment.   Plan   Treatment/Interventions Functional transfer training;LE strengthening/ROM;Elevations;Therapeutic exercise;Endurance training;Patient/family training;Bed mobility;Gait training;Spoke to nursing;OT;Family   PT Frequency Twice a day  (prn)   Discharge Recommendation   Rehab Resource Intensity Level, PT III (Minimum Resource Intensity)   Equipment Recommended Walker  (pt owns)   Additional Comments The patient's AM-PAC Basic Mobility Inpatient Short Form Raw Score is 20. A Raw score of greater than 16 suggests the patient may benefit from discharge to home. Please also refer to the recommendation of the Physical Therapist for safe discharge planning.   AM-PAC Basic Mobility Inpatient   Turning in Flat Bed Without Bedrails 4   Lying on Back to Sitting on Edge of Flat Bed Without Bedrails 3   Moving Bed to Chair 3   Standing Up From Chair Using Arms 4   Walk in Room 3   Climb 3-5 Stairs With Railing 3   Basic Mobility Inpatient Raw Score 20   Basic Mobility Standardized Score 43.99   [facility] Highest Level Of Mobility   -HLM Goal 6: Walk 10 steps or more   -HLM Achieved 7: Walk 25 feet or  more   End of Consult   Patient Position at End of Consult Bed/Chair alarm activated;All needs within reach;Bedside chair   End of Consult Comments Pt stable, left seated in recliner chair at end of session with alarm on. RN updated     Hx/personal factors: co-morbidities, pain, WB restrictions, total hip precautions, and fall risk  Examination: dec mobility, dec balance, dec endurance, dec amb, risk for falls, pain, assessed body system, balance, endurance, amb, D/C disposition & fall risk, WB restrictions, impairements in locomotion, musculoskeletal, balance, endurance, posture, coordination  Clinical: unpredictable (ongoing medical status, risk for falls, POD #0, and pain mgt)  Complexity: high      Saira Nation, PT

## 2024-06-14 NOTE — PROGRESS NOTES
Orthopedics  Lakishamary Trevizo 63 y.o. female MRN: [id number]  Unit/Bed#: WE 2 N -01        Subjective:    63 y.o.female seen and examined at the bedside.  Patient reports no issues overnight, and overall she is doing well.  No significant pain in the right hip area, and denies any associated RLE paresthesias.          Objective:    Labs:  0   Lab Value Date/Time    HCT 33.9 (L) 06/14/2024 0527    HCT 43.9 05/17/2024 0842    HCT 38.5 02/14/2023 0639    HCT 45.0 11/18/2016 0801    HGB 11.4 (L) 06/14/2024 0527    HGB 14.6 05/17/2024 0842    HGB 12.7 02/14/2023 0639    HGB 14.8 11/18/2016 0801    INR 0.92 05/17/2024 0842    WBC 13.77 (H) 06/14/2024 0527    WBC 6.76 05/17/2024 0842    WBC 11.74 (H) 02/14/2023 0639    WBC 7.8 11/18/2016 0801    WBC 6-10 (A) 11/18/2016 0801       Meds:    Current Facility-Administered Medications:     acetaminophen (TYLENOL) tablet 650 mg, 650 mg, Oral, Q6H JACQUES, Christiano Deleon PA-C, 650 mg at 06/14/24 0215    allopurinol (ZYLOPRIM) tablet 300 mg, 300 mg, Oral, Daily, La Nena Elizabeth PA-C, 300 mg at 06/13/24 1312    ALPRAZolam (XANAX) tablet 0.25 mg, 0.25 mg, Oral, Daily PRN, La Nena Elizabeth PA-C    calcium carbonate (TUMS) chewable tablet 1,000 mg, 1,000 mg, Oral, Daily PRN, La Nena Elizabeth PA-C    docusate sodium (COLACE) capsule 100 mg, 100 mg, Oral, BID, La Nena Elizbaeth PA-C, 100 mg at 06/13/24 1802    enoxaparin (LOVENOX) subcutaneous injection 40 mg, 40 mg, Subcutaneous, Daily, La Nena Elizabeth PA-C, 40 mg at 06/13/24 2110    gabapentin (NEURONTIN) capsule 100 mg, 100 mg, Oral, Q8H, La Nena Ciano, PA-C, 100 mg at 06/13/24 2314    HYDROmorphone (DILAUDID) injection 0.5 mg, 0.5 mg, Intravenous, Q2H PRN, La Nena Ciano, PA-C    lactated ringers bolus 1,000 mL, 1,000 mL, Intravenous, Once PRN **AND** lactated ringers bolus 1,000 mL, 1,000 mL, Intravenous, Once PRN, La Nena Ciano, PA-C    lactated ringers infusion, 125 mL/hr, Intravenous, Continuous, La Nena Ciano, PA-C, Last Rate: 125 mL/hr at 06/13/24 1305,  "125 mL/hr at 06/13/24 1305    methocarbamol (ROBAXIN) tablet 500 mg, 500 mg, Oral, Q6H JACQUES, VAL Kim-ADRIEL, 500 mg at 06/14/24 0533    ondansetron (ZOFRAN) injection 4 mg, 4 mg, Intravenous, Q6H PRN, La Nena Elizabeth PA-C    oxyCODONE (ROXICODONE) immediate release tablet 10 mg, 10 mg, Oral, Q4H PRN, La Nena Elizabeth PA-C    oxyCODONE (ROXICODONE) IR tablet 5 mg, 5 mg, Oral, Q4H PRN, La Nena Elizabeth PA-C, 5 mg at 06/14/24 0214    sodium chloride 0.9 % bolus 1,000 mL, 1,000 mL, Intravenous, Once PRN **AND** sodium chloride 0.9 % bolus 1,000 mL, 1,000 mL, Intravenous, Once PRN, La Nena Elizabeth PA-C    Blood Culture:   No results found for: \"BLOODCX\"    Wound Culture:   No results found for: \"WOUNDCULT\"    Ins and Outs:  I/O last 24 hours:  In: 1750 [P.O.:900; I.V.:700; IV Piggyback:150]  Out: 2200 [Urine:2100; Blood:100]      Orthopedic Physical Exam:    Vitals:    06/14/24 0253   BP: 111/62   Pulse: 67   Resp: 18   Temp: 97.7 °F (36.5 °C)   SpO2: 94%   Patient sitting upright in the chair, NAD.  Breathing unlabored, no diaphoresis.    right Lower Extremity extremity:  Mepiliex Dressing x 1 C/D/I, no saturation or leaking  Visible skin no erythema or ecchymosis  Thigh soft, no palpable hematoma  5/5 strength with ankle DF/PF, EHL/FHL  SILT   No calf tenderness or pitting edema  Toes warm, sensate, mobile    _*_*_*_*_*_*_*_*_*_*_*_*_*_*_*_*_*_*_*_*_*_*_*_*_*_*_*_*_*_*_*_*_*_*_*_*_*_*_*_*_*    Assessment:     63 y.o.female POD 1 s/p right total hip arthroplasty.  Doing well.        Plan:    Weight bearing as tolerated right lower extremity  Up and out of bed with assistance as needed  Posterior hip precautions and abduction pillow while in bed.  DVT prophylaxis - Lovenox  Analgesics  PT/OT  Dispo: Discharge planning.  Patient noted to have acute blood loss anemia due to a drop in Hbg of > 2.0g from preop levels, will monitor vital signs and resuscitate with IV fluids as needed. H 11.4 this AM, VSS, monitor.        Moe Fowler" WILLIAM Lee

## 2024-06-14 NOTE — OCCUPATIONAL THERAPY NOTE
Occupational Therapy Progress Note     Patient Name: Lakisha Trevizo  Today's Date: 6/14/2024  Problem List  Principal Problem:    Primary osteoarthritis of right hip            06/14/24 0957   OT Last Visit   OT Visit Date 06/14/24   Note Type   Note Type Treatment   Pain Assessment   Pain Assessment Tool 0-10   Pain Score No Pain   Restrictions/Precautions   Weight Bearing Precautions Per Order Yes   RLE Weight Bearing Per Order WBAT   Braces or Orthoses Other (Comment)  (hip abduction wedge)   Other Precautions WBS;Fall Risk;Pain;THR;Bed Alarm;Chair Alarm  (R THP)   Lifestyle   Autonomy I with ADLs/IADLs/no AD/ +/works PT   Reciprocal Relationships Supportive spouse   Service to Others PTE- [employer]   Intrinsic Gratification Enjoys horse racing   ADL   Where Assessed Chair   Equipment Provided Reacher;Sock aid   Grooming Assistance 5  Supervision/Setup   Grooming Deficit Setup;Wash/dry hands   UB Dressing Assistance 5  Supervision/Setup   UB Dressing Deficit Fasteners   LB Dressing Assistance 5  Supervision/Setup   LB Dressing Deficit Setup;Requires assistive device for steadying;Use of adaptive equipment;Thread RLE into pants;Thread RLE into underwear;Thread LLE into pants;Thread LLE into underwear;Don/doff L sock;Don/doff R sock   Toileting Assistance  5  Supervision/Setup   Toileting Deficit Setup;Supervison/safety;Increased time to complete  (BSC over toilet)   Bed Mobility   Supine to Sit Unable to assess   Sit to Supine Unable to assess   Additional Comments Pt greeted up in recliner chair.   Transfers   Sit to Stand 5  Supervision   Additional items Verbal cues;Armrests;Increased time required   Stand to Sit 5  Supervision   Additional items Verbal cues;Increased time required;Armrests   Toilet transfer 5  Supervision   Additional items Increased time required;Commode   Additional Comments with RW   Functional Mobility   Functional Mobility 5  Supervision   Additional Comments S with  functional mobility with RW- to/from bathroom   Additional items Rolling walker   Cognition   Overall Cognitive Status WFL   Arousal/Participation Alert;Cooperative   Attention Within functional limits   Orientation Level Oriented X4   Memory Within functional limits   Following Commands Follows one step commands without difficulty   Comments Pt very pleasant and cooperative. Pt able to recall 3/3 THP and able to follow them throughout the session 100% of the time.   Activity Tolerance   Activity Tolerance Patient tolerated treatment well   Medical Staff Made Aware RN cleared/updated. Ali, DPT   Assessment   Assessment Pt greeted bedside for OT treatment on 06/14/24 focusing on maximizing independence with ADLs. Pt is making great functional progress towards meeting goals and with supportive spouse able to assist. Pt engages in ADL routine within room and with use of LHAE. Pt S with grooming, S with UB dressing, S with LB dressing, S with toileting, S with functional transfers, and S with functional mobility with RW. Limitations that impact functional performance include decreased ADL status, decreased UE ROM, decreased UE strength, decreased safe judgement during ADLs, decreased endurance, decreased self care transfers, decreased high level ADLs, and pain. Occupational performance areas to address ADL retraining, functional transfer training, UE strengthening/ROM, endurance training, Pt/caregiver education, equipment evaluation/education, compensatory technique education, energy conservation, and activity engagement . Pt would benefit from continued skilled OT services while in hospital to maximize independence with ADLs. Will continue to follow Pt's goals and progress. Pt would benefit from returning home with increased social support upon DC to maximize safety and independence with ADLs and functional tasks of choice.   Plan   Treatment Interventions ADL retraining;Functional transfer training;UE  strengthening/ROM;Endurance training;Cognitive reorientation;Patient/family training;Equipment evaluation/education;Compensatory technique education;Activityengagement;Energy conservation   Goal Expiration Date 06/16/24   OT Treatment Day 1   OT Frequency 3-5x/wk   Discharge Recommendation   Rehab Resource Intensity Level, OT No post-acute rehabilitation needs   Equipment Recommended Hip Kit ($)  (Pt owns)   Additional Comments  The patient's raw score on the AM-PAC Daily Activity Inpatient Short Form is 19. A raw score of greater than or equal to 19 suggests the patient may benefit from discharge to home. Please refer to the recommendation of the Occupational Therapist for safe discharge planning.   -PAC Daily Activity Inpatient   Lower Body Dressing 3   Bathing 3   Toileting 3   Upper Body Dressing 3   Grooming 3   Eating 4   Daily Activity Raw Score 19   Daily Activity Standardized Score (Calc for Raw Score >=11) 40.22   AM-PAC Applied Cognition Inpatient   Following a Speech/Presentation 4   Understanding Ordinary Conversation 4   Taking Medications 4   Remembering Where Things Are Placed or Put Away 4   Remembering List of 4-5 Errands 4   Taking Care of Complicated Tasks 4   Applied Cognition Raw Score 24   Applied Cognition Standardized Score 62.21   End of Consult   Education Provided Yes   Patient Position at End of Consult Bedside chair;All needs within reach;Bed/Chair alarm activated   Nurse Communication Nurse aware of consult       Elizabeth Mendoza MS, OTR/L

## 2024-06-14 NOTE — PLAN OF CARE
Problem: OCCUPATIONAL THERAPY ADULT  Goal: Performs self-care activities at highest level of function for planned discharge setting.  See evaluation for individualized goals.  Description: Treatment Interventions: ADL retraining, Functional transfer training, UE strengthening/ROM, Endurance training, Patient/family training, Equipment evaluation/education, Compensatory technique education, Energy conservation, Activityengagement  Equipment Recommended: Hip Kit ($)       See flowsheet documentation for full assessment, interventions and recommendations.   6/14/2024 1054 by Elizabeth Mendoza OT  Outcome: Adequate for Discharge  Note: Limitation: Decreased ADL status, Decreased UE ROM, Decreased UE strength, Decreased Safe judgement during ADL, Decreased endurance, Decreased self-care trans, Decreased high-level ADLs  Prognosis: Good  Assessment: Pt greeted bedside for OT treatment on 06/14/24 focusing on maximizing independence with ADLs. Pt is making great functional progress towards meeting goals and with supportive spouse able to assist. Pt engages in ADL routine within room and with use of LHAE. Pt S with grooming, S with UB dressing, S with LB dressing, S with toileting, S with functional transfers, and S with functional mobility with RW. Limitations that impact functional performance include decreased ADL status, decreased UE ROM, decreased UE strength, decreased safe judgement during ADLs, decreased endurance, decreased self care transfers, decreased high level ADLs, and pain. Occupational performance areas to address ADL retraining, functional transfer training, UE strengthening/ROM, endurance training, Pt/caregiver education, equipment evaluation/education, compensatory technique education, energy conservation, and activity engagement . Pt would benefit from continued skilled OT services while in hospital to maximize independence with ADLs. Will continue to follow Pt's goals and progress. Pt would  benefit from returning home with increased social support upon DC to maximize safety and independence with ADLs and functional tasks of choice.     Rehab Resource Intensity Level, OT: No post-acute rehabilitation needs       6/14/2024 1054 by Elizabeth Mendoza OT  Outcome: Progressing  Note: Limitation: Decreased ADL status, Decreased UE ROM, Decreased UE strength, Decreased Safe judgement during ADL, Decreased endurance, Decreased self-care trans, Decreased high-level ADLs  Prognosis: Good  Assessment: Pt greeted bedside for OT treatment on 06/14/24 focusing on maximizing independence with ADLs. Pt is making great functional progress towards meeting goals and with supportive spouse able to assist. Pt engages in ADL routine within room and with use of LHAE. Pt S with grooming, S with UB dressing, S with LB dressing, S with toileting, S with functional transfers, and S with functional mobility with RW. Limitations that impact functional performance include decreased ADL status, decreased UE ROM, decreased UE strength, decreased safe judgement during ADLs, decreased endurance, decreased self care transfers, decreased high level ADLs, and pain. Occupational performance areas to address ADL retraining, functional transfer training, UE strengthening/ROM, endurance training, Pt/caregiver education, equipment evaluation/education, compensatory technique education, energy conservation, and activity engagement . Pt would benefit from continued skilled OT services while in hospital to maximize independence with ADLs. Will continue to follow Pt's goals and progress. Pt would benefit from returning home with increased social support upon DC to maximize safety and independence with ADLs and functional tasks of choice.     Rehab Resource Intensity Level, OT: No post-acute rehabilitation needs

## 2024-06-14 NOTE — PLAN OF CARE
Problem: PAIN - ADULT  Goal: Verbalizes/displays adequate comfort level or baseline comfort level  Description: Interventions:  - Encourage patient to monitor pain and request assistance  - Assess pain using appropriate pain scale  - Administer analgesics based on type and severity of pain and evaluate response  - Implement non-pharmacological measures as appropriate and evaluate response  - Consider cultural and social influences on pain and pain management  - Notify physician/advanced practitioner if interventions unsuccessful or patient reports new pain  Outcome: Progressing     Problem: SAFETY ADULT  Goal: Patient will remain free of falls  Description: INTERVENTIONS:  - Educate patient/family on patient safety including physical limitations  - Instruct patient to call for assistance with activity   - Consult OT/PT to assist with strengthening/mobility   - Keep Call bell within reach  - Keep bed low and locked with side rails adjusted as appropriate  - Keep care items and personal belongings within reach  - Initiate and maintain comfort rounds  - Make Fall Risk Sign visible to staff  - Offer Toileting every 3 Hours, in advance of need  - Initiate/Maintain bed alarm  - Obtain necessary fall risk management equipment:   - Apply yellow socks and bracelet for high fall risk patients  - Consider moving patient to room near nurses station  Outcome: Progressing     Problem: SAFETY ADULT  Goal: Maintain or return to baseline ADL function  Description: INTERVENTIONS:  -  Assess patient's ability to carry out ADLs; assess patient's baseline for ADL function and identify physical deficits which impact ability to perform ADLs (bathing, care of mouth/teeth, toileting, grooming, dressing, etc.)  - Assess/evaluate cause of self-care deficits   - Assess range of motion  - Assess patient's mobility; develop plan if impaired  - Assess patient's need for assistive devices and provide as appropriate  - Encourage maximum  independence but intervene and supervise when necessary  - Involve family in performance of ADLs  - Assess for home care needs following discharge   - Consider OT consult to assist with ADL evaluation and planning for discharge  - Provide patient education as appropriate  Outcome: Progressing

## 2024-06-14 NOTE — QUICK NOTE
"  62yo F with history of single functioning kidney whom is POD #1 right total hip arthroplasty posterior approach   AVSS, urinating appropriately, denies any acute events overnight, pain is controlled      /62 (BP Location: Right arm)   Pulse 67   Temp 97.7 °F (36.5 °C) (Temporal)   Resp 18   Ht 5' 4\" (1.626 m)   Wt 79.8 kg (176 lb)   SpO2 94%   BMI 30.21 kg/m²       Recent Labs     06/14/24  0527   HGB 11.4*   HCT 33.9*   WBC 13.77*         Hgb drop >2 as compared to preop levels    Dressing C/D/I.  Thigh is soft and no ecchymosis appreciated  Motor grossly intact  "

## 2024-06-14 NOTE — PLAN OF CARE
Problem: PHYSICAL THERAPY ADULT  Goal: Performs mobility at highest level of function for planned discharge setting.  See evaluation for individualized goals.  Description: Treatment/Interventions: Functional transfer training, LE strengthening/ROM, Elevations, Therapeutic exercise, Endurance training, Patient/family training, Bed mobility, Gait training, Spoke to nursing, OT, Family  Equipment Recommended: Walker (pt owns)       See flowsheet documentation for full assessment, interventions and recommendations.  Note: Prognosis: Good  Problem List: Decreased strength, Decreased range of motion, Impaired balance, Decreased endurance, Decreased mobility, Orthopedic restrictions, Pain  Assessment: Pt is a 63 y.o. female who presented to [facility] on [facility]/13/2024 s/p R ROXANA, posterior approach done by Dr. Pitt. Precautions include no hip flexion past 90 deg, and no IR. Pt has a past medical history of HTN, Renal dysplasia, and Single functional kidney.. Pt greeted in recliner chair for PT evaluation on 06/14/24. Pt referred to PT for functional mobility evaluation & D/C planning w/ orders of activity beginning POD #0 and activity as tolerated. PTA, pt reports being I w/o AD and I w/ IADLs. Personal factors affecting pt at time of IE include: lives in 2 story house and stairs to enter home. Please find objective findings from PT assessment regarding body systems outlined above with impairments and limitations including weakness, decreased ROM, impaired balance, decreased endurance, gait deviations, pain, decreased activity tolerance, decreased functional mobility tolerance, fall risk, and orthopedic restrictions.  Please see flow sheet above for objective findings and level of assistance required for safe completion of functional tasks. Pt was able to perform sit<>stand with S and use of RW. Pt able to ambulate 90'x2 with RW and S. Pt was compliant with posterior hip precautions and able to recite them back to PT when asked.  Pt able to perform 4 steps with RW and S. Pt needed cues for RW safety and LE sequencing.  Pt demonstrated dec endurance and tolerance to activity. Denies reports of dizziness or SOB t/o session. Patient was left in recliner chair  with call bell and all needs within reach. Pt was educated on fall precautions and reinforced w/ good understanding. Based on pt presentation and impaired function, pt would benefit from level III, (minimal resource intensity) at D/C. From PT/mobility standpoint, pt would benefit from OPPT to address deficits as defined above and maximize level of independence and return pt to PLOF. HEP given and reviewed with patient, no questions at this time. CM to follow. Nsg staff to continue to mobilized pt (OOB in chair for all meals & ambulate in room/unit) as tolerated to prevent further decline in function. Nsg notified. Co-eval performed with OT to complete this evaluation for the pts best interest given pts medical complexity and functional level.  Barriers to Discharge: None     Rehab Resource Intensity Level, PT: III (Minimum Resource Intensity)    See flowsheet documentation for full assessment.

## 2024-06-15 DIAGNOSIS — Z00.6 ENCOUNTER FOR EXAMINATION FOR NORMAL COMPARISON OR CONTROL IN CLINICAL RESEARCH PROGRAM: ICD-10-CM

## 2024-06-17 ENCOUNTER — OFFICE VISIT (OUTPATIENT)
Dept: PHYSICAL THERAPY | Facility: CLINIC | Age: 64
End: 2024-06-17
Payer: COMMERCIAL

## 2024-06-17 ENCOUNTER — TELEPHONE (OUTPATIENT)
Dept: OBGYN CLINIC | Facility: HOSPITAL | Age: 64
End: 2024-06-17

## 2024-06-17 DIAGNOSIS — Z96.641 STATUS POST TOTAL REPLACEMENT OF RIGHT HIP: Primary | ICD-10-CM

## 2024-06-17 DIAGNOSIS — M16.11 PRIMARY OSTEOARTHRITIS OF RIGHT HIP: ICD-10-CM

## 2024-06-17 PROCEDURE — 97164 PT RE-EVAL EST PLAN CARE: CPT | Performed by: PHYSICAL THERAPIST

## 2024-06-17 PROCEDURE — 97116 GAIT TRAINING THERAPY: CPT | Performed by: PHYSICAL THERAPIST

## 2024-06-17 PROCEDURE — 97110 THERAPEUTIC EXERCISES: CPT | Performed by: PHYSICAL THERAPIST

## 2024-06-17 PROCEDURE — 97112 NEUROMUSCULAR REEDUCATION: CPT | Performed by: PHYSICAL THERAPIST

## 2024-06-17 PROCEDURE — 97140 MANUAL THERAPY 1/> REGIONS: CPT | Performed by: PHYSICAL THERAPIST

## 2024-06-17 NOTE — PROGRESS NOTES
PT Re-Evaluation     Today's date: 2024  Patient name: Lakisha Trevizo  : 1960  MRN: [id number]  Referring provider: Juan Pitt,*  Dx:   Encounter Diagnosis     ICD-10-CM    1. Status post total replacement of right hip  Z96.641       2. Primary osteoarthritis of right hip  M16.11 Ambulatory referral to Physical Therapy                     Assessment  Impairments: abnormal gait, abnormal or restricted ROM, activity intolerance, impaired physical strength, lacks appropriate home exercise program, pain with function, weight-bearing intolerance, poor posture  and poor body mechanics  Symptom irritability: moderate    Assessment details: Pt presents with post-operative impairments of R hip strength, ROM, WB tolerance, gait, and pain. She will benefit from skilled PT services post-operatively to address her impairments in order to decrease pain and restore function.  Understanding of Dx/Px/POC: good     Prognosis: good    Goals  STG - 2-4 wks  1) pt will d/c FWW  2) pt will be demonstrate 3+/5 R hip strength    MTG - 4-6 wks  1) pt will d/c SPC  2) pt will demonstrate 4-/5 R hip strength    LTG - 6-8 wks  1) pt will normalize gait  2) pt will use reciprocal pattern on stairs  3) pt will demonstrate 4/5 R hip strength    Plan  Patient would benefit from: skilled physical therapy    Planned therapy interventions: manual therapy, abdominal trunk stabilization, body mechanics training, balance/weight bearing training, neuromuscular re-education, postural training, strengthening, stretching, therapeutic activities, therapeutic exercise, home exercise program, gait training, flexibility and functional ROM exercises    Frequency: 2x week  Duration in weeks: 8  Treatment plan discussed with: patient        Subjective Evaluation    History of Present Illness  Mechanism of injury: Pt is a 63 y.o. female with PMHx significant for anxiety, B TKA, CLBP, latex allergy. She presents to PT s/p R ROXANA posterior  "approach 24.  Patient Goals  Patient goals for therapy: decreased edema, decreased pain, increased strength, independence with ADLs/IADLs, return to sport/leisure activities, return to work, increased motion and improved balance    Pain  Current pain ratin  At best pain ratin  At worst pain ratin  Location: anterior R hip, groin  Quality: burning, sharp, pressure and discomfort  Relieving factors: change in position and rest  Aggravating factors: sitting, standing and walking  Progression: worsening    Social Support  Steps to enter house: yes  2  Stairs in house: yes (FF basement)   Lives in: one-story house  Lives with: spouse    Employment status: working ([employer])    Diagnostic Tests  X-ray: abnormal (moderate R hip OA)  Treatments  Previous treatment: chiropractic, injection treatment, physical therapy and medication  Current treatment: medication        Objective     Observations     Right Hip  Positive for incision (badage in place, no s/s infection).     Passive Range of Motion     Right Hip   Flexion: 90 degrees   Extension: 0 degrees   Abduction: 22 degrees   Adduction: 0 degrees   Internal rotation (90/90): 0 degrees     Strength/Myotome Testing     Right Hip   Planes of Motion   Flexion: 2  Extension: 3-  Abduction: 2+  Adduction: 3-  External rotation: 2+  Internal rotation: 2+    General Comments:      Hip Comments   Gait FWW: mild FF trunk, decreased R stance time, fair step-through gait pattern    Step-ups: 4\" mild trunk lean and circumduction to assist with hip flexion, mod UE A x2             Precautions: PMHx: anxiety, B TKA, CLBP, latex allergy  Dx: s/p R ROXANA posterior approach 24    Manuals  6/10           R hip flexion PROM to 90 deg  10\"x10           R hip ABD PROM  10\"x5           R HA stretch  10\"x5                        Neuro Re-Ed             Abdominal bracing 5\"x10 5\"x15           bridges 1x5 2x5 3x5                                                     " "                      Ther Ex             SKC to 90 deg 5\"x5            Supine hip ABD 1x10 2x10           Supine hip march AAROM  1x5                                                                            Ther Activity             STS   Foam  1x5          Step-ups  4\"/  1x10 4\"/          Step-downs                          Gait Training             Gait training on floor  FWW  2x150 ft                                                  Modalities                                            "

## 2024-06-17 NOTE — TELEPHONE ENCOUNTER
"Patient contacted for a postoperative follow up assessment. Patient reports doing  \"ok, getting better each day\" Patient states current pain level of a  0/10  when sitting and 4/10 when walking with RW. Patient reports swelling from thigh to quad area. She reports the dressing is clean, dry and intact. I recommended patient to ice every 1-2 hours for 20-30 mins to help with swelling.     We reviewed patients AVS medication list. Patient is taking Tylenol 500mg every 6 hours (informed patient can take up to 1000mg Q8 to help with pain), Oxycodone 5mg PRN, Robaxin 500mg TID PRN, Lovenox 40mg Inj daily. Patient has had a BM since being home.     Patient denies nausea, vomiting, abdominal pain, chest pain, shortness of breath, fever, dizziness and calf pain. Patient confirmed post-op appointment with PT today, 6/17 and with surgeon on 06/20 .Patient does not have any other questions or concerns at this time. Pt was encouraged to call with any questions, concerns or issues.    "

## 2024-06-19 ENCOUNTER — OFFICE VISIT (OUTPATIENT)
Dept: PHYSICAL THERAPY | Facility: CLINIC | Age: 64
End: 2024-06-19
Payer: COMMERCIAL

## 2024-06-19 DIAGNOSIS — M16.11 PRIMARY OSTEOARTHRITIS OF RIGHT HIP: Primary | ICD-10-CM

## 2024-06-19 DIAGNOSIS — Z96.641 STATUS POST TOTAL REPLACEMENT OF RIGHT HIP: ICD-10-CM

## 2024-06-19 DIAGNOSIS — M25.551 RIGHT HIP PAIN: ICD-10-CM

## 2024-06-19 PROCEDURE — 97110 THERAPEUTIC EXERCISES: CPT

## 2024-06-19 PROCEDURE — 97530 THERAPEUTIC ACTIVITIES: CPT

## 2024-06-19 PROCEDURE — 97112 NEUROMUSCULAR REEDUCATION: CPT

## 2024-06-19 NOTE — PROGRESS NOTES
"Daily Note     Today's date: 2024  Patient name: Lakisha Trevizo  : 1960  MRN: [id number]  Referring provider: Juan Pitt,*  Dx:   Encounter Diagnosis     ICD-10-CM    1. Primary osteoarthritis of right hip  M16.11       2. Status post total replacement of right hip  Z96.641       3. Right hip pain  M25.551           Start Time: 1100  Stop Time: 1140  Total time in clinic (min): 40 minutes    Subjective: Patient reports having no pain prior to therapy. Notes \"-2/10\" soreness at most.     Objective: See treatment diary below    Assessment: Tolerated treatment well. Patient demonstrated fatigue post treatment, exhibited good technique with therapeutic exercises, and would benefit from continued PT. Good PROM noted within the current protocol limits. Limited hip flexor activation. Did require B/L UE assist to complete STSs.     Plan: Continue per plan of care.      Precautions: PMHx: anxiety, B TKA, CLBP, latex allergy  Dx: s/p R ROXANA posterior approach 24    Manuals 5/29 6/10 6/19          R hip flexion PROM to 90 deg  10\"x10 10\"x10          R hip ABD PROM  10\"x5 10\"x5          R HA stretch  10\"x5 10\"x5                       Neuro Re-Ed             Abdominal bracing 5\"x10 5\"x15 5\"x15          bridges 1x5 2x5 3x5                                                                           Ther Ex             SKC to 90 deg 5\"x5            Supine hip ABD 1x10 2x10 2x10          Supine hip march AAROM  1x5 3x5                                                                           Ther Activity             STS   Foam  1x5          Step-ups  4\"/  1x10 4\"/  1x10          Step-downs                          Gait Training             Gait training on floor  FWW  2x150 ft FWW 1x150 ft                                                 Modalities                                          "

## 2024-06-20 ENCOUNTER — HOSPITAL ENCOUNTER (OUTPATIENT)
Dept: RADIOLOGY | Facility: HOSPITAL | Age: 64
Discharge: HOME | End: 2024-06-20
Attending: ORTHOPAEDIC SURGERY
Payer: COMMERCIAL

## 2024-06-20 ENCOUNTER — OFFICE VISIT (OUTPATIENT)
Dept: OBGYN CLINIC | Facility: HOSPITAL | Age: 64
End: 2024-06-20

## 2024-06-20 VITALS
HEIGHT: 64 IN | HEART RATE: 79 BPM | SYSTOLIC BLOOD PRESSURE: 134 MMHG | BODY MASS INDEX: 30.21 KG/M2 | DIASTOLIC BLOOD PRESSURE: 72 MMHG

## 2024-06-20 DIAGNOSIS — Z96.641 AFTERCARE FOLLOWING RIGHT HIP JOINT REPLACEMENT SURGERY: ICD-10-CM

## 2024-06-20 DIAGNOSIS — Z47.1 AFTERCARE FOLLOWING RIGHT HIP JOINT REPLACEMENT SURGERY: ICD-10-CM

## 2024-06-20 DIAGNOSIS — Z96.641 AFTERCARE FOLLOWING RIGHT HIP JOINT REPLACEMENT SURGERY: Primary | ICD-10-CM

## 2024-06-20 DIAGNOSIS — Z47.1 AFTERCARE FOLLOWING RIGHT HIP JOINT REPLACEMENT SURGERY: Primary | ICD-10-CM

## 2024-06-20 PROCEDURE — 99024 POSTOP FOLLOW-UP VISIT: CPT | Performed by: ORTHOPAEDIC SURGERY

## 2024-06-20 PROCEDURE — 73502 X-RAY EXAM HIP UNI 2-3 VIEWS: CPT

## 2024-06-20 NOTE — PROGRESS NOTES
Assessment:   Diagnosis ICD-10-CM Associated Orders   1. Aftercare following right hip joint replacement surgery  Z47.1     Z96.641           Plan:  63 y.o. female 1 week s/p right ROXANA  Continue physical therapy   Continue weight bearing activities as tolerated   Continue pain medications as needed   Able to shower, letting warm soapy water run over incision and only pat dry   Follow up in 1 week for 2 week post-op appointment and removal of staples      The above stated was discussed in layman's terms and the patient expressed understanding.  All questions were answered to the patient's satisfaction.     To do next visit:  Return in about 1 week (around 6/27/2024) for 2 week post-op and removal of staples.      Subjective:   Lakisha Trevizo is a 63 y.o. female who presents 7 days s/p right total hip arthroplasty.  She is doing well.  She admits to minimal pain which has been well controlled with OTC Tylenol as needed.  She has been attending physical therapy twice a week and admits to making good progress.  Patient ambulating well with walker today.  She admits to taking Lovenox daily.           Review of systems negative unless otherwise specified in HPI    Past Medical History:   Diagnosis Date    Aftercare following left knee joint replacement surgery 03/27/2023    Anxiety     MENOPAUSE    Arthritis     KNEES    Chest pain syndrome 03/29/2022    Chondromalacia patellae of right knee 11/08/2016    Chronic pain disorder     Chronic pain of right knee 05/05/2022    Diverticulosis     GERD (gastroesophageal reflux disease)     Hypertension     1 episode last year went to ER; no problems since    Primary osteoarthritis of left knee 05/05/2022    Primary osteoarthritis of right knee 09/24/2019    Renal dysplasia     nonfunctioning left life-long    Single functional kidney     left kidney non-functioning       Past Surgical History:   Procedure Laterality Date    COLONOSCOPY  03/2016    FL INJECTION RIGHT HIP (NON  ARTHROGRAM)  5/10/2022    FL INJECTION RIGHT HIP (NON ARTHROGRAM)  11/3/2022    FL INJECTION RIGHT HIP (NON ARTHROGRAM)  5/22/2023    FL INJECTION RIGHT HIP (NON ARTHROGRAM)  10/18/2023    FL INJECTION RIGHT HIP (NON ARTHROGRAM)  1/30/2024    KIDNEY SURGERY Right     age 32    SC ARTHRP ACETBLR/PROX FEM PROSTC AGRFT/ALGRFT Right 6/13/2024    Procedure: ARTHROPLASTY HIP TOTAL;  Surgeon: Juan Pitt MD;  Location: [facility];  Service: Orthopedics    SC ARTHRP KNE CONDYLE&PLATU MEDIAL&LAT COMPARTMENTS Right 8/1/2022    Procedure: ARTHROPLASTY KNEE TOTAL W ROBOT;  Surgeon: Juan Pitt MD;  Location: [facility];  Service: Orthopedics    SC ARTHRP KNE CONDYLE&PLATU MEDIAL&LAT COMPARTMENTS Left 2/13/2023    Procedure: ARTHROPLASTY KNEE TOTAL W ROBOT;  Surgeon: Juan Pitt MD;  Location[facility];  Service: Orthopedics    REDUCTION MAMMAPLASTY      WRIST SURGERY Right     fx       Family History   Problem Relation Age of Onset    No Known Problems Mother     Diabetes Father     Breast cancer Neg Hx     Ovarian cancer Neg Hx     Colon cancer Neg Hx        Social History     Occupational History    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   Tobacco Use    Smoking status: Former     Current packs/day: 0.25     Average packs/day: 0.3 packs/day for 15.0 years (3.8 ttl pk-yrs)     Types: Cigarettes    Smokeless tobacco: Never   Vaping Use    Vaping status: Never Used   Substance and Sexual Activity    Alcohol use: Yes     Alcohol/week: 10.0 standard drinks of alcohol     Types: 5 Cans of beer, 5 Standard drinks or equivalent per week     Comment: daily    Drug use: Never    Sexual activity: Yes     Partners: Male     Birth control/protection: Post-menopausal         Current Outpatient Medications:     acetaminophen (TYLENOL) 650 mg CR tablet, Take 1 tablet (650 mg total) by mouth every 8 (eight) hours as needed for mild pain, Disp: 30 tablet, Rfl: 0    allopurinol (ZYLOPRIM) 300 mg tablet, Take 1 tablet (300  mg total) by mouth daily, Disp: 90 tablet, Rfl: 1    ALPRAZolam (XANAX) 0.25 mg tablet, Take 1 tablet (0.25 mg total) by mouth daily as needed for anxiety, Disp: 30 tablet, Rfl: 0    amoxicillin (AMOXIL) 500 mg capsule, 4 CAPS (2GRAMS) 1 HOUR PRIOR TO PROCEDURE, Disp: , Rfl:     Biotin w/ Vitamins C & E 1250-7.5-7.5 MCG-MG-UNT CHEW, Chew, Disp: , Rfl:     enoxaparin (LOVENOX) 40 mg/0.4 mL, Inject 0.4 mL (40 mg total) under the skin daily for 28 days Start injections after surgery, Disp: 11.2 mL, Rfl: 0    FIBER PO, Take by mouth daily, Disp: , Rfl:     methocarbamol (ROBAXIN) 500 mg tablet, Take 1 tablet (500 mg total) by mouth 3 (three) times a day as needed for muscle spasms, Disp: 60 tablet, Rfl: 0    Multiple Vitamins-Minerals (MULTI FOR HER 50+ PO), Take by mouth daily, Disp: , Rfl:     oxyCODONE (Roxicodone) 5 immediate release tablet, Take 1 tablet (5 mg total) by mouth every 6 (six) hours as needed for moderate pain for up to 10 days Max Daily Amount: 20 mg, Disp: 20 tablet, Rfl: 0    Allergies   Allergen Reactions    Sulfa Antibiotics Rash            Vitals:    06/20/24 1409   BP: 134/72   Pulse: 79       Objective:  Physical exam  General: Awake, Alert, Oriented  Eyes: Pupils equal, round and reactive to light  Heart: regular rate and rhythm  Lungs: No audible wheezing  Abdomen: soft                    Ortho Exam  Right hip:   Posterior incision clean dry and intact  Staples well approximated   Appropriate warmth and swelling  Good arc of motion   Mild ecchymosis of RLE   Patient sits comfortably in chair with hip flexed at 90 degrees  Patient stands from seated position without assistance  Calf compartments soft and supple  Sensation intact  Toes are warm sensate and mobile     Diagnostics, reviewed and taken today if performed as documented:    Right hip xray:   - Well aligned prosthesis without evidence of acute fractures, dislocations, acute changes, or hardware failure         Procedures, if performed  "today:    None performed      Portions of the record may have been created with voice recognition software.  Occasional wrong word or \"sound a like\" substitutions may have occurred due to the inherent limitations of voice recognition software.  Read the chart carefully and recognize, using context, where substitutions have occurred.      "

## 2024-06-24 ENCOUNTER — OFFICE VISIT (OUTPATIENT)
Dept: PHYSICAL THERAPY | Facility: CLINIC | Age: 64
End: 2024-06-24
Payer: COMMERCIAL

## 2024-06-24 DIAGNOSIS — M25.551 RIGHT HIP PAIN: ICD-10-CM

## 2024-06-24 DIAGNOSIS — Z96.641 STATUS POST TOTAL REPLACEMENT OF RIGHT HIP: ICD-10-CM

## 2024-06-24 DIAGNOSIS — M16.11 PRIMARY OSTEOARTHRITIS OF RIGHT HIP: Primary | ICD-10-CM

## 2024-06-24 DIAGNOSIS — M1A.0790 IDIOPATHIC CHRONIC GOUT OF FOOT WITHOUT TOPHUS, UNSPECIFIED LATERALITY: ICD-10-CM

## 2024-06-24 PROCEDURE — 97140 MANUAL THERAPY 1/> REGIONS: CPT

## 2024-06-24 PROCEDURE — 97110 THERAPEUTIC EXERCISES: CPT

## 2024-06-24 PROCEDURE — 97530 THERAPEUTIC ACTIVITIES: CPT

## 2024-06-24 PROCEDURE — 97112 NEUROMUSCULAR REEDUCATION: CPT

## 2024-06-24 RX ORDER — ALLOPURINOL 300 MG/1
300 TABLET ORAL DAILY
Qty: 90 TABLET | Refills: 0 | Status: CANCELLED | OUTPATIENT
Start: 2024-06-24

## 2024-06-24 NOTE — PROGRESS NOTES
"Daily Note     Today's date: 2024  Patient name: Lakisha Trevizo  : 1960  MRN: [id number]  Referring provider: Juan Pitt,*  Dx:   Encounter Diagnosis     ICD-10-CM    1. Primary osteoarthritis of right hip  M16.11       2. Status post total replacement of right hip  Z96.641       3. Right hip pain  M25.551           Start Time: 1100  Stop Time: 1145  Total time in clinic (min): 45 minutes    Subjective: Patient reports having soreness and no pain.     Objective: See treatment diary below    Assessment: Tolerated treatment well. Patient demonstrated fatigue post treatment, exhibited good technique with therapeutic exercises, and would benefit from continued PT. Continues to make steady progress thus far. Gait belt used for gait and stair training today. No LOB today.     Plan: Continue per plan of care.      Precautions: PMHx: anxiety, B TKA, CLBP, latex allergy  Dx: s/p R ROXANA posterior approach 24    Manuals 5/29 6/10 6/19 6/24         R hip flexion PROM to 90 deg  10\"x10 10\"x10 10\"x10         R hip ABD PROM  10\"x5 10\"x5 10\"x5         R HA stretch  10\"x5 10\"x5 10\"x5                      Neuro Re-Ed             Abdominal bracing 5\"x10 5\"x15 5\"x15 5\"x20         bridges 1x5 2x5 3x5 2x10                                                                          Ther Ex             SKC to 90 deg 5\"x5            Supine hip ABD 1x10 2x10 2x10 3x10         Supine hip march AAROM  1x5 3x5          Supine hip march AROM    3x5                                                             Ther Activity             STS   Foam  1x5 Foam  1x10         Step-ups  4\"/  1x10 4\"/  1x10 4\"  1x10    6\"  1x10    Railing and SPC         Step-downs                          Gait Training             Gait training on floor  FWW  2x150 ft FWW 1x150 ft FWW 1x150 ft         SPC    2 laps ea                                   Modalities                                          "

## 2024-06-26 ENCOUNTER — OFFICE VISIT (OUTPATIENT)
Dept: PHYSICAL THERAPY | Facility: CLINIC | Age: 64
End: 2024-06-26
Payer: COMMERCIAL

## 2024-06-26 DIAGNOSIS — Z96.641 STATUS POST TOTAL REPLACEMENT OF RIGHT HIP: ICD-10-CM

## 2024-06-26 DIAGNOSIS — M16.11 PRIMARY OSTEOARTHRITIS OF RIGHT HIP: Primary | ICD-10-CM

## 2024-06-26 DIAGNOSIS — M25.551 RIGHT HIP PAIN: ICD-10-CM

## 2024-06-26 PROCEDURE — 97110 THERAPEUTIC EXERCISES: CPT | Performed by: PHYSICAL THERAPIST

## 2024-06-26 PROCEDURE — 97140 MANUAL THERAPY 1/> REGIONS: CPT | Performed by: PHYSICAL THERAPIST

## 2024-06-26 PROCEDURE — 97112 NEUROMUSCULAR REEDUCATION: CPT | Performed by: PHYSICAL THERAPIST

## 2024-06-26 PROCEDURE — 97530 THERAPEUTIC ACTIVITIES: CPT | Performed by: PHYSICAL THERAPIST

## 2024-06-26 NOTE — PROGRESS NOTES
"Daily Note     Today's date: 2024  Patient name: Lakisha Trevizo  : 1960  MRN: [id number]  Referring provider: Juan Pitt,*  Dx:   Encounter Diagnosis     ICD-10-CM    1. Primary osteoarthritis of right hip  M16.11       2. Status post total replacement of right hip  Z96.641       3. Right hip pain  M25.551                      Subjective: Pt reports low pain during the day, difficulty sleeping due to fear of dislocation while turning in bed.    Objective: See treatment diary below    Educated pt on use of pillow between knees when sleeping and reassured reasonably low risk of dislocation as she continues to be compliant with hip precautions    Assessment: Pt demonstrated improved PROM but significant R hip weakness.    Plan: Continue per plan of care.      Precautions: PMHx: anxiety, B TKA, CLBP, latex allergy  Dx: s/p R ROXANA posterior approach 24    Manuals 5/29 6/10 6/19 6/24 6/26        R hip flexion PROM to 90 deg  10\"x10 10\"x10 10\"x10 10\"x5        R hip ABD PROM  10\"x5 10\"x5 10\"x5 10\"x5        R HA stretch  10\"x5 10\"x5 10\"x5 10\"x5                     Neuro Re-Ed             Abdominal bracing 5\"x10 5\"x15 5\"x15 5\"x20 5\"x20        bridges 1x5 2x5 3x5 2x10 3x10                                                                         Ther Ex             SKC to 90 deg 5\"x5            Supine hip ABD 1x10 2x10 2x10 3x10 3x10        Supine hip march AAROM  1x5 3x5          Supine hip march AROM    3x5 3x5 3x10       Iso hip ADD             Supine clamshells                                       Ther Activity             STS   Foam  1x5 Foam  1x10 Foam  1x10        Step-ups  4\"/  1x10 4\"/  1x10 4\"  1x10    6\"  1x10    Railing and SPC (+) rail  (-) SPC    6\"/  1x10        Step-downs                          Gait Training             Gait training on floor  FWW  2x150 ft FWW 1x150 ft FWW 1x150 ft SPC  400 ft        SPC    2 laps ea                                   Modalities                      "

## 2024-06-27 ENCOUNTER — OFFICE VISIT (OUTPATIENT)
Dept: OBGYN CLINIC | Facility: HOSPITAL | Age: 64
End: 2024-06-27

## 2024-06-27 VITALS
HEART RATE: 65 BPM | HEIGHT: 64 IN | SYSTOLIC BLOOD PRESSURE: 118 MMHG | BODY MASS INDEX: 30.21 KG/M2 | DIASTOLIC BLOOD PRESSURE: 72 MMHG

## 2024-06-27 DIAGNOSIS — Z47.1 AFTERCARE FOLLOWING RIGHT HIP JOINT REPLACEMENT SURGERY: Primary | ICD-10-CM

## 2024-06-27 DIAGNOSIS — Z96.641 AFTERCARE FOLLOWING RIGHT HIP JOINT REPLACEMENT SURGERY: Primary | ICD-10-CM

## 2024-06-27 PROCEDURE — 99024 POSTOP FOLLOW-UP VISIT: CPT | Performed by: ORTHOPAEDIC SURGERY

## 2024-06-27 NOTE — PROGRESS NOTES
Assessment:   Diagnosis ICD-10-CM Associated Orders   1. Aftercare following right hip joint replacement surgery  Z47.1     Z96.641           Plan:  63 y.o. female 2 weeks s/p right ROXANA  Continue physical therapy   Continue weight bearing activities as tolerated   Continue pain medications as needed   Staples removed in office today, incision cleaned, steri-strips applied   Follow up in 4 weeks for 6 week post-op appointment      The above stated was discussed in layman's terms and the patient expressed understanding.  All questions were answered to the patient's satisfaction.     To do next visit:  Return in about 4 weeks (around 7/25/2024) for 6 week post-op apointment.      Subjective:   Lakisha Trevizo is a 63 y.o. female who presents 2 weeks s/p right total hip arthroplasty.  She is doing well.  She admits to mild pain which she has been able to control well with OTC tylenol as needed.  She continues to work with physical therapy and continues to make progress.  Patient ambulates well with SPC today.  She continues to take lovenox daily.         Review of systems negative unless otherwise specified in HPI    Past Medical History:   Diagnosis Date    Aftercare following left knee joint replacement surgery 03/27/2023    Anxiety     MENOPAUSE    Arthritis     KNEES    Chest pain syndrome 03/29/2022    Chondromalacia patellae of right knee 11/08/2016    Chronic pain disorder     Chronic pain of right knee 05/05/2022    Diverticulosis     GERD (gastroesophageal reflux disease)     Hypertension     1 episode last year went to ER; no problems since    Primary osteoarthritis of left knee 05/05/2022    Primary osteoarthritis of right knee 09/24/2019    Renal dysplasia     nonfunctioning left life-long    Single functional kidney     left kidney non-functioning       Past Surgical History:   Procedure Laterality Date    COLONOSCOPY  03/2016    FL INJECTION RIGHT HIP (NON ARTHROGRAM)  5/10/2022    FL INJECTION RIGHT HIP  (NON ARTHROGRAM)  11/3/2022    FL INJECTION RIGHT HIP (NON ARTHROGRAM)  5/22/2023    FL INJECTION RIGHT HIP (NON ARTHROGRAM)  10/18/2023    FL INJECTION RIGHT HIP (NON ARTHROGRAM)  1/30/2024    KIDNEY SURGERY Right     age 32    [place] ARTHRP ACETBLR/PROX FEM PROSTC AGRFT/ALGRFT Right 6/13/2024    Procedure: ARTHROPLASTY HIP TOTAL;  Surgeon: Juan Pitt MD;  Location: [facility];  Service: Orthopedics    W[place] ARTHRP KNE CONDYLE&PLATU MEDIAL&LAT COMPARTMENTS Right 8/1/2022    Procedure: ARTHROPLASTY KNEE TOTAL W ROBOT;  Surgeon: Juan Pitt MD;  Location: [facility];  Service: Orthopedics    [place] ARTHRP KNE CONDYLE&PLATU MEDIAL&LAT COMPARTMENTS Left 2/13/2023    Procedure: ARTHROPLASTY KNEE TOTAL W ROBOT;  Surgeon: Juan Pitt MD;  Location: [facility];  Service: Orthopedics    REDUCTION MAMMAPLASTY      WRIST SURGERY Right     fx       Family History   Problem Relation Age of Onset    No Known Problems Mother     Diabetes Father     Breast cancer Neg Hx     Ovarian cancer Neg Hx     Colon cancer Neg Hx        Social History     Occupational History    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   Tobacco Use    Smoking status: Former     Current packs/day: 0.25     Average packs/day: 0.3 packs/day for 15.0 years (3.8 ttl pk-yrs)     Types: Cigarettes    Smokeless tobacco: Never   Vaping Use    Vaping status: Never Used   Substance and Sexual Activity    Alcohol use: Yes     Alcohol/week: 10.0 standard drinks of alcohol     Types: 5 Cans of beer, 5 Standard drinks or equivalent per week     Comment: daily    Drug use: Never    Sexual activity: Yes     Partners: Male     Birth control/protection: Post-menopausal         Current Outpatient Medications:     acetaminophen (TYLENOL) 650 mg CR tablet, Take 1 tablet (650 mg total) by mouth every 8 (eight) hours as needed for mild pain, Disp: 30 tablet, Rfl: 0    allopurinol (ZYLOPRIM) 300 mg tablet, Take 1 tablet (300 mg total) by mouth daily, Disp: 90 tablet, Rfl:  "1    ALPRAZolam (XANAX) 0.25 mg tablet, Take 1 tablet (0.25 mg total) by mouth daily as needed for anxiety, Disp: 30 tablet, Rfl: 0    amoxicillin (AMOXIL) 500 mg capsule, 4 CAPS (2GRAMS) 1 HOUR PRIOR TO PROCEDURE, Disp: , Rfl:     Biotin w/ Vitamins C & E 1250-7.5-7.5 MCG-MG-UNT CHEW, Chew, Disp: , Rfl:     enoxaparin (LOVENOX) 40 mg/0.4 mL, Inject 0.4 mL (40 mg total) under the skin daily for 28 days Start injections after surgery, Disp: 11.2 mL, Rfl: 0    FIBER PO, Take by mouth daily, Disp: , Rfl:     methocarbamol (ROBAXIN) 500 mg tablet, Take 1 tablet (500 mg total) by mouth 3 (three) times a day as needed for muscle spasms, Disp: 60 tablet, Rfl: 0    Multiple Vitamins-Minerals (MULTI FOR HER 50+ PO), Take by mouth daily, Disp: , Rfl:     Allergies   Allergen Reactions    Sulfa Antibiotics Rash            Vitals:    06/27/24 1403   BP: 118/72   Pulse: 65       Objective:  Physical exam  General: Awake, Alert, Oriented  Eyes: Pupils equal, round and reactive to light  Heart: regular rate and rhythm  Lungs: No audible wheezing  Abdomen: soft                    Ortho Exam  Right hip:  Posterior incision clean dry and intact  Staples well approximated; removed today  Incision cleaned, steri-strips applied   Appropriate warmth and swelling  Good arc of motion with no pain  Patient sits comfortably in chair with hip flexed at 90 degrees  Patient stands from seated position without assistance  Calf compartments soft and supple  Sensation intact  Toes are warm sensate and mobile     Diagnostics, reviewed and taken today if performed as documented:    None performed        Procedures, if performed today:    None performed      Portions of the record may have been created with voice recognition software.  Occasional wrong word or \"sound a like\" substitutions may have occurred due to the inherent limitations of voice recognition software.  Read the chart carefully and recognize, using context, where substitutions have " occurred.

## 2024-07-01 ENCOUNTER — OFFICE VISIT (OUTPATIENT)
Dept: PHYSICAL THERAPY | Facility: CLINIC | Age: 64
End: 2024-07-01
Payer: COMMERCIAL

## 2024-07-01 DIAGNOSIS — M25.551 RIGHT HIP PAIN: ICD-10-CM

## 2024-07-01 DIAGNOSIS — M16.11 PRIMARY OSTEOARTHRITIS OF RIGHT HIP: Primary | ICD-10-CM

## 2024-07-01 DIAGNOSIS — Z96.641 STATUS POST TOTAL REPLACEMENT OF RIGHT HIP: ICD-10-CM

## 2024-07-01 PROCEDURE — 97110 THERAPEUTIC EXERCISES: CPT | Performed by: PHYSICAL THERAPIST

## 2024-07-01 PROCEDURE — 97112 NEUROMUSCULAR REEDUCATION: CPT | Performed by: PHYSICAL THERAPIST

## 2024-07-01 PROCEDURE — 97530 THERAPEUTIC ACTIVITIES: CPT | Performed by: PHYSICAL THERAPIST

## 2024-07-01 PROCEDURE — 97140 MANUAL THERAPY 1/> REGIONS: CPT | Performed by: PHYSICAL THERAPIST

## 2024-07-01 NOTE — PROGRESS NOTES
"Daily Note     Today's date: 2024  Patient name: Lakisha Trevizo  : 1960  MRN: [id number]  Referring provider: Juan Pitt,*  Dx:   Encounter Diagnosis     ICD-10-CM    1. Primary osteoarthritis of right hip  M16.11       2. Status post total replacement of right hip  Z96.641       3. Right hip pain  M25.551                      Subjective: Pt reports using SPC for community ambulation but with limited ambulation tolerance due to fatigue    Objective: See treatment diary below    Assessment: Pt demonstrated improved gait, STS > stairs.    Plan: Continue per plan of care.      Precautions: PMHx: anxiety, B TKA, CLBP, latex allergy  Dx: s/p R ROXANA posterior approach 24    Manuals 5/29 6/10 6/19 6/24 6/26 7/1       R hip flexion PROM to 90 deg  10\"x10 10\"x10 10\"x10 10\"x5        R hip ABD PROM  10\"x5 10\"x5 10\"x5 10\"x5 10\"x5       R HA stretch  10\"x5 10\"x5 10\"x5 10\"x5 10\"x5                    Neuro Re-Ed             Abdominal bracing 5\"x10 5\"x15 5\"x15 5\"x20 5\"x20 5\"x20       bridges 1x5 2x5 3x5 2x10 3x10 3x10                                                                        Ther Ex             SKC to 90 deg 5\"x5            Supine hip ABD 1x10 2x10 2x10 3x10 3x10 HEP       Supine hip march AAROM  1x5 3x5          SLR             Supine hip march AROM    3x5 3x5 3x10       Iso hip ADD      2.2#  5\"x20       Supine clamshells      RTB/  3x10                                 Ther Activity             STS   Foam  1x5 Foam  1x10 Foam  1x10 Foam  2x8       Step-ups  4\"/  1x10 4\"/  1x10 4\"  1x10    6\"  1x10    Railing and SPC (+) rail  (-) SPC    6\"/  1x10 (+) rail  (-) SPC    6\"/  1x12       Step-downs                          Gait Training             Gait training on floor  FWW  2x150 ft FWW 1x150 ft FWW 1x150 ft SPC  400 ft SPC  400 ft       SPC    2 laps ea                                   Modalities                                          "

## 2024-07-03 ENCOUNTER — OFFICE VISIT (OUTPATIENT)
Dept: PHYSICAL THERAPY | Facility: CLINIC | Age: 64
End: 2024-07-03
Payer: COMMERCIAL

## 2024-07-03 DIAGNOSIS — M25.551 RIGHT HIP PAIN: ICD-10-CM

## 2024-07-03 DIAGNOSIS — M16.11 PRIMARY OSTEOARTHRITIS OF RIGHT HIP: Primary | ICD-10-CM

## 2024-07-03 DIAGNOSIS — Z96.641 STATUS POST TOTAL REPLACEMENT OF RIGHT HIP: ICD-10-CM

## 2024-07-03 PROCEDURE — 97110 THERAPEUTIC EXERCISES: CPT

## 2024-07-03 PROCEDURE — 97530 THERAPEUTIC ACTIVITIES: CPT

## 2024-07-03 PROCEDURE — 97112 NEUROMUSCULAR REEDUCATION: CPT

## 2024-07-03 NOTE — PROGRESS NOTES
"Daily Note     Today's date: 7/3/2024  Patient name: Lakisha Trevizo  : 1960  MRN: [id number]  Referring provider: Juan Pitt,*  Dx:   Encounter Diagnosis     ICD-10-CM    1. Primary osteoarthritis of right hip  M16.11       2. Status post total replacement of right hip  Z96.641       3. Right hip pain  M25.551                      Subjective: Pt reports she is less reliant on her SPC when walking.     Objective: See treatment diary below    Assessment: Pt demonstrated moderate L weight shift with STS that she was able to correct 50% with vcs. Pt's stair tolerance is increasing.     Plan: Continue per plan of care.      Precautions: PMHx: anxiety, B TKA, CLBP, latex allergy  Dx: s/p R ROXANA posterior approach 24    Manuals 5/29 6/10 6/19 6/24 6/26 7/1 7/3      R hip flexion PROM to 90 deg  10\"x10 10\"x10 10\"x10 10\"x5        R hip ABD PROM  10\"x5 10\"x5 10\"x5 10\"x5 10\"x5 10\"x5      R HA stretch  10\"x5 10\"x5 10\"x5 10\"x5 10\"x5 10\"x5                   Neuro Re-Ed             Abdominal bracing 5\"x10 5\"x15 5\"x15 5\"x20 5\"x20 5\"x20 5\"x20      bridges 1x5 2x5 3x5 2x10 3x10 3x10 3x12                                                                       Ther Ex             SKC to 90 deg 5\"x5            Supine hip ABD 1x10 2x10 2x10 3x10 3x10 HEP       Supine hip march AAROM  1x5 3x5          SLR             Supine hip march AROM    3x5 3x5 3x10 3x10      Iso hip ADD      2.2#  5\"x20 2.2#  5\"x20      Supine clamshells      RTB/  3x10 RTB  3x10                                Ther Activity             STS   Foam  1x5 Foam  1x10 Foam  1x10 Foam  2x8 Foam   2x8      Step-ups  4\"/  1x10 4\"/  1x10 4\"  1x10    6\"  1x10    Railing and SPC (+) rail  (-) SPC    6\"/  1x10 (+) rail  (-) SPC    6\"/  1x12 (+) rail  (-) SPC    6\"/  1x14      Step-downs                          Gait Training             Gait training on floor  FWW  2x150 ft FWW 1x150 ft FWW 1x150 ft SPC  400 ft SPC  400 ft SPC  400 ft      SPC    2 laps ea   "                                 Modalities

## 2024-07-08 ENCOUNTER — OFFICE VISIT (OUTPATIENT)
Dept: PHYSICAL THERAPY | Facility: CLINIC | Age: 64
End: 2024-07-08
Payer: COMMERCIAL

## 2024-07-08 DIAGNOSIS — Z96.641 STATUS POST TOTAL REPLACEMENT OF RIGHT HIP: ICD-10-CM

## 2024-07-08 DIAGNOSIS — M25.551 RIGHT HIP PAIN: ICD-10-CM

## 2024-07-08 DIAGNOSIS — M16.11 PRIMARY OSTEOARTHRITIS OF RIGHT HIP: Primary | ICD-10-CM

## 2024-07-08 PROCEDURE — 97530 THERAPEUTIC ACTIVITIES: CPT

## 2024-07-08 PROCEDURE — 97112 NEUROMUSCULAR REEDUCATION: CPT

## 2024-07-08 PROCEDURE — 97110 THERAPEUTIC EXERCISES: CPT

## 2024-07-08 NOTE — PROGRESS NOTES
"Daily Note     Today's date: 2024  Patient name: Lakisha Trevizo  : 1960  MRN: [id number]  Referring provider: Juan Pitt,*  Dx:   Encounter Diagnosis     ICD-10-CM    1. Primary osteoarthritis of right hip  M16.11       2. Status post total replacement of right hip  Z96.641       3. Right hip pain  M25.551           Start Time: 1030  Stop Time: 1115  Total time in clinic (min): 45 minutes    Subjective: Patients reports feeling soreness and being able to sleep t/o the night.      Objective: See treatment diary below    Assessment: Patient demonstrates good PROM. Her hip flexor strength is improving.     Plan: Continue per plan of care.      Precautions: PMHx: anxiety, B TKA, CLBP, latex allergy  Dx: s/p R ROXANA posterior approach 24    Manuals 5/29 6/10 6/19 6/24 6/26 7/1 7/3 7/8     R hip flexion PROM to 90 deg  10\"x10 10\"x10 10\"x10 10\"x5        R hip ABD PROM  10\"x5 10\"x5 10\"x5 10\"x5 10\"x5 10\"x5 10\"x5     R HA stretch  10\"x5 10\"x5 10\"x5 10\"x5 10\"x5 10\"x5 10\"x5                  Neuro Re-Ed             Abdominal bracing 5\"x10 5\"x15 5\"x15 5\"x20 5\"x20 5\"x20 5\"x20 5\"x20     bridges 1x5 2x5 3x5 2x10 3x10 3x10 3x12 3x12                                                                      Ther Ex             SKC to 90 deg 5\"x5            Supine hip ABD 1x10 2x10 2x10 3x10 3x10 HEP       Supine hip march AAROM  1x5 3x5          SLR             Supine hip march AROM    3x5 3x5 3x10 3x10 3x10     Iso hip ADD      2.2#  5\"x20 2.2#  5\"x20 2.2#  5\"x20     Supine clamshells      RTB/  3x10 RTB  3x10 RTB  3x10                               Ther Activity             STS   Foam  1x5 Foam  1x10 Foam  1x10 Foam  2x8 Foam   2x8 Foam   2x8     Step-ups  4\"/  1x10 4\"/  1x10 4\"  1x10    6\"  1x10    Railing and SPC (+) rail  (-) SPC    6\"/  1x10 (+) rail  (-) SPC    6\"/  1x12 (+) rail  (-) SPC    6\"/  1x14 (+) rail    6\"/  1x14     Step-downs                          Gait Training             Gait training on " floor  FWW  2x150 ft FWW 1x150 ft FWW 1x150 ft SPC  400 ft SPC  400 ft SPC  400 ft SPC  400 ft     SPC    2 laps ea                                   Modalities

## 2024-07-10 ENCOUNTER — APPOINTMENT (OUTPATIENT)
Dept: PHYSICAL THERAPY | Facility: CLINIC | Age: 64
End: 2024-07-10
Payer: COMMERCIAL

## 2024-07-11 ENCOUNTER — OFFICE VISIT (OUTPATIENT)
Dept: PHYSICAL THERAPY | Facility: CLINIC | Age: 64
End: 2024-07-11
Payer: COMMERCIAL

## 2024-07-11 DIAGNOSIS — M25.551 RIGHT HIP PAIN: ICD-10-CM

## 2024-07-11 DIAGNOSIS — M16.11 PRIMARY OSTEOARTHRITIS OF RIGHT HIP: Primary | ICD-10-CM

## 2024-07-11 DIAGNOSIS — Z96.641 STATUS POST TOTAL REPLACEMENT OF RIGHT HIP: ICD-10-CM

## 2024-07-11 PROCEDURE — 97110 THERAPEUTIC EXERCISES: CPT

## 2024-07-11 PROCEDURE — 97112 NEUROMUSCULAR REEDUCATION: CPT

## 2024-07-11 PROCEDURE — 97530 THERAPEUTIC ACTIVITIES: CPT

## 2024-07-11 NOTE — PROGRESS NOTES
"Daily Note     Today's date: 2024  Patient name: Lakisha Trevizo  : 1960  MRN: [id number]  Referring provider: Juan Pitt,*  Dx:   Encounter Diagnosis     ICD-10-CM    1. Primary osteoarthritis of right hip  M16.11       2. Status post total replacement of right hip  Z96.641       3. Right hip pain  M25.551           Start Time: 1030  Stop Time: 1115  Total time in clinic (min): 45 minutes    Subjective: Patients had no complaints to offer prior to therapy.      Objective: See treatment diary below    Assessment: Patient demonstrates good PROM. Her hip flexor strength is improving noting her ability to complete a SLR without quad lag today. She is challenged with standing stability noted during SLS and ambulation without an AD.      Plan: Continue per plan of care.      Precautions: PMHx: anxiety, B TKA, CLBP, latex allergy  Dx: s/p R ROXANA posterior approach 24    Manuals 5/29 6/10 6/19 6/24 6/26 7/1 7/3 7/8 7/11    R hip flexion PROM to 90 deg  10\"x10 10\"x10 10\"x10 10\"x5        R hip ABD PROM  10\"x5 10\"x5 10\"x5 10\"x5 10\"x5 10\"x5 10\"x5 10\"x5    R HA stretch  10\"x5 10\"x5 10\"x5 10\"x5 10\"x5 10\"x5 10\"x5 10\"x5                 Neuro Re-Ed             Abdominal bracing 5\"x10 5\"x15 5\"x15 5\"x20 5\"x20 5\"x20 5\"x20 5\"x20     bridges 1x5 2x5 3x5 2x10 3x10 3x10 3x12 3x12 3x12    SLS         FTT  10\"x3    Standing hip abd         2x10 ea                                           Ther Ex             SKC to 90 deg 5\"x5            Supine hip ABD 1x10 2x10 2x10 3x10 3x10 HEP       Supine hip march AAROM  1x5 3x5          SLR             Supine hip march AROM    3x5 3x5 3x10 3x10 3x10 3x10    SLR         3x5    Iso hip ADD      2.2#  5\"x20 2.2#  5\"x20 2.2#  5\"x20 2.2#  5\"x20    Supine clamshells      RTB/  3x10 RTB  3x10 RTB  3x10 RTB  3x10                              Ther Activity             STS   Foam  1x5 Foam  1x10 Foam  1x10 Foam  2x8 Foam   2x8 Foam   2x8 Foam  2x10    Step-ups  4\"/  1x10 4\"/  1x10 " "4\"  1x10    6\"  1x10    Railing and SPC (+) rail  (-) SPC    6\"/  1x10 (+) rail  (-) SPC    6\"/  1x12 (+) rail  (-) SPC    6\"/  1x14 (+) rail    6\"/  1x14 (+) rail    6\"/  2x10    Step-downs                          Gait Training             Gait training on floor  FWW  2x150 ft FWW 1x150 ft FWW 1x150 ft SPC  400 ft SPC  400 ft SPC  400 ft SPC  400 ft     SPC    2 laps ea         No AD         3 laps                 Modalities                                          "

## 2024-07-15 ENCOUNTER — OFFICE VISIT (OUTPATIENT)
Dept: PHYSICAL THERAPY | Facility: CLINIC | Age: 64
End: 2024-07-15
Payer: COMMERCIAL

## 2024-07-15 DIAGNOSIS — M25.551 RIGHT HIP PAIN: ICD-10-CM

## 2024-07-15 DIAGNOSIS — Z96.641 STATUS POST TOTAL REPLACEMENT OF RIGHT HIP: ICD-10-CM

## 2024-07-15 DIAGNOSIS — M16.11 PRIMARY OSTEOARTHRITIS OF RIGHT HIP: Primary | ICD-10-CM

## 2024-07-15 PROCEDURE — 97110 THERAPEUTIC EXERCISES: CPT

## 2024-07-15 PROCEDURE — 97530 THERAPEUTIC ACTIVITIES: CPT

## 2024-07-15 PROCEDURE — 97112 NEUROMUSCULAR REEDUCATION: CPT

## 2024-07-15 NOTE — PROGRESS NOTES
"Daily Note     Today's date: 7/15/2024  Patient name: Lakisha Trevizo  : 1960  MRN: [id number]  Referring provider: Juan Pitt,*  Dx:   Encounter Diagnosis     ICD-10-CM    1. Primary osteoarthritis of right hip  M16.11       2. Status post total replacement of right hip  Z96.641       3. Right hip pain  M25.551           Start Time: 1030  Stop Time: 1115  Total time in clinic (min): 45 minutes    Subjective: Patient is hoping to go back to work soon. She reports having general fatigue but has no complaints of pain or soreness.     Objective: See treatment diary below    Assessment: Patient did well progressing SLR over the weekend. Improvements with ambulation with no AD. Able to increase step height with no issue. Continue to progress as able.     Plan: Continue per plan of care.      Precautions: PMHx: anxiety, B TKA, CLBP, latex allergy  Dx: s/p R ROXANA posterior approach 24    Manuals 5/29 6/10 6/19 6/24 6/26 7/1 7/3 7/8 7/11 7/15   R hip flexion PROM to 90 deg  10\"x10 10\"x10 10\"x10 10\"x5        R hip ABD PROM  10\"x5 10\"x5 10\"x5 10\"x5 10\"x5 10\"x5 10\"x5 10\"x5 10\"x5   R HA stretch  10\"x5 10\"x5 10\"x5 10\"x5 10\"x5 10\"x5 10\"x5 10\"x5 10\"x5                Neuro Re-Ed             Abdominal bracing 5\"x10 5\"x15 5\"x15 5\"x20 5\"x20 5\"x20 5\"x20 5\"x20     bridges 1x5 2x5 3x5 2x10 3x10 3x10 3x12 3x12 3x12 3x15   SLS         FTT  10\"x3 FTT  10\"x3   Standing hip abd         2x10 ea 3x10 ea                                          Ther Ex             SKC to 90 deg 5\"x5            Supine hip ABD 1x10 2x10 2x10 3x10 3x10 HEP       Supine hip march AAROM  1x5 3x5          SLR             Supine hip march AROM    3x5 3x5 3x10 3x10 3x10 3x10    SLR         3x5 3x10   Iso hip ADD      2.2#  5\"x20 2.2#  5\"x20 2.2#  5\"x20 2.2#  5\"x20 2.2#  5\"x20   Supine clamshells      RTB/  3x10 RTB  3x10 RTB  3x10 RTB  3x10 GTB  3x10                             Ther Activity             STS   Foam  1x5 Foam  1x10 Foam  1x10 " "Foam  2x8 Foam   2x8 Foam   2x8 Foam  2x10 Foam  3x10   Step-ups  4\"/  1x10 4\"/  1x10 4\"  1x10    6\"  1x10    Railing and SPC (+) rail  (-) SPC    6\"/  1x10 (+) rail  (-) SPC    6\"/  1x12 (+) rail  (-) SPC    6\"/  1x14 (+) rail    6\"/  1x14 (+) rail    6\"/  2x10 6\"  2x10  No UE    8\"  1x10  Rail and SPC   Step-downs                          Gait Training             Gait training on floor  FWW  2x150 ft FWW 1x150 ft FWW 1x150 ft SPC  400 ft SPC  400 ft SPC  400 ft SPC  400 ft     SPC    2 laps ea         No AD         3 laps 3 laps ea                Modalities                                          "

## 2024-07-18 ENCOUNTER — OFFICE VISIT (OUTPATIENT)
Dept: PHYSICAL THERAPY | Facility: CLINIC | Age: 64
End: 2024-07-18
Payer: COMMERCIAL

## 2024-07-18 DIAGNOSIS — Z96.641 STATUS POST TOTAL REPLACEMENT OF RIGHT HIP: ICD-10-CM

## 2024-07-18 DIAGNOSIS — M25.551 RIGHT HIP PAIN: ICD-10-CM

## 2024-07-18 DIAGNOSIS — M16.11 PRIMARY OSTEOARTHRITIS OF RIGHT HIP: Primary | ICD-10-CM

## 2024-07-18 PROCEDURE — 97530 THERAPEUTIC ACTIVITIES: CPT | Performed by: PHYSICAL THERAPIST

## 2024-07-18 PROCEDURE — 97110 THERAPEUTIC EXERCISES: CPT | Performed by: PHYSICAL THERAPIST

## 2024-07-18 PROCEDURE — 97140 MANUAL THERAPY 1/> REGIONS: CPT | Performed by: PHYSICAL THERAPIST

## 2024-07-18 PROCEDURE — 97112 NEUROMUSCULAR REEDUCATION: CPT | Performed by: PHYSICAL THERAPIST

## 2024-07-18 NOTE — PROGRESS NOTES
"Daily Note     Today's date: 2024  Patient name: Lakisha Trevizo  : 1960  MRN: [id number]  Referring provider: Juan Pitt,*  Dx:   Encounter Diagnosis     ICD-10-CM    1. Primary osteoarthritis of right hip  M16.11       2. Status post total replacement of right hip  Z96.641       3. Right hip pain  M25.551                      Subjective: Pt reports R glute soreness after doing several flights of stairs yesterday while doing laundry yesterday.    Objective: See treatment diary below    Assessment: Pt demonstrated normalized gait at slower speeds, improved stair tolerance and stability.     Plan: Continue per plan of care.      Precautions: PMHx: anxiety, B TKA, CLBP, latex allergy  Dx: s/p R ROXANA posterior approach 24    Manuals 7/18         7/15   R hip flexion PROM to 90 deg 10\"x3            R hip ABD PROM 10\"x3         10\"x5   R HA stretch 10\"x3         10\"x5                Neuro Re-Ed                          bridges 3x15 3x20        3x15   SLS 30\"x1 ea         FTT  10\"x3   Standing hip abd 3x10 ea         3x10 ea                                          Ther Ex                                                                              SLR 3x10         3x10   Iso hip ADD 4.4#  5\"x15         2.2#  5\"x20   Supine clamshells G/  3x15 hold        GTB  3x10   SL clamshells nv                         Ther Activity             STS Foam  3x10         Foam  3x10   Step-ups 6\"/  1x10    8\"/  1x10         6\"  2x10  No UE    8\"  1x10  Rail and SPC   Step-downs 6\"/  1x10    8\"/  1x10                         Gait Training             Gait training on floor             SPC             No AD          3 laps ea                Modalities                                          "

## 2024-07-29 ENCOUNTER — OFFICE VISIT (OUTPATIENT)
Dept: PHYSICAL THERAPY | Facility: CLINIC | Age: 64
End: 2024-07-29
Payer: COMMERCIAL

## 2024-07-29 DIAGNOSIS — M25.551 RIGHT HIP PAIN: ICD-10-CM

## 2024-07-29 DIAGNOSIS — M16.11 PRIMARY OSTEOARTHRITIS OF RIGHT HIP: Primary | ICD-10-CM

## 2024-07-29 DIAGNOSIS — Z96.641 STATUS POST TOTAL REPLACEMENT OF RIGHT HIP: ICD-10-CM

## 2024-07-29 PROCEDURE — 97112 NEUROMUSCULAR REEDUCATION: CPT

## 2024-07-29 PROCEDURE — 97530 THERAPEUTIC ACTIVITIES: CPT

## 2024-07-29 PROCEDURE — 97110 THERAPEUTIC EXERCISES: CPT

## 2024-07-29 NOTE — PROGRESS NOTES
"Daily Note     Today's date: 2024  Patient name: Lakisha Trevizo  : 1960  MRN: [id number]  Referring provider: Juan Pitt,*  Dx:   Encounter Diagnosis     ICD-10-CM    1. Primary osteoarthritis of right hip  M16.11       2. Status post total replacement of right hip  Z96.641       3. Right hip pain  M25.551           Start Time: 1400  Stop Time: 1440  Total time in clinic (min): 40 minutes    Subjective: Patient reports feeling confident without her SPC. She walked a good amount during vacation with no issue. She has no complaints of pain. She will see her surgeon this week and plans to see if she can drive and go back to work.     Objective: See treatment diary below    Assessment: She was very challenged with S/L clamshells. Patient is progressing well with the current PT POC. Continue to progress as able.     Plan: Continue per plan of care.      Precautions: PMHx: anxiety, B TKA, CLBP, latex allergy  Dx: s/p R ROXANA posterior approach 24    Manuals 7/18 7/29        7/15   R hip flexion PROM to 90 deg 10\"x3 10\"x3           R hip ABD PROM 10\"x3 10\"x3        10\"x5   R HA stretch 10\"x3 10\"x3        10\"x5                Neuro Re-Ed                          bridges 3x15 3x20        3x15   SLS 30\"x1 ea 30\"x1 ea        FTT  10\"x3   Standing hip abd 3x10 ea 3x10 ea        3x10 ea                                          Ther Ex                                                                              SLR 3x10 3x10        3x10   Iso hip ADD 4.4#  5\"x15         2.2#  5\"x20   Supine clamshells G/  3x15 hold        GTB  3x10   SL clamshells nv 3x5                        Ther Activity             STS Foam  3x10 Foam  1x10    No foam  2x8        Foam  3x10   Step-ups 6\"/  1x10    8\"/  1x10 6\"/  1x10    8\"/  1x10        6\"  2x10  No UE    8\"  1x10  Rail and SPC   Step-downs 6\"/  1x10    8\"/  1x10 6\"/  1x10    8\"/  1x10                        Gait Training             Gait training on floor           "   SPC             No AD          3 laps ea                Modalities

## 2024-07-30 ENCOUNTER — OFFICE VISIT (OUTPATIENT)
Dept: OBGYN CLINIC | Facility: HOSPITAL | Age: 64
End: 2024-07-30

## 2024-07-30 VITALS
SYSTOLIC BLOOD PRESSURE: 148 MMHG | BODY MASS INDEX: 31.41 KG/M2 | WEIGHT: 184 LBS | DIASTOLIC BLOOD PRESSURE: 81 MMHG | HEIGHT: 64 IN | HEART RATE: 79 BPM

## 2024-07-30 DIAGNOSIS — Z47.1 AFTERCARE FOLLOWING RIGHT HIP JOINT REPLACEMENT SURGERY: Primary | ICD-10-CM

## 2024-07-30 DIAGNOSIS — Z96.641 AFTERCARE FOLLOWING RIGHT HIP JOINT REPLACEMENT SURGERY: Primary | ICD-10-CM

## 2024-07-30 PROCEDURE — 99024 POSTOP FOLLOW-UP VISIT: CPT | Performed by: ORTHOPAEDIC SURGERY

## 2024-07-30 NOTE — PROGRESS NOTES
Assessment:   Diagnosis ICD-10-CM Associated Orders   1. Aftercare following right hip joint replacement surgery  Z47.1     Z96.641           Plan:  63 y.o. female presents 6 weeks out from a right total hip arthroplasty  Continue physical therapy   Continue weight bearing activities as tolerated   Continue pain medications as needed   Follow up in 6 weeks for 3 month post-op appointment with repeat x-rays       The above stated was discussed in layman's terms and the patient expressed understanding.  All questions were answered to the patient's satisfaction.     To do next visit:  Return in about 6 weeks (around 9/10/2024).      Subjective:   Lakisha Trevizo is a 63 y.o. female who presents 6 weeks out from a right total hip arthroplasty.  She reports she has been working with physical therapy, tolerating well.  She has been weightbearing without the assistance of a cane or walker.  She denies any new trauma to the hip, no feelings of instability, able to weight-bear as tolerated.      Review of systems negative unless otherwise specified in HPI    Past Medical History:   Diagnosis Date    Aftercare following left knee joint replacement surgery 03/27/2023    Anxiety     MENOPAUSE    Arthritis     KNEES    Chest pain syndrome 03/29/2022    Chondromalacia patellae of right knee 11/08/2016    Chronic pain disorder     Chronic pain of right knee 05/05/2022    Diverticulosis     GERD (gastroesophageal reflux disease)     Hypertension     1 episode last year went to ER; no problems since    Primary osteoarthritis of left knee 05/05/2022    Primary osteoarthritis of right knee 09/24/2019    Renal dysplasia     nonfunctioning left life-long    Single functional kidney     left kidney non-functioning       Past Surgical History:   Procedure Laterality Date    COLONOSCOPY  03/2016    FL INJECTION RIGHT HIP (NON ARTHROGRAM)  5/10/2022    FL INJECTION RIGHT HIP (NON ARTHROGRAM)  11/3/2022    FL INJECTION RIGHT HIP (NON  ARTHROGRAM)  5/22/2023    FL INJECTION RIGHT HIP (NON ARTHROGRAM)  10/18/2023    FL INJECTION RIGHT HIP (NON ARTHROGRAM)  1/30/2024    KIDNEY SURGERY Right     age 32    AR ARTHRP ACETBLR/PROX FEM PROSTC AGRFT/ALGRFT Right 6/13/2024    Procedure: ARTHROPLASTY HIP TOTAL;  Surgeon: Juan Pitt MD;  Location: [facility];  Service: Orthopedics    AR ARTHRP KNE CONDYLE&PLATU MEDIAL&LAT COMPARTMENTS Right 8/1/2022    Procedure: ARTHROPLASTY KNEE TOTAL W ROBOT;  Surgeon: Juan Pitt MD;  Location: [facility];  Service: Orthopedics    AR ARTHRP KNE CONDYLE&PLATU MEDIAL&LAT COMPARTMENTS Left 2/13/2023    Procedure: ARTHROPLASTY KNEE TOTAL W ROBOT;  Surgeon: Juan Pitt MD;  Location: [facility];  Service: Orthopedics    REDUCTION MAMMAPLASTY      WRIST SURGERY Right     fx       Family History   Problem Relation Age of Onset    No Known Problems Mother     Diabetes Father     Breast cancer Neg Hx     Ovarian cancer Neg Hx     Colon cancer Neg Hx        Social History     Occupational History    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   Tobacco Use    Smoking status: Former     Current packs/day: 0.25     Average packs/day: 0.3 packs/day for 15.0 years (3.8 ttl pk-yrs)     Types: Cigarettes    Smokeless tobacco: Never   Vaping Use    Vaping status: Never Used   Substance and Sexual Activity    Alcohol use: Yes     Alcohol/week: 10.0 standard drinks of alcohol     Types: 5 Cans of beer, 5 Standard drinks or equivalent per week     Comment: daily    Drug use: Never    Sexual activity: Yes     Partners: Male     Birth control/protection: Post-menopausal         Current Outpatient Medications:     acetaminophen (TYLENOL) 650 mg CR tablet, Take 1 tablet (650 mg total) by mouth every 8 (eight) hours as needed for mild pain, Disp: 30 tablet, Rfl: 0    allopurinol (ZYLOPRIM) 300 mg tablet, Take 1 tablet (300 mg total) by mouth daily, Disp: 90 tablet, Rfl: 1    ALPRAZolam (XANAX) 0.25 mg tablet, Take 1 tablet (0.25  "mg total) by mouth daily as needed for anxiety, Disp: 30 tablet, Rfl: 0    amoxicillin (AMOXIL) 500 mg capsule, 4 CAPS (2GRAMS) 1 HOUR PRIOR TO PROCEDURE, Disp: , Rfl:     Biotin w/ Vitamins C & E 1250-7.5-7.5 MCG-MG-UNT CHEW, Chew, Disp: , Rfl:     enoxaparin (LOVENOX) 40 mg/0.4 mL, Inject 0.4 mL (40 mg total) under the skin daily for 28 days Start injections after surgery, Disp: 11.2 mL, Rfl: 0    FIBER PO, Take by mouth daily, Disp: , Rfl:     methocarbamol (ROBAXIN) 500 mg tablet, Take 1 tablet (500 mg total) by mouth 3 (three) times a day as needed for muscle spasms, Disp: 60 tablet, Rfl: 0    Multiple Vitamins-Minerals (MULTI FOR HER 50+ PO), Take by mouth daily, Disp: , Rfl:     Allergies   Allergen Reactions    Sulfa Antibiotics Rash            Vitals:    07/30/24 1007   BP: 148/81   Pulse: 79       Objective:  Physical exam  General: Awake, Alert, Oriented  Eyes: Pupils equal, round and reactive to light  Heart: regular rate and rhythm  Lungs: No audible wheezing  Abdomen: soft  Exam shows a lateral incision, clean/dry/intact and well-healing.  No tenderness to palpation over the incision site.  5/5 strength with hip abduction and hip flexion.  Sensation intact to light touch to distal extremities, toes warm and well-perfused    Diagnostics, reviewed and taken today if performed as documented:    None performed        Procedures, if performed today:    None performed      Portions of the record may have been created with voice recognition software.  Occasional wrong word or \"sound a like\" substitutions may have occurred due to the inherent limitations of voice recognition software.  Read the chart carefully and recognize, using context, where substitutions have occurred.        "

## 2024-07-31 ENCOUNTER — EVALUATION (OUTPATIENT)
Dept: PHYSICAL THERAPY | Facility: CLINIC | Age: 64
End: 2024-07-31
Payer: COMMERCIAL

## 2024-07-31 DIAGNOSIS — M25.551 RIGHT HIP PAIN: ICD-10-CM

## 2024-07-31 DIAGNOSIS — M16.11 PRIMARY OSTEOARTHRITIS OF RIGHT HIP: Primary | ICD-10-CM

## 2024-07-31 DIAGNOSIS — Z96.641 STATUS POST TOTAL REPLACEMENT OF RIGHT HIP: ICD-10-CM

## 2024-07-31 PROCEDURE — 97530 THERAPEUTIC ACTIVITIES: CPT | Performed by: PHYSICAL THERAPIST

## 2024-07-31 PROCEDURE — 97112 NEUROMUSCULAR REEDUCATION: CPT | Performed by: PHYSICAL THERAPIST

## 2024-07-31 PROCEDURE — 97110 THERAPEUTIC EXERCISES: CPT | Performed by: PHYSICAL THERAPIST

## 2024-07-31 NOTE — PROGRESS NOTES
"Daily Note     Today's date: 2024  Patient name: Lakisha Trevizo  : 1960  MRN: [id number]  Referring provider: Juan Pitt,*  Dx:   Encounter Diagnosis     ICD-10-CM    1. Primary osteoarthritis of right hip  M16.11       2. Status post total replacement of right hip  Z96.641       3. Right hip pain  M25.551                      Subjective: Pt reports no pain, moderate fatigue with prolonged WB activities. She reports good f/u with MD who removed hip precautions and cleared for driving and RTW when ready.    Objective: See treatment diary below    Assessment: Pt demonstrated improved stair tolerance.     Plan: Continue per plan of care.      Precautions: PMHx: anxiety, B TKA, CLBP, latex allergy  Dx: s/p R ROXANA posterior approach 24    Manuals           R hip flexion PROM to first restriction 10\"x3 10\"x3 10\"x3          R hip ABD PROM 10\"x3 10\"x3 10\"x3          R HA stretch 10\"x3 10\"x3 10\"x3                       Neuro Re-Ed                          bridges 3x15 3x20 3x20          SLS 30\"x1 ea 30\"x1 ea 30\"x1 ea          Standing hip abd 3x10 ea 3x10 ea 3x10 ea                                                 Ther Ex                                                                              SLR 3x10 3x10 3x10          Iso hip ADD 4.4#  5\"x15            Supine clamshells G/  3x15 hold           SL clamshells nv 3x5 3x10 Latex-free  Y/  3x10                      Ther Activity             STS Foam  3x10 Foam  1x10    No foam  2x8 No foam  3x10          Step-ups 6\"/  1x10    8\"/  1x10 6\"/  1x10    8\"/  1x10 8\"/  2x12          Step-downs 6\"/  1x10    8\"/  1x10 6\"/  1x10    8\"/  1x10 8\"/  2x12                       Gait Training             Gait training on floor   No AD  400 ft          SPC             No AD                          Modalities                                          "

## 2024-08-02 ENCOUNTER — APPOINTMENT (OUTPATIENT)
Dept: PHYSICAL THERAPY | Facility: CLINIC | Age: 64
End: 2024-08-02
Payer: COMMERCIAL

## 2024-08-05 ENCOUNTER — OFFICE VISIT (OUTPATIENT)
Dept: PHYSICAL THERAPY | Facility: CLINIC | Age: 64
End: 2024-08-05
Payer: COMMERCIAL

## 2024-08-05 DIAGNOSIS — Z96.641 STATUS POST TOTAL REPLACEMENT OF RIGHT HIP: ICD-10-CM

## 2024-08-05 DIAGNOSIS — M16.11 PRIMARY OSTEOARTHRITIS OF RIGHT HIP: Primary | ICD-10-CM

## 2024-08-05 DIAGNOSIS — M25.551 RIGHT HIP PAIN: ICD-10-CM

## 2024-08-05 PROCEDURE — 97110 THERAPEUTIC EXERCISES: CPT

## 2024-08-05 PROCEDURE — 97112 NEUROMUSCULAR REEDUCATION: CPT

## 2024-08-05 PROCEDURE — 97530 THERAPEUTIC ACTIVITIES: CPT

## 2024-08-05 NOTE — PROGRESS NOTES
"Daily Note     Today's date: 2024  Patient name: Lakisha Trevizo  : 1960  MRN: [id number]  Referring provider: Juan Pitt,*  Dx:   Encounter Diagnosis     ICD-10-CM    1. Primary osteoarthritis of right hip  M16.11       2. Status post total replacement of right hip  Z96.641       3. Right hip pain  M25.551                      Subjective: Pt w/ nothing new to report. Continues w/ stiffness after being in 1 position for a while but loosens up after walking a couple of steps.       Objective: See treatment diary below      Assessment: Tolerated treatment well. Able to progress resistance for several exercises w/o increase in pain. Reports fatigue w/ SLR, standing hip abduction and clamshells w/ newly added resistance. Continue to progress as able.        Plan: Continue per plan of care.  Progress treatment as tolerated.       Precautions: PMHx: anxiety, B TKA, CLBP, latex allergy  Dx: s/p R ROXANA posterior approach 24    Manuals          R hip flexion PROM to first restriction 10\"x3 10\"x3 10\"x3 10\"x3         R hip ABD PROM 10\"x3 10\"x3 10\"x3 10\"x3         R HA stretch 10\"x3 10\"x3 10\"x3 10\"x3                      Neuro Re-Ed                          bridges 3x15 3x20 3x20 3x20         SLS 30\"x1 ea 30\"x1 ea 30\"x1 ea 30\"x1 ea         Standing hip abd 3x10 ea 3x10 ea 3x10 ea 3x10 ea                                                Ther Ex                                                                              SLR 3x10 3x10 3x10 3# 3x10         Iso hip ADD 4.4#  5\"x15            Supine clamshells G/  3x15 hold           SL clamshells nv 3x5 3x10 Latex-free  Y/  3x10                      Ther Activity             STS Foam  3x10 Foam  1x10    No foam  2x8 No foam  3x10 No foam 2x10     10# KB hold x10         Step-ups 6\"/  1x10    8\"/  1x10 6\"/  1x10    8\"/  1x10 8\"/  2x12 8\"/  2x12         Step-downs 6\"/  1x10    8\"/  1x10 6\"/  1x10    8\"/  1x10 8\"/  2x12 8\"/  2x12               "        Gait Training             Gait training on floor   No AD  400 ft No AD 400ft         SPC             No AD                          Modalities

## 2024-08-07 ENCOUNTER — OFFICE VISIT (OUTPATIENT)
Dept: PHYSICAL THERAPY | Facility: CLINIC | Age: 64
End: 2024-08-07
Payer: COMMERCIAL

## 2024-08-07 DIAGNOSIS — M16.11 PRIMARY OSTEOARTHRITIS OF RIGHT HIP: Primary | ICD-10-CM

## 2024-08-07 DIAGNOSIS — Z96.641 STATUS POST TOTAL REPLACEMENT OF RIGHT HIP: ICD-10-CM

## 2024-08-07 DIAGNOSIS — M25.551 RIGHT HIP PAIN: ICD-10-CM

## 2024-08-07 PROCEDURE — 97110 THERAPEUTIC EXERCISES: CPT

## 2024-08-07 PROCEDURE — 97112 NEUROMUSCULAR REEDUCATION: CPT

## 2024-08-07 PROCEDURE — 97530 THERAPEUTIC ACTIVITIES: CPT

## 2024-08-07 NOTE — PROGRESS NOTES
"Daily Note     Today's date: 2024  Patient name: Lakisha Trevizo  : 1960  MRN: [id number]  Referring provider: Juan Pitt,*  Dx:   Encounter Diagnosis     ICD-10-CM    1. Primary osteoarthritis of right hip  M16.11       2. Status post total replacement of right hip  Z96.641       3. Right hip pain  M25.551           Start Time: 1030  Stop Time: 1115  Total time in clinic (min): 45 minutes    Subjective: Patient reports her hip is doing really well. She returns to work soon and thinks she almost done with therapy.     Objective: See treatment diary below    Assessment: Tolerated treatment well. Patient is progressing well with the current exercise program. Decrease to once a week for a month. Continue to progress as able.      Plan: Continue per plan of care.  Progress treatment as tolerated.       Precautions: PMHx: anxiety, B TKA, CLBP, latex allergy  Dx: s/p R ROXANA posterior approach 24    Manuals         R hip flexion PROM to first restriction 10\"x3 10\"x3 10\"x3 10\"x3 10\"x3        R hip ABD PROM 10\"x3 10\"x3 10\"x3 10\"x3 10\"x3        R HA stretch 10\"x3 10\"x3 10\"x3 10\"x3 10\"x3                     Neuro Re-Ed                          bridges 3x15 3x20 3x20 3x20 3x20        SLS 30\"x1 ea 30\"x1 ea 30\"x1 ea 30\"x1 ea 30\"x1 ea        Standing hip abd 3x10 ea 3x10 ea 3x10 ea 3x10 ea 3x10 ea                                               Ther Ex                                                                              SLR 3x10 3x10 3x10 3# 3x10 3# 3x10        Iso hip ADD 4.4#  5\"x15            Supine clamshells G/  3x15 hold           SL clamshells nv 3x5 3x10 Latex-free  Y/  3x10 Y/  3x10                     Ther Activity             STS Foam  3x10 Foam  1x10    No foam  2x8 No foam  3x10 No foam 2x10     10# KB hold x10 No foam 2x10     5# ea 1x10        Step-ups 6\"/  1x10    8\"/  1x10 6\"/  1x10    8\"/  1x10 8\"/  2x12 8\"/  2x12 8\"/  2x12        Step-downs " "6\"/  1x10    8\"/  1x10 6\"/  1x10    8\"/  1x10 8\"/  2x12 8\"/  2x12 8\"/  2x12                     Gait Training             Gait training on floor   No AD  400 ft No AD 400ft No AD 400ft        SPC             No AD                          Modalities                                          "

## 2024-08-12 ENCOUNTER — OFFICE VISIT (OUTPATIENT)
Dept: PHYSICAL THERAPY | Facility: CLINIC | Age: 64
End: 2024-08-12
Payer: COMMERCIAL

## 2024-08-12 DIAGNOSIS — M25.551 RIGHT HIP PAIN: ICD-10-CM

## 2024-08-12 DIAGNOSIS — M16.11 PRIMARY OSTEOARTHRITIS OF RIGHT HIP: Primary | ICD-10-CM

## 2024-08-12 DIAGNOSIS — Z96.641 STATUS POST TOTAL REPLACEMENT OF RIGHT HIP: ICD-10-CM

## 2024-08-12 PROCEDURE — 97140 MANUAL THERAPY 1/> REGIONS: CPT | Performed by: PHYSICAL THERAPIST

## 2024-08-12 PROCEDURE — 97530 THERAPEUTIC ACTIVITIES: CPT | Performed by: PHYSICAL THERAPIST

## 2024-08-12 PROCEDURE — 97112 NEUROMUSCULAR REEDUCATION: CPT | Performed by: PHYSICAL THERAPIST

## 2024-08-12 PROCEDURE — 97110 THERAPEUTIC EXERCISES: CPT | Performed by: PHYSICAL THERAPIST

## 2024-08-12 NOTE — PROGRESS NOTES
"Daily Note     Today's date: 2024  Patient name: Lakisha Trevizo  : 1960  MRN: [id number]  Referring provider: Juan Pitt,*  Dx:   Encounter Diagnosis     ICD-10-CM    1. Primary osteoarthritis of right hip  M16.11       2. Status post total replacement of right hip  Z96.641       3. Right hip pain  M25.551                      Subjective: Pt reports no pain, no difficulty with ADLs. She will return to work 2 days/week later this week.     Objective: See treatment diary below    Assessment: Pt demonstrated normalized gait, stairs, and squatting.    Plan: D/C to [facility] if appropriate response to RTW.     Precautions: PMHx: anxiety, B TKA, CLBP, latex allergy  Dx: s/p R ROXANA posterior approach 24    Manuals        R hip flexion PROM to first restriction 10\"x3 10\"x3 10\"x3 10\"x3 10\"x3 10\"x3       R hip ABD PROM 10\"x3 10\"x3 10\"x3 10\"x3 10\"x3 10\"x3       R HA stretch 10\"x3 10\"x3 10\"x3 10\"x3 10\"x3 10\"x3                    Neuro Re-Ed                          bridges 3x15 3x20 3x20 3x20 3x20 3x25       SLS 30\"x1 ea 30\"x1 ea 30\"x1 ea 30\"x1 ea 30\"x1 ea 30\"x1 ea       Standing hip abd 3x10 ea 3x10 ea 3x10 ea 3x10 ea 3x10 ea 3x15 ea                                              Ther Ex                                                                              SLR 3x10 3x10 3x10 3# 3x10 3# 3x10 1.5#  3x10       Iso hip ADD 4.4#  5\"x15            Supine clamshells G/  3x15 hold           SL clamshells nv 3x5 3x10 Latex-free  Y/  3x10 Y/  3x10 R/  3x10                    Ther Activity             STS Foam  3x10 Foam  1x10    No foam  2x8 No foam  3x10 No foam 2x10     10# KB hold x10 No foam 2x10     5# ea 1x10        Step-ups 6\"/  1x10    8\"/  1x10 6\"/  1x10    8\"/  1x10 8\"/  2x12 8\"/  2x12 8\"/  2x12 8\"/  3x12       Step-downs 6\"/  1x10    8\"/  1x10 6\"/  1x10    8\"/  1x10 8\"/  2x12 8\"/  2x12 8\"/  2x12 8\"/  3x12       squats      5#  1x10    10#  1x10    15#  1x10              "       Gait Training             Gait training on floor   No AD  400 ft No AD 400ft No AD 400ft No AD  400 ft       SPC             No AD                          Modalities

## 2024-08-14 ENCOUNTER — APPOINTMENT (OUTPATIENT)
Dept: PHYSICAL THERAPY | Facility: CLINIC | Age: 64
End: 2024-08-14
Payer: COMMERCIAL

## 2024-08-19 ENCOUNTER — OFFICE VISIT (OUTPATIENT)
Dept: PHYSICAL THERAPY | Facility: CLINIC | Age: 64
End: 2024-08-19
Payer: COMMERCIAL

## 2024-08-19 DIAGNOSIS — M16.11 PRIMARY OSTEOARTHRITIS OF RIGHT HIP: Primary | ICD-10-CM

## 2024-08-19 DIAGNOSIS — M25.551 RIGHT HIP PAIN: ICD-10-CM

## 2024-08-19 DIAGNOSIS — Z96.641 STATUS POST TOTAL REPLACEMENT OF RIGHT HIP: ICD-10-CM

## 2024-08-19 PROCEDURE — 97112 NEUROMUSCULAR REEDUCATION: CPT | Performed by: PHYSICAL THERAPIST

## 2024-08-19 PROCEDURE — 97110 THERAPEUTIC EXERCISES: CPT | Performed by: PHYSICAL THERAPIST

## 2024-08-19 PROCEDURE — 97530 THERAPEUTIC ACTIVITIES: CPT | Performed by: PHYSICAL THERAPIST

## 2024-08-19 NOTE — PROGRESS NOTES
"Daily Note     Today's date: 2024  Patient name: Lakisha Trevizo  : 1960  MRN: [id number]  Referring provider: Juan Pitt,*  Dx:   Encounter Diagnosis     ICD-10-CM    1. Primary osteoarthritis of right hip  M16.11       2. Status post total replacement of right hip  Z96.641       3. Right hip pain  M25.551                      Subjective: Pt reports no pain, mild fatigue after RTW last week.    Objective: See treatment diary below    Updated [facility]    Assessment: Pt demonstrated normalized gait, stairs, and squatting. Pt is appropriate for d/c from skilled PT services.    Plan: D/C to [facility]..     Precautions: PMHx: anxiety, B TKA, CLBP, latex allergy  Dx: s/p R ROXANA posterior approach 24    Manuals       R hip flexion PROM to first restriction 10\"x3 10\"x3 10\"x3 10\"x3 10\"x3 10\"x3 Pt deferred      R hip ABD PROM 10\"x3 10\"x3 10\"x3 10\"x3 10\"x3 10\"x3 Pt deferred      R HA stretch 10\"x3 10\"x3 10\"x3 10\"x3 10\"x3 10\"x3 Pt deferred                   Neuro Re-Ed                          bridges 3x15 3x20 3x20 3x20 3x20 3x25 3x25      SLS 30\"x1 ea 30\"x1 ea 30\"x1 ea 30\"x1 ea 30\"x1 ea 30\"x1 ea 30\"x1 ea      Standing hip abd 3x10 ea 3x10 ea 3x10 ea 3x10 ea 3x10 ea 3x15 ea 3x15 ea                                             Ther Ex                                                                              SLR 3x10 3x10 3x10 3# 3x10 3# 3x10 1.5#  3x10 1.5#  3x10      Iso hip ADD 4.4#  5\"x15            Supine clamshells G/  3x15 hold           SL clamshells nv 3x5 3x10 Latex-free  Y/  3x10 Y/  3x10 R/  3x10 G/  3x10                   Ther Activity             STS Foam  3x10 Foam  1x10    No foam  2x8 No foam  3x10 No foam 2x10     10# KB hold x10 No foam 2x10     5# ea 1x10        Step-ups 6\"/  1x10    8\"/  1x10 6\"/  1x10    8\"/  1x10 8\"/  2x12 8\"/  2x12 8\"/  2x12 8\"/  3x12 8\"/  3x12      Step-downs 6\"/  1x10    8\"/  1x10 6\"/  1x10    8\"/  1x10 8\"/  2x12 8\"/  2x12 8\"/  2x12 " "8\"/  3x12 8\"/  3x12      squats      5#  1x10    10#  1x10    15#  1x10 5#  1x10    10#  1x10    15#  1x10                   Gait Training             Gait training on floor   No AD  400 ft No AD 400ft No AD 400ft No AD  400 ft No   AD  500 ft      SPC             No AD                          Modalities                                          "

## 2024-08-21 ENCOUNTER — APPOINTMENT (OUTPATIENT)
Dept: PHYSICAL THERAPY | Facility: CLINIC | Age: 64
End: 2024-08-21
Payer: COMMERCIAL

## 2024-08-26 ENCOUNTER — APPOINTMENT (OUTPATIENT)
Dept: PHYSICAL THERAPY | Facility: CLINIC | Age: 64
End: 2024-08-26
Payer: COMMERCIAL

## 2024-08-26 DIAGNOSIS — Z96.641 AFTERCARE FOLLOWING RIGHT HIP JOINT REPLACEMENT SURGERY: Primary | ICD-10-CM

## 2024-08-26 DIAGNOSIS — Z47.1 AFTERCARE FOLLOWING RIGHT HIP JOINT REPLACEMENT SURGERY: Primary | ICD-10-CM

## 2024-08-26 NOTE — PATIENT INSTRUCTIONS
Reviewed health history along with medication, she is doing very well status post June right hip replacement.  Did complete physical therapy.    1. Encounter for annual physical exam  2. Strain of left rotator cuff  Assessment & Plan:  She is improving regarding left shoulder strain, has no trauma I held off on imaging.  She can try meloxicam 15 mg once daily for 3 days then as needed in place of Aleve.  If not improving further she can do formal physical therapy-she will start with home exercise program, also could see her orthopedic group with injection.  Orders:  -     meloxicam (Mobic) 15 mg tablet; Take 1 tablet (15 mg total) by mouth daily as needed (shoulder pain)  3. Mixed hyperlipidemia  Assessment & Plan:  Cholesterol last year 250 with HDL 58, , has 4 to 5% 10-year cardiovascular risk without significant family history.  Await redo, continue to work on healthy diet, in the future consider coronary calcium screening to better quantify risk.  Orders:  -     Lipid panel  -     TSH, 3rd generation with Free T4 reflex  4. Prediabetes  Assessment & Plan:  A1c in May preop was 5.6, glucose was bit high at 115.  We did discuss prediabetes, continue to work on healthy diet, limit carbohydrates, snacks, sweets, portions.  Increase exercise as tolerated, check A1c at least yearly  Orders:  -     TSH, 3rd generation with Free T4 reflex  5. Congenital renal dysplasia ( left )  Assessment & Plan:  Lifelong nonfunctioning left kidney, urinalysis was okay last year, creatinine has been stable, redo urinalysis    Continue to monitor blood pressure    Can use short course NSAID, try to avoid NSAIDs long-term.  Orders:  -     Urinalysis with microscopic  6. H/O total knee replacement, bilateral  7. Anxiety  Assessment & Plan:  She can continue to use alprazolam sparingly, call for prescription when needed.  We have discussed controlled substance.  Orders:  -     TSH, 3rd generation with Free T4 reflex  8. Hx of Gout  Foot  Assessment & Plan:  Previous uric acid level 9.2, has had no issues with gout since she started allopurinol 300 mg daily, include uric acid with blood work.  Orders:  -     Uric acid  9. History of right hip replacement      We did review previous blood work, back in May preop CBC, CMP were fine other than glucose 115  She is up to date with Lipid screening.   She is up to date with Diabetes screening.  Include TSH with blood work     Immunization History   Administered Date(s) Administered    COVID-19 MODERNA VACC 0.5 ML IM 01/31/2022    COVID-19 PFIZER VACCINE 0.3 ML IM 03/17/2021, 04/08/2021    Tdap 04/16/2015    Zoster 01/01/2016       She does not do yearly Flu shot.   Tdap/tetanus shot is up to date until next April (done every 10 yrs for superficial cuts, every 5 yrs for deep wounds)   Can also look into coverage for 'shingles' shot, Shingrix. Can do that here or at pharmacy.  Covid vaccine prev rcvd   Consider booster    Non-smoker      Regarding Colon Cancer screening, we discussed Colonoscopy vs ColoGuard is indicated starting at age 45-50.   Screening is up to date.  Colonoscopy was negative in May 2021, redo 10 years.     She does see her Gynecologist routinely.   Discussed screening Mammogram, this is up-to-date.  Mammogram was okay in January     Continue to try to watch healthy diet, exercise routinely.     Will see her again in 1 year.

## 2024-08-27 ENCOUNTER — OFFICE VISIT (OUTPATIENT)
Dept: FAMILY MEDICINE CLINIC | Facility: CLINIC | Age: 64
End: 2024-08-27
Payer: COMMERCIAL

## 2024-08-27 VITALS
OXYGEN SATURATION: 100 % | HEART RATE: 67 BPM | TEMPERATURE: 97.4 F | DIASTOLIC BLOOD PRESSURE: 84 MMHG | BODY MASS INDEX: 32.2 KG/M2 | WEIGHT: 187.6 LBS | SYSTOLIC BLOOD PRESSURE: 130 MMHG

## 2024-08-27 DIAGNOSIS — S46.012A STRAIN OF LEFT ROTATOR CUFF CAPSULE, INITIAL ENCOUNTER: ICD-10-CM

## 2024-08-27 DIAGNOSIS — E78.2 MIXED HYPERLIPIDEMIA: ICD-10-CM

## 2024-08-27 DIAGNOSIS — F41.9 ANXIETY: ICD-10-CM

## 2024-08-27 DIAGNOSIS — M10.9 GOUT OF FOOT, UNSPECIFIED CAUSE, UNSPECIFIED CHRONICITY, UNSPECIFIED LATERALITY: ICD-10-CM

## 2024-08-27 DIAGNOSIS — Q61.4 CONGENITAL RENAL DYSPLASIA: ICD-10-CM

## 2024-08-27 DIAGNOSIS — Z00.00 ENCOUNTER FOR ANNUAL PHYSICAL EXAM: Primary | ICD-10-CM

## 2024-08-27 DIAGNOSIS — R73.03 PREDIABETES: ICD-10-CM

## 2024-08-27 DIAGNOSIS — Z96.653 H/O TOTAL KNEE REPLACEMENT, BILATERAL: ICD-10-CM

## 2024-08-27 DIAGNOSIS — Z96.641 HISTORY OF RIGHT HIP REPLACEMENT: ICD-10-CM

## 2024-08-27 PROCEDURE — 99396 PREV VISIT EST AGE 40-64: CPT | Performed by: FAMILY MEDICINE

## 2024-08-27 RX ORDER — MELOXICAM 15 MG/1
15 TABLET ORAL DAILY PRN
Qty: 30 TABLET | Refills: 0 | Status: SHIPPED | OUTPATIENT
Start: 2024-08-27

## 2024-08-27 NOTE — ASSESSMENT & PLAN NOTE
Lifelong nonfunctioning left kidney, urinalysis was okay last year, creatinine has been stable, redo urinalysis    Continue to monitor blood pressure    Can use short course NSAID, try to avoid NSAIDs long-term.

## 2024-08-27 NOTE — ASSESSMENT & PLAN NOTE
Previous uric acid level 9.2, has had no issues with gout since she started allopurinol 300 mg daily, include uric acid with blood work.

## 2024-08-27 NOTE — ASSESSMENT & PLAN NOTE
A1c in May preop was 5.6, glucose was bit high at 115.  We did discuss prediabetes, continue to work on healthy diet, limit carbohydrates, snacks, sweets, portions.  Increase exercise as tolerated, check A1c at least yearly

## 2024-08-27 NOTE — ASSESSMENT & PLAN NOTE
She is improving regarding left shoulder strain, has no trauma I held off on imaging.  She can try meloxicam 15 mg once daily for 3 days then as needed in place of Aleve.  If not improving further she can do formal physical therapy-she will start with home exercise program, also could see her orthopedic group with injection.

## 2024-08-27 NOTE — ASSESSMENT & PLAN NOTE
She can continue to use alprazolam sparingly, call for prescription when needed.  We have discussed controlled substance.

## 2024-08-27 NOTE — ASSESSMENT & PLAN NOTE
Cholesterol last year 250 with HDL 58, , has 4 to 5% 10-year cardiovascular risk without significant family history.  Await redo, continue to work on healthy diet, in the future consider coronary calcium screening to better quantify risk.

## 2024-08-27 NOTE — PROGRESS NOTES
FAMILY PRACTICE OFFICE VISIT  Jeromy Mehta D.O.    [facility]  [facility]  [address]   [address]  [place], [id number]      NAME: Lakisha Trevizo  AGE: 63 y.o. SEX: female  : 1960   MRN: [id number]    DATE: 2024  TIME: 8:15 AM      Assessment and Plan         Patient Instructions   Reviewed health history along with medication, she is doing very well status post  right hip replacement.  Did complete physical therapy.    1. Encounter for annual physical exam  2. Strain of left rotator cuff  Assessment & Plan:  She is improving regarding left shoulder strain, has no trauma I held off on imaging.  She can try meloxicam 15 mg once daily for 3 days then as needed in place of Aleve.  If not improving further she can do formal physical therapy-she will start with home exercise program, also could see her orthopedic group with injection.  Orders:  -     meloxicam (Mobic) 15 mg tablet; Take 1 tablet (15 mg total) by mouth daily as needed (shoulder pain)  3. Mixed hyperlipidemia  Assessment & Plan:  Cholesterol last year 250 with HDL 58, , has 4 to 5% 10-year cardiovascular risk without significant family history.  Await redo, continue to work on healthy diet, in the future consider coronary calcium screening to better quantify risk.  Orders:  -     Lipid panel  -     TSH, 3rd generation with Free T4 reflex  4. Prediabetes  Assessment & Plan:  A1c in May preop was 5.6, glucose was bit high at 115.  We did discuss prediabetes, continue to work on healthy diet, limit carbohydrates, snacks, sweets, portions.  Increase exercise as tolerated, check A1c at least yearly  Orders:  -     TSH, 3rd generation with Free T4 reflex  5. Congenital renal dysplasia ( left )  Assessment & Plan:  Lifelong nonfunctioning left kidney, urinalysis was okay last year, creatinine has been stable, redo urinalysis    Continue to monitor blood pressure    Can use short course NSAID, try to  avoid NSAIDs long-term.  Orders:  -     Urinalysis with microscopic  6. H/O total knee replacement, bilateral  7. Anxiety  Assessment & Plan:  She can continue to use alprazolam sparingly, call for prescription when needed.  We have discussed controlled substance.  Orders:  -     TSH, 3rd generation with Free T4 reflex  8. Hx of Gout Foot  Assessment & Plan:  Previous uric acid level 9.2, has had no issues with gout since she started allopurinol 300 mg daily, include uric acid with blood work.  Orders:  -     Uric acid  9. History of right hip replacement      We did review previous blood work, back in May preop CBC, CMP were fine other than glucose 115  She is up to date with Lipid screening.   She is up to date with Diabetes screening.  Include TSH with blood work     Immunization History   Administered Date(s) Administered    COVID-19 MODERNA VACC 0.5 ML IM 01/31/2022    COVID-19 PFIZER VACCINE 0.3 ML IM 03/17/2021, 04/08/2021    Tdap 04/16/2015    Zoster 01/01/2016       She does not do yearly Flu shot.   Tdap/tetanus shot is up to date until next April (done every 10 yrs for superficial cuts, every 5 yrs for deep wounds)   Can also look into coverage for 'shingles' shot, Shingrix. Can do that here or at pharmacy.  Covid vaccine prev rcvd   Consider booster    Non-smoker      Regarding Colon Cancer screening, we discussed Colonoscopy vs ColoGuard is indicated starting at age 45-50.   Screening is up to date.  Colonoscopy was negative in May 2021, redo 10 years.     She does see her Gynecologist routinely.   Discussed screening Mammogram, this is up-to-date.  Mammogram was okay in January     Continue to try to watch healthy diet, exercise routinely.     Will see her again in 1 year.    Chief Complaint     Chief Complaint   Patient presents with    Shoulder Pain     Left side       History of Present Illness   Lakisha Trevizo is a 63 y.o.-year-old female who is in today regarding left shoulder pain, noted  increased pain since right hip was replaced, feels she strained it getting in and out of bed.  Did very well with hip replacement, in the past had both knees replaced, is back at work.    Otherwise feels well, no issues with gout      Review of Systems   Review of Systems   Constitutional:  Negative for appetite change, fatigue, fever and unexpected weight change.   HENT:  Negative for sore throat and trouble swallowing.    Respiratory:  Negative for cough, chest tightness, shortness of breath and wheezing.    Cardiovascular:  Negative for chest pain, palpitations and leg swelling.   Gastrointestinal:  Negative for abdominal pain, blood in stool, nausea and vomiting.        No acid reflux     No change in bowel   Genitourinary:  Negative for dysuria and hematuria.   Neurological:  Negative for dizziness, syncope, light-headedness and headaches.   Psychiatric/Behavioral:  Negative for behavioral problems and confusion. The patient is nervous/anxious (Rare alprazolam use, prescription usually last about 2 years).        Active Problem List     Patient Active Problem List   Diagnosis    Anxiety    Congenital renal dysplasia ( left )    Mixed hyperlipidemia    History of right hip replacement    H/O total knee replacement, bilateral    Gout of foot    Prediabetes    Strain of left rotator cuff capsule       Past Medical History:  Reviewed    Past Surgical History:  Reviewed    Family History:  Reviewed    Social History:  Reviewed    Objective     Vitals:    08/27/24 0700   BP: 130/84   BP Location: Right arm   Cuff Size: Large   Pulse: 67   Temp: (!) 97.4 °F (36.3 °C)   TempSrc: Tympanic   SpO2: 100%   Weight: 85.1 kg (187 lb 9.6 oz)     Body mass index is 32.2 kg/m².    BP Readings from Last 3 Encounters:   08/27/24 130/84   07/30/24 148/81   06/27/24 118/72       Wt Readings from Last 3 Encounters:   08/27/24 85.1 kg (187 lb 9.6 oz)   07/30/24 83.5 kg (184 lb)   06/13/24 79.8 kg (176 lb)       Physical  Exam  Constitutional:       General: She is not in acute distress.     Appearance: Normal appearance. She is well-developed. She is not ill-appearing.   HENT:      Right Ear: Tympanic membrane normal.      Left Ear: Tympanic membrane normal.      Mouth/Throat:      Pharynx: Oropharynx is clear.   Eyes:      General: No scleral icterus.  Neck:      Vascular: No carotid bruit.   Cardiovascular:      Rate and Rhythm: Normal rate and regular rhythm.      Heart sounds: Normal heart sounds. No murmur heard.  Pulmonary:      Effort: Pulmonary effort is normal. No respiratory distress.      Breath sounds: Normal breath sounds.   Abdominal:      Palpations: Abdomen is soft.      Tenderness: There is no abdominal tenderness.   Musculoskeletal:      Right lower leg: No edema.      Left lower leg: No edema.      Comments: She has mild decreased internal, external rotation, especially internal rotation on the left, does have pain with range of motion.  Is able to fully abduct, no weakness in arm, no cervical spine findings.   Lymphadenopathy:      Cervical: No cervical adenopathy.   Skin:     Coloration: Skin is not jaundiced.   Neurological:      Mental Status: She is alert and oriented to person, place, and time.   Psychiatric:         Mood and Affect: Mood normal.         Behavior: Behavior normal.         ALLERGIES:  Allergies   Allergen Reactions    Sulfa Antibiotics Rash       Current Medications     Current Outpatient Medications   Medication Sig Dispense Refill    acetaminophen (TYLENOL) 650 mg CR tablet Take 1 tablet (650 mg total) by mouth every 8 (eight) hours as needed for mild pain 30 tablet 0    allopurinol (ZYLOPRIM) 300 mg tablet Take 1 tablet (300 mg total) by mouth daily 90 tablet 1    amoxicillin (AMOXIL) 500 mg capsule 4 CAPS (2GRAMS) 1 HOUR PRIOR TO PROCEDURE      Biotin w/ Vitamins C & E 1250-7.5-7.5 MCG-MG-UNT CHEW Chew      FIBER PO Take by mouth daily      meloxicam (Mobic) 15 mg tablet Take 1 tablet (15  mg total) by mouth daily as needed (shoulder pain) 30 tablet 0    Multiple Vitamins-Minerals (MULTI FOR HER 50+ PO) Take by mouth daily      ALPRAZolam (XANAX) 0.25 mg tablet Take 1 tablet (0.25 mg total) by mouth daily as needed for anxiety 30 tablet 0    methocarbamol (ROBAXIN) 500 mg tablet Take 1 tablet (500 mg total) by mouth 3 (three) times a day as needed for muscle spasms 60 tablet 0     No current facility-administered medications for this visit.            Orders Placed This Encounter   Procedures    Uric acid    Lipid panel    TSH, 3rd generation with Free T4 reflex    Urinalysis with microscopic         Jeromy Mehta DO

## 2024-08-28 ENCOUNTER — APPOINTMENT (OUTPATIENT)
Dept: PHYSICAL THERAPY | Facility: CLINIC | Age: 64
End: 2024-08-28
Payer: COMMERCIAL

## 2024-09-10 ENCOUNTER — HOSPITAL ENCOUNTER (OUTPATIENT)
Dept: RADIOLOGY | Facility: HOSPITAL | Age: 64
Discharge: HOME | End: 2024-09-10
Attending: ORTHOPAEDIC SURGERY
Payer: COMMERCIAL

## 2024-09-10 ENCOUNTER — OFFICE VISIT (OUTPATIENT)
Dept: OBGYN CLINIC | Facility: HOSPITAL | Age: 64
End: 2024-09-10

## 2024-09-10 VITALS
BODY MASS INDEX: 32.1 KG/M2 | HEART RATE: 80 BPM | DIASTOLIC BLOOD PRESSURE: 48 MMHG | SYSTOLIC BLOOD PRESSURE: 143 MMHG | HEIGHT: 64 IN | WEIGHT: 188 LBS

## 2024-09-10 DIAGNOSIS — Z47.1 AFTERCARE FOLLOWING RIGHT HIP JOINT REPLACEMENT SURGERY: Primary | ICD-10-CM

## 2024-09-10 DIAGNOSIS — Z96.641 AFTERCARE FOLLOWING RIGHT HIP JOINT REPLACEMENT SURGERY: ICD-10-CM

## 2024-09-10 DIAGNOSIS — Z47.1 AFTERCARE FOLLOWING RIGHT HIP JOINT REPLACEMENT SURGERY: ICD-10-CM

## 2024-09-10 DIAGNOSIS — Z96.641 AFTERCARE FOLLOWING RIGHT HIP JOINT REPLACEMENT SURGERY: Primary | ICD-10-CM

## 2024-09-10 PROCEDURE — 73502 X-RAY EXAM HIP UNI 2-3 VIEWS: CPT

## 2024-09-10 PROCEDURE — 99024 POSTOP FOLLOW-UP VISIT: CPT | Performed by: ORTHOPAEDIC SURGERY

## 2024-09-10 NOTE — PROGRESS NOTES
Assessment:  1. Aftercare following right hip joint replacement surgery            Plan:  3 months s/p right ROXANA, 6/13/2024  She is doing well.    She is encouraged to remain active including HEP.    The patient is counseled on prophylactic antibiotic use prior to dental visits.    She should follow up in 3 months      To do next visit:  Return in about 3 months (around 12/10/2024) for re-check with x-rays.    The above stated was discussed in layman's terms and the patient expressed understanding.  All questions were answered to the patient's satisfaction.       Scribe Attestation      I,:  Leighton Rice MA am acting as a scribe while in the presence of the attending physician.:       I,:  Juan Pitt MD personally performed the services described in this documentation    as scribed in my presence.:               Subjective:   Lakisha Trevizo is a 63 y.o. female who presents 3 months s/p right ROXANA, 6/13/2024.  She is doing well.  Today she complains of occasional right lateral hip soreness following prolonged activity.  She has concluded physical therapy with benefit.  He denies fever, chills or shortness of breath.        Review of systems negative unless otherwise specified in HPI    Past Medical History:   Diagnosis Date    Aftercare following left knee joint replacement surgery 03/27/2023    Anxiety     MENOPAUSE    Arthritis     KNEES    Chest pain syndrome 03/29/2022    Chondromalacia patellae of right knee 11/08/2016    Chronic pain disorder     Chronic pain of right knee 05/05/2022    Diverticulosis     GERD (gastroesophageal reflux disease)     Hypertension     1 episode last year went to ER; no problems since    Primary osteoarthritis of left knee 05/05/2022    Primary osteoarthritis of right knee 09/24/2019    Renal dysplasia     nonfunctioning left life-long    Single functional kidney     left kidney non-functioning       Past Surgical History:   Procedure Laterality Date    COLONOSCOPY   03/2016    FL INJECTION RIGHT HIP (NON ARTHROGRAM)  5/10/2022    FL INJECTION RIGHT HIP (NON ARTHROGRAM)  11/3/2022    FL INJECTION RIGHT HIP (NON ARTHROGRAM)  5/22/2023    FL INJECTION RIGHT HIP (NON ARTHROGRAM)  10/18/2023    FL INJECTION RIGHT HIP (NON ARTHROGRAM)  1/30/2024    KIDNEY SURGERY Right     age 32    [place] ARTHRP ACETBLR/PROX FEM PROSTC AGRFT/ALGRFT Right 6/13/2024    Procedure: ARTHROPLASTY HIP TOTAL;  Surgeon: Juan Pitt MD;  [facility];  Service: Orthopedics    [place] ARTHRP KNE CONDYLE&PLATU MEDIAL&LAT COMPARTMENTS Right 8/1/2022    Procedure: ARTHROPLASTY KNEE TOTAL W ROBOT;  Surgeon: Juan Pitt MD;  [facility];  Service: Orthopedics    [place] ARTHRP KNE CONDYLE&PLATU MEDIAL&LAT COMPARTMENTS Left 2/13/2023    Procedure: ARTHROPLASTY KNEE TOTAL W ROBOT;  Surgeon: Juan Pitt MD;  [facility];  Service: Orthopedics    REDUCTION MAMMAPLASTY      WRIST SURGERY Right     fx       Family History   Problem Relation Age of Onset    No Known Problems Mother     Diabetes Father     Breast cancer Neg Hx     Ovarian cancer Neg Hx     Colon cancer Neg Hx        Social History     Occupational History    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   Tobacco Use    Smoking status: Former     Current packs/day: 0.25     Average packs/day: 0.3 packs/day for 15.0 years (3.8 ttl pk-yrs)     Types: Cigarettes    Smokeless tobacco: Never   Vaping Use    Vaping status: Never Used   Substance and Sexual Activity    Alcohol use: Yes     Alcohol/week: 10.0 standard drinks of alcohol     Types: 5 Cans of beer, 5 Standard drinks or equivalent per week     Comment: daily    Drug use: Never    Sexual activity: Yes     Partners: Male     Birth control/protection: Post-menopausal         Current Outpatient Medications:     acetaminophen (TYLENOL) 650 mg CR tablet, Take 1 tablet (650 mg total) by mouth every 8 (eight) hours as needed for mild pain, Disp: 30 tablet, Rfl: 0    allopurinol  "(ZYLOPRIM) 300 mg tablet, Take 1 tablet (300 mg total) by mouth daily, Disp: 90 tablet, Rfl: 1    ALPRAZolam (XANAX) 0.25 mg tablet, Take 1 tablet (0.25 mg total) by mouth daily as needed for anxiety, Disp: 30 tablet, Rfl: 0    amoxicillin (AMOXIL) 500 mg capsule, 4 CAPS (2GRAMS) 1 HOUR PRIOR TO PROCEDURE, Disp: , Rfl:     Biotin w/ Vitamins C & E 1250-7.5-7.5 MCG-MG-UNT CHEW, Chew, Disp: , Rfl:     FIBER PO, Take by mouth daily, Disp: , Rfl:     meloxicam (Mobic) 15 mg tablet, Take 1 tablet (15 mg total) by mouth daily as needed (shoulder pain), Disp: 30 tablet, Rfl: 0    methocarbamol (ROBAXIN) 500 mg tablet, Take 1 tablet (500 mg total) by mouth 3 (three) times a day as needed for muscle spasms, Disp: 60 tablet, Rfl: 0    Multiple Vitamins-Minerals (MULTI FOR HER 50+ PO), Take by mouth daily, Disp: , Rfl:     Allergies   Allergen Reactions    Sulfa Antibiotics Rash            Vitals:    09/10/24 1106   BP: (!) 143/48   Pulse: 80       Objective:  Physical exam  General: Awake, Alert, Oriented  Eyes: Pupils equal, round and reactive to light  Heart: regular rate and rhythm  Lungs: No audible wheezing  Abdomen: soft                    Ortho Exam  Right hip:  Well healed posterior incision  Good arc of motion with no pain  Patient sits comfortably in chair with hip flexed at 90 degrees  Patient stands from seated position without assistance  Calf compartments soft and supple  Sensation intact  Toes are warm sensate and mobile      Diagnostics, reviewed and taken today if performed as documented:    The attending physician has personally reviewed the pertinent films in PACS and interpretation is as follows:  Right hip x-ray:  Well aligned prosthesis with no acute changes.      Procedures, if performed today:    Procedures    None performed      Portions of the record may have been created with voice recognition software.  Occasional wrong word or \"sound a like\" substitutions may have occurred due to the inherent " limitations of voice recognition software.  Read the chart carefully and recognize, using context, where substitutions have occurred.

## 2024-09-30 DIAGNOSIS — M1A.0790 IDIOPATHIC CHRONIC GOUT OF FOOT WITHOUT TOPHUS, UNSPECIFIED LATERALITY: ICD-10-CM

## 2024-10-01 ENCOUNTER — APPOINTMENT (OUTPATIENT)
Dept: LAB | Facility: AMBULARY SURGERY CENTER | Age: 64
End: 2024-10-01
Payer: COMMERCIAL

## 2024-10-01 LAB
BACTERIA UR QL AUTO: NORMAL /HPF
BILIRUB UR QL STRIP: NEGATIVE
CHOLEST SERPL-MCNC: 216 MG/DL
CLARITY UR: CLEAR
COLOR UR: COLORLESS
GLUCOSE UR STRIP-MCNC: NEGATIVE MG/DL
HDLC SERPL-MCNC: 55 MG/DL
HGB UR QL STRIP.AUTO: NEGATIVE
KETONES UR STRIP-MCNC: NEGATIVE MG/DL
LDLC SERPL CALC-MCNC: 126 MG/DL (ref 0–100)
LEUKOCYTE ESTERASE UR QL STRIP: NEGATIVE
NITRITE UR QL STRIP: NEGATIVE
NON-SQ EPI CELLS URNS QL MICRO: NORMAL /HPF
NONHDLC SERPL-MCNC: 161 MG/DL
PH UR STRIP.AUTO: 6.5 [PH]
PROT UR STRIP-MCNC: NEGATIVE MG/DL
RBC #/AREA URNS AUTO: NORMAL /HPF
SP GR UR STRIP.AUTO: 1.01 (ref 1–1.03)
TRIGL SERPL-MCNC: 177 MG/DL
TSH SERPL DL<=0.05 MIU/L-ACNC: 2.48 UIU/ML (ref 0.45–4.5)
URATE SERPL-MCNC: 4.9 MG/DL (ref 2–7.5)
UROBILINOGEN UR STRIP-ACNC: <2 MG/DL
WBC #/AREA URNS AUTO: NORMAL /HPF

## 2024-10-01 RX ORDER — ALLOPURINOL 300 MG/1
300 TABLET ORAL DAILY
Qty: 90 TABLET | Refills: 1 | Status: SHIPPED | OUTPATIENT
Start: 2024-10-01

## 2024-10-23 ENCOUNTER — TELEPHONE (OUTPATIENT)
Age: 64
End: 2024-10-23

## 2024-11-09 NOTE — RESULT ENCOUNTER NOTE
I spoke to her  Covid negative -  She has had no symptoms since we saw her, no fever since that day  She can return to work as of 12/10/20 -  She will stop at office to  RTW  letter I printed  She will set up a regular physical w me in the next month or so    Call us sooner if needed No

## 2024-11-27 DIAGNOSIS — Z96.641 AFTERCARE FOLLOWING RIGHT HIP JOINT REPLACEMENT SURGERY: Primary | ICD-10-CM

## 2024-11-27 DIAGNOSIS — Z47.1 AFTERCARE FOLLOWING RIGHT HIP JOINT REPLACEMENT SURGERY: Primary | ICD-10-CM

## 2024-12-27 ENCOUNTER — OFFICE VISIT (OUTPATIENT)
Dept: OBGYN CLINIC | Facility: MEDICAL CENTER | Age: 64
End: 2024-12-27
Payer: COMMERCIAL

## 2024-12-27 ENCOUNTER — APPOINTMENT (OUTPATIENT)
Dept: RADIOLOGY | Facility: MEDICAL CENTER | Age: 64
End: 2024-12-27
Payer: COMMERCIAL

## 2024-12-27 VITALS — HEIGHT: 64 IN | BODY MASS INDEX: 32.1 KG/M2 | WEIGHT: 188 LBS

## 2024-12-27 DIAGNOSIS — Z47.1 AFTERCARE FOLLOWING RIGHT HIP JOINT REPLACEMENT SURGERY: ICD-10-CM

## 2024-12-27 DIAGNOSIS — Z96.641 AFTERCARE FOLLOWING RIGHT HIP JOINT REPLACEMENT SURGERY: ICD-10-CM

## 2024-12-27 DIAGNOSIS — Z47.1 AFTERCARE FOLLOWING RIGHT HIP JOINT REPLACEMENT SURGERY: Primary | ICD-10-CM

## 2024-12-27 DIAGNOSIS — Z96.641 AFTERCARE FOLLOWING RIGHT HIP JOINT REPLACEMENT SURGERY: Primary | ICD-10-CM

## 2024-12-27 PROCEDURE — 73502 X-RAY EXAM HIP UNI 2-3 VIEWS: CPT

## 2024-12-27 PROCEDURE — 99213 OFFICE O/P EST LOW 20 MIN: CPT

## 2024-12-27 RX ORDER — CEPHALEXIN 500 MG/1
CAPSULE ORAL
Qty: 40 CAPSULE | Refills: 0 | Status: SHIPPED | OUTPATIENT
Start: 2024-12-27 | End: 2025-01-03

## 2024-12-27 NOTE — PROGRESS NOTES
Assessment:   Diagnosis ICD-10-CM Associated Orders   1. Aftercare following right hip joint replacement surgery  Z47.1 XR hip/pelv 2-3 vws right if performed    Z96.641 cephalexin (KEFLEX) 500 mg capsule          Plan:  64 y.o. female 6 months s/p right ROXANA  Continue weight bearing activities as tolerated   Continue increasing physical activity  Continue OTC pain medications as needed   The patient was reminded about prophylactic antibiotic use prior to dental visits for the next 1.5 years  Antibiotics sent to pharmacy today    Follow up in 6 months for 1 year post-op appointment with repeat x-rays      The above stated was discussed in layman's terms and the patient expressed understanding.  All questions were answered to the patient's satisfaction.     To do next visit:  Return in about 6 months (around 6/27/2025) for 1 year post-op appointment.      Subjective:   Lakisha Trevizo is a 64 y.o. female who presents 6 months s/p right total hip arthroplasty.  She is doing well.  She admits to some achiness of the right thigh with prolonged sitting, however denies any pain of the hip or leg.  She admits if she has any true discomfort, it is easily managed with over-the-counter Tylenol.  Patient explains she has returned to activities of daily living without any issues or concerns.  Overall she admits to improvement in quality of life since surgery.    Review of systems negative unless otherwise specified in HPI    Past Medical History:   Diagnosis Date   • Aftercare following left knee joint replacement surgery 03/27/2023   • Anxiety     MENOPAUSE   • Arthritis     KNEES   • Chest pain syndrome 03/29/2022   • Chondromalacia patellae of right knee 11/08/2016   • Chronic pain disorder    • Chronic pain of right knee 05/05/2022   • Diverticulosis    • GERD (gastroesophageal reflux disease)    • Hypertension     1 episode last year went to ER; no problems since   • Primary osteoarthritis of left knee 05/05/2022   • Primary  osteoarthritis of right knee 09/24/2019   • Renal dysplasia     nonfunctioning left life-long   • Single functional kidney     left kidney non-functioning       Past Surgical History:   Procedure Laterality Date   • COLONOSCOPY  03/2016   • FL INJECTION RIGHT HIP (NON ARTHROGRAM)  5/10/2022   • FL INJECTION RIGHT HIP (NON ARTHROGRAM)  11/3/2022   • FL INJECTION RIGHT HIP (NON ARTHROGRAM)  5/22/2023   • FL INJECTION RIGHT HIP (NON ARTHROGRAM)  10/18/2023   • FL INJECTION RIGHT HIP (NON ARTHROGRAM)  1/30/2024   • KIDNEY SURGERY Right     age 32   • [place] ARTHRP ACETBLR/PROX FEM PROSTC AGRFT/ALGRFT Right 6/13/2024    Procedure: ARTHROPLASTY HIP TOTAL;  Surgeon: Juan Pitt MD;  [facility];  Service: Orthopedics   • [place] ARTHRP KNE CONDYLE&PLATU MEDIAL&LAT COMPARTMENTS Right 8/1/2022    Procedure: ARTHROPLASTY KNEE TOTAL W ROBOT;  Surgeon: Juan Pitt MD;  [facility];  Service: Orthopedics   • [place] ARTHRP KNE CONDYLE&PLATU MEDIAL&LAT COMPARTMENTS Left 2/13/2023    Procedure: ARTHROPLASTY KNEE TOTAL W ROBOT;  Surgeon: Juan Pitt MD;  [facility];  Service: Orthopedics   • REDUCTION MAMMAPLASTY     • WRIST SURGERY Right     fx       Family History   Problem Relation Age of Onset   • No Known Problems Mother    • Diabetes Father    • Breast cancer Neg Hx    • Ovarian cancer Neg Hx    • Colon cancer Neg Hx        Social History     Occupational History   • 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   Tobacco Use   • Smoking status: Former     Current packs/day: 0.25     Average packs/day: 0.3 packs/day for 15.0 years (3.8 ttl pk-yrs)     Types: Cigarettes   • Smokeless tobacco: Never   Vaping Use   • Vaping status: Never Used   Substance and Sexual Activity   • Alcohol use: Yes     Alcohol/week: 10.0 standard drinks of alcohol     Types: 5 Cans of beer, 5 Standard drinks or equivalent per week     Comment: daily   • Drug use: Never   • Sexual activity: Yes     Partners: Male     Birth  control/protection: Post-menopausal         Current Outpatient Medications:   •  acetaminophen (TYLENOL) 650 mg CR tablet, Take 1 tablet (650 mg total) by mouth every 8 (eight) hours as needed for mild pain, Disp: 30 tablet, Rfl: 0  •  allopurinol (ZYLOPRIM) 300 mg tablet, Take 1 tablet (300 mg total) by mouth daily, Disp: 90 tablet, Rfl: 1  •  ALPRAZolam (XANAX) 0.25 mg tablet, Take 1 tablet (0.25 mg total) by mouth daily as needed for anxiety, Disp: 30 tablet, Rfl: 0  •  amoxicillin (AMOXIL) 500 mg capsule, 4 CAPS (2GRAMS) 1 HOUR PRIOR TO PROCEDURE, Disp: , Rfl:   •  Biotin w/ Vitamins C & E 1250-7.5-7.5 MCG-MG-UNT CHEW, Chew, Disp: , Rfl:   •  cephalexin (KEFLEX) 500 mg capsule, Take 4 capsules 1 hour prior to dental procedures or cleanings, Disp: 40 capsule, Rfl: 0  •  colchicine (COLCRYS) 0.6 mg tablet, Take 2 tablets by mouth at the first sign of gout pain, can use 1 tablet 1 hour later if needed, then use 1 tablet every day until symptoms resolve.  Medication can cause diarrhea, Disp: 20 tablet, Rfl: 0  •  FIBER PO, Take by mouth daily, Disp: , Rfl:   •  meloxicam (Mobic) 15 mg tablet, Take 1 tablet (15 mg total) by mouth daily as needed (shoulder pain), Disp: 30 tablet, Rfl: 0  •  methocarbamol (ROBAXIN) 500 mg tablet, Take 1 tablet (500 mg total) by mouth 3 (three) times a day as needed for muscle spasms, Disp: 60 tablet, Rfl: 0  •  Multiple Vitamins-Minerals (MULTI FOR HER 50+ PO), Take by mouth daily, Disp: , Rfl:   •  predniSONE 20 mg tablet, ( Via urgicare for acute gout ), Disp: , Rfl:     Allergies   Allergen Reactions   • Sulfa Antibiotics Rash          There were no vitals filed for this visit.    Objective:  Physical exam  General: Awake, Alert, Oriented  Eyes: Pupils equal, round and reactive to light  Heart: regular rate and rhythm  Lungs: No audible wheezing  Abdomen: soft                    Ortho Exam  Right hip:  Well healed posterior incision  No erythema, edema, or signs of infection  Good  "arc of motion with no pain  Patient sits comfortably in chair with hip flexed at 90 degrees  Patient stands from seated position without assistance  Calf compartments soft and supple  Sensation intact  Toes are warm sensate and mobile     Diagnostics, reviewed and taken today if performed as documented:    Right hip xray:   - Well aligned prosthesis without evidence of acute fractures, dislocations, acute changes, or hardware failure   - Prosthesis appears properly sized and properly bonded to surrounding bone         Procedures, if performed today:    None performed      Portions of the record may have been created with voice recognition software.  Occasional wrong word or \"sound a like\" substitutions may have occurred due to the inherent limitations of voice recognition software.  Read the chart carefully and recognize, using context, where substitutions have occurred.      "

## 2025-02-12 ENCOUNTER — TELEPHONE (OUTPATIENT)
Dept: FAMILY MEDICINE CLINIC | Facility: CLINIC | Age: 65
End: 2025-02-12

## 2025-02-12 NOTE — TELEPHONE ENCOUNTER
KARENAM for pt to call back to reschedule her August 28, 2025 8:40 AM appointment with Dr. Mehta due to him retiring.

## 2025-03-12 ENCOUNTER — HOSPITAL ENCOUNTER (OUTPATIENT)
Dept: RADIOLOGY | Facility: HOSPITAL | Age: 65
Discharge: HOME | End: 2025-03-12
Payer: COMMERCIAL

## 2025-03-12 DIAGNOSIS — Z47.1 AFTERCARE FOLLOWING RIGHT HIP JOINT REPLACEMENT SURGERY: ICD-10-CM

## 2025-03-12 DIAGNOSIS — M99.07 ARM SOMATIC DYSFUNCTION: ICD-10-CM

## 2025-03-12 DIAGNOSIS — G89.29 CHRONIC LEFT SHOULDER PAIN: ICD-10-CM

## 2025-03-12 DIAGNOSIS — Z96.641 AFTERCARE FOLLOWING RIGHT HIP JOINT REPLACEMENT SURGERY: ICD-10-CM

## 2025-03-12 DIAGNOSIS — M25.512 CHRONIC LEFT SHOULDER PAIN: ICD-10-CM

## 2025-03-12 PROCEDURE — 73030 X-RAY EXAM OF SHOULDER: CPT

## 2025-04-23 ENCOUNTER — ANNUAL EXAM (OUTPATIENT)
Dept: OBGYN CLINIC | Facility: CLINIC | Age: 65
End: 2025-04-23
Payer: COMMERCIAL

## 2025-04-23 VITALS
WEIGHT: 185 LBS | HEIGHT: 64 IN | SYSTOLIC BLOOD PRESSURE: 130 MMHG | DIASTOLIC BLOOD PRESSURE: 80 MMHG | BODY MASS INDEX: 31.58 KG/M2

## 2025-04-23 DIAGNOSIS — Z01.419 ENCOUNTER FOR GYNECOLOGICAL EXAMINATION (GENERAL) (ROUTINE) WITHOUT ABNORMAL FINDINGS: Primary | ICD-10-CM

## 2025-04-23 PROCEDURE — S0612 ANNUAL GYNECOLOGICAL EXAMINA: HCPCS | Performed by: STUDENT IN AN ORGANIZED HEALTH CARE EDUCATION/TRAINING PROGRAM

## 2025-04-23 NOTE — PROGRESS NOTES
Lakisha Trevizo   1960    CC:  Yearly exam    A:  Yearly exam.     Problem List Items Addressed This Visit    None  Visit Diagnoses         Encounter for gynecological examination (general) (routine) without abnormal findings    -  Primary            P:   Pap up to date. We reviewed ASCCP guidelines for Pap testing.   Mammo scheduled   Colonoscopy due     RTO one year for yearly exam or sooner as needed.      S:  64 y.o. female here for yearly exam. She is postmenopausal and has had no vaginal bleeding.  She denies vaginal discharge, itching, odor or dryness.     Sexual activity: She is occasionally sexually active without significant pain, bleeding or dryness.     STD testing: She does not want STD testing today.     Menopausal    Last Pap: 2023 NILM/HPV -  Last Mammo: 2024 BIRADS 1  Last Colonoscopy: 2021, 10yr recall     Former smoker, social drinker  Exercises regularly in the last 2 weeks. Walking regularly. Heading to [facility] at the beginning of May.     Family hx of breast cancer: denies   Family hx of ovarian cancer: denies  Family hx of colon cancer: denies       Current Outpatient Medications:     acetaminophen (TYLENOL) 650 mg CR tablet, Take 1 tablet (650 mg total) by mouth every 8 (eight) hours as needed for mild pain, Disp: 30 tablet, Rfl: 0    allopurinol (ZYLOPRIM) 300 mg tablet, Take 1 tablet (300 mg total) by mouth daily, Disp: 90 tablet, Rfl: 1    ALPRAZolam (XANAX) 0.25 mg tablet, Take 1 tablet (0.25 mg total) by mouth daily as needed for anxiety, Disp: 30 tablet, Rfl: 0    Biotin w/ Vitamins C & E 1250-7.5-7.5 MCG-MG-UNT CHEW, Chew, Disp: , Rfl:     colchicine (COLCRYS) 0.6 mg tablet, Take 2 tablets by mouth at the first sign of gout pain, can use 1 tablet 1 hour later if needed, then use 1 tablet every day until symptoms resolve.  Medication can cause diarrhea, Disp: 20 tablet, Rfl: 1    FIBER PO, Take by mouth daily, Disp: , Rfl:     meloxicam (Mobic) 15 mg tablet, Take 1  tablet (15 mg total) by mouth daily as needed (shoulder pain), Disp: 30 tablet, Rfl: 0    methocarbamol (ROBAXIN) 500 mg tablet, Take 1 tablet (500 mg total) by mouth 3 (three) times a day as needed for muscle spasms, Disp: 60 tablet, Rfl: 0    Multiple Vitamins-Minerals (MULTI FOR HER 50+ PO), Take by mouth daily, Disp: , Rfl:     amoxicillin (AMOXIL) 500 mg capsule, 4 CAPS (2GRAMS) 1 HOUR PRIOR TO PROCEDURE (Patient not taking: Reported on 4/23/2025), Disp: , Rfl:   Social History     Socioeconomic History    Marital status: /Civil Union     Spouse name: Not on file    Number of children: Not on file    Years of education: Not on file    Highest education level: Not on file   Occupational History    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   Tobacco Use    Smoking status: Former     Current packs/day: 0.25     Average packs/day: 0.3 packs/day for 15.0 years (3.8 ttl pk-yrs)     Types: Cigarettes    Smokeless tobacco: Never   Vaping Use    Vaping status: Never Used   Substance and Sexual Activity    Alcohol use: Yes     Alcohol/week: 10.0 standard drinks of alcohol     Types: 5 Cans of beer, 5 Standard drinks or equivalent per week     Comment: daily    Drug use: Never    Sexual activity: Yes     Partners: Male     Birth control/protection: Post-menopausal   Other Topics Concern    Not on file   Social History Narrative    Not on file     Social Drivers of Health     Financial Resource Strain: Not on file   Food Insecurity: No Food Insecurity (6/13/2024)    Nursing - Inadequate Food Risk Classification     Worried About Running Out of Food in the Last Year: Never true     Ran Out of Food in the Last Year: Never true     Ran Out of Food in the Last Year: Not on file   Transportation Needs: No Transportation Needs (6/13/2024)    PRAPARE - Transportation     Lack of Transportation (Medical): No     Lack of Transportation (Non-Medical): No   Physical Activity: Not on file   Stress: Not on file   Social Connections: Not on file  "  Intimate Partner Violence: Not on file   Housing Stability: Low Risk  (6/13/2024)    Housing Stability Vital Sign     Unable to Pay for Housing in the Last Year: No     Number of Times Moved in the Last Year: 1     Homeless in the Last Year: No     Family History   Problem Relation Age of Onset    No Known Problems Mother     Diabetes Father     Cancer Father     Breast cancer Neg Hx     Ovarian cancer Neg Hx     Colon cancer Neg Hx      Past Medical History:   Diagnosis Date    Aftercare following left knee joint replacement surgery 03/27/2023    Anxiety     MENOPAUSE    Arthritis     KNEES    Chest pain syndrome 03/29/2022    Chondromalacia patellae of right knee 11/08/2016    Chronic pain disorder     Chronic pain of right knee 05/05/2022    Diverticulosis     GERD (gastroesophageal reflux disease)     Hypertension     1 episode last year went to ER; no problems since    Primary osteoarthritis of left knee 05/05/2022    Primary osteoarthritis of right knee 09/24/2019    Renal dysplasia     nonfunctioning left life-long    Single functional kidney     left kidney non-functioning        Review of Systems   Respiratory: Negative.    Cardiovascular: Negative.    Gastrointestinal: Negative for constipation and diarrhea.   Genitourinary: Negative for difficulty urinating, pelvic pain, vaginal bleeding, vaginal discharge, itching or odor.    O:  Blood pressure 130/80, height 5' 4\" (1.626 m), weight 83.9 kg (185 lb).    Patient appears well and is not in distress  Neck is supple without masses  Breasts are symmetrical without mass, tenderness, nipple discharge, skin changes or adenopathy.   Abdomen is soft and nontender without masses.   Vulva without lesions or rashes.  Urethral meatus and urethra are normal  Bladder is normal to palpation  Vagina is normal without discharge or bleeding.   Cervix is normal without discharge or lesion.   Uterus and adnexa are normal, mobile, nontender without palpable " mass.  Rectovaginal exam without nodularity/masses/visible blood on glove

## 2025-04-24 ENCOUNTER — HOSPITAL ENCOUNTER (OUTPATIENT)
Dept: MAMMOGRAPHY | Facility: HOSPITAL | Age: 65
Discharge: HOME | End: 2025-04-24
Attending: STUDENT IN AN ORGANIZED HEALTH CARE EDUCATION/TRAINING PROGRAM
Payer: COMMERCIAL

## 2025-04-24 VITALS — HEIGHT: 64 IN | BODY MASS INDEX: 31.58 KG/M2 | WEIGHT: 184.97 LBS

## 2025-04-24 DIAGNOSIS — Z12.31 ENCOUNTER FOR SCREENING MAMMOGRAM FOR MALIGNANT NEOPLASM OF BREAST: ICD-10-CM

## 2025-04-24 PROCEDURE — 77063 BREAST TOMOSYNTHESIS BI: CPT

## 2025-04-24 PROCEDURE — 77067 SCR MAMMO BI INCL CAD: CPT

## 2025-06-16 DIAGNOSIS — Z47.1 AFTERCARE FOLLOWING RIGHT HIP JOINT REPLACEMENT SURGERY: Primary | ICD-10-CM

## 2025-06-16 DIAGNOSIS — Z96.641 AFTERCARE FOLLOWING RIGHT HIP JOINT REPLACEMENT SURGERY: Primary | ICD-10-CM

## 2025-06-26 ENCOUNTER — OFFICE VISIT (OUTPATIENT)
Dept: OBGYN CLINIC | Facility: HOSPITAL | Age: 65
End: 2025-06-26
Payer: COMMERCIAL

## 2025-06-26 ENCOUNTER — HOSPITAL ENCOUNTER (OUTPATIENT)
Dept: RADIOLOGY | Facility: HOSPITAL | Age: 65
Discharge: HOME | End: 2025-06-26
Attending: ORTHOPAEDIC SURGERY
Payer: COMMERCIAL

## 2025-06-26 VITALS — BODY MASS INDEX: 31.75 KG/M2 | HEIGHT: 64 IN

## 2025-06-26 DIAGNOSIS — Z47.1 AFTERCARE FOLLOWING RIGHT HIP JOINT REPLACEMENT SURGERY: ICD-10-CM

## 2025-06-26 DIAGNOSIS — Z96.641 AFTERCARE FOLLOWING RIGHT HIP JOINT REPLACEMENT SURGERY: ICD-10-CM

## 2025-06-26 DIAGNOSIS — Z96.641 AFTERCARE FOLLOWING RIGHT HIP JOINT REPLACEMENT SURGERY: Primary | ICD-10-CM

## 2025-06-26 DIAGNOSIS — Z47.1 AFTERCARE FOLLOWING RIGHT HIP JOINT REPLACEMENT SURGERY: Primary | ICD-10-CM

## 2025-06-26 PROCEDURE — 99213 OFFICE O/P EST LOW 20 MIN: CPT | Performed by: ORTHOPAEDIC SURGERY

## 2025-06-26 PROCEDURE — 73502 X-RAY EXAM HIP UNI 2-3 VIEWS: CPT

## 2025-06-26 NOTE — PROGRESS NOTES
Name: Lakisha Trevizo      : 1960       MRN: [id number]   Encounter Provider: Juan Pitt MD   Encounter Date: 25  Encounter department: [facility] [place]     ASSESSMENT & PLAN:  Assessment & Plan  Aftercare following right hip joint replacement surgery  DOS 2024            ___________________________________________________  X-rays taken and reviewed, physical exam performed  Doing well 1 year s/p right ROXANA  Total hip precautions with ABX until this time next year.  WBAT    To do next visit:  Return for re-check on an as needed basis (PRN).  ____________________________________________________  CHIEF COMPLAINT:  Chief Complaint   Patient presents with    Right Hip - Post-op         SUBJECTIVE:  Lakisha Trevizo is a 64 y.o. female who presents today for repeat evaluation of her right hip, 1 yr s/p ROXANA from 24. She is overall doing well and is pleased.  She did not come in for her 6 month post-op sophie as she was retiring. She just returned from [facility] last evening.  She has a cruise planned for November.     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       Rt TKA 2022  Lt TKA 2023    PAST MEDICAL HISTORY:  Past Medical History[1]    PAST SURGICAL HISTORY:  Past Surgical History[2]    FAMILY HISTORY:  Family History[3]    SOCIAL HISTORY:  Social History[4]    MEDICATIONS:  Current Medications[5]    ALLERGIES:  Allergies[6]    LABS:  HgA1c:   Lab Results   Component Value Date    HGBA1C 5.6 2024     BMP:   Lab Results   Component Value Date    CALCIUM 9.1 2024     2016    K 4.2 2024    CO2 26 2024     2024    BUN 10 2024    CREATININE 0.55 (L) 2024     CBC: No components found for:  "\"CBC\"    _____________________________________________________  PHYSICAL EXAMINATION:  Vital signs: Ht 5' 4\" (1.626 m)   BMI 31.75 kg/m²   General: No acute distress, awake and alert  Psychiatric: Mood and affect appear appropriate  HEENT: Trachea Midline, No torticollis, no apparent facial trauma  Cardiovascular: No audible murmurs; Extremities appear perfused  Pulmonary: No audible wheezing or stridor  Skin: Intact, no open lesions; see further details (if any) below    MUSCULOSKELETAL EXAMINATION:    Right Hip Exam     Range of Motion   The patient has normal right hip ROM.    Muscle Strength   The patient has normal right hip strength.    Other   Erythema: absent  Sensation: normal    Comments:    Well-healed posterior lateral incision.  No swelling.  No warmth.  No signs of infection.  Painless arc gentle passive range of motion all planes.  Calf and thigh are soft and nontender no signs DVT.  Essentially equal leg lengths  Nonantalgic gait          _____________________________________________________  STUDIES REVIEWED:    The attending physician has personally reviewed the pertinent films in PACS and their interpretation is as follows:    Right hip and pelvis x-rays taken and reviewed in the office today show: Well aligned, positioned, bonded right total hip prosthesis without signs of loosening or stress shielding.  Well-preserved left femoral acetabular joint space    PROCEDURES PERFORMED:    Procedures    None Performed      Scribe Attestation      I,:  Gordo Bran am acting as a scribe while in the presence of the attending physician.:       I,:  Juan Ptit MD personally performed the services described in this documentation    as scribed in my presence.:                [1]   Past Medical History:  Diagnosis Date    Aftercare following left knee joint replacement surgery 03/27/2023    Anxiety     MENOPAUSE    Arthritis     KNEES    Chest pain syndrome 03/29/2022    Chondromalacia patellae of " right knee 11/08/2016    Chronic pain disorder     Chronic pain of right knee 05/05/2022    Diverticulosis     GERD (gastroesophageal reflux disease)     Hypertension     1 episode last year went to ER; no problems since    Primary osteoarthritis of left knee 05/05/2022    Primary osteoarthritis of right knee 09/24/2019    Renal dysplasia     nonfunctioning left life-long    Single functional kidney     left kidney non-functioning   [2]   Past Surgical History:  Procedure Laterality Date    COLONOSCOPY  03/2016    FL INJECTION RIGHT HIP (NON ARTHROGRAM)  05/10/2022    FL INJECTION RIGHT HIP (NON ARTHROGRAM)  11/03/2022    FL INJECTION RIGHT HIP (NON ARTHROGRAM)  05/22/2023    FL INJECTION RIGHT HIP (NON ARTHROGRAM)  10/18/2023    FL INJECTION RIGHT HIP (NON ARTHROGRAM)  01/30/2024    KIDNEY SURGERY Right     age 32    KNEE SURGERY  08/01/2022    NE ARTHRP ACETBLR/PROX FEM PROSTC AGRFT/ALGRFT Right 06/13/2024    Procedure: ARTHROPLASTY HIP TOTAL;  Surgeon: Juan Pitt MD;  [facility]: [facility];  Service: Orthopedics    NE ARTHRP KNE CONDYLE&PLATU MEDIAL&LAT COMPARTMENTS Right 08/01/2022    Procedure: ARTHROPLASTY KNEE TOTAL W ROBOT;  Surgeon: Juan Pitt MD;  Location: [facility];  Service: Orthopedics    NE ARTHRP KNE CONDYLE&PLATU MEDIAL&LAT COMPARTMENTS Left 02/13/2023    Procedure: ARTHROPLASTY KNEE TOTAL W ROBOT;  Surgeon: Juan Pitt MD;  Location[facility];  Service: Orthopedics    REDUCTION MAMMAPLASTY Bilateral     age 54    WRIST SURGERY Right     fx   [3]   Family History  Problem Relation Name Age of Onset    No Known Problems Mother      Diabetes Father Dalton Marin     Cancer Father Dalton Marin     No Known Problems Son      No Known Problems Brother      No Known Problems Brother      No Known Problems Sister      No Known Problems Sister      Breast cancer Neg Hx      Ovarian cancer Neg Hx      Colon cancer Neg Hx     [4]   Social History  Tobacco Use    Smoking  status: Former     Current packs/day: 0.25     Average packs/day: 0.3 packs/day for 15.0 years (3.8 ttl pk-yrs)     Types: Cigarettes    Smokeless tobacco: Never   Vaping Use    Vaping status: Never Used   Substance Use Topics    Alcohol use: Yes     Alcohol/week: 10.0 standard drinks of alcohol     Types: 5 Cans of beer, 5 Standard drinks or equivalent per week     Comment: daily    Drug use: Never   [5]   Current Outpatient Medications:     acetaminophen (TYLENOL) 650 mg CR tablet, Take 1 tablet (650 mg total) by mouth every 8 (eight) hours as needed for mild pain, Disp: 30 tablet, Rfl: 0    allopurinol (ZYLOPRIM) 300 mg tablet, Take 1 tablet (300 mg total) by mouth daily, Disp: 90 tablet, Rfl: 1    ALPRAZolam (XANAX) 0.25 mg tablet, Take 1 tablet (0.25 mg total) by mouth daily as needed for anxiety, Disp: 30 tablet, Rfl: 0    amoxicillin (AMOXIL) 500 mg capsule, 4 CAPS (2GRAMS) 1 HOUR PRIOR TO PROCEDURE (Patient not taking: Reported on 4/23/2025), Disp: , Rfl:     Biotin w/ Vitamins C & E 1250-7.5-7.5 MCG-MG-UNT CHEW, Chew, Disp: , Rfl:     colchicine (COLCRYS) 0.6 mg tablet, Take 2 tablets by mouth at the first sign of gout pain, can use 1 tablet 1 hour later if needed, then use 1 tablet every day until symptoms resolve.  Medication can cause diarrhea, Disp: 20 tablet, Rfl: 1    FIBER PO, Take by mouth daily, Disp: , Rfl:     meloxicam (Mobic) 15 mg tablet, Take 1 tablet (15 mg total) by mouth daily as needed (shoulder pain), Disp: 30 tablet, Rfl: 0    methocarbamol (ROBAXIN) 500 mg tablet, Take 1 tablet (500 mg total) by mouth 3 (three) times a day as needed for muscle spasms, Disp: 60 tablet, Rfl: 0    Multiple Vitamins-Minerals (MULTI FOR HER 50+ PO), Take by mouth daily, Disp: , Rfl:   [6]   Allergies  Allergen Reactions    Sulfa Antibiotics Rash

## (undated) DEVICE — PENCIL ELECTROSURG E-Z CLEAN -0035H

## (undated) DEVICE — DRAPE SHEET THREE QUARTER

## (undated) DEVICE — BETHLEHEM TOTAL HIP, KIT: Brand: CARDINAL HEALTH

## (undated) DEVICE — STERILE PITCHER PACK: Brand: CARDINAL HEALTH

## (undated) DEVICE — CAPIT KNEE ATTUNE FB CEMENT -DEPUY

## (undated) DEVICE — DRAPE EQUIPMENT RF WAND

## (undated) DEVICE — GLOVE SRG BIOGEL 7.5

## (undated) DEVICE — ABDOMINAL PAD: Brand: DERMACEA

## (undated) DEVICE — COOL TEMP PAD

## (undated) DEVICE — STOCKINETTE REGULAR

## (undated) DEVICE — HOOD WITH PEEL AWAY FACE SHIELD: Brand: T7PLUS

## (undated) DEVICE — DUAL CUT SAGITTAL BLADE

## (undated) DEVICE — VELYS BLADE SAW OSC 85 X 19 X 2MM

## (undated) DEVICE — HANDPIECE SET WITH RETRACTABLE COAXIAL FAN SPRAY TIP AND SUCTION TUBE: Brand: INTERPULSE

## (undated) DEVICE — GLOVE INDICATOR PI UNDERGLOVE SZ 7.5 BLUE

## (undated) DEVICE — SYRINGE 10ML LL

## (undated) DEVICE — TRAY FOLEY 16FR URIMETER SURESTEP

## (undated) DEVICE — VELYS ROBOT DRAPE

## (undated) DEVICE — RECIPROCATING BLADE, DOUBLE SIDED, OFFSET  (70.0 X 0.64 X 12.6MM)

## (undated) DEVICE — GLOVE SRG BIOGEL 8

## (undated) DEVICE — PROXIMATE PLUS MD MULTI-DIRECTIONAL RELEASE SKIN STAPLERS CONTAINS 35 STAINLESS STEEL STAPLES APPROXIMATE CLOSED DIMENSIONS: 6.9MM X 3.9MM WIDE: Brand: PROXIMATE

## (undated) DEVICE — ACE WRAP 6 IN STERILE

## (undated) DEVICE — SILVER-COATED ANTIMICROBIAL BARRIER DRESSING: Brand: ACTICOAT   4" X 8"

## (undated) DEVICE — HEAVY DUTY TABLE COVER: Brand: CONVERTORS

## (undated) DEVICE — BETHLEHEM UNIV TOTAL KNEE, KIT: Brand: CARDINAL HEALTH

## (undated) DEVICE — GLOVE INDICATOR PI UNDERGLOVE SZ 7 BLUE

## (undated) DEVICE — BLADE INTREX LRG BONE OSCILLATING

## (undated) DEVICE — MEDI-VAC TUBING CONNECTOR 6-IN-1 STRAIGHT: Brand: CARDINAL HEALTH

## (undated) DEVICE — GLOVE SRG BIOGEL 6.5

## (undated) DEVICE — DRAPE SHEET X-LG

## (undated) DEVICE — NO-SCRATCH ™ SMALL WHITNEY CURETTE ™ IS A SINGLE-USE, PLASTIC CURETTE FOR QUICKLY APPLYING, MANIPULATING AND REMOVING BONE CEMENT DURING HIP AND KNEE REPLACEMENT SURGERY. THE PLASTIC IS SOFTER THAN STEEL INSTRUMENTS, REDUCING THE RISK OF DAMAGING THE PROSTHESIS WITH METAL INSTRUMENTS.  THE CURETTE’S 6MM TIP REMOVES EXCESS CEMENT FROM REPLACEMENT HIPS AND KNEES. EASY-TO-MANEUVER, THE SMALL BLUE CURETTE LETS YOU REMOVE CEMENT FROM ALL EDGES OF THE PROSTHESIS.NO-SCRATCH WHITNEY SMALL CURETTE FEATURES:SAFER THAN STEEL- MADE OF PLASTIC - STURDY YET SOFTER THAN SURGICAL STEEL.HANDIER- EACH TOOL HAS A MOLDED-IN THUMB INDENTATION INSTANTLY ORIENTING THE TOOL.- EASIER TO MANEUVER IN HARD TO SEE PLACES.- COLOR-CODED FOR EASY IDENTIFICATION.FASTER- COMES INDIVIDUALLY PACKAGED IN STERILE, PEEL OPEN POUCH, READY TO GO.- APPLIES, MANIPULATES, OR REMOVES CEMENT WITH FINGERTIP PRECISION.ECONOMICAL- THE COST OF A SINGLE REVISION DWARFS THE COST OF A SINGLE-USE CURETTE. - DISPOSABLE – THERE’S NO NEED TO WASTE TIME REMOVING HARDENED CEMENT OR RE-STERILIZING TOOLS.- LESS EXPENSIVE TO BUY AND INVENTORY - ORDER ONLY THE TOOL YOU USE.- PACKAGED 25 INDIVIDUALLY WRAPPED TOOLS TO A CARTON FOR CONVENIENT SHELF STORAGE.: Brand: WHITNEY NO-SCRATCH CURETTE (SMALL)

## (undated) DEVICE — SPONGE PVP SCRUB WING STERILE

## (undated) DEVICE — GLOVE INDICATOR PI UNDERGLOVE SZ 8.5 BLUE

## (undated) DEVICE — VELYS SATEL DRAPE

## (undated) DEVICE — VELYS ROBOT-ASSISTED SOLUTION ARRAY DRILL PIN 125 MM X 4 MM: Brand: VELYS

## (undated) DEVICE — SUT VICRYL PLUS 2-0 CTB-1 27 IN VCPB259H

## (undated) DEVICE — JP 3-SPRING RES W/10FR PVC DRAIN/TR: Brand: CARDINAL HEALTH

## (undated) DEVICE — 3M™ IOBAN™ 2 ANTIMICROBIAL INCISE DRAPE 6650EZ: Brand: IOBAN™ 2

## (undated) DEVICE — HOOD: Brand: T7PLUS

## (undated) DEVICE — PLUMEPEN PRO 10FT

## (undated) DEVICE — CUFF TOURNIQUET 30 X 4 IN QUICK CONNECT DISP 1BLA

## (undated) DEVICE — ABDUCTION PILLOW FOAM POSITIONER: Brand: CARDINAL HEALTH

## (undated) DEVICE — SUT VICRYL PLUS 1 CTB-1 36 IN VCPB947H

## (undated) DEVICE — ASTOUND STANDARD SURGICAL GOWN, XL: Brand: CONVERTORS

## (undated) DEVICE — HOOD: Brand: FLYTE, SURGICOOL

## (undated) DEVICE — WET SKIN PREP TRAY: Brand: MEDLINE INDUSTRIES, INC.

## (undated) DEVICE — TUBING SUCTION 5MM X 12 FT

## (undated) DEVICE — THE SIMPULSE SOLO SYSTEM WITH ULTREX RETRACTABLE SPLASH SHIELD TIP: Brand: SIMPULSE SOLO

## (undated) DEVICE — PADDING CAST 4 IN  COTTON STRL

## (undated) DEVICE — SPINNING CEMENT MIXING BOWL

## (undated) DEVICE — VELYS ROBOTIC-ASSISTED SOLUTION ARRAY SET - KNEE: Brand: VELYS

## (undated) DEVICE — STIRRUP STRAP ADULT DISP

## (undated) DEVICE — DRESSING MEPILEX AG BORDER POST-OP 4 X 12 IN

## (undated) DEVICE — GAUZE SPONGES,16 PLY: Brand: CURITY

## (undated) DEVICE — ACE WRAP 6 IN UNSTERILE

## (undated) DEVICE — CAPIT HIP MOP -POLY CEMEMTED

## (undated) DEVICE — PAD GROUNDING ADULT